# Patient Record
Sex: FEMALE | Race: WHITE | NOT HISPANIC OR LATINO | Employment: PART TIME | URBAN - METROPOLITAN AREA
[De-identification: names, ages, dates, MRNs, and addresses within clinical notes are randomized per-mention and may not be internally consistent; named-entity substitution may affect disease eponyms.]

---

## 2017-01-13 ENCOUNTER — ALLSCRIPTS OFFICE VISIT (OUTPATIENT)
Dept: OTHER | Facility: OTHER | Age: 52
End: 2017-01-13

## 2017-01-13 DIAGNOSIS — Z00.00 ENCOUNTER FOR GENERAL ADULT MEDICAL EXAMINATION WITHOUT ABNORMAL FINDINGS: ICD-10-CM

## 2017-01-13 DIAGNOSIS — G31.09 OTHER FRONTOTEMPORAL DEMENTIA (CODE) (HCC): ICD-10-CM

## 2017-01-13 DIAGNOSIS — G43.109 MIGRAINE WITH AURA AND WITHOUT STATUS MIGRAINOSUS, NOT INTRACTABLE: ICD-10-CM

## 2017-01-13 DIAGNOSIS — E78.5 HYPERLIPIDEMIA: ICD-10-CM

## 2017-01-30 ENCOUNTER — HOSPITAL ENCOUNTER (OUTPATIENT)
Dept: MAMMOGRAPHY | Facility: MEDICAL CENTER | Age: 52
Discharge: HOME | End: 2017-01-30
Payer: COMMERCIAL

## 2017-01-30 DIAGNOSIS — Z12.31 ENCOUNTER FOR SCREENING MAMMOGRAM FOR MALIGNANT NEOPLASM OF BREAST: ICD-10-CM

## 2017-01-30 PROCEDURE — G0202 SCR MAMMO BI INCL CAD: HCPCS

## 2017-02-02 ENCOUNTER — APPOINTMENT (OUTPATIENT)
Dept: LAB | Facility: HOSPITAL | Age: 52
End: 2017-02-02
Payer: COMMERCIAL

## 2017-02-02 ENCOUNTER — TRANSCRIBE ORDERS (OUTPATIENT)
Dept: ADMINISTRATIVE | Facility: HOSPITAL | Age: 52
End: 2017-02-02

## 2017-02-02 DIAGNOSIS — Z00.00 ENCOUNTER FOR GENERAL ADULT MEDICAL EXAMINATION WITHOUT ABNORMAL FINDINGS: ICD-10-CM

## 2017-02-02 DIAGNOSIS — G31.09 OTHER FRONTOTEMPORAL DEMENTIA (CODE) (HCC): ICD-10-CM

## 2017-02-02 DIAGNOSIS — G43.109 MIGRAINE WITH AURA AND WITHOUT STATUS MIGRAINOSUS, NOT INTRACTABLE: ICD-10-CM

## 2017-02-02 DIAGNOSIS — E78.5 HYPERLIPIDEMIA: ICD-10-CM

## 2017-02-02 LAB
ALBUMIN SERPL BCP-MCNC: 3.5 G/DL (ref 3.5–5)
ALP SERPL-CCNC: 74 U/L (ref 46–116)
ALT SERPL W P-5'-P-CCNC: 47 U/L (ref 12–78)
ANION GAP SERPL CALCULATED.3IONS-SCNC: 6 MMOL/L (ref 4–13)
AST SERPL W P-5'-P-CCNC: 40 U/L (ref 5–45)
BACTERIA UR QL AUTO: ABNORMAL /HPF
BASOPHILS # BLD AUTO: 0.03 THOUSANDS/ΜL (ref 0–0.1)
BASOPHILS NFR BLD AUTO: 1 % (ref 0–1)
BILIRUB SERPL-MCNC: 0.4 MG/DL (ref 0.2–1)
BILIRUB UR QL STRIP: NEGATIVE
BUN SERPL-MCNC: 18 MG/DL (ref 5–25)
CALCIUM SERPL-MCNC: 8.8 MG/DL (ref 8.3–10.1)
CHLORIDE SERPL-SCNC: 101 MMOL/L (ref 100–108)
CLARITY UR: CLEAR
CO2 SERPL-SCNC: 34 MMOL/L (ref 21–32)
COLOR UR: YELLOW
CREAT SERPL-MCNC: 0.77 MG/DL (ref 0.6–1.3)
EOSINOPHIL # BLD AUTO: 0.22 THOUSAND/ΜL (ref 0–0.61)
EOSINOPHIL NFR BLD AUTO: 4 % (ref 0–6)
ERYTHROCYTE [DISTWIDTH] IN BLOOD BY AUTOMATED COUNT: 12.5 % (ref 11.6–15.1)
GFR SERPL CREATININE-BSD FRML MDRD: >60 ML/MIN/1.73SQ M
GLUCOSE SERPL-MCNC: 107 MG/DL (ref 65–140)
GLUCOSE UR STRIP-MCNC: NEGATIVE MG/DL
HCT VFR BLD AUTO: 43.4 % (ref 34.8–46.1)
HGB BLD-MCNC: 13.8 G/DL (ref 11.5–15.4)
HGB UR QL STRIP.AUTO: NEGATIVE
KETONES UR STRIP-MCNC: NEGATIVE MG/DL
LEUKOCYTE ESTERASE UR QL STRIP: ABNORMAL
LYMPHOCYTES # BLD AUTO: 2.1 THOUSANDS/ΜL (ref 0.6–4.47)
LYMPHOCYTES NFR BLD AUTO: 35 % (ref 14–44)
MCH RBC QN AUTO: 31.4 PG (ref 26.8–34.3)
MCHC RBC AUTO-ENTMCNC: 31.8 G/DL (ref 31.4–37.4)
MCV RBC AUTO: 99 FL (ref 82–98)
MONOCYTES # BLD AUTO: 0.81 THOUSAND/ΜL (ref 0.17–1.22)
MONOCYTES NFR BLD AUTO: 14 % (ref 4–12)
MUCOUS THREADS UR QL AUTO: ABNORMAL
NEUTROPHILS # BLD AUTO: 2.78 THOUSANDS/ΜL (ref 1.85–7.62)
NEUTS SEG NFR BLD AUTO: 46 % (ref 43–75)
NITRITE UR QL STRIP: NEGATIVE
NON-SQ EPI CELLS URNS QL MICRO: ABNORMAL /HPF
PH UR STRIP.AUTO: 7 [PH] (ref 4.5–8)
PLATELET # BLD AUTO: 300 THOUSANDS/UL (ref 149–390)
PMV BLD AUTO: 10.6 FL (ref 8.9–12.7)
POTASSIUM SERPL-SCNC: 4.1 MMOL/L (ref 3.5–5.3)
PROT SERPL-MCNC: 7.7 G/DL (ref 6.4–8.2)
PROT UR STRIP-MCNC: NEGATIVE MG/DL
RBC # BLD AUTO: 4.39 MILLION/UL (ref 3.81–5.12)
RBC #/AREA URNS AUTO: ABNORMAL /HPF
SODIUM SERPL-SCNC: 141 MMOL/L (ref 136–145)
SP GR UR STRIP.AUTO: 1.02 (ref 1–1.03)
TSH SERPL DL<=0.05 MIU/L-ACNC: 1.57 UIU/ML (ref 0.36–3.74)
UROBILINOGEN UR QL STRIP.AUTO: 0.2 E.U./DL
VIT B12 SERPL-MCNC: 686 PG/ML (ref 100–900)
WBC # BLD AUTO: 5.94 THOUSAND/UL (ref 4.31–10.16)
WBC #/AREA URNS AUTO: ABNORMAL /HPF

## 2017-02-02 PROCEDURE — 82607 VITAMIN B-12: CPT

## 2017-02-02 PROCEDURE — 81001 URINALYSIS AUTO W/SCOPE: CPT

## 2017-02-02 PROCEDURE — 84443 ASSAY THYROID STIM HORMONE: CPT

## 2017-02-02 PROCEDURE — 85025 COMPLETE CBC W/AUTO DIFF WBC: CPT

## 2017-02-02 PROCEDURE — 80053 COMPREHEN METABOLIC PANEL: CPT

## 2017-02-02 PROCEDURE — 36415 COLL VENOUS BLD VENIPUNCTURE: CPT

## 2017-02-08 ENCOUNTER — GENERIC CONVERSION - ENCOUNTER (OUTPATIENT)
Dept: OTHER | Facility: OTHER | Age: 52
End: 2017-02-08

## 2017-03-24 ENCOUNTER — ALLSCRIPTS OFFICE VISIT (OUTPATIENT)
Dept: OTHER | Facility: OTHER | Age: 52
End: 2017-03-24

## 2017-03-24 ENCOUNTER — TRANSCRIBE ORDERS (OUTPATIENT)
Dept: ADMINISTRATIVE | Facility: HOSPITAL | Age: 52
End: 2017-03-24

## 2017-03-24 DIAGNOSIS — R41.3 OTHER AMNESIA: ICD-10-CM

## 2017-03-24 DIAGNOSIS — R41.3 MEMORY LOSS: Primary | ICD-10-CM

## 2017-03-31 ENCOUNTER — ALLSCRIPTS OFFICE VISIT (OUTPATIENT)
Dept: OTHER | Facility: OTHER | Age: 52
End: 2017-03-31

## 2017-04-07 ENCOUNTER — HOSPITAL ENCOUNTER (OUTPATIENT)
Dept: RADIOLOGY | Facility: HOSPITAL | Age: 52
Discharge: HOME | End: 2017-04-07
Payer: COMMERCIAL

## 2017-04-07 DIAGNOSIS — R41.3 OTHER AMNESIA: ICD-10-CM

## 2017-04-07 PROCEDURE — 70551 MRI BRAIN STEM W/O DYE: CPT

## 2017-05-05 ENCOUNTER — ALLSCRIPTS OFFICE VISIT (OUTPATIENT)
Dept: OTHER | Facility: OTHER | Age: 52
End: 2017-05-05

## 2017-06-02 ENCOUNTER — ALLSCRIPTS OFFICE VISIT (OUTPATIENT)
Dept: OTHER | Facility: OTHER | Age: 52
End: 2017-06-02

## 2017-06-05 ENCOUNTER — ALLSCRIPTS OFFICE VISIT (OUTPATIENT)
Dept: OTHER | Facility: OTHER | Age: 52
End: 2017-06-05

## 2017-06-07 ENCOUNTER — APPOINTMENT (OUTPATIENT)
Dept: LAB | Facility: CLINIC | Age: 52
End: 2017-06-07
Payer: COMMERCIAL

## 2017-06-07 ENCOUNTER — TRANSCRIBE ORDERS (OUTPATIENT)
Dept: LAB | Facility: CLINIC | Age: 52
End: 2017-06-07

## 2017-06-07 DIAGNOSIS — Z00.8 HEALTH EXAMINATION IN POPULATION SURVEY: Primary | ICD-10-CM

## 2017-06-07 DIAGNOSIS — Z00.8 HEALTH EXAMINATION IN POPULATION SURVEY: ICD-10-CM

## 2017-06-07 LAB
CHOLEST SERPL-MCNC: 260 MG/DL (ref 50–200)
EST. AVERAGE GLUCOSE BLD GHB EST-MCNC: 114 MG/DL
HBA1C MFR BLD: 5.6 % (ref 4.2–6.3)
HDLC SERPL-MCNC: 112 MG/DL (ref 40–60)
LDLC SERPL CALC-MCNC: 138 MG/DL (ref 0–100)
TRIGL SERPL-MCNC: 50 MG/DL

## 2017-06-07 PROCEDURE — 80061 LIPID PANEL: CPT

## 2017-06-07 PROCEDURE — 36415 COLL VENOUS BLD VENIPUNCTURE: CPT

## 2017-06-07 PROCEDURE — 83036 HEMOGLOBIN GLYCOSYLATED A1C: CPT

## 2017-07-03 ENCOUNTER — TRANSCRIBE ORDERS (OUTPATIENT)
Dept: ADMINISTRATIVE | Facility: HOSPITAL | Age: 52
End: 2017-07-03

## 2017-07-03 ENCOUNTER — APPOINTMENT (OUTPATIENT)
Dept: LAB | Facility: MEDICAL CENTER | Age: 52
End: 2017-07-03
Payer: COMMERCIAL

## 2017-07-03 DIAGNOSIS — Z11.1 SCREENING EXAMINATION FOR PULMONARY TUBERCULOSIS: Primary | ICD-10-CM

## 2017-07-03 DIAGNOSIS — Z11.1 SCREENING EXAMINATION FOR PULMONARY TUBERCULOSIS: ICD-10-CM

## 2017-07-03 PROCEDURE — 86480 TB TEST CELL IMMUN MEASURE: CPT

## 2017-07-03 PROCEDURE — 36415 COLL VENOUS BLD VENIPUNCTURE: CPT

## 2017-07-05 LAB
ANNOTATION COMMENT IMP: NORMAL
GAMMA INTERFERON BACKGROUND BLD IA-ACNC: 0.04 IU/ML
M TB IFN-G BLD-IMP: NEGATIVE
M TB IFN-G CD4+ BCKGRND COR BLD-ACNC: <0.01 IU/ML
M TB IFN-G CD4+ T-CELLS BLD-ACNC: 0.03 IU/ML
MITOGEN IGNF BLD-ACNC: 7.47 IU/ML
QUANTIFERON-TB GOLD IN TUBE: NORMAL
SERVICE CMNT-IMP: NORMAL

## 2017-07-11 ENCOUNTER — ALLSCRIPTS OFFICE VISIT (OUTPATIENT)
Dept: OTHER | Facility: OTHER | Age: 52
End: 2017-07-11

## 2017-07-28 ENCOUNTER — ALLSCRIPTS OFFICE VISIT (OUTPATIENT)
Dept: OTHER | Facility: OTHER | Age: 52
End: 2017-07-28

## 2017-09-01 ENCOUNTER — ALLSCRIPTS OFFICE VISIT (OUTPATIENT)
Dept: OTHER | Facility: OTHER | Age: 52
End: 2017-09-01

## 2017-09-05 ENCOUNTER — TRANSCRIBE ORDERS (OUTPATIENT)
Dept: ADMINISTRATIVE | Facility: HOSPITAL | Age: 52
End: 2017-09-05

## 2017-09-05 ENCOUNTER — APPOINTMENT (OUTPATIENT)
Dept: LAB | Facility: MEDICAL CENTER | Age: 52
End: 2017-09-05
Payer: COMMERCIAL

## 2017-09-05 DIAGNOSIS — Z11.1 SCREENING EXAMINATION FOR PULMONARY TUBERCULOSIS: Primary | ICD-10-CM

## 2017-09-05 DIAGNOSIS — Z11.1 SCREENING EXAMINATION FOR PULMONARY TUBERCULOSIS: ICD-10-CM

## 2017-09-05 PROCEDURE — 36415 COLL VENOUS BLD VENIPUNCTURE: CPT

## 2017-09-05 PROCEDURE — 86480 TB TEST CELL IMMUN MEASURE: CPT

## 2017-09-07 LAB
ANNOTATION COMMENT IMP: NORMAL
GAMMA INTERFERON BACKGROUND BLD IA-ACNC: 0.06 IU/ML
M TB IFN-G BLD-IMP: NEGATIVE
M TB IFN-G CD4+ BCKGRND COR BLD-ACNC: <0.01 IU/ML
M TB IFN-G CD4+ T-CELLS BLD-ACNC: 0.05 IU/ML
MITOGEN IGNF BLD-ACNC: 9.29 IU/ML
QUANTIFERON-TB GOLD IN TUBE: NORMAL
SERVICE CMNT-IMP: NORMAL

## 2017-10-24 ENCOUNTER — ALLSCRIPTS OFFICE VISIT (OUTPATIENT)
Dept: OTHER | Facility: OTHER | Age: 52
End: 2017-10-24

## 2017-11-17 ENCOUNTER — GENERIC CONVERSION - ENCOUNTER (OUTPATIENT)
Dept: OTHER | Facility: OTHER | Age: 52
End: 2017-11-17

## 2017-11-17 DIAGNOSIS — G43.109 MIGRAINE WITH AURA AND WITHOUT STATUS MIGRAINOSUS, NOT INTRACTABLE: ICD-10-CM

## 2017-11-28 ENCOUNTER — GENERIC CONVERSION - ENCOUNTER (OUTPATIENT)
Dept: OTHER | Facility: OTHER | Age: 52
End: 2017-11-28

## 2017-12-06 ENCOUNTER — TRANSCRIBE ORDERS (OUTPATIENT)
Dept: ADMINISTRATIVE | Facility: HOSPITAL | Age: 52
End: 2017-12-06

## 2017-12-06 DIAGNOSIS — G43.109 MIGRAINE WITH AURA AND WITHOUT STATUS MIGRAINOSUS, NOT INTRACTABLE: Primary | ICD-10-CM

## 2017-12-11 ENCOUNTER — GENERIC CONVERSION - ENCOUNTER (OUTPATIENT)
Dept: OTHER | Facility: OTHER | Age: 52
End: 2017-12-11

## 2018-01-11 NOTE — PROGRESS NOTES
Assessment    1  Encounter for preventive health examination (V70 0) (Z00 00)   2  Frontotemporal dementia (331 19) (G31 09,F02 80)   3  Hyperlipidemia (272 4) (E78 5)   4  Depression with anxiety (300 4) (F41 8)    Plan  Classic migraine with aura    · (1) CBC/PLT/DIFF; Status:Active; Requested [id number];   Frontotemporal dementia    · (1) VITAMIN B12; Status:Active; Requested [id number]:[id number]; Health Maintenance    · (1) URINALYSIS (will reflex a microscopy if leukocytes, occult blood, protein or nitrites  are not within normal limits); Status:Active; Requested [id number]:[id number];   Hyperlipidemia    · (1) COMPREHENSIVE METABOLIC PANEL; Status:Active; Requested [id number]:[id number];    · (1) TSH; Status:Active; Requested WQV:[id number];   PMH: Early onset Alzheimer's dementia without behavioral disturbance, PMH: Memory  difficulties    · Memantine HCl - 10 MG Oral Tablet  PMH: Memory difficulties    · Memantine HCl - 5 MG Oral Tablet    Discussion/Summary    Prevention: Akhil Gaxiola is up-to-date on GYN checkups, eye care and dental care  She also had a colonoscopy in 2014 which was normal and she is on a 10 year recall list     She had hemoglobin A1c and lipid panel through her employer odor this year and ingrown A1c was 5 5 and lipid panel was favorable despite elevated total cholesterol 247, as her HDL was 98  No treatment is required  Appropriate lab work was ordered today and she'll be notified of results  She continues active treatment for migraine including Botox injections with good relief of symptoms temporarily  She is in a clinical trial through the office of Dr Frank Gupta, for consideration for treatment for amyloid deposition disease if her recent amyloid PET/CT suggests that she may benefit from treatment  She does have frontal temporal dementia, as confirmed on multiple MRIs, clinical evaluations, and PET scans   She promises to call with a copy of her amyloid PET/CT result, which will be under Dr veras and will continue follow-up with [facility] neurology as well  She spoke to her cousin who is a neurologist in [place], who advised against taking Memantine until all of her metabolic brain testing was finished  Therefore she decided not to take this medicine  I asked her to inform her neurologist of this  She is maintaining a healthy lifestyle and has great support from her long-standing boyfriend, who is a clinical psychologist with Dr Dominic Olsen  Anxiety with depression well-managed with active follow up with her psychiatrist, Dr Valeria Rehman  Next visit was scheduled for one year  Chief Complaint  pt is here for annual physical      History of Present Illness  HPI: Iain Bermeo is here for annual breast exam and I last saw her just over 2 years ago  She is followed actively by neurology as she has frontotemporal dementia  In addition, she is also followed by neurology for her long-standing confusional migraines which may be related, retrospectively with her frontotemporal dementia  She has active workup through clinical trial available to her through her boyfriends psychiatrist, for consideration of treatment with new medication to remove amyloid deposition from the brain  This involves amyloid PET CT and MRI which will receive performed at [facility] as part of the clinical trial  She'll be following up in the near future with the lead physician in this regard  She is feeling well physically, coping well psychologically with all this  Her total children are doing well and she has grandchildren that are doing well and she is still working  Active Problems    1  Classic migraine with aura (346 00) (G43 109)   2  Depression with anxiety (300 4) (F41 8)   3  Frontotemporal dementia (331 19) (G31 09,F02 80)   4  Headache, chronic daily (784 0) (R51)   5  Hyperlipidemia (272 4) (E78 5)   6   Insomnia (780 52) (G47 00)    Past Medical History    · History of basal cell carcinoma (V10 83) (P73 705)    Surgical History    · History of Bladder Surgery   · History of Cervix Cryosurgery   · History of Eye Surgery   · History of Hysteroscopy With Endometrial Ablation   · History of Therapeutic Cystoscopy   · History of Tubal Ligation    Family History  Mother    · Family history of Heart murmur   · Family history of Hyperlipidemia  Father    · Family history of Atrial fibrillation   · Family history of arthritis (V17 7) (Z82 61)  Daughter    · Family history of ADD (attention deficit disorder)  Son    · Family history of ADD (attention deficit disorder)  Sister    · Family history of Psoriasis  Paternal Grandmother    · Family history of Breast Cancer (V16 3)   · Family history of Diabetes Mellitus (V18 0)   · Family history of Hypertension (V17 49)  Maternal Grandfather    · Family history of Heart disease  Paternal Grandfather    · Family history of Skin cancer    Social History    · Being A Social Drinker   · Caffeine use (V49 89) (F15 90)   ·    · Drinks coffee   · Denied: History of Drug use   · Never A Smoker    Current Meds   1  ClonazePAM 0 5 MG Oral Tablet Recorded   2  FLUoxetine HCl - 40 MG Oral Capsule Recorded   3  Memantine HCl - 10 MG Oral Tablet; take 1 tablet twice a day; Therapy: 04XZG1470 to (Lunda Grief)  Requested for: 34NRH8030; Last   Rx:02Nov2016 Ordered   4  Memantine HCl - 5 MG Oral Tablet; TAKE 1 TABLET ONCE DAILY for 7 days, then 1   tablet bid x 7 days, then 1 tab qam and 2 tab qhs x 7 days, then start 10mg script; Therapy: 42CQQ5735 to (Arthor Given)  Requested for: 51KRS3352; Last   Rx:02Nov2016 Ordered    Allergies    1  Femstat 3 CREA   2   Monistat 1 OINT    Vitals   Recorded: 72QQR2902 02:38PM   Heart Rate 982   Systolic 823   Diastolic 90   Height 5 ft 4 5 in   Weight 126 lb 2 oz   BMI Calculated 21 31   BSA Calculated 1 62   O2 Saturation 98     Physical Exam    Constitutional   General appearance: No acute distress, well appearing and well nourished  Head and Face   Head and face: Normal     Eyes   Conjunctiva and lids: No swelling, erythema or discharge  Pupils and irises: Equal, round, reactive to light  Ears, Nose, Mouth, and Throat   External inspection of ears and nose: Normal     Otoscopic examination: Tympanic membranes translucent with normal light reflex  Canals patent without erythema  Hearing: Normal     Nasal mucosa, septum, and turbinates: Normal without edema or erythema  Lips, teeth, and gums: Normal, good dentition  Oropharynx: Normal with no erythema, edema, exudate or lesions  Neck   Neck: Supple, symmetric, trachea midline, no masses  Thyroid: Normal, no thyromegaly  Pulmonary   Respiratory effort: No increased work of breathing or signs of respiratory distress  Auscultation of lungs: Clear to auscultation  Cardiovascular   Auscultation of heart: Normal rate and rhythm, normal S1 and S2, no murmurs  Carotid pulses: 2+ bilaterally  Abdominal aorta: Normal     Pedal pulses: 2+ bilaterally  Peripheral vascular exam: Normal     Examination of extremities for edema and/or varicosities: Normal     Abdomen   Abdomen: Non-tender, no masses  Liver and spleen: No hepatomegaly or splenomegaly  Examination for hernias: No hernia appreciated  Lymphatic   Palpation of lymph nodes in neck: No lymphadenopathy  Palpation of lymph nodes in other areas: No lymphadenopathy  Musculoskeletal   Gait and station: Normal     Digits and nails: Normal without clubbing or cyanosis  Joints, bones, and muscles: Normal     Range of motion: Normal     Stability: Normal     Muscle strength/tone: Normal     Skin   Skin and subcutaneous tissue: Normal without rashes or lesions  Neurologic   Cranial nerves: Cranial nerves II-XII intact  Occasionally loses her train of thought, but language is fluent and quite sophisticated, good insight is noted and good executive functions  Sensation: No sensory loss  Coordination: Normal finger to nose and heel to shin  Psychiatric   Judgment and insight: Normal     Orientation to person, place, and time: Normal     Recent and remote memory: Intact  Mood and affect: Normal        Results/Data  PHQ-9 Adult Depression Screening 79VYD4071 02:41PM User Stefans     Test Name Result Flag Reference   PHQ-9 Adult Depression Score 6     Over the last two weeks, how often have you been bothered by any of the following problems? Little interest or pleasure in doing things: Several days - 1  Feeling down, depressed, or hopeless: Not at all - 0  Trouble falling or staying asleep, or sleeping too much: More than half the days - 2  Feeling tired or having little energy: Several days - 1  Poor appetite or over eating: Not at all - 0  Feeling bad about yourself - or that you are a failure or have let yourself or your family down: Not at all - 0  Trouble concentrating on things, such as reading the newspaper or watching television: Several days - 1  Moving or speaking so slowly that other people could have noticed  Or the opposite -  being so fidgety or restless that you have been moving around a lot more than usual: Several days - 1  Thoughts that you would be better off dead, or of hurting yourself in some way: Not at all - 0   PHQ-9 Adult Depression Screening Negative     PHQ-9 Difficulty Level Not difficult at all     PHQ-9 Severity Mild Depression       (1) LIPID PANEL, FASTING 21Jun2016 07:28AM Reina Bourgeois     Test Name Result Flag Reference   CHOLESTEROL 247 mg/dL H    HDL,DIRECT 98 mg/dL H 40-60   Specimen collection should occur prior to Metamizole administration due to the potential for falsely depressed results     LDL CHOLESTEROL CALCULATED 138 mg/dL H 0-100   Triglyceride:         Normal              <150 mg/dl       Borderline High    150-199 mg/dl       High               200-499 mg/dl       Very High          >499 mg/dl  Cholesterol:         Desirable        <200 mg/dl      Borderline High  200-239 mg/dl      High             >239 mg/dl  HDL Cholesterol:        High    >59 mg/dL      Low     <41 mg/dL  LDL CALCULATED:    This screening LDL is a calculated result  It does not have the accuracy of the Direct Measured LDL in the monitoring of patients with hyperlipidemia and/or statin therapy  Direct Measure LDL (IPG127) must be ordered separately in these patients  TRIGLYCERIDES 56 mg/dL  <=150   Specimen collection should occur prior to N-Acetylcysteine or Metamizole administration due to the potential for falsely depressed results  (1) HEMOGLOBIN A1C 21Jun2016 07:28AM Shahana Alejo     Test Name Result Flag Reference   HEMOGLOBIN A1C 5 5 %  4 2-6 3   EST  AVG  GLUCOSE 111 mg/dl         Future Appointments    Date/Time Provider Specialty Site   03/31/2017 09:45 AM Monica Flood, [address] Neurology [facility] [address]   01/19/2018 11:00 AM SHAVONNE Latham  Internal Medicine [facility]   03/24/2017 09:45 AM Enriqueta Nobles, [address] Neurology [facility] [address]   11/28/2017 05:20 PM SHAVONNE Sterling   Obstetrics/Gynecology [facility]     Signatures   Electronically signed by : SHAVONNE Fernandes ; Jan 13 2017  3:09PM EST                       (Author)

## 2018-01-12 VITALS — HEART RATE: 87 BPM | TEMPERATURE: 98.3 F | SYSTOLIC BLOOD PRESSURE: 140 MMHG | DIASTOLIC BLOOD PRESSURE: 61 MMHG

## 2018-01-13 VITALS
HEIGHT: 65 IN | HEART RATE: 92 BPM | WEIGHT: 129 LBS | RESPIRATION RATE: 14 BRPM | BODY MASS INDEX: 21.49 KG/M2 | SYSTOLIC BLOOD PRESSURE: 118 MMHG | DIASTOLIC BLOOD PRESSURE: 72 MMHG

## 2018-01-13 VITALS
BODY MASS INDEX: 21.16 KG/M2 | HEIGHT: 65 IN | SYSTOLIC BLOOD PRESSURE: 122 MMHG | HEART RATE: 84 BPM | RESPIRATION RATE: 16 BRPM | DIASTOLIC BLOOD PRESSURE: 72 MMHG | WEIGHT: 127 LBS

## 2018-01-13 VITALS
HEART RATE: 93 BPM | DIASTOLIC BLOOD PRESSURE: 70 MMHG | SYSTOLIC BLOOD PRESSURE: 130 MMHG | RESPIRATION RATE: 16 BRPM | OXYGEN SATURATION: 98 % | BODY MASS INDEX: 21.51 KG/M2 | WEIGHT: 129.13 LBS | HEIGHT: 65 IN

## 2018-01-13 VITALS
OXYGEN SATURATION: 98 % | DIASTOLIC BLOOD PRESSURE: 90 MMHG | HEIGHT: 65 IN | HEART RATE: 102 BPM | WEIGHT: 126.13 LBS | BODY MASS INDEX: 21.01 KG/M2 | SYSTOLIC BLOOD PRESSURE: 144 MMHG

## 2018-01-13 VITALS — RESPIRATION RATE: 14 BRPM | SYSTOLIC BLOOD PRESSURE: 142 MMHG | DIASTOLIC BLOOD PRESSURE: 90 MMHG | HEART RATE: 83 BPM

## 2018-01-13 NOTE — PROCEDURES
Chief Complaint   Patient present for Botox Injection for chronic migraines      Current Meds    1  Amphetamine-Dextroamphetamine 5 MG Oral Tablet; TAKE 1 TO 2 TABLETS daily for     attention difficulties; Therapy: 01Sep2017 to (Last Rx:01Sep2017) Ordered   2  BuPROPion HCl ER (XL) 300 MG Oral Tablet Extended Release 24 Hour; take 1 tablet by     mouth every morning; Therapy: 19QBP2855 to (Last Rx:01Sep2017)  Requested for: 01Sep2017 Ordered   3  ClonazePAM 0 5 MG Oral Tablet; Take 1/2 to 1 tab daily as needed; Therapy: 01Sep2017 to (Last Rx:01Sep2017) Ordered   4  FLUoxetine HCl - 40 MG Oral Capsule; 1 CAPSULE DAILY  Requested for: 01Sep2017;     Last Rx:01Sep2017 Ordered    Allergies   1  Femstat 3 CREA   2  Monistat 1 OINT    Vitals    Recorded: 58PFQ0091 07:40AM   Temperature 73 3 F   Systolic 140   Diastolic 84     Procedure   Procedure: Headache botox injection  Indication: Chronic migraine headache  Risks, benefits and alternatives were discussed with the patient  We discussed possible complications, including infection,-- bleeding-- and-- allergic reaction  Written consent was obtained prior to the procedure  The site was prepped with an alcohol swab  Anesthesia: No anesthesia was needed  Procedure Note: The patient was placed in the upright position  200 --Mml of Botox-A and  EMG guidance was not used in identifying the injection site  5 unit(s) was injected into the procerus muscle  5 unit(s) was injected into the  right  muscle  5 unit(s) was injected into the  left  muscle  10 unit(s) was injected into the  right frontalis muscle  10 unit(s) was injected into the  left frontalis muscle  20 unit(s) was injected into the  right temporalis muscle  20 unit(s) was injected into the  left temporalis muscle  15 unit(s) was injected into the  right occipitalis muscle      15 unit(s) was injected into the  left occipitalis muscle  10 unit(s) was injected into the  right cervical paraspinal muscle  10 unit(s) was injected into the  left cervical paraspinal muscle  15 unit(s) was injected into the  right trapezius muscle  15 unit(s) was injected into the  left trapezius muscle  A total of 155units were used  A total of 45units were discarded  Botox Lot:  Lot number: [id number] -- Expiration date: apr 2020  Patient Status:  the patient tolerated the procedure well  Complications:  there were no complications  Follow-up in the office in 3 month(s)  100 units/ 2cc saline x2      Assessment   1  Chronic migraine without aura (346 70) (G43 709)   2  Classic migraine with aura (346 00) (G43 109)    Plan    Chronic migraine without aura    · Botox 100 UNIT Injection Solution Reconstituted   Rx By: Jose G NUGENT;For: Chronic migraine without aura; Dose of 200 UNIT; Injection; BHAVYA = N; Administered by: Rayne Mackey MA: 10/24/2017 8:06:00 AM      Follow-up visit in 3 months Evaluation and Treatment  Follow-up  Status: Hold For - Scheduling  Requested for: 26HJX9762     Ordered; For: Chronic migraine without aura; Ordered By: Clarence Wayne  Performed:   Due: 12PUB0106      Chemodenervation of muscles innervated by facial, trigeminal, c-spine, accessory nerves - POC; Status:Need Information - Financial Authorization; Requested [id number];      Perform: In Office; [phone]; Ordered; For:Chronic migraine without aura; Ordered By:Betsy Rios;      Chronic migraine without aura (346 70) (G43 709)               Discussion/Summary      The patient was encouraged to follow up with her physician Dr Jose Dexter as directed by Shira MURPHY        Future Appointments      Date/Time Provider Specialty Site   02/09/2018 01:30 PM Bee Montelongo DO Psychiatry [facility]   12/20/2018 05:20 PM Brady Godinez OB   02/02/2018 12:45 PM Daniela Smith, [facility] Neurology [place] [id number]     Signatures    Electronically signed by :  Elvin De La Garza, [facility]; Oct 24 2017 10:00AM EST                       (Author)     Electronically signed by : Uche Velazquez MD; Jan 25 2018  5:44PM EST                       (Co-author)

## 2018-01-14 VITALS — SYSTOLIC BLOOD PRESSURE: 149 MMHG | DIASTOLIC BLOOD PRESSURE: 67 MMHG | TEMPERATURE: 98.6 F | HEART RATE: 85 BPM

## 2018-01-14 VITALS — DIASTOLIC BLOOD PRESSURE: 84 MMHG | TEMPERATURE: 98.3 F | SYSTOLIC BLOOD PRESSURE: 124 MMHG

## 2018-01-15 NOTE — PSYCH
Behavioral Health Outpatient Intake    Referred By: PT IS EMPLOYEE  Intake Questions: there are no developmental disabilities  the patient does not have a hearing impairment  the patient does not have an ICM or CTT  patient is not taking injectable psychiatric medications  Employment: The patient is employed full time at [employer]  Emergency Contact Information:   Emergency Contact: Ruthie Nava   Relationship to Patient: MOTHER   Phone Number: [phone]   Previous Psychiatric Treatment: She has previously been seen by a psychiatrist  Katlyn Duron  She has not been previously seen by a therapist     History: no  service  She has not had combat service  Insurance Subscriber: Arnold Biggs   Employer: [employer]   Employer Address: [address], [facility]   Primary Insurance: [employer]   ID number: [id number]   Secondary Insurance: NO         Presenting Problem (in patient's words): REC MDD, ADHD  Substance Abuse: NONE  Accepted as Patient   DR Flower Ramos 4/21/17 @ 10AM     Primary Care Physician: DR Mac Blandon       Signatures   Electronically signed by : Kenny Garrett, ; Feb 8 2017 12:26PM EST                       (Author)

## 2018-01-16 NOTE — PSYCH
Assessment    1  Recurrent major depressive disorder, in remission (296 35) (F33 40)   2  TIM (generalized anxiety disorder) (300 02) (F41 1)   3  Attention and concentration deficit (799 51) (R41 840)    Dm Gonzalez is a pleasant 68-year-old female with history of major depressive episodes which is currently in remission and also has some low-lying depression presently  Also has generalized anxiety disorder  Attention and concentration deficit is also apparently present although not sure if this is related to organic issues, anxiety, or underlying ADD/ADHD  She appears to gotten B's in middle school and high school and a's generally in business school  I do not sense that this is been not long-standing attention or concentration issue and less it's truly related to anxiety  She has been on stimulants for at least a year and has done well with this and not abused  I think continuing at this time would be reasonable  We talked about "putrid and she is interested in trying this medication  We talked about anxiety and insomnia seizures hypertension cardiovascular risks and other side effects  Discussed bipolar and manic induction (do not believe she is bipolar)  We also talked about serotonin syndrome and issues of interactions with medications  She said that she is going to try to reduce her stimulant use and start the Wellbutrin  She only uses Klonopin 2 times a week  I also talked about my concerns related to how Klonopin and stimulants interact with each other  She appears to be a genuine and honest historian and does not appear to have any substance issues or misuse of medications  She is a good support system in a good job  She is generally healthy aside from her neurological concerns and she does have a paternal aunt who had early onset dementia  Plan    1  ClonazePAM 0 5 MG Oral Tablet; 1/2 in AM as needed and 1 nigthly as needed for   anxiety   2   FLUoxetine HCl - 40 MG Oral Capsule; 1 CAPSULE DAILY    3  BuPROPion HCl ER (XL) 150 MG Oral Tablet Extended Release 24 Hour   (Wellbutrin XL); TAKE 1 TABLET DAILY    1) Meds:   - Prozac 40mg daily  PARQ discussed (consider increase but orgasm affected)   - START Wellbutrin XL 150mg daily  PARQ discussed   - Klonopin 0 25mg in day PRN and 0 5mg HS PRN  (Pt uses ~2 days a week  #60 for 3 mo supply)   - Adderal XR 20mg Daily (Pt still has scripts for this from prior psychiatrist  Uses ~5x/wk  Will try to cut back as reasonable  Consider Vyvanse as it is a smoother on/off medication)    2) Labs: PRN   -2/2017: CBC, CMP WNL  TSH 1 57, TG 56, HDL 98,     3) Therapy:   - not in therapy, revisit PRN    4) Medical:  Concentration issues, MRI abnormalities, family h/o early onset dementia ([place])   - pt to f/u with neurologist   - pt to f/u with other providers PRN    5) Other: support prn   - works at [address] [place] [address]  Reception   - good support from family, daughter lives with her   - 3 kids,     10) Follow up:   - 4 weeks, but patient to call if issues or concerns      Chief Complaint  My insurance changed      History of Present Illness  Alex Jordan was seen Dr Jonny Webb for approximately 7 years but due to insurance changes she had to convert  She said the headache good relationship and with that had left that she didn't have to  She's been doing with depression and anxiety "all my life"  Things that make it worse include stressors at work and stressors in her home life  One situational things, she has a hard time coping with change  Patient states that she has difficulties such as setting down poor concentration and other issues when she is feeling these ways  She has not had any significant issues recently and says her depression and anxiety are both relatively controlled at 2-3/10  However she does have to take Adderall in order to make herself feel calm at work because she has worries that, but she is not focused well   We talked about how she uses stimulants and benzodiazepines and while she uses them judiciously and appropriately we talked about alternatives to care  She denies any suicidal or homicidal ideation and states that her sleeps about 8 hours a night and her energy is good during the day and her appetite is been fine  Things of been relatively stable in her life recently aside from the fact that the heydi that she was dating moved to [place] 2 months ago and this is been depressing to her  No other significant stressors or changes in her life other than her ongoing stress related to work but this is relatively well managed  Review of Systems  Psychiatric review of systems:  She denies any symptoms suggestive of bipolar disorder now or in the past although she will states that she has periods where she has a burst of energy and decreased need for sleep but she says that it actually because she has anxiety and has a lot of things to get done  She denies grandiosity elevated mood irritability indiscretions distractibility racing thoughts talkativeness decreased need for sleep and high energy at other times now or in the past    She denies any symptoms that would suggest current major depressive episode although she has had some in the past and currently is in remission  It's been so bad that she has attempted suicide in almost been in a catatonic like state  She does admit to being a worrier and does have worry racing thoughts related to worry fatigue difficulty with sleep and difficulty with concentration related to worry and that his most days of the week that she'll have worry although is generally well-controlled lately  She has a history of social anxiety to some degree but it never caused her to change her behavior significantly and she is very much internalizing her worries  She denies ever having panic attacks  She used to be a little compulsive in her behaviors but generally speaking this is much reduced now that she is on medications   It does not appear this ever met criteria as far as I can ascertain  She denies ever having PTSD symptoms or a traumatic event that would lead to this  Denies eating disorders now or in the past such as restriction binging or purging  She denies a history of substance issues or present issues  She denies ever having psychotic symptoms  anxiety and depression  Constitutional: No fever, no chills, no recent weight gain or recent weight loss  ENT: no ear ache, no loss of hearing, no nosebleeds or nasal discharge, no sore throat or hoarseness  Cardiovascular: no complaints of slow or fast heart rate, no chest pain, no palpitations, no leg claudication or lower extremity edema  Respiratory: no complaints of shortness of breath, no wheezing, no dyspnea on exertion, no orthopnea or PND  Gastrointestinal: no complaints of abdominal pain, no constipation, no nausea or diarrhea, no vomiting, no bloody stools  Genitourinary: Delayed orgasm, but no complaints of dysuria, no incontinence, no pelvic pain, no dysmenorrhea, no vaginal discharge or abnormal vaginal bleeding  Musculoskeletal: no complaints of arthralgia, no myalgia, no joint swelling or stiffness, no limb pain or swelling  Integumentary: no complaints of skin rash or lesion, no itching or dry skin, no skin wounds  Neurological: confusion  Other Symptoms: Denies endocrine issues  ROS reviewed  Past Psychiatric History    Past Psychiatric History: Patient did used to see [facility] in early 2016 when she had a change providers due to insurance  She does not have a therapist     She been hospitalized once in 2000  She says that it was related to having a breakup with her boyfriend at the time and then finding out that she lost her job because she was  at his place of employment   Losing her boyfriend because she did not want to get  and he did and also having job issues led her to crisis and overdose although it was not a dramatic overdose it was a time when she said that she was trying to take pills to go to sleep and that she did have some suicidal thoughts associated with it  She denies any self-harm homicidal ideation in the past or present  She's never been violent  She says that she's not had any suicidal ideation since that time  Past medications include Prozac which helps but does have some decreased orgasm  She took Topamax for migraines but this seemed to lead to confusion and she has not been on it for years  Zoloft she does not recall details of Nordette she recall details of Celexa or Lexapro  Effexor seemed to cause weight gain and she did not like it for that  Wellbutrin was not particularly helpful as she recalls however it was long ago and she does not recall details but she does not recall issues with that either  Ambien    PAST Dr Radha Guevara records suggest she was on Prozac 50mg daily in the past         Substance Abuse Hx    Substance Abuse History: denies  Active Problems    1  Chronic migraine without aura (346 70) (G43 709)   2  Classic migraine with aura (346 00) (G43 109)   3  Depression with anxiety (300 4) (F41 8)   4  Hyperlipidemia (272 4) (E78 5)   5  Insomnia (780 52) (G47 00)   6  Memory difficulties (780 93) (R41 3)    Past Medical History    1  History of basal cell carcinoma (V10 83) (Z85 828)   2  Denied: History of concussion   3  Denied: History of Seizures    The active problems and past medical history were reviewed and updated today  Surgical History    The surgical history was reviewed and updated today  Allergies    1  Femstat 3 CREA   2  Monistat 1 OINT    Current Meds   1  Adderall XR 20 MG Oral Capsule Extended Release 24 Hour; Take 1 capsule daily as   needed; Therapy: [id number] to (Evaluate:04Jun2017) Recorded   2  ClonazePAM 0 5 MG Oral Tablet Recorded   3  FLUoxetine HCl - 40 MG Oral Capsule Recorded    The medication list was reviewed and updated today  Family Psych History  Mother    1  Family history of Heart murmur   2  Family history of Hyperlipidemia  Father    3  Family history of Atrial fibrillation   4  Family history of arthritis (V17 7) (Z82 61)  Daughter    5  Family history of ADD (attention deficit disorder)  Son    10  Family history of ADD (attention deficit disorder)  Sister    9  Family history of Psoriasis  Paternal Grandmother    6  Family history of Breast Cancer (V16 3)   9  Family history of Diabetes Mellitus (V18 0)   10  Family history of Hypertension (V17 49)  Maternal Grandfather    11  Family history of Heart disease  Paternal Grandfather    15  Family history of Skin cancer  Family History    15  Denied: Family history of bipolar disorder   14  Denied: Family history of substance abuse   15  Denied: Family history of suicide attempt    The family history was reviewed and updated today  Social History    · Being A Social Drinker   · Caffeine use (V49 89) (F15 90)   ·    · Drinks coffee   · Denied: History of Drug use   · Never A Smoker  The social history was reviewed and updated today  Aliya Alvarenga was raised in [facility] to a "good" childhood  Her parents were together and still are  She denies ever having physical or sexual abuse or other forms neglect now or in the past as a child  She has one brother and one sister  She developed normally  She finished high school and got associates degree in business  Her daughter currently lives with her  She has 3 children 2 boys and one girl  She is good relationships with her family  She's been  and  twice  Currently not in a serious relationship at intake    Her support system includes her children her parents and her cousins  She has friends that are close but she doesn't typically opened up to them about mental health  She is [place] and attends [employer] sometimes  No  history no legal issues and no weapons at home      She does not use tobacco drinks about 1 cup of coffee a day and is a social drinker and when she does drink it's very rare and not much at that time  She denies ever using substances and has never been to rehabilitation  History Of Phys/Sex Abuse Or Perpetration    History Of Phys/Sex Abuse or Perpetration: Denies  Vitals  Signs   Recorded: [id number] 02:32PM   Heart Rate: 100  Respiration: 14  Systolic: 818  Diastolic: 82  Weight: 953 lb   BMI Calculated: 21 8  BSA Calculated: 1 63    Physical Exam    Appearance: was calm and cooperative and good eye contact  Observed mood: mood appropriate  Observed mood: affect was constricted  Speech: a normal rate and fluent  Thought processes: coherent/organized  Hallucinations: no hallucinations present  Thought Content: no delusions  Abnormal Thoughts: The patient has no suicidal thoughts and no homicidal thoughts  Orientation: The patient is oriented to person, place and time  Recent and Remote Memory: short term memory intact and long term memory intact  Attention Span And Concentration: concentration intact  Insight: Insight intact  Judgment: Her judgment was intact  Muscle Strength And Tone  Muscle strength and tone were normal  Normal gait and station  Language:   Grossly intact  Fund of knowledge: Patient displays adequate knowledge of current events, adequate fund of knowledge regarding past history and adequate fund of knowledge regarding vocabulary  Risks, Benefits And Possible Side Effects Of Medications: Risks, benefits, and possible side effects of medications explained to patient and patient verbalizes understanding  The patient has been filling controlled prescriptions on time as prescribed to [facility]        End of Encounter Meds    1  ClonazePAM 0 5 MG Oral Tablet; 1/2 in AM as needed and 1 nigthly as needed for anxiety; Last QS:[id number] Ordered   2   FLUoxetine HCl - 40 MG Oral Capsule; 1 CAPSULE DAILY  Requested for: [id number];   Last ED:[id number] Ordered    3  Adderall XR 20 MG Oral Capsule Extended Release 24 Hour   (Amphetamine-Dextroamphet ER); Take 1 capsule daily as needed; Therapy: [id number] to (Evaluate:[id number]) Recorded   4  BuPROPion HCl ER (XL) 150 MG Oral Tablet Extended Release 24 Hour (Wellbutrin XL);   TAKE 1 TABLET DAILY; Therapy: [id number] to (Evaluate:02Get4524)  Requested for: 60BPJ2524; Last   [id number] Ordered    Future Appointments    Date/Time Provider Specialty Site   [facility] 09[facility]   2017 11:15 [facility]   2017 05:00 PM Lylia Runner, DO Psychiatry [facility]   2018 11:00 AM Kathrene Lennox, M D  Internal Medicine [facility]   2017 05:20 PM SHAVONNE Hernandez   Obstetrics/Gynecology [facility]     Signatures   Electronically signed by : Lylia Runner, DO; May  5 2017  2:43PM EST                       (Author)    Electronically signed by : Lylia Runner, DO; May  5 2017  2:47PM EST                       (Author)

## 2018-01-18 NOTE — PSYCH
Psych Med Mgmt    Appearance: was calm and cooperative and good eye contact  Observed mood: mood appropriate  Observed mood: affect appropriate  Speech: a normal rate and fluent  Thought processes: coherent/organized  Hallucinations: no hallucinations present  Thought Content: no delusions  Abnormal Thoughts: The patient has no suicidal thoughts and no homicidal thoughts  Orientation: The patient is oriented to person, place and time  Recent and Remote Memory: short term memory intact and long term memory intact  Attention Span And Concentration: concentration intact  Insight: Insight intact  Judgment: Her judgment was intact  Muscle Strength And Tone  Muscle strength and tone were normal  Normal gait and station  Language:  Intact and appropriate  Fund of knowledge: Patient displays adequate knowledge of current events, adequate fund of knowledge regarding past history and adequate fund of knowledge regarding vocabulary  Risks, Benefits And Possible Side Effects Of Medications: Risks, benefits, and possible side effects of medications explained to patient and patient verbalizes understanding  The patient has been filling controlled prescriptions on time as [facility]     She reports normal appetite, normal energy level, no weight change and decrease in number of sleep hours   Cameron Null indicates that she's been doing well since our last visit  Depression and anxiety are both a 2/10  She denies any suicidal or homicidal ideation  Energy overall is good but slightly low  Appetite is good  She sleeps poorly about 3 days out of 7 but overall gets good sleep  Stop taking her Adderall since her last visit and has not taken Klonopin either  She says that she was rarely taking the Klonopin before but want to see how she would do on his current medications without these others   To prefer that we keep Adderall and Klonopin in the books at this present time as we may revisit them and I originally felt that they were okay for her to be on at the doses that she was taking  That being said it sounds appropriate for her not to take his medications at this time and work towards increasing Wellbutrin  She had no side effects or issues with the medications as currently prescribed and feels like her concentration improved some and also overall her well-being improved  She was offered Namenda by her neurologist but she said that she declined as she would prefer to go away from that and doesn't have a strong indication to take that at this present time  We talked about sleep and she said that she does not want a sleeping at this present time  Reviewed medications and no hospitalizations  No significant family or social history changes  Vitals  Signs   Recorded: 46PXB6644 05:26PM   Heart Rate: 81  Systolic: 027  Diastolic: 87    Assessment    1  TIM (generalized anxiety disorder) (300 02) (F41 1)   2  Recurrent major depressive disorder, in remission (296 35) (F33 40)   3  Attention and concentration deficit (799 51) (R41 840)        Iain Bermeo is a pleasant female who is presenting with symptoms that have shown improvement  Depression has shown improvement, anxiety has shown improvement, and attention and concentration have shown improvement  Attention and concentration deficit is also apparently present although not sure if this is related to organic issues, anxiety, or underlying ADD/ADHD  She is a good support system in a good job  She is generally healthy aside from her neurological concerns and she does have a paternal aunt who had early onset dementia  Past medications include Prozac which helps but does have some decreased orgasm  She took Topamax for migraines but this seemed to lead to confusion and she has not been on it for years  She does not recall details of Zoloft, Celexa or Lexapro   Effexor seemed to cause weight gain and she did not like it for that  Wellbutrin in past  Ambien caused oversedation, melatonin did not help  Plan    1  FLUoxetine HCl - 40 MG Oral Capsule; 1 CAPSULE DAILY    2  BuPROPion HCl ER (XL) 300 MG Oral Tablet Extended Release 24 Hour; take 1   tablet by mouth every morning    1) Meds:   - Prozac 40mg daily  PARQ discussed (consider increase but orgasm affected  Was on 50mg in past per charts)   - Increase Wellbutrin XL from 150mg daily to 300mg daily  PARQ revisited (insomnia, anxiety, seizures, drinking risks, cardiovascular effects)   - Klonopin 0 25mg in day PRN and 0 5mg HS PRN  (Pt not using recently)   - Adderal XR 20mg Daily (Pt not using recently)    2) Labs: PRN   -2/2017: CBC, CMP WNL  TSH 1 57, TG 56, HDL 98,     3) Therapy:   - not in therapy, revisit PRN    4) Medical:  Concentration issues, MRI abnormalities, family h/o early onset dementia (Paunt), migraines (gets Botox)   - pt to f/u with neurologist   - pt to f/u with other providers PRN    5) Other: support prn   - works at [address] [place] [address]  Reception   - good support from family, daughter lives with her   - 3 kids,     10) Follow up:   - 3 months, but patient to call if issues or concerns      Review of Systems    Constitutional: No fever, no chills, feels well, no tiredness, no recent weight gain or loss  Cardiovascular: no complaints of slow or fast heart rate, no chest pain, no palpitations  Respiratory: no complaints of shortness of breath, no wheezing, no dyspnea on exertion  Gastrointestinal: no complaints of abdominal pain, no constipation, no nausea, no diarrhea, no vomiting  Musculoskeletal: no complaints of arthralgia, no myalgias, no limb pain, no joint stiffness  Neurological: confusion, but no complaints of headache, no confusion, no numbness, no dizziness        Past Psychiatric History    Past Psychiatric History: ALEX    [facility] when she had a change providers due to insurance  She does not have a therapist     She been hospitalized once in 2000  She says that it was related to having a breakup with her boyfriend at the time and then finding out that she lost her job because she was  at his place of employment  Losing her boyfriend because she did not want to get  and he did and also having job issues led her to crisis and overdose although it was not a dramatic overdose it was a time when she said that she was trying to take pills to go to sleep and that she did have some suicidal thoughts associated with it  She denies any self-harm homicidal ideation in the past or present  She's never been violent  She says that she's not had any suicidal ideation since that time  Active Problems    1  Attention and concentration deficit (799 51) (R41 840)   2  Chronic migraine without aura (346 70) (G43 709)   3  Classic migraine with aura (346 00) (G43 109)   4  Depression with anxiety (300 4) (F41 8)   5  TIM (generalized anxiety disorder) (300 02) (F41 1)   6  Hyperlipidemia (272 4) (E78 5)   7  Insomnia (780 52) (G47 00)   8  Memory difficulties (780 93) (R41 3)   9  Recurrent major depressive disorder, in remission (296 35) (F33 40)    Past Medical History    1  History of basal cell carcinoma (V10 83) (Z85 828)   2  Denied: History of concussion   3  Denied: History of Seizures    The active problems and past medical history were reviewed and updated today  Surgical History    The surgical history was reviewed and updated today  Allergies    1  Femstat 3 CREA   2  Monistat 1 OINT    Current Meds   1  Adderall XR 20 MG Oral Capsule Extended Release 24 Hour; Take 1 capsule daily as   needed; Therapy: 70HNT3611 to (Evaluate:04Jun2017) Recorded   2  BuPROPion HCl ER (XL) 150 MG Oral Tablet Extended Release 24 Hour; TAKE 1   TABLET DAILY;    Therapy: 39NOO5399 to (Evaluate:82Vzv8392)  Requested for: 91CZM3481; Last   WZ:[id number] Ordered   3  ClonazePAM 0 5 MG Oral Tablet; 1/2 in AM as needed and 1 nigthly as needed for anxiety; Last JY:[id number] Ordered   4  FLUoxetine HCl - 40 MG Oral Capsule; 1 CAPSULE DAILY  Requested for: 01SEM8409;   Last KD:02BAA2603 Ordered    The medication list was reviewed and updated today  Family Psych History  Mother    1  Family history of Heart murmur   2  Family history of Hyperlipidemia  Father    3  Family history of Atrial fibrillation   4  Family history of arthritis (V17 7) (Z82 61)  Daughter    5  Family history of ADD (attention deficit disorder)  Son    10  Family history of ADD (attention deficit disorder)  Sister    9  Family history of Psoriasis  Paternal Grandmother    6  Family history of Breast Cancer (V16 3)   9  Family history of Diabetes Mellitus (V18 0)   10  Family history of Hypertension (V17 49)  Maternal Grandfather    11  Family history of Heart disease  Paternal Grandfather    15  Family history of Skin cancer  Family History    15  Denied: Family history of bipolar disorder   14  Denied: Family history of substance abuse   15  Denied: Family history of suicide attempt    The family history was reviewed and updated today  Social History    · Being A Social Drinker   · Caffeine use (V49 89) (F15 90)   ·    · Drinks coffee   · Denied: History of Drug use   · Never A Smoker  The social history was reviewed and updated today  Bea Cazares was raised in [facility] to a "good" childhood  Her parents were together and still are  She denies ever having physical or sexual abuse or other forms neglect now or in the past as a child  She has one brother and one sister  She developed normally  She finished high school and got associates degree in business  Her daughter currently lives with her  She has 3 children 2 boys and one girl  She is good relationships with her family  She's been  and  twice   Currently not in a serious relationship at intake    Her support system includes her children her parents and her cousins  She has friends that are close but she doesn't typically opened up to them about mental health  She is [place] and attends Spiritism sometimes  No  history no legal issues and no weapons at home  She does not use tobacco drinks about 1 cup of coffee a day and is a social drinker and when she does drink it's very rare and not much at that time  She denies ever using substances and has never been to rehabilitation  End of Encounter Meds    1  ClonazePAM 0 5 MG Oral Tablet; 1/2 in AM as needed and 1 nigthly as needed for anxiety; Last OY:07OXW2918 Ordered   2  FLUoxetine HCl - 40 MG Oral Capsule; 1 CAPSULE DAILY  Requested for: 97BZC2513;   Last Rx:05Jun2017; Status: ACTIVE - Transmit to Pharmacy - Awaiting Verification   Ordered    3  Adderall XR 20 MG Oral Capsule Extended Release 24 Hour   (Amphetamine-Dextroamphet ER); Take 1 capsule daily as needed; Therapy: 17PDO7167 to (Evaluate:04Jun2017) Recorded   4  BuPROPion HCl ER (XL) 300 MG Oral Tablet Extended Release 24 Hour; take 1 tablet by   mouth every morning; Therapy: 36NXE2254 to (Last Rx:05Jun2017)  Requested for: 37NQC5939; Status: ACTIVE   [facility] Ordered    Future Appointments    Date/Time Provider Specialty Site   [facility] [address]   01/19/2018 11:00 AM SHAVONNE Oneill  Internal Medicine [facility]   11/28/2017 05:20 PM SHAVONNE Oliva   Obstetrics/Gynecology [facility] OB     Signatures   Electronically signed by : Jay Frank DO; Jun 5 2017  5:32PM EST                       (Author)

## 2018-01-19 ENCOUNTER — ALLSCRIPTS OFFICE VISIT (OUTPATIENT)
Dept: OTHER | Facility: OTHER | Age: 53
End: 2018-01-19

## 2018-01-19 ENCOUNTER — GENERIC CONVERSION - ENCOUNTER (OUTPATIENT)
Dept: OTHER | Facility: OTHER | Age: 53
End: 2018-01-19

## 2018-01-19 DIAGNOSIS — G43.709 CHRONIC MIGRAINE WITHOUT AURA WITHOUT STATUS MIGRAINOSUS, NOT INTRACTABLE: ICD-10-CM

## 2018-01-19 DIAGNOSIS — E78.5 HYPERLIPIDEMIA: ICD-10-CM

## 2018-01-19 DIAGNOSIS — R41.3 OTHER AMNESIA: ICD-10-CM

## 2018-01-22 ENCOUNTER — TRANSCRIBE ORDERS (OUTPATIENT)
Dept: ADMINISTRATIVE | Facility: HOSPITAL | Age: 53
End: 2018-01-22

## 2018-01-22 ENCOUNTER — APPOINTMENT (OUTPATIENT)
Dept: LAB | Facility: MEDICAL CENTER | Age: 53
End: 2018-01-22
Payer: COMMERCIAL

## 2018-01-22 VITALS
OXYGEN SATURATION: 98 % | SYSTOLIC BLOOD PRESSURE: 120 MMHG | DIASTOLIC BLOOD PRESSURE: 60 MMHG | BODY MASS INDEX: 22.33 KG/M2 | HEART RATE: 93 BPM | HEIGHT: 65 IN | WEIGHT: 134 LBS

## 2018-01-22 VITALS
HEART RATE: 100 BPM | WEIGHT: 129 LBS | SYSTOLIC BLOOD PRESSURE: 136 MMHG | BODY MASS INDEX: 22.14 KG/M2 | DIASTOLIC BLOOD PRESSURE: 82 MMHG | RESPIRATION RATE: 14 BRPM

## 2018-01-22 VITALS — DIASTOLIC BLOOD PRESSURE: 87 MMHG | HEART RATE: 81 BPM | SYSTOLIC BLOOD PRESSURE: 133 MMHG

## 2018-01-22 VITALS
DIASTOLIC BLOOD PRESSURE: 80 MMHG | HEIGHT: 65 IN | SYSTOLIC BLOOD PRESSURE: 110 MMHG | WEIGHT: 134.19 LBS | BODY MASS INDEX: 22.36 KG/M2

## 2018-01-22 DIAGNOSIS — R41.3 OTHER AMNESIA: ICD-10-CM

## 2018-01-22 DIAGNOSIS — E78.5 HYPERLIPIDEMIA: ICD-10-CM

## 2018-01-22 DIAGNOSIS — G43.709 CHRONIC MIGRAINE WITHOUT AURA WITHOUT STATUS MIGRAINOSUS, NOT INTRACTABLE: ICD-10-CM

## 2018-01-22 LAB
ALBUMIN SERPL BCP-MCNC: 3.5 G/DL (ref 3.5–5)
ALP SERPL-CCNC: 63 U/L (ref 46–116)
ALT SERPL W P-5'-P-CCNC: 43 U/L (ref 12–78)
ANION GAP SERPL CALCULATED.3IONS-SCNC: 6 MMOL/L (ref 4–13)
AST SERPL W P-5'-P-CCNC: 33 U/L (ref 5–45)
BASOPHILS # BLD AUTO: 0.01 THOUSANDS/ΜL (ref 0–0.1)
BASOPHILS NFR BLD AUTO: 0 % (ref 0–1)
BILIRUB SERPL-MCNC: 0.34 MG/DL (ref 0.2–1)
BILIRUB UR QL STRIP: NEGATIVE
BUN SERPL-MCNC: 14 MG/DL (ref 5–25)
CALCIUM SERPL-MCNC: 8.7 MG/DL (ref 8.3–10.1)
CHLORIDE SERPL-SCNC: 102 MMOL/L (ref 100–108)
CLARITY UR: CLEAR
CO2 SERPL-SCNC: 30 MMOL/L (ref 21–32)
COLOR UR: YELLOW
CREAT SERPL-MCNC: 0.82 MG/DL (ref 0.6–1.3)
EOSINOPHIL # BLD AUTO: 0.16 THOUSAND/ΜL (ref 0–0.61)
EOSINOPHIL NFR BLD AUTO: 3 % (ref 0–6)
ERYTHROCYTE [DISTWIDTH] IN BLOOD BY AUTOMATED COUNT: 12.9 % (ref 11.6–15.1)
GFR SERPL CREATININE-BSD FRML MDRD: 81 ML/MIN/1.73SQ M
GLUCOSE P FAST SERPL-MCNC: 95 MG/DL (ref 65–99)
GLUCOSE UR STRIP-MCNC: NEGATIVE MG/DL
HCT VFR BLD AUTO: 39.6 % (ref 34.8–46.1)
HGB BLD-MCNC: 12.7 G/DL (ref 11.5–15.4)
HGB UR QL STRIP.AUTO: NEGATIVE
KETONES UR STRIP-MCNC: NEGATIVE MG/DL
LEUKOCYTE ESTERASE UR QL STRIP: NEGATIVE
LYMPHOCYTES # BLD AUTO: 1.93 THOUSANDS/ΜL (ref 0.6–4.47)
LYMPHOCYTES NFR BLD AUTO: 36 % (ref 14–44)
MCH RBC QN AUTO: 31.8 PG (ref 26.8–34.3)
MCHC RBC AUTO-ENTMCNC: 32.1 G/DL (ref 31.4–37.4)
MCV RBC AUTO: 99 FL (ref 82–98)
MONOCYTES # BLD AUTO: 0.71 THOUSAND/ΜL (ref 0.17–1.22)
MONOCYTES NFR BLD AUTO: 13 % (ref 4–12)
NEUTROPHILS # BLD AUTO: 2.58 THOUSANDS/ΜL (ref 1.85–7.62)
NEUTS SEG NFR BLD AUTO: 48 % (ref 43–75)
NITRITE UR QL STRIP: NEGATIVE
NRBC BLD AUTO-RTO: 0 /100 WBCS
PH UR STRIP.AUTO: 6.5 [PH] (ref 4.5–8)
PLATELET # BLD AUTO: 296 THOUSANDS/UL (ref 149–390)
PMV BLD AUTO: 11.7 FL (ref 8.9–12.7)
POTASSIUM SERPL-SCNC: 3.7 MMOL/L (ref 3.5–5.3)
PROT SERPL-MCNC: 7.3 G/DL (ref 6.4–8.2)
PROT UR STRIP-MCNC: NEGATIVE MG/DL
RBC # BLD AUTO: 3.99 MILLION/UL (ref 3.81–5.12)
SODIUM SERPL-SCNC: 138 MMOL/L (ref 136–145)
SP GR UR STRIP.AUTO: 1.03 (ref 1–1.03)
TSH SERPL DL<=0.05 MIU/L-ACNC: 1.94 UIU/ML (ref 0.36–3.74)
UROBILINOGEN UR QL STRIP.AUTO: 0.2 E.U./DL
WBC # BLD AUTO: 5.4 THOUSAND/UL (ref 4.31–10.16)

## 2018-01-22 PROCEDURE — 81003 URINALYSIS AUTO W/O SCOPE: CPT

## 2018-01-22 PROCEDURE — 36415 COLL VENOUS BLD VENIPUNCTURE: CPT

## 2018-01-22 PROCEDURE — 84443 ASSAY THYROID STIM HORMONE: CPT

## 2018-01-22 PROCEDURE — 80053 COMPREHEN METABOLIC PANEL: CPT

## 2018-01-22 PROCEDURE — 85025 COMPLETE CBC W/AUTO DIFF WBC: CPT

## 2018-01-23 NOTE — MISCELLANEOUS
Message  Pt indicates adderall 5-10mg daily is ineffective, and would like to return to XR 20mg daily PRN  P: Adderall XR 20mg daily as needed        Plan  Attention and concentration deficit    · Amphetamine-Dextroamphetamine 5 MG Oral Tablet (Adderall)  Attention and concentration deficit, Memory difficulties    · Amphetamine-Dextroamphet ER 20 MG Oral Capsule Extended Release 24 Hour  (Adderall XR); TAKE 1 CAPSULE DAILY as needed for attention difficulty    Signatures   Electronically signed by : Lisandra Hein DO; Dec 11 2017  5:15PM EST                       (Author)

## 2018-01-23 NOTE — PROGRESS NOTES
Assessment    1  Encounter for preventive health examination (V70 0) (Z00 00)    Plan  Chronic migraine without aura    · (1) CBC/PLT/DIFF; Status:Active; Requested RXP:[id number];    · (1) URINALYSIS (will reflex a microscopy if leukocytes, occult blood, protein or nitrites  are not within normal limits); Status:Active; Requested ICX:61BBQ9304;   Hyperlipidemia    · (1) COMPREHENSIVE METABOLIC PANEL; Status:Active; Requested NNX:43CLF6090;   Memory difficulties    · (1) TSH; Status:Active; Requested LCR:39GTU6235; Discussion/Summary    Phan Haas was encouraged to continue her optimal lifestyle  We also reviewed the list of labs, which will include a TSH  She is having hemoglobin A1c and lipid panel with work each summer and lipids are ideal with very high HDL  This is reviewed again today  Phan Haas will be notified of the results of her tests and further advised  Next checkup was recommended in 1 year  Chief Complaint  pt is here for annual physical      History of Present Illness  HPI: hPan Haas is here for annual preventive exam  We also review medical problems  Her migraines are well controlled and Botox injections are helping  Longstanding treatment for depression anxiety is going very well  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  Phan Haas is noticed for 6 months that her skin has been somewhat dry and she notices that she is losing some hair, and that her metabolism may be slowing  There is no personal or family history of thyroid disease  There is no constipation  Dermatology checkups are once year with history of basal cell cancer and up-to-date  I exams are up-to-date with stable vision with glasses  Colonoscopy was 2014 and was negative and recalls in 10 years  GI review of systems is negative  Carolin exercises regularly with good exercise tolerance  She has received a flu shot this season      Past medical history, family history and social history were reviewed  Allergies medicines reviewed  Review of systems: As above, all others negative  Active Problems    1  Attention and concentration deficit (799 51) (R41 840)   2  Chronic migraine without aura (346 70) (G43 709)   3  Classic migraine with aura (346 00) (G43 109)   4  TIM (generalized anxiety disorder) (300 02) (F41 1)   5  Hyperlipidemia (272 4) (E78 5)   6  Memory difficulties (780 93) (R41 3)   7   Recurrent major depressive disorder, in remission (296 35) (F33 40)    Past Medical History    · History of Depression with anxiety (300 4) (F41 8)   · History of basal cell carcinoma (V10 83) (T52 968)   · Denied: History of concussion   · History of insomnia (V13 89) (Z87 898)   · Denied: History of Seizures    Surgical History    · History of Bladder Surgery   · History of Cervix Cryosurgery   · History of Eye Surgery   · History of Hysteroscopy With Endometrial Ablation   · History of Therapeutic Cystoscopy   · History of Tubal Ligation    Family History  Mother    · Family history of Heart murmur   · Family history of Hyperlipidemia  Father    · Family history of Atrial fibrillation   · Family history of arthritis (V17 7) (Z82 61)  Daughter    · Family history of ADD (attention deficit disorder)  Son    · Family history of ADD (attention deficit disorder)  Sister    · Family history of Psoriasis  Paternal Grandmother    · Family history of Breast Cancer (V16 3)   · Family history of Diabetes Mellitus (V18 0)   · Family history of Hypertension (V17 49)  Maternal Grandfather    · Family history of Heart disease  Paternal Grandfather    · Family history of Skin cancer  Family History    · Denied: Family history of bipolar disorder   · Denied: Family history of substance abuse   · Denied: Family history of suicide attempt    Social History    · Being A Social Drinker   · Caffeine use (V49 89) (F15 90)   ·    · Drinks coffee   · Denied: History of Drug use   · Never A Smoker    Current Meds   1  Amphetamine-Dextroamphet ER 20 MG Oral Capsule Extended Release 24 Hour; TAKE   1 CAPSULE DAILY as needed for attention difficulty; Therapy: 97LZC0557 to (Evaluate:10Jan2018); Last Rx:11Dec2017 Ordered   2  BuPROPion HCl ER (XL) 300 MG Oral Tablet Extended Release 24 Hour; take 1 tablet   by mouth every morning; Therapy: 70POO2839 to (Last Rx:04Dec2017)  Requested for: 85SYS4782 Ordered   3  FLUoxetine HCl - 40 MG Oral Capsule; 1 CAPSULE DAILY  Requested for: 28PCG3548;   Last Rx:04Dec2017 Ordered    Allergies    1  Femstat 3 CREA   2  Monistat 1 OINT    Vitals   Recorded: 96ESP7836 03:50PM   Heart Rate 67   Systolic 304   Diastolic 86   Height 5 ft 4 5 in   Weight 131 lb 6 oz   BMI Calculated 22 2   BSA Calculated 1 65   O2 Saturation 98     Physical Exam    Constitutional   General appearance: No acute distress, well appearing and well nourished  Head and Face   Head and face: Normal     Eyes   Conjunctiva and lids: No swelling, erythema or discharge  Pupils and irises: Equal, round, reactive to light  Ears, Nose, Mouth, and Throat   External inspection of ears and nose: Normal     Otoscopic examination: Tympanic membranes translucent with normal light reflex  Canals patent without erythema  Hearing: Normal     Nasal mucosa, septum, and turbinates: Normal without edema or erythema  Lips, teeth, and gums: Normal, good dentition  Oropharynx: Normal with no erythema, edema, exudate or lesions  Neck   Neck: Supple, symmetric, trachea midline, no masses  Thyroid: Normal, no thyromegaly  Pulmonary   Respiratory effort: No increased work of breathing or signs of respiratory distress  Auscultation of lungs: Clear to auscultation  Cardiovascular   Auscultation of heart: Normal rate and rhythm, normal S1 and S2, no murmurs  Carotid pulses: 2+ bilaterally  Pedal pulses: 2+ bilaterally      Peripheral vascular exam: Normal     Examination of extremities for edema and/or varicosities: Normal     Abdomen   Abdomen: Non-tender, no masses  Liver and spleen: No hepatomegaly or splenomegaly  Examination for hernias: No hernia appreciated  Lymphatic   Palpation of lymph nodes in neck: No lymphadenopathy  Palpation of lymph nodes in other areas: No lymphadenopathy  Musculoskeletal   Gait and station: Normal     Digits and nails: Normal without clubbing or cyanosis  Joints, bones, and muscles: Normal     Range of motion: Normal     Stability: Normal     Muscle strength/tone: Normal     Skin   Skin and subcutaneous tissue: Normal without rashes or lesions  Neurologic   Cranial nerves: Cranial nerves II-XII intact  Cortical function: Normal mental status  Sensation: No sensory loss  Coordination: Normal finger to nose and heel to shin  Psychiatric   Judgment and insight: Normal     Orientation to person, place, and time: Normal     Recent and remote memory: Intact      Mood and affect: Normal        Future Appointments    Date/Time Provider Specialty Site   02/09/2018 01:30 PM Regulo Mcgee DO Psychiatry [facility]   12/20/2018 05:20 PM Tammy Sullivan, [address] Obstetrics/Gynecology [facility]   02/02/2018 12:45 PM Galina Rose, [facility] [address]     Signatures   Electronically signed by : SHAVONNE Vance ; Jan 19 2018  4:08PM EST                       (Author)

## 2018-01-24 VITALS
OXYGEN SATURATION: 98 % | WEIGHT: 131.38 LBS | HEART RATE: 67 BPM | BODY MASS INDEX: 21.89 KG/M2 | DIASTOLIC BLOOD PRESSURE: 86 MMHG | HEIGHT: 65 IN | SYSTOLIC BLOOD PRESSURE: 128 MMHG

## 2018-01-29 DIAGNOSIS — Z12.31 ENCOUNTER FOR SCREENING MAMMOGRAM FOR MALIGNANT NEOPLASM OF BREAST: ICD-10-CM

## 2018-01-29 RX ORDER — FLUOXETINE HYDROCHLORIDE 40 MG/1
1 CAPSULE ORAL DAILY
COMMUNITY
End: 2018-02-09 | Stop reason: SDUPTHER

## 2018-01-29 RX ORDER — DEXTROAMPHETAMINE SACCHARATE, AMPHETAMINE ASPARTATE MONOHYDRATE, DEXTROAMPHETAMINE SULFATE AND AMPHETAMINE SULFATE 5; 5; 5; 5 MG/1; MG/1; MG/1; MG/1
CAPSULE, EXTENDED RELEASE ORAL
COMMUNITY
Start: 2017-12-11 | End: 2018-02-09 | Stop reason: SDUPTHER

## 2018-01-29 RX ORDER — BUPROPION HYDROCHLORIDE 300 MG/1
1 TABLET ORAL
COMMUNITY
Start: 2017-05-05 | End: 2018-02-09 | Stop reason: SDUPTHER

## 2018-02-01 NOTE — PROGRESS NOTES
Patient ID: Juanita Darnell is a 45 y o  female  Assessment/Plan:    No problem-specific Assessment & Plan notes found for this encounter  {Assess/PlanSmartLinks:87282}       Subjective:    HPI    {[facility] HPI texts:07221}    {Common ambulatory SmartLinks:65573}         Objective: There were no vitals taken for this visit      Physical Exam    Neurological Exam      ROS:    Review of Systems

## 2018-02-02 ENCOUNTER — OFFICE VISIT (OUTPATIENT)
Dept: NEUROLOGY | Facility: CLINIC | Age: 53
End: 2018-02-02
Payer: COMMERCIAL

## 2018-02-02 VITALS — DIASTOLIC BLOOD PRESSURE: 86 MMHG | TEMPERATURE: 98.1 F | SYSTOLIC BLOOD PRESSURE: 132 MMHG

## 2018-02-02 DIAGNOSIS — G43.709 CHRONIC MIGRAINE WITHOUT AURA WITHOUT STATUS MIGRAINOSUS, NOT INTRACTABLE: Primary | ICD-10-CM

## 2018-02-02 PROCEDURE — 64615 CHEMODENERV MUSC MIGRAINE: CPT | Performed by: PHYSICIAN ASSISTANT

## 2018-02-02 NOTE — PROGRESS NOTES
Chemodenervation  Date/Time: 2/2/2018 7:46 AM  Performed by: Denise Herrera  Authorized by: Charissa Mcnair details:     Prepped With: Alcohol    Anesthesia (see MAR for exact dosages): Anesthesia method:  None  Procedure details:     Position:  Upright  Botox:     Botox Type:  Type A    Brand:  Botox    Final Concentration per CC:  200 units    Needle Gauge:  30 G 2 5 inch    Medication Administration:  100 Units onabotulinumtoxin A 100 units  Procedures:     Botox Procedures: chronic headache      Indications: migraines    Injection Location:     Head / Face:  L superior cervical paraspinal, R superior cervical paraspinal, L , R , L frontalis, R frontalis, L medial occipitalis, R medial occipitalis, procerus, R temporalis, L temporalis, R superior trapezius, L superior trapezius, L orbicularis oculi and R orbicularis oculi    L  injection amount:  5 unit(s)    R  injection amount:  5 unit(s)    L lateral frontalis:  5 unit(s)    R lateral frontalis:  5 unit(s)    L medial frontalis:  5 unit(s)    R medial frontalis:  5 unit(s)    L temporalis injection amount:  20 unit(s)    R temporalis injection amount:  20 unit(s)    Procerus injection amount:  5 unit(s)    L orbicularis oculi injection amount:  5 unit(s)    R orbicularis oculi injection amount:  5 unit(s)    L medial occipitalis injection amount:  15 unit(s)    R medial occipitalis injection amount:  15 unit(s)    L superior cervical paraspinal injection amount:  10 unit(s)    R superior cervical paraspinal injection amount:  10 unit(s)    L superior trapezius injection amount:  15 unit(s)    R superior trapezius injection amount:  15 unit(s)  Total Units:     Total units used:  200, all medically necessary    Total units discarded:  0  Post-procedure details:     Chemodenervation:  Chronic migraine    Facial Nerve Location[de-identified]  Bilateral facial nerve    Patient tolerance of procedure:   Tolerated well, no immediate complications  Comments:      35 units into scalp, all medically necessary

## 2018-02-09 ENCOUNTER — OFFICE VISIT (OUTPATIENT)
Dept: PSYCHIATRY | Facility: CLINIC | Age: 53
End: 2018-02-09
Payer: COMMERCIAL

## 2018-02-09 DIAGNOSIS — F33.40 RECURRENT MAJOR DEPRESSIVE DISORDER, IN REMISSION (HCC): ICD-10-CM

## 2018-02-09 DIAGNOSIS — R41.3 MEMORY DIFFICULTIES: ICD-10-CM

## 2018-02-09 DIAGNOSIS — F41.1 GAD (GENERALIZED ANXIETY DISORDER): Primary | ICD-10-CM

## 2018-02-09 DIAGNOSIS — R41.840 ATTENTION AND CONCENTRATION DEFICIT: ICD-10-CM

## 2018-02-09 PROCEDURE — 90833 PSYTX W PT W E/M 30 MIN: CPT | Performed by: PSYCHIATRY & NEUROLOGY

## 2018-02-09 PROCEDURE — 99213 OFFICE O/P EST LOW 20 MIN: CPT | Performed by: PSYCHIATRY & NEUROLOGY

## 2018-02-09 RX ORDER — FLUOXETINE HYDROCHLORIDE 40 MG/1
40 CAPSULE ORAL DAILY
Qty: 90 CAPSULE | Refills: 1 | Status: SHIPPED | OUTPATIENT
Start: 2018-02-09 | End: 2018-06-01 | Stop reason: SDUPTHER

## 2018-02-09 RX ORDER — DEXTROAMPHETAMINE SACCHARATE, AMPHETAMINE ASPARTATE MONOHYDRATE, DEXTROAMPHETAMINE SULFATE AND AMPHETAMINE SULFATE 5; 5; 5; 5 MG/1; MG/1; MG/1; MG/1
20 CAPSULE, EXTENDED RELEASE ORAL DAILY PRN
Qty: 30 CAPSULE | Refills: 0 | Status: SHIPPED | OUTPATIENT
Start: 2018-02-09 | End: 2018-02-09 | Stop reason: SDUPTHER

## 2018-02-09 RX ORDER — CLONAZEPAM 0.5 MG/1
TABLET ORAL
Qty: 30 TABLET | Refills: 2 | Status: SHIPPED | OUTPATIENT
Start: 2018-02-09 | End: 2018-06-01 | Stop reason: SDUPTHER

## 2018-02-09 RX ORDER — BUPROPION HYDROCHLORIDE 300 MG/1
300 TABLET ORAL EVERY MORNING
Qty: 90 TABLET | Refills: 1 | Status: SHIPPED | OUTPATIENT
Start: 2018-02-09 | End: 2018-06-01 | Stop reason: SDUPTHER

## 2018-02-09 RX ORDER — DEXTROAMPHETAMINE SACCHARATE, AMPHETAMINE ASPARTATE MONOHYDRATE, DEXTROAMPHETAMINE SULFATE AND AMPHETAMINE SULFATE 5; 5; 5; 5 MG/1; MG/1; MG/1; MG/1
20 CAPSULE, EXTENDED RELEASE ORAL DAILY PRN
Qty: 30 CAPSULE | Refills: 0 | Status: SHIPPED | OUTPATIENT
Start: 2018-02-09 | End: 2018-06-01 | Stop reason: SDUPTHER

## 2018-02-20 ENCOUNTER — HOSPITAL ENCOUNTER (OUTPATIENT)
Dept: MAMMOGRAPHY | Facility: MEDICAL CENTER | Age: 53
Discharge: HOME | End: 2018-02-20
Payer: COMMERCIAL

## 2018-02-20 DIAGNOSIS — Z12.31 ENCOUNTER FOR SCREENING MAMMOGRAM FOR MALIGNANT NEOPLASM OF BREAST: ICD-10-CM

## 2018-02-20 PROCEDURE — 77067 SCR MAMMO BI INCL CAD: CPT

## 2018-03-10 ENCOUNTER — APPOINTMENT (EMERGENCY)
Dept: RADIOLOGY | Facility: HOSPITAL | Age: 53
End: 2018-03-10
Payer: COMMERCIAL

## 2018-03-10 ENCOUNTER — HOSPITAL ENCOUNTER (EMERGENCY)
Facility: HOSPITAL | Age: 53
Discharge: HOME | End: 2018-03-10
Attending: EMERGENCY MEDICINE
Payer: COMMERCIAL

## 2018-03-10 VITALS
DIASTOLIC BLOOD PRESSURE: 72 MMHG | BODY MASS INDEX: 21.97 KG/M2 | OXYGEN SATURATION: 99 % | WEIGHT: 130 LBS | TEMPERATURE: 97.6 F | HEART RATE: 72 BPM | RESPIRATION RATE: 18 BRPM | SYSTOLIC BLOOD PRESSURE: 131 MMHG

## 2018-03-10 DIAGNOSIS — S81.011A KNEE LACERATION, RIGHT, INITIAL ENCOUNTER: ICD-10-CM

## 2018-03-10 DIAGNOSIS — S62.317A: Primary | ICD-10-CM

## 2018-03-10 PROCEDURE — 73564 X-RAY EXAM KNEE 4 OR MORE: CPT

## 2018-03-10 PROCEDURE — 90715 TDAP VACCINE 7 YRS/> IM: CPT | Performed by: EMERGENCY MEDICINE

## 2018-03-10 PROCEDURE — 90471 IMMUNIZATION ADMIN: CPT

## 2018-03-10 PROCEDURE — 99283 EMERGENCY DEPT VISIT LOW MDM: CPT

## 2018-03-10 PROCEDURE — 73130 X-RAY EXAM OF HAND: CPT

## 2018-03-10 RX ORDER — ONDANSETRON 4 MG/1
4 TABLET, ORALLY DISINTEGRATING ORAL ONCE
Status: DISCONTINUED | OUTPATIENT
Start: 2018-03-10 | End: 2018-03-10

## 2018-03-10 RX ORDER — ONDANSETRON 4 MG/1
4 TABLET, ORALLY DISINTEGRATING ORAL ONCE
Status: COMPLETED | OUTPATIENT
Start: 2018-03-10 | End: 2018-03-10

## 2018-03-10 RX ORDER — GINSENG 100 MG
1 CAPSULE ORAL ONCE
Status: COMPLETED | OUTPATIENT
Start: 2018-03-10 | End: 2018-03-10

## 2018-03-10 RX ORDER — LIDOCAINE HYDROCHLORIDE AND EPINEPHRINE 10; 10 MG/ML; UG/ML
20 INJECTION, SOLUTION INFILTRATION; PERINEURAL ONCE
Status: COMPLETED | OUTPATIENT
Start: 2018-03-10 | End: 2018-03-10

## 2018-03-10 RX ADMIN — TETANUS TOXOID, REDUCED DIPHTHERIA TOXOID AND ACELLULAR PERTUSSIS VACCINE, ADSORBED 0.5 ML: 5; 2.5; 8; 8; 2.5 SUSPENSION INTRAMUSCULAR at 05:09

## 2018-03-10 RX ADMIN — LIDOCAINE HYDROCHLORIDE,EPINEPHRINE BITARTRATE 20 ML: 10; .01 INJECTION, SOLUTION INFILTRATION; PERINEURAL at 05:09

## 2018-03-10 RX ADMIN — BACITRACIN ZINC 1 SMALL APPLICATION: 500 OINTMENT TOPICAL at 05:09

## 2018-03-10 RX ADMIN — ONDANSETRON 4 MG: 4 TABLET, ORALLY DISINTEGRATING ORAL at 05:19

## 2018-03-10 RX ADMIN — ONDANSETRON 4 MG: 4 TABLET, ORALLY DISINTEGRATING ORAL at 06:24

## 2018-03-10 NOTE — ED NOTES
Dr Amrita Hughes at bedside administering lidocaine-epinephrine to R  Knee laceration     Tate Heredia RN  03/10/18 7231

## 2018-03-10 NOTE — ED PROVIDER NOTES
History  Chief Complaint   Patient presents with    Knee Pain     Right knee pain with laceration; tripped going up steps; no LOC      This 75-year-old female was running 2 hours ago in the darkness  She did not see 2 steps on the curb and she tripped  She landed striking the right knee  against the curb  She lacerated the knee overlying the patella  She has been able to walk since then but hold her leg nearly straight  She also has pain along  5th metacarpal of the left hand  She denies any other injury  She denies loss of consciousness  and neurologic changes  Prior to Admission Medications   Prescriptions Last Dose Informant Patient Reported? Taking? FLUoxetine (PROzac) 40 MG capsule   No No   Sig: Take 1 capsule (40 mg total) by mouth daily   amphetamine-dextroamphetamine (ADDERALL XR) 20 MG 24 hr capsule   No No   Sig: Take 1 capsule (20 mg total) by mouth daily as needed (focus and concentration deficit) Max Daily Amount: 20 mg   buPROPion (WELLBUTRIN XL) 300 mg 24 hr tablet   No No   Sig: Take 1 tablet (300 mg total) by mouth every morning   clonazePAM (KlonoPIN) 0 5 mg tablet   No No   Sig: Take 1/2 tab daily as needed for anxiety and take 1 tab nightly as needed for insomnia and anxiety      Facility-Administered Medications: None       Past Medical History:   Diagnosis Date    Kidney stone        Past Surgical History:   Procedure Laterality Date    BLADDER SURGERY      lift of bladder       History reviewed  No pertinent family history  I have reviewed and agree with the history as documented  Social History   Substance Use Topics    Smoking status: Never Smoker    Smokeless tobacco: Never Used    Alcohol use No        Review of Systems   Constitutional: Negative  HENT: Negative  Eyes: Negative  Respiratory: Negative  Cardiovascular: Negative  Gastrointestinal: Negative  Endocrine: Negative  Genitourinary: Negative  Musculoskeletal: Negative  See HPI   Skin: Negative  Allergic/Immunologic: Negative  Neurological: Negative  Hematological: Negative  Psychiatric/Behavioral: Negative  All other systems reviewed and are negative  Physical Exam  ED Triage Vitals [03/10/18 0446]   Temperature Pulse Respirations Blood Pressure SpO2   97 6 °F (36 4 °C) 72 15 145/74 98 %      Temp Source Heart Rate Source Patient Position - Orthostatic VS BP Location FiO2 (%)   Temporal Monitor Sitting Left arm --      Pain Score       4           Orthostatic Vital Signs  Vitals:    03/10/18 0446   BP: 145/74   Pulse: 72   Patient Position - Orthostatic VS: Sitting       Physical Exam   Constitutional: She is oriented to person, place, and time  She appears well-developed and well-nourished  No distress  HENT:   Head: Normocephalic and atraumatic  Eyes: Conjunctivae and EOM are normal    Neck: Neck supple  Cardiovascular: Intact distal pulses  Pulmonary/Chest: Effort normal  No stridor  No respiratory distress  Abdominal: Soft  There is no tenderness  Musculoskeletal:   Tenderness along the left hand 5th metatarsal   No deformity or crepitance  Full range of motion  Sensation intact  Tendon function intact  Laceration  The left knee  Has a 5 centimeter laceration overlying the patella  Straight leg lifting is normal   There is no patellar crepitance  There is no joint line tenderness  There is no knee joint effusion  Ligaments are stable  Distal sensation, tendon function and range of motion are intact  Neurological: She is alert and oriented to person, place, and time  No sensory deficit  She exhibits normal muscle tone  Coordination normal    Skin: Skin is warm and dry  Capillary refill takes less than 2 seconds  She is not diaphoretic    5 centimeter laceration overlying the right patella   Psychiatric: She has a normal mood and affect  Her behavior is normal    Nursing note and vitals reviewed        ED Medications  Medications - No data to display    Diagnostic Studies  Results Reviewed     None                 No orders to display              Procedures  Lac Repair  Date/Time: 3/10/2018 6:03 AM  Performed by: Zoe Hernandez  Authorized by: Zoe Hernandez   Consent: Verbal consent obtained  Written consent not obtained  Risks and benefits: risks, benefits and alternatives were discussed  Consent given by: patient  Patient understanding: patient states understanding of the procedure being performed  Imaging studies: imaging studies available  Body area: lower extremity  Location details: right knee  Laceration length: 6 cm  Tendon involvement: none  Nerve involvement: none  Vascular damage: no  Anesthesia: local infiltration    Anesthesia:  Local Anesthetic: lidocaine 1% with epinephrine    Sedation:  Patient sedated: no      Procedure Details:  Preparation: Patient was prepped and draped in the usual sterile fashion    Irrigation solution: saline  Irrigation method: jet lavage  Amount of cleaning: extensive  Debridement: minimal  Degree of undermining: none  Skin closure: 4-0 nylon  Number of sutures: 5  Technique: horizontal mattress  Approximation: close  Approximation difficulty: simple  Dressing: antibiotic ointment, gauze roll and non-adhesive packing strip  Patient tolerance: Patient tolerated the procedure well with no immediate complications             Phone Contacts  ED Phone Contact    ED Course  ED Course                                MDM  Number of Diagnoses or Management Options  Closed fracture of base of fifth metacarpal bone of left hand: new and requires workup  Knee laceration, right, initial encounter: new and requires workup     Amount and/or Complexity of Data Reviewed  Tests in the radiology section of CPT®: ordered and reviewed  Independent visualization of images, tracings, or specimens: yes      CritCare Time    Disposition  Final diagnoses:   None     ED Disposition     None      Follow-up Information None       Patient's Medications   Discharge Prescriptions    No medications on file     No discharge procedures on file      ED Provider  Electronically Signed by           Caroline Urbina DO  03/10/18 3606

## 2018-03-10 NOTE — DISCHARGE INSTRUCTIONS
Hand Fracture   WHAT YOU NEED TO KNOW:   A hand fracture is a break in one of the bones in your hand  This includes the bones in the wrist and fingers, and those that connect the wrist to the fingers  A hand fracture may be caused by twisting or bending the hand in the wrong way  It may also be caused by a fall, a crush injury, or a sports injury  DISCHARGE INSTRUCTIONS:   Return to the emergency department if:   · Your have severe pain that does not get better, even with pain medicine  · Your injured hand or forearm is cold, numb, or pale  · Your cast or splint gets wet, damaged, or comes off  · Your arm feels warm, tender, and painful  It may look swollen and red  Contact your healthcare provider if:   · You have new sores around your brace, cast, or splint  · You notice a bad smell coming from under your cast     · You have questions or concerns about your condition or care  Medicines:   · NSAIDs , such as ibuprofen, help decrease swelling, pain, and fever  This medicine is available with or without a doctor's order  NSAIDs can cause stomach bleeding or kidney problems in certain people  If you take blood thinner medicine, always ask your healthcare provider if NSAIDs are safe for you  Always read the medicine label and follow directions  · Acetaminophen  decreases pain and fever  It is available without a doctor's order  Ask how much to take and how often to take it  Follow directions  Acetaminophen can cause liver damage if not taken correctly  · Prescription pain medicine  may be given  Ask how to take this medicine safely  · Take your medicine as directed  Contact your healthcare provider if you think your medicine is not helping or if you have side effects  Tell him or her if you are allergic to any medicine  Keep a list of the medicines, vitamins, and herbs you take  Include the amounts, and when and why you take them  Bring the list or the pill bottles to follow-up visits  Carry your medicine list with you in case of an emergency  Follow up with your healthcare provider or hand specialist as directed: You may need to return to have your cast, splint, or stitches removed  Write down your questions so you remember to ask them during your visits  Manage your symptoms:   · Wear your splint as directed  Do not remove the splint until you follow up with your healthcare provider or hand specialist      · Apply ice  on your hand for 15 to 20 minutes every hour or as directed  Use an ice pack, or put crushed ice in a plastic bag  Cover it with a towel before you apply it to your skin  Ice helps prevent tissue damage and decreases swelling and pain  · Elevate  your hand above the level of his or her heart as often as you can  This will help decrease swelling and pain  Prop your hand on pillows or blankets to keep it elevated comfortably  · Go to physical therapy as directed  A physical therapist teaches you exercises to help improve movement and strength and to decrease pain  Bathing with a cast or splint:  Do not let your cast or splint get wet  Before bathing, cover the cast or splint with a plastic bag  Tape the bag to your skin above the cast or splint to seal out the water  Keep your hand out of the water in case the bag leaks  Follow instructions about when it is okay to take a bath or shower  Cast or splint care:   · Check the skin around the cast or splint for redness or sores every day  · Do not push down or lean on any part of the cast or splint because it may break  · Do not use a sharp or pointed object to scratch your skin under the cast or splint  Activity:  You may not be able to drive for up to 2 weeks  Ask when it is safe for you to drive and return to other activities such as sports  © 2017 [employer] Information is for End User's use only and may not be sold, redistributed or otherwise used for commercial purposes   All illustrations and images included in CareNotes® are the copyrighted property of A [employer]  or Nigel Barth  The above information is an  only  It is not intended as medical advice for individual conditions or treatments  Talk to your doctor, nurse or pharmacist before following any medical regimen to see if it is safe and effective for you  Care For Your Stitches   WHAT YOU NEED TO KNOW:   Stitches, or sutures, are used to close cuts and wounds on the skin  Stitches need to be removed after your wound has healed  DISCHARGE INSTRUCTIONS:   Return to the emergency department if:   · Your stitches come apart  · Blood soaks through your bandages  · You suddenly cannot move your injured joint  · You have sudden numbness around your wound  · You see red streaks coming from your wound  Contact your healthcare provider if:   · You have a fever and chills  · Your wound is red, warm, swollen, or leaking pus  · There is a bad smell coming from your wound  · You have increased pain in the wound area  · You have questions or concerns about your condition or care  Care for your stitches:  Keep your stitches clean and dry  You may need to cover your stitches with a bandage for 24 to 48 hours, or as directed  Do not bump or hit the suture area, as this could open the wound  Do not trim or shorten the ends of your stitches  If they rub on your clothing, put a gauze bandage between the stitches and your clothes  Clean your wound:  Carefully wash your wound with soap and water  For mouth and lip wounds, rinse your mouth after meals and at bedtime  Ask your healthcare provider what to use to rinse your mouth  If you have a scalp wound, you may gently wash your hair every 2 days with mild shampoo  Do not use hair products, such as hair spray  Help your wound heal:   · Elevate  your wound above the level of your heart as often as you can  This will help decrease swelling and pain   Prop your wound on pillows or blankets to keep it elevated comfortably  · Limit activity  Do not stretch the skin around your wound  This will help prevent bleeding and swelling of the wound area  Follow up with your healthcare provider as directed: You may need to return to have your stitches removed  Write down your questions so you remember to ask them during your visits  © 2017 [employer]  Information is for End User's use only and may not be sold, redistributed or otherwise used for commercial purposes  All illustrations and images included in CareNotes® are the copyrighted property of [employer]  or Nigel Barth  The above information is an  only  It is not intended as medical advice for individual conditions or treatments  Talk to your doctor, nurse or pharmacist before following any medical regimen to see if it is safe and effective for you

## 2018-03-12 ENCOUNTER — OFFICE VISIT (OUTPATIENT)
Dept: OBGYN CLINIC | Facility: CLINIC | Age: 53
End: 2018-03-12
Payer: COMMERCIAL

## 2018-03-12 VITALS
HEIGHT: 64 IN | SYSTOLIC BLOOD PRESSURE: 133 MMHG | WEIGHT: 134.4 LBS | BODY MASS INDEX: 22.94 KG/M2 | DIASTOLIC BLOOD PRESSURE: 85 MMHG | HEART RATE: 93 BPM

## 2018-03-12 DIAGNOSIS — S62.307A CLOSED NONDISPLACED FRACTURE OF FIFTH METACARPAL BONE OF LEFT HAND, UNSPECIFIED PORTION OF METACARPAL, INITIAL ENCOUNTER: Primary | ICD-10-CM

## 2018-03-12 PROCEDURE — 99203 OFFICE O/P NEW LOW 30 MIN: CPT | Performed by: ORTHOPAEDIC SURGERY

## 2018-03-12 PROCEDURE — 26600 TREAT METACARPAL FRACTURE: CPT | Performed by: ORTHOPAEDIC SURGERY

## 2018-03-12 NOTE — PROGRESS NOTES
ASSESSMENT/PLAN:    Diagnoses and all orders for this visit:    Closed nondisplaced fracture of fifth metacarpal bone of left hand, unspecified portion of metacarpal, initial encounter        Assessment:   Fracture Left SF MC base nondisplaced     Plan:   Tylenol, activity modification and casting    Follow Up:  6  week(s)    To Do Next Visit:  cast off and x-rays    General Discussions:     Fracture - Nonoperative Care: The physiology of a fractured bone was discussed with the patient today  With nondisplaced or minimally displaced fractures, conservative treatment often results in a functional recovery  Typically, these fractures are immobilized in either a cast or splint depending on the pattern  Radiographs are typically taken at intervals throughout the fracture healing to ensure that muscular forces do not cause loss of reduction or alignment  If the fracture loses its alignment, surgical intervention may be required to stabilize it  Medical conditions such as diabetes, osteoporosis, vitamin D deficiency, and a history of or exposure to smoking may delay or prevent fracture healing  Operative Discussions:         _____________________________________________________  CHIEF COMPLAINT:  Chief Complaint   Patient presents with    Left Little Finger - Fracture         SUBJECTIVE:  Jovon Pa is a 45y o  year old female who presents with left SF pain after mechanical fall 3 days ago  Pt missed a step while walking in dark landed onto her left hand and Right knee  Pt was evaluated at ED where she was found to Nondisplaced SF MC base fx and no fractures of knee  She sustained a laceration to her right knee which required sutures  No numbness or tingling  No other complaints     PAST MEDICAL HISTORY:  Past Medical History:   Diagnosis Date    Kidney stone        PAST SURGICAL HISTORY:  Past Surgical History:   Procedure Laterality Date    BLADDER SURGERY      lift of bladder       FAMILY HISTORY:  No family history on file  SOCIAL HISTORY:  Social History   Substance Use Topics    Smoking status: Never Smoker    Smokeless tobacco: Never Used    Alcohol use No       MEDICATIONS:    Current Outpatient Prescriptions:     amphetamine-dextroamphetamine (ADDERALL XR) 20 MG 24 hr capsule, Take 1 capsule (20 mg total) by mouth daily as needed (focus and concentration deficit) Max Daily Amount: 20 mg, Disp: 30 capsule, Rfl: 0    buPROPion (WELLBUTRIN XL) 300 mg 24 hr tablet, Take 1 tablet (300 mg total) by mouth every morning, Disp: 90 tablet, Rfl: 1    clonazePAM (KlonoPIN) 0 5 mg tablet, Take 1/2 tab daily as needed for anxiety and take 1 tab nightly as needed for insomnia and anxiety, Disp: 30 tablet, Rfl: 2    FLUoxetine (PROzac) 40 MG capsule, Take 1 capsule (40 mg total) by mouth daily, Disp: 90 capsule, Rfl: 1    ALLERGIES:  Allergies   Allergen Reactions    Butoconazole Hives    Tioconazole Hives       REVIEW OF SYSTEMS:  Pertinent items are noted in HPI  A comprehensive review of systems was negative      LABS:  HgA1c:   Lab Results   Component Value Date    HGBA1C 5 6 06/07/2017     BMP:   Lab Results   Component Value Date    GLUCOSE 107 02/02/2017    CALCIUM 8 7 01/22/2018     01/22/2018    K 3 7 01/22/2018    CO2 30 01/22/2018     01/22/2018    BUN 14 01/22/2018    CREATININE 0 82 01/22/2018         _____________________________________________________  PHYSICAL EXAMINATION:  General: well developed and well nourished, alert, oriented times 3 and appears comfortable  Psychiatric: Normal  HEENT: Trachea Midline, No torticollis  Cardiovascular: No discernable arrhythmia  Pulmonary: No wheezing or stridor  Skin: No masses, erthema, lacerations, fluctation, ulcerations  Neurovascular: Sensation Intact to the Median, Ulnar, Radial Nerve, Motor Intact to the Median, Ulnar, Radial Nerve and Pulses Intact    MUSCULOSKELETAL EXAMINATION:  LEFT SIDE:  Finger:  Tenderness at [facility] base with mild swelling , Wrist:  Full Motion, No tenderness, No instability and Shoulder:  Full ROM, No tenderss, No Instability    _____________________________________________________  STUDIES REVIEWED:  I have personally reviewed pertinent films in PACS and my interpretation is XR left hand SF nondisplaced base fx   PROCEDURES PERFORMED:  Fracture / Dislocation Treatment  Date/Time: 3/12/2018 4:50 PM  Performed by: Fab Gutierrez  Authorized by: Fab Gutierrez   Consent: Verbal consent obtained  Risks and benefits: risks, benefits and alternatives were discussed  Consent given by: patient  Imaging studies: imaging studies available  Patient identity confirmed: verbally with patient  Injury location: hand  Location details: right hand  Injury type: fracture  Fracture type: fifth metacarpal  Pre-procedure neurovascular assessment: neurovascularly intact  Manipulation performed: no  Immobilization: cast    Cast type: short armSupplies used: cotton padding and fiberglass  Post-procedure neurovascular assessment: post-procedure neurovascularly intact  Post-procedure distal perfusion: normal  Post-procedure neurological function: normal  Post-procedure range of motion: normal  Patient tolerance: Patient tolerated the procedure well with no immediate complications            I interviewed, took the history and examined the patient  I discuss the case with the resident and reviewed the resident's note  I supervised the resident and I agree with the resident management plan as it was presented to me  I was present in the clinic and examined the patient

## 2018-03-19 ENCOUNTER — OFFICE VISIT (OUTPATIENT)
Dept: INTERNAL MEDICINE CLINIC | Facility: CLINIC | Age: 53
End: 2018-03-19
Payer: COMMERCIAL

## 2018-03-19 VITALS
HEART RATE: 79 BPM | OXYGEN SATURATION: 97 % | WEIGHT: 134 LBS | TEMPERATURE: 97.7 F | HEIGHT: 64 IN | SYSTOLIC BLOOD PRESSURE: 132 MMHG | BODY MASS INDEX: 22.88 KG/M2 | DIASTOLIC BLOOD PRESSURE: 80 MMHG

## 2018-03-19 DIAGNOSIS — Z48.02 VISIT FOR SUTURE REMOVAL: Primary | ICD-10-CM

## 2018-03-19 PROCEDURE — 99213 OFFICE O/P EST LOW 20 MIN: CPT | Performed by: INTERNAL MEDICINE

## 2018-03-19 PROCEDURE — 3008F BODY MASS INDEX DOCD: CPT | Performed by: INTERNAL MEDICINE

## 2018-03-19 NOTE — PROGRESS NOTES
Assessment/Plan:    Suture removal:  Plan is to keep the wound clean and dry, be on the lookout for any signs of dehiscence or infection, with no evidence at this time  Apply Neosporin ointment until healing is completed  Avoid repeat trauma  Follow-up will be as needed  Diagnoses and all orders for this visit:    Visit for suture removal          Subjective:      Patient ID: Cassy Montalvo is a 45 y o  female  Taylor Small is here for suture removal   She sustained her injury on March 10th, while running out of doors in the dark, tripping on steps and falling forward, striking her right knee on concrete steps and falling to her left on her outstretched hand  She had a laceration to the right knee and a left hand fifth metacarpal fracture  These were treated at the [facility] Emergency Department on the same day  A tetanus shot was given at that time  Immobilization with casting has been successful in relieving her pain and she will be cast for 4 weeks and does have follow-up with Orthopedics  She has had no problems with her wound and has kept clean and dry and apply Neosporin as well  She reports that the sutures are in place  She has no new complaints      HPI    The following portions of the patient's history were reviewed and updated as appropriate:   Current Outpatient Prescriptions   Medication Sig Dispense Refill    amphetamine-dextroamphetamine (ADDERALL XR) 20 MG 24 hr capsule Take 1 capsule (20 mg total) by mouth daily as needed (focus and concentration deficit) Max Daily Amount: 20 mg 30 capsule 0    buPROPion (WELLBUTRIN XL) 300 mg 24 hr tablet Take 1 tablet (300 mg total) by mouth every morning 90 tablet 1    clonazePAM (KlonoPIN) 0 5 mg tablet Take 1/2 tab daily as needed for anxiety and take 1 tab nightly as needed for insomnia and anxiety 30 tablet 2    FLUoxetine (PROzac) 40 MG capsule Take 1 capsule (40 mg total) by mouth daily 90 capsule 1     No current facility-administered medications for this visit       Review of Systems  fever to 102 2 nights ago, with resolution since that time and no other symptoms  Objective:      /80 (BP Location: Left arm, Patient Position: Sitting, Cuff Size: Standard)   Pulse 79   Temp 97 7 °F (36 5 °C)   Ht 5' 4" (1 626 m)   Wt 60 8 kg (134 lb)   SpO2 97%   BMI 23 00 kg/m²          Physical Exam  vital signs stable in no distress  Afebrile  There is a transverse laceration just below the right patella, healing well with 6 sutures in place which were easily removed using a sterile suture removal kit  There is minimal blood loss, no dehiscence of the wound, and there are no signs of infection  A large sterile Band-Aid was applied

## 2018-03-30 ENCOUNTER — TRANSCRIBE ORDERS (OUTPATIENT)
Dept: LAB | Facility: CLINIC | Age: 53
End: 2018-03-30

## 2018-03-30 ENCOUNTER — APPOINTMENT (OUTPATIENT)
Dept: LAB | Facility: CLINIC | Age: 53
End: 2018-03-30
Payer: COMMERCIAL

## 2018-03-30 DIAGNOSIS — G43.109 MIGRAINE WITH AURA AND WITHOUT STATUS MIGRAINOSUS, NOT INTRACTABLE: ICD-10-CM

## 2018-03-30 LAB
ANION GAP SERPL CALCULATED.3IONS-SCNC: 5 MMOL/L (ref 4–13)
BUN SERPL-MCNC: 13 MG/DL (ref 5–25)
CALCIUM SERPL-MCNC: 9.3 MG/DL (ref 8.3–10.1)
CHLORIDE SERPL-SCNC: 101 MMOL/L (ref 100–108)
CO2 SERPL-SCNC: 33 MMOL/L (ref 21–32)
CREAT SERPL-MCNC: 0.84 MG/DL (ref 0.6–1.3)
GFR SERPL CREATININE-BSD FRML MDRD: 79 ML/MIN/1.73SQ M
GLUCOSE SERPL-MCNC: 84 MG/DL (ref 65–140)
POTASSIUM SERPL-SCNC: 3.9 MMOL/L (ref 3.5–5.3)
SODIUM SERPL-SCNC: 139 MMOL/L (ref 136–145)

## 2018-03-30 PROCEDURE — 36415 COLL VENOUS BLD VENIPUNCTURE: CPT

## 2018-03-30 PROCEDURE — 80048 BASIC METABOLIC PNL TOTAL CA: CPT

## 2018-04-06 ENCOUNTER — HOSPITAL ENCOUNTER (OUTPATIENT)
Dept: RADIOLOGY | Facility: HOSPITAL | Age: 53
Discharge: HOME | End: 2018-04-06
Payer: COMMERCIAL

## 2018-04-06 DIAGNOSIS — G43.109 MIGRAINE WITH AURA AND WITHOUT STATUS MIGRAINOSUS, NOT INTRACTABLE: ICD-10-CM

## 2018-04-06 PROCEDURE — 70553 MRI BRAIN STEM W/O & W/DYE: CPT

## 2018-04-06 PROCEDURE — A9585 GADOBUTROL INJECTION: HCPCS | Performed by: PHYSICIAN ASSISTANT

## 2018-04-06 RX ADMIN — GADOBUTROL 10 ML: 604.72 INJECTION INTRAVENOUS at 12:09

## 2018-04-10 DIAGNOSIS — R41.3 AMNESIA/MEMORY DISORDER: Primary | ICD-10-CM

## 2018-04-13 ENCOUNTER — OFFICE VISIT (OUTPATIENT)
Dept: OBGYN CLINIC | Facility: HOSPITAL | Age: 53
End: 2018-04-13

## 2018-04-13 ENCOUNTER — HOSPITAL ENCOUNTER (OUTPATIENT)
Dept: RADIOLOGY | Facility: HOSPITAL | Age: 53
Discharge: HOME | End: 2018-04-13
Attending: ORTHOPAEDIC SURGERY
Payer: COMMERCIAL

## 2018-04-13 VITALS
HEART RATE: 101 BPM | DIASTOLIC BLOOD PRESSURE: 87 MMHG | SYSTOLIC BLOOD PRESSURE: 139 MMHG | HEIGHT: 64 IN | WEIGHT: 135.6 LBS | BODY MASS INDEX: 23.15 KG/M2

## 2018-04-13 DIAGNOSIS — Z09 FRACTURE FOLLOW-UP: ICD-10-CM

## 2018-04-13 DIAGNOSIS — Z09 FRACTURE FOLLOW-UP: Primary | ICD-10-CM

## 2018-04-13 PROCEDURE — 99024 POSTOP FOLLOW-UP VISIT: CPT | Performed by: ORTHOPAEDIC SURGERY

## 2018-04-13 PROCEDURE — 73130 X-RAY EXAM OF HAND: CPT

## 2018-04-13 NOTE — PROGRESS NOTES
ASSESSMENT/PLAN:    Diagnoses and all orders for this visit:    Fracture follow-up  -     XR hand 3+ vw left; Future        Assessment:   Fracture left small metacarpal base    Plan:   Avoid heavy activities x 3 weeks then may proceed with activities as tolerated  Follow Up:  PRN          _____________________________________________________  CHIEF COMPLAINT:  Chief Complaint   Patient presents with    Left Little Finger - Follow-up         SUBJECTIVE:  Mona De La Cruz is a 45y o  year old female who presents for follow up regarding left small finger metacarpal fracture  States overall she is doing well  States an occasional ache in the area of her fracture  No n/t  PAST MEDICAL HISTORY:  Past Medical History:   Diagnosis Date    Basal cell carcinoma     Last assessed: 9/26/14    Depression with anxiety     Last assessed: 1/13/17    Insomnia     Last assessed: 9/26/14    Kidney stone        PAST SURGICAL HISTORY:  Past Surgical History:   Procedure Laterality Date    BLADDER SURGERY      lift of bladder    CYSTOSCOPY      Theurapeutic   TVT-O    EYE SURGERY      LASIK    GYNECOLOGIC CRYOSURGERY      Cervix    HYSTEROSCOPY W/ ENDOMETRIAL ABLATION  12/14/2010    Deborah    KIDNEY SURGERY  kidney stone removal    1983    TUBAL LIGATION         FAMILY HISTORY:  Family History   Problem Relation Age of Onset    Heart murmur Mother     Hyperlipidemia Mother     Atrial fibrillation Father     Arthritis Father     Psoriasis Sister     Heart disease Maternal Grandfather     Breast cancer Paternal Grandmother     Diabetes Paternal Grandmother      Mellitus    Hypertension Paternal Grandmother     Skin cancer Paternal Grandfather    [facility] ADD / ADHD Son     ADD / ADHD Daughter        SOCIAL HISTORY:  Social History   Substance Use Topics    Smoking status: Never Smoker    Smokeless tobacco: Never Used    Alcohol use Yes      Comment: socially       MEDICATIONS:    Current Outpatient Prescriptions:    amphetamine-dextroamphetamine (ADDERALL XR) 20 MG 24 hr capsule, Take 1 capsule (20 mg total) by mouth daily as needed (focus and concentration deficit) Max Daily Amount: 20 mg, Disp: 30 capsule, Rfl: 0    buPROPion (WELLBUTRIN XL) 300 mg 24 hr tablet, Take 1 tablet (300 mg total) by mouth every morning, Disp: 90 tablet, Rfl: 1    clonazePAM (KlonoPIN) 0 5 mg tablet, Take 1/2 tab daily as needed for anxiety and take 1 tab nightly as needed for insomnia and anxiety, Disp: 30 tablet, Rfl: 2    FLUoxetine (PROzac) 40 MG capsule, Take 1 capsule (40 mg total) by mouth daily, Disp: 90 capsule, Rfl: 1    ALLERGIES:  Allergies   Allergen Reactions    Butoconazole Hives    Tioconazole Hives       REVIEW OF SYSTEMS:  Pertinent items are noted in HPI  A comprehensive review of systems was negative  LABS:  HgA1c:   Lab Results   Component Value Date    HGBA1C 5 6 06/07/2017     BMP:   Lab Results   Component Value Date    GLUCOSE 84 03/30/2018    CALCIUM 9 3 03/30/2018     03/30/2018    K 3 9 03/30/2018    CO2 33 (H) 03/30/2018     03/30/2018    BUN 13 03/30/2018    CREATININE 0 84 03/30/2018           _____________________________________________________  PHYSICAL EXAMINATION:  General: well developed and well nourished, alert, oriented times 3 and appears comfortable  Psychiatric: Normal  HEENT: Trachea Midline, No torticollis  Cardiovascular: No discernable arrhythmia  Pulmonary: No wheezing or stridor  Skin: No masses, erthema, lacerations, fluctation, ulcerations  Neurovascular: Sensation Intact to the Median, Ulnar, Radial Nerve, Motor Intact to the Median, Ulnar, Radial Nerve and Pulses Intact    MUSCULOSKELETAL EXAMINATION:  LEFT SIDE:  Finger:  No deformity  Patient denies ttp along the small finger metacarpal  She is able to flex and extend the finger without signs of malrotation   She has some expected LROM 2/2 stiffness from her cast  _____________________________________________________  STUDIES REVIEWED:  I have personally reviewed pertinent films in PACS and my interpretation is nondisplaced healing transverse fracture of small metacarpal base  Ange Savage       PROCEDURES PERFORMED:  Procedures  No Procedures performed today   Scribe Attestation    I,:   Navin Cortes PA-C am acting as a scribe while in the presence of the attending physician :        I,:   Bola Rogel MD personally performed the services described in this documentation    as scribed in my presence :

## 2018-05-10 NOTE — PROGRESS NOTES
Chemodenervation  Date/Time: 5/11/2018 11:59 AM  Performed by: Raven Rodriguez  Authorized by: Cira Tipton details:     Prepped With: Alcohol    Anesthesia (see MAR for exact dosages): Anesthesia method:  None  Procedure details:     Position:  Upright  Botox:     Botox Type:  Type A    Brand:  Botox    mL's of Botulinum Toxin:  200    Final Concentration per CC:  200 units    Needle Gauge:  30 G 2 5 inch  Procedures:     Botox Procedures: chronic headache      Indications: migraines    Injection Location:     Head / Face:  L superior cervical paraspinal, R superior cervical paraspinal, L , R , L frontalis, R frontalis, L medial occipitalis, R medial occipitalis, procerus, R temporalis, L temporalis, R superior trapezius, L superior trapezius, R orbicularis oculi and L orbicularis oculi    L  injection amount:  5 unit(s)    R  injection amount:  5 unit(s)    L lateral frontalis:  5 unit(s)    R lateral frontalis:  5 unit(s)    L medial frontalis:  5 unit(s)    R medial frontalis:  5 unit(s)    L temporalis injection amount:  20 unit(s)    R temporalis injection amount:  20 unit(s)    Procerus injection amount:  5 unit(s)    L orbicularis oculi injection amount:  5 unit(s)    R orbicularis oculi injection amount:  5 unit(s)    Comments: All medically necessary    L medial occipitalis injection amount:  15 unit(s)    R medial occipitalis injection amount:  15 unit(s)    L superior cervical paraspinal injection amount:  10 unit(s)    R superior cervical paraspinal injection amount:  10 unit(s)    L superior trapezius injection amount:  15 unit(s)    R superior trapezius injection amount:  15 unit(s)  Total Units:     Total units used:  200    Total units discarded:  0  Post-procedure details:     Chemodenervation:  Chronic migraine    Patient tolerance of procedure:   Tolerated well, no immediate complications  Comments:      35 units frontalis bilaterally, all medically necessary

## 2018-05-11 ENCOUNTER — PROCEDURE VISIT (OUTPATIENT)
Dept: NEUROLOGY | Facility: CLINIC | Age: 53
End: 2018-05-11
Payer: COMMERCIAL

## 2018-05-11 VITALS — DIASTOLIC BLOOD PRESSURE: 63 MMHG | HEART RATE: 76 BPM | SYSTOLIC BLOOD PRESSURE: 135 MMHG | TEMPERATURE: 98.3 F

## 2018-05-11 DIAGNOSIS — G43.709 CHRONIC MIGRAINE WITHOUT AURA WITHOUT STATUS MIGRAINOSUS, NOT INTRACTABLE: Primary | ICD-10-CM

## 2018-05-11 PROCEDURE — 64615 CHEMODENERV MUSC MIGRAINE: CPT | Performed by: PHYSICIAN ASSISTANT

## 2018-06-01 DIAGNOSIS — F41.1 GAD (GENERALIZED ANXIETY DISORDER): ICD-10-CM

## 2018-06-01 DIAGNOSIS — R41.3 MEMORY DIFFICULTIES: ICD-10-CM

## 2018-06-01 DIAGNOSIS — R41.840 ATTENTION AND CONCENTRATION DEFICIT: ICD-10-CM

## 2018-06-01 DIAGNOSIS — F33.40 RECURRENT MAJOR DEPRESSIVE DISORDER, IN REMISSION (HCC): ICD-10-CM

## 2018-06-01 RX ORDER — FLUOXETINE HYDROCHLORIDE 40 MG/1
40 CAPSULE ORAL DAILY
Qty: 90 CAPSULE | Refills: 0 | Status: SHIPPED | OUTPATIENT
Start: 2018-06-01 | End: 2018-09-07 | Stop reason: SDUPTHER

## 2018-06-01 RX ORDER — CLONAZEPAM 0.5 MG/1
TABLET ORAL
Qty: 30 TABLET | Refills: 2 | Status: SHIPPED | OUTPATIENT
Start: 2018-06-01 | End: 2018-09-07 | Stop reason: SDUPTHER

## 2018-06-01 RX ORDER — BUPROPION HYDROCHLORIDE 300 MG/1
300 TABLET ORAL EVERY MORNING
Qty: 90 TABLET | Refills: 0 | Status: SHIPPED | OUTPATIENT
Start: 2018-06-01 | End: 2018-09-07 | Stop reason: SDUPTHER

## 2018-06-01 RX ORDER — DEXTROAMPHETAMINE SACCHARATE, AMPHETAMINE ASPARTATE MONOHYDRATE, DEXTROAMPHETAMINE SULFATE AND AMPHETAMINE SULFATE 5; 5; 5; 5 MG/1; MG/1; MG/1; MG/1
20 CAPSULE, EXTENDED RELEASE ORAL DAILY PRN
Qty: 30 CAPSULE | Refills: 0 | Status: SHIPPED | OUTPATIENT
Start: 2018-06-01 | End: 2018-07-10 | Stop reason: SDUPTHER

## 2018-06-01 NOTE — TELEPHONE ENCOUNTER
Pt came in and thought she had an appt today/pts appt on 5/04 was canceled and she thought it was rescheduled for today  Pt is rescheduled but not until 09/07/18 and is almost out of medication

## 2018-06-06 NOTE — ED NOTES
Pt reports no nausea    Dr Roxanna Isaac at the bedside providing wound care     Anusha Varela RN  03/10/18 5925 Consent: Verbal consent was obtained and risks were reviewed including but not limited to scarring, infection, bleeding, scabbing, incomplete removal, nerve damage and allergy to anesthesia. Detail Level: Detailed Was A Bandage Applied: Yes Additional Anesthesia Volume In Cc (Will Not Render If 0): 0 Electrodesiccation And Curettage Text: The wound bed was treated with electrodesiccation and curettage after the biopsy was performed. Bill [id number] For Specimen Handling/Conveyance To Laboratory?: no Biopsy Type: H and E Anesthesia Type: 1% lidocaine with 1:100,000 epinephrine Anesthesia Volume In Cc (Will Not Render If 0): 0.5 Wound Care: Aquaphor Dressing: bandage Notification Instructions: Patient understands to return to office in 2-4 weeks for biopsy results Electrodesiccation Text: The wound bed was treated with electrodesiccation after the biopsy was performed. Type Of Destruction Used: Curettage Post-Care Instructions: I reviewed with the patient in detail post-care instructions. Patient is to keep the biopsy site dry overnight, and then apply aquaphor twice daily until healed. Patient may apply hydrogen peroxide soaks to remove any crusting. Silver Nitrate Text: The wound bed was treated with silver nitrate after the biopsy was performed. Cryotherapy Text: The wound bed was treated with cryotherapy after the biopsy was performed. Billing Type: Third-Party Bill Curettage Text: The wound bed was treated with curettage after the biopsy was performed. Hemostasis: Aluminum Chloride

## 2018-06-19 ENCOUNTER — LAB (OUTPATIENT)
Dept: LAB | Facility: MEDICAL CENTER | Age: 53
End: 2018-06-19

## 2018-06-19 ENCOUNTER — TRANSCRIBE ORDERS (OUTPATIENT)
Dept: ADMINISTRATIVE | Facility: HOSPITAL | Age: 53
End: 2018-06-19

## 2018-06-19 DIAGNOSIS — Z00.8 HEALTH EXAMINATION IN POPULATION SURVEY: ICD-10-CM

## 2018-06-19 DIAGNOSIS — Z00.8 HEALTH EXAMINATION IN POPULATION SURVEY: Primary | ICD-10-CM

## 2018-06-19 LAB
CHOLEST SERPL-MCNC: 286 MG/DL (ref 50–200)
EST. AVERAGE GLUCOSE BLD GHB EST-MCNC: 120 MG/DL
HBA1C MFR BLD: 5.8 % (ref 4.2–6.3)
HDLC SERPL-MCNC: 107 MG/DL (ref 40–60)
LDLC SERPL CALC-MCNC: 164 MG/DL (ref 0–100)
NONHDLC SERPL-MCNC: 179 MG/DL
TRIGL SERPL-MCNC: 75 MG/DL

## 2018-06-19 PROCEDURE — 83036 HEMOGLOBIN GLYCOSYLATED A1C: CPT

## 2018-06-19 PROCEDURE — 80061 LIPID PANEL: CPT

## 2018-06-19 PROCEDURE — 36415 COLL VENOUS BLD VENIPUNCTURE: CPT

## 2018-07-10 DIAGNOSIS — R41.840 ATTENTION AND CONCENTRATION DEFICIT: ICD-10-CM

## 2018-07-10 DIAGNOSIS — F33.40 RECURRENT MAJOR DEPRESSIVE DISORDER, IN REMISSION (HCC): ICD-10-CM

## 2018-07-10 DIAGNOSIS — R41.3 MEMORY DIFFICULTIES: ICD-10-CM

## 2018-07-10 RX ORDER — DEXTROAMPHETAMINE SACCHARATE, AMPHETAMINE ASPARTATE MONOHYDRATE, DEXTROAMPHETAMINE SULFATE AND AMPHETAMINE SULFATE 5; 5; 5; 5 MG/1; MG/1; MG/1; MG/1
20 CAPSULE, EXTENDED RELEASE ORAL DAILY PRN
Qty: 30 CAPSULE | Refills: 0 | Status: SHIPPED | OUTPATIENT
Start: 2018-07-10 | End: 2018-09-07 | Stop reason: SDUPTHER

## 2018-07-12 ENCOUNTER — APPOINTMENT (OUTPATIENT)
Dept: LAB | Facility: HOSPITAL | Age: 53
End: 2018-07-12
Attending: OBSTETRICS & GYNECOLOGY
Payer: COMMERCIAL

## 2018-07-12 ENCOUNTER — TELEPHONE (OUTPATIENT)
Dept: OBGYN CLINIC | Facility: CLINIC | Age: 53
End: 2018-07-12

## 2018-07-12 DIAGNOSIS — R30.0 DYSURIA: Primary | ICD-10-CM

## 2018-07-12 DIAGNOSIS — B37.3 CANDIDAL VULVOVAGINITIS: ICD-10-CM

## 2018-07-12 DIAGNOSIS — R30.0 DYSURIA: ICD-10-CM

## 2018-07-12 LAB
BACTERIA UR QL AUTO: ABNORMAL /HPF
BILIRUB UR QL STRIP: NEGATIVE
CLARITY UR: ABNORMAL
COLOR UR: YELLOW
GLUCOSE UR STRIP-MCNC: NEGATIVE MG/DL
HGB UR QL STRIP.AUTO: ABNORMAL
HYALINE CASTS #/AREA URNS LPF: ABNORMAL /LPF
KETONES UR STRIP-MCNC: ABNORMAL MG/DL
LEUKOCYTE ESTERASE UR QL STRIP: ABNORMAL
NITRITE UR QL STRIP: POSITIVE
NON-SQ EPI CELLS URNS QL MICRO: ABNORMAL /HPF
PH UR STRIP.AUTO: 6 [PH] (ref 4.5–8)
PROT UR STRIP-MCNC: ABNORMAL MG/DL
RBC #/AREA URNS AUTO: ABNORMAL /HPF
SP GR UR STRIP.AUTO: 1.02 (ref 1–1.03)
UROBILINOGEN UR QL STRIP.AUTO: 0.2 E.U./DL
WBC #/AREA URNS AUTO: ABNORMAL /HPF

## 2018-07-12 PROCEDURE — 87086 URINE CULTURE/COLONY COUNT: CPT

## 2018-07-12 PROCEDURE — 87077 CULTURE AEROBIC IDENTIFY: CPT

## 2018-07-12 PROCEDURE — 87186 SC STD MICRODIL/AGAR DIL: CPT

## 2018-07-12 PROCEDURE — 81001 URINALYSIS AUTO W/SCOPE: CPT

## 2018-07-12 RX ORDER — SULFAMETHOXAZOLE AND TRIMETHOPRIM 800; 160 MG/1; MG/1
1 TABLET ORAL EVERY 12 HOURS SCHEDULED
Qty: 6 TABLET | Refills: 0 | Status: SHIPPED | OUTPATIENT
Start: 2018-07-12 | End: 2018-07-15

## 2018-07-12 NOTE — TELEPHONE ENCOUNTER
Patient started with UTI symptoms this morning and they have gotten worse throughout the day  She has burning with urination, pressure, constant urge to urinate and odor to urine, no fever  She is going on vacation on Saturday and uses [facility] in [place]  Allergic to butoconazole and tioconazole  I placed order for UA and C&S  She is going now  Please advise treatment

## 2018-07-13 RX ORDER — FLUCONAZOLE 150 MG/1
150 TABLET ORAL ONCE
Qty: 2 TABLET | Refills: 0 | Status: SHIPPED | OUTPATIENT
Start: 2018-07-13 | End: 2018-07-13

## 2018-07-13 NOTE — TELEPHONE ENCOUNTER
Patient called me back, she did say she gets an allergic reaction to OTC yeast meds and can't use them  She says every time she takes an antibiotic she gets a [place]  She is asking for diflucan to  today and take with her so if she gets one while on vacation she will have it on hand  She uses [facility]

## 2018-07-13 NOTE — TELEPHONE ENCOUNTER
I left a message for patient   She does not have a yeast infection now, if she develops one while on vacation she can use [facility]

## 2018-07-13 NOTE — TELEPHONE ENCOUNTER
Pt calling back regarding prev notes,   She get a Vietnamese within a couple days with the rx that has been called in, pls call pt to discuss more options as she is leaving for [place] tomorrow,  thanks

## 2018-07-14 LAB — BACTERIA UR CULT: ABNORMAL

## 2018-07-16 ENCOUNTER — TELEPHONE (OUTPATIENT)
Dept: OBGYN CLINIC | Facility: CLINIC | Age: 53
End: 2018-07-16

## 2018-07-16 NOTE — TELEPHONE ENCOUNTER
----- Message from Arnulfo Telles MD sent at 7/16/2018 10:20 AM EDT -----  Please let the patient know that she did have an E  coli UTI  It is susceptible to the antibiotic we put her on  Please ensure her symptoms have resolved on the Bactrim

## 2018-07-17 ENCOUNTER — TELEPHONE (OUTPATIENT)
Dept: NEUROLOGY | Facility: CLINIC | Age: 53
End: 2018-07-17

## 2018-07-18 ENCOUNTER — TELEPHONE (OUTPATIENT)
Dept: NEUROLOGY | Facility: CLINIC | Age: 53
End: 2018-07-18

## 2018-07-19 ENCOUNTER — TELEPHONE (OUTPATIENT)
Dept: NEUROLOGY | Facility: CLINIC | Age: 53
End: 2018-07-19

## 2018-08-17 ENCOUNTER — PROCEDURE VISIT (OUTPATIENT)
Dept: NEUROLOGY | Facility: CLINIC | Age: 53
End: 2018-08-17
Payer: COMMERCIAL

## 2018-08-17 VITALS — TEMPERATURE: 97.6 F | SYSTOLIC BLOOD PRESSURE: 142 MMHG | HEART RATE: 76 BPM | DIASTOLIC BLOOD PRESSURE: 65 MMHG

## 2018-08-17 DIAGNOSIS — G43.709 CHRONIC MIGRAINE WITHOUT AURA WITHOUT STATUS MIGRAINOSUS, NOT INTRACTABLE: Primary | ICD-10-CM

## 2018-08-17 PROCEDURE — 64615 CHEMODENERV MUSC MIGRAINE: CPT | Performed by: PHYSICIAN ASSISTANT

## 2018-08-17 NOTE — PROGRESS NOTES
Chemodenervation  Date/Time: 8/17/2018 10:46 AM  Performed by: Lavern White  Authorized by: Jefferson Diaz details:     Prepped With: Alcohol    Anesthesia (see MAR for exact dosages): Anesthesia method:  None  Procedure details:     Position:  Upright  Botox:     Botox Type:  Type A    Brand:  Botox    Final Concentration per CC:  200 units    Needle Gauge:  30 G 2 5 inch  Procedures:     Botox Procedures: chronic headache      Indications: migraines    Injection Location:     Head / Face:  L superior cervical paraspinal, R superior cervical paraspinal, L , R , L frontalis, R frontalis, L medial occipitalis, R medial occipitalis, procerus, R temporalis, L temporalis, R superior trapezius and L superior trapezius    L  injection amount:  5 unit(s)    R  injection amount:  5 unit(s)    L lateral frontalis:  5 unit(s)    R lateral frontalis:  5 unit(s)    L medial frontalis:  5 unit(s)    R medial frontalis:  5 unit(s)    L temporalis injection amount:  20 unit(s)    R temporalis injection amount:  20 unit(s)    Procerus injection amount:  5 unit(s)    L medial occipitalis injection amount:  15 unit(s)    R medial occipitalis injection amount:  15 unit(s)    L superior cervical paraspinal injection amount:  10 unit(s)    R superior cervical paraspinal injection amount:  10 unit(s)    L superior trapezius injection amount:  15 unit(s)    R superior trapezius injection amount:  15 unit(s)  Post-procedure details:     Chemodenervation:  Chronic migraine    Patient tolerance of procedure:   Tolerated well, no immediate complications  Comments:      5 units orbicularis oculi bilaterally  35 units frontalis   All medically necessary

## 2018-09-06 ENCOUNTER — DOCUMENTATION (OUTPATIENT)
Dept: PSYCHIATRY | Facility: CLINIC | Age: 53
End: 2018-09-06

## 2018-09-06 ENCOUNTER — TELEPHONE (OUTPATIENT)
Dept: PSYCHIATRY | Facility: CLINIC | Age: 53
End: 2018-09-06

## 2018-09-06 NOTE — TELEPHONE ENCOUNTER
Patient called for refills of Adderall XR 20 mg, Bupropion  mg, Clonazepam 0 5 mg and Fluoxetine 40 mg

## 2018-09-06 NOTE — PROGRESS NOTES
Treatment Plan Tracking    # 1Treatment Plan not completed within required time limits due to :  provider being out of the office 9/7/2018  and  will RS

## 2018-09-07 DIAGNOSIS — R41.840 ATTENTION AND CONCENTRATION DEFICIT: ICD-10-CM

## 2018-09-07 DIAGNOSIS — F41.1 GAD (GENERALIZED ANXIETY DISORDER): Primary | ICD-10-CM

## 2018-09-07 DIAGNOSIS — R41.3 MEMORY DIFFICULTIES: ICD-10-CM

## 2018-09-07 DIAGNOSIS — F33.40 RECURRENT MAJOR DEPRESSIVE DISORDER, IN REMISSION (HCC): ICD-10-CM

## 2018-09-07 RX ORDER — DEXTROAMPHETAMINE SACCHARATE, AMPHETAMINE ASPARTATE MONOHYDRATE, DEXTROAMPHETAMINE SULFATE AND AMPHETAMINE SULFATE 5; 5; 5; 5 MG/1; MG/1; MG/1; MG/1
20 CAPSULE, EXTENDED RELEASE ORAL DAILY PRN
Qty: 30 CAPSULE | Refills: 0 | Status: SHIPPED | OUTPATIENT
Start: 2018-09-07 | End: 2018-10-31 | Stop reason: SDUPTHER

## 2018-09-07 RX ORDER — BUPROPION HYDROCHLORIDE 300 MG/1
300 TABLET ORAL EVERY MORNING
Qty: 90 TABLET | Refills: 0 | Status: SHIPPED | OUTPATIENT
Start: 2018-09-07 | End: 2018-10-31 | Stop reason: SDUPTHER

## 2018-09-07 RX ORDER — CLONAZEPAM 0.5 MG/1
TABLET ORAL
Qty: 30 TABLET | Refills: 0 | Status: SHIPPED | OUTPATIENT
Start: 2018-09-07 | End: 2018-10-31 | Stop reason: SDUPTHER

## 2018-09-07 RX ORDER — FLUOXETINE HYDROCHLORIDE 40 MG/1
40 CAPSULE ORAL DAILY
Qty: 90 CAPSULE | Refills: 0 | Status: SHIPPED | OUTPATIENT
Start: 2018-09-07 | End: 2018-10-31 | Stop reason: SDUPTHER

## 2018-09-07 NOTE — TELEPHONE ENCOUNTER
I spoke with Edie Jordan  Please send to [place]   (She travels between two campuses and uses both depending where she works from that week ) Thanks

## 2018-10-22 ENCOUNTER — TELEPHONE (OUTPATIENT)
Dept: NEUROLOGY | Facility: CLINIC | Age: 53
End: 2018-10-22

## 2018-10-22 NOTE — TELEPHONE ENCOUNTER
LM regarding location change for BINJ on 11/23/18 and OVL on 11/30/18  Asked for call back to confirm

## 2018-10-23 ENCOUNTER — TELEPHONE (OUTPATIENT)
Dept: NEUROLOGY | Facility: CLINIC | Age: 53
End: 2018-10-23

## 2018-10-26 NOTE — TELEPHONE ENCOUNTER
2 nd attempt  LM regarding location change for BINJ  Mailing directions and appt card to patient   In message I did advise patient OVL is canceled due to change in schedule and requested she call back to r/s

## 2018-10-31 DIAGNOSIS — F41.1 GAD (GENERALIZED ANXIETY DISORDER): ICD-10-CM

## 2018-10-31 DIAGNOSIS — R41.840 ATTENTION AND CONCENTRATION DEFICIT: ICD-10-CM

## 2018-10-31 DIAGNOSIS — R41.3 MEMORY DIFFICULTIES: ICD-10-CM

## 2018-10-31 DIAGNOSIS — F33.40 RECURRENT MAJOR DEPRESSIVE DISORDER, IN REMISSION (HCC): ICD-10-CM

## 2018-10-31 RX ORDER — DEXTROAMPHETAMINE SACCHARATE, AMPHETAMINE ASPARTATE MONOHYDRATE, DEXTROAMPHETAMINE SULFATE AND AMPHETAMINE SULFATE 5; 5; 5; 5 MG/1; MG/1; MG/1; MG/1
20 CAPSULE, EXTENDED RELEASE ORAL DAILY PRN
Qty: 30 CAPSULE | Refills: 0 | Status: SHIPPED | OUTPATIENT
Start: 2018-10-31 | End: 2018-12-07 | Stop reason: SDUPTHER

## 2018-10-31 RX ORDER — FLUOXETINE HYDROCHLORIDE 40 MG/1
40 CAPSULE ORAL DAILY
Qty: 90 CAPSULE | Refills: 0 | Status: SHIPPED | OUTPATIENT
Start: 2018-10-31 | End: 2018-12-07 | Stop reason: SDUPTHER

## 2018-10-31 RX ORDER — CLONAZEPAM 0.5 MG/1
TABLET ORAL
Qty: 30 TABLET | Refills: 0 | Status: SHIPPED | OUTPATIENT
Start: 2018-10-31 | End: 2018-12-07 | Stop reason: SDUPTHER

## 2018-10-31 RX ORDER — BUPROPION HYDROCHLORIDE 300 MG/1
300 TABLET ORAL EVERY MORNING
Qty: 90 TABLET | Refills: 0 | Status: SHIPPED | OUTPATIENT
Start: 2018-10-31 | End: 2018-12-07 | Stop reason: SDUPTHER

## 2018-11-28 ENCOUNTER — TELEPHONE (OUTPATIENT)
Dept: NEUROLOGY | Facility: CLINIC | Age: 53
End: 2018-11-28

## 2018-11-28 NOTE — TELEPHONE ENCOUNTER
Patient called back and spoke with [place] Caller from call center and stated she received my message and will keep 12/7/18 SHONNA

## 2018-11-28 NOTE — TELEPHONE ENCOUNTER
Spoke with patient to offer available BINJ spot for tomorrow 11/29/18 but patient declined due [place]  We will keep 12/7/18 appt, confirmed with patient

## 2018-12-05 ENCOUNTER — DOCUMENTATION (OUTPATIENT)
Dept: PSYCHIATRY | Facility: CLINIC | Age: 53
End: 2018-12-05

## 2018-12-05 NOTE — PROGRESS NOTES
# 1Treatment Plan not completed within required time limits due to : Provider will be out of the office 5/4/18  and will reschedule at a later time

## 2018-12-07 ENCOUNTER — PROCEDURE VISIT (OUTPATIENT)
Dept: NEUROLOGY | Facility: CLINIC | Age: 53
End: 2018-12-07
Payer: COMMERCIAL

## 2018-12-07 ENCOUNTER — OFFICE VISIT (OUTPATIENT)
Dept: PSYCHIATRY | Facility: CLINIC | Age: 53
End: 2018-12-07
Payer: COMMERCIAL

## 2018-12-07 VITALS — DIASTOLIC BLOOD PRESSURE: 72 MMHG | SYSTOLIC BLOOD PRESSURE: 130 MMHG | HEART RATE: 76 BPM | TEMPERATURE: 97.9 F

## 2018-12-07 DIAGNOSIS — F33.40 RECURRENT MAJOR DEPRESSIVE DISORDER, IN REMISSION (HCC): ICD-10-CM

## 2018-12-07 DIAGNOSIS — R41.3 MEMORY DIFFICULTIES: ICD-10-CM

## 2018-12-07 DIAGNOSIS — G43.709 CHRONIC MIGRAINE WITHOUT AURA WITHOUT STATUS MIGRAINOSUS, NOT INTRACTABLE: Primary | ICD-10-CM

## 2018-12-07 DIAGNOSIS — R41.840 ATTENTION AND CONCENTRATION DEFICIT: ICD-10-CM

## 2018-12-07 DIAGNOSIS — F41.1 GAD (GENERALIZED ANXIETY DISORDER): ICD-10-CM

## 2018-12-07 PROCEDURE — 99213 OFFICE O/P EST LOW 20 MIN: CPT | Performed by: PSYCHIATRY & NEUROLOGY

## 2018-12-07 PROCEDURE — 64615 CHEMODENERV MUSC MIGRAINE: CPT | Performed by: PHYSICIAN ASSISTANT

## 2018-12-07 RX ORDER — DEXTROAMPHETAMINE SACCHARATE, AMPHETAMINE ASPARTATE MONOHYDRATE, DEXTROAMPHETAMINE SULFATE AND AMPHETAMINE SULFATE 5; 5; 5; 5 MG/1; MG/1; MG/1; MG/1
20 CAPSULE, EXTENDED RELEASE ORAL DAILY PRN
Qty: 30 CAPSULE | Refills: 0 | Status: SHIPPED | OUTPATIENT
Start: 2018-12-07 | End: 2018-12-07 | Stop reason: SDUPTHER

## 2018-12-07 RX ORDER — CLONAZEPAM 0.5 MG/1
TABLET ORAL
Qty: 30 TABLET | Refills: 3 | Status: SHIPPED | OUTPATIENT
Start: 2018-12-07 | End: 2019-04-29 | Stop reason: SDUPTHER

## 2018-12-07 RX ORDER — FLUOXETINE HYDROCHLORIDE 40 MG/1
40 CAPSULE ORAL DAILY
Qty: 90 CAPSULE | Refills: 1 | Status: SHIPPED | OUTPATIENT
Start: 2018-12-07 | End: 2019-07-26 | Stop reason: SDUPTHER

## 2018-12-07 RX ORDER — BUPROPION HYDROCHLORIDE 300 MG/1
300 TABLET ORAL EVERY MORNING
Qty: 90 TABLET | Refills: 1 | Status: SHIPPED | OUTPATIENT
Start: 2018-12-07 | End: 2019-07-26 | Stop reason: SDUPTHER

## 2018-12-07 RX ORDER — DEXTROAMPHETAMINE SACCHARATE, AMPHETAMINE ASPARTATE MONOHYDRATE, DEXTROAMPHETAMINE SULFATE AND AMPHETAMINE SULFATE 5; 5; 5; 5 MG/1; MG/1; MG/1; MG/1
20 CAPSULE, EXTENDED RELEASE ORAL DAILY PRN
Qty: 30 CAPSULE | Refills: 0 | Status: SHIPPED | OUTPATIENT
Start: 2018-12-07 | End: 2019-06-14 | Stop reason: SDUPTHER

## 2018-12-07 NOTE — PROGRESS NOTES
Chemodenervation  Date/Time: 12/7/2018 7:59 AM  Performed by: Brannon Johns by: Sarah Armando details:     Preparation: Patient was prepped and draped in usual sterile fashion      Prepped With: Alcohol    Anesthesia (see MAR for exact dosages): Anesthesia method:  None  Procedure details:     Position:  Upright  Botox:     Botox Type:  Type A    Brand:  Botox    mL's of Botulinum Toxin:  200    Final Concentration per CC:  200 units    Needle Gauge:  30 G 2 5 inch  Procedures:     Botox Procedures: chronic headache      Indications: migraines    Injection Location:     Head / Face:  L superior cervical paraspinal, R superior cervical paraspinal, L , R , L frontalis, R frontalis, procerus, R medial occipitalis, L medial occipitalis, L temporalis, R temporalis, L superior trapezius and R superior trapezius    L  injection amount:  5 unit(s)    R  injection amount:  5 unit(s)    L medial frontalis:  10 unit(s)    R medial frontalis:  10 unit(s)    L temporalis injection amount:  20 unit(s)    R temporalis injection amount:  20 unit(s)    Procerus injection amount:  5 unit(s)    L medial occipitalis injection amount:  15 unit(s)    R medial occipitalis injection amount:  15 unit(s)    L superior cervical paraspinal injection amount:  10 unit(s)    R superior cervical paraspinal injection amount:  10 unit(s)    L superior trapezius injection amount:  15 unit(s)    R superior trapezius injection amount:  15 unit(s)  Total Units:     Total units used:  200    Total units discarded:  0  Post-procedure details:     Chemodenervation:  Chronic migraine    Facial Nerve Location[de-identified]  Bilateral facial nerve    Patient tolerance of procedure:   Tolerated well, no immediate complications  Comments:      5 units orbicularis oculi bilaterally  35 units frontalis   All medically necessary

## 2018-12-07 NOTE — PSYCH
MEDICATION MANAGEMENT NOTE        [facility]      Name and Date of Birth:  Clemencia Blood 52 y o  1965    Date of Visit: December 7, 2018    SUBJECTIVE:  CC: Melissa Archuleta presents today for follow up on "doing good  Things are good"; anxiety and depression     Melissa Archuleta feels meds are doing what she needs them to do  She tried reducing adderall, clonazepambut "I do better on those"  She feels more engaged, more balanced  Difficult with boyfriend, 72, retired, in [facility] and her whole family here, including her aging parents  Since our last visit, overall symptoms have been stable  HPI ROS:             ('was' notes: recent => remote)  Medication Side Effects:  no     Depression (10 worst):  1 (Was 2)   Anxiety (10 worst):  3 (Was 3-6)   Safety (SI, HI, Other):  no (Was no)   Sleep:  good (Was 8hr)   Energy:  good (Was so so )   Appetite:  good (Was fine)   Weight Change:  no change      Melissa Archuleta denies any side effects from medications unless noted above    Review Of Systems as noted above  In addition:     Constitutional negative   ENT negative   Cardiovascular negative   Respiratory negative   Gastrointestinal negative   Genitourinary negative   Musculoskeletal negative   Integumentary negative   Neurological negative   Endocrine negative   Other Symptoms none     Pain none   Pain Scale 0     History Review:  The following portions of the patient's history were reviewed and updated as appropriate: allergies, current medications, past family history, past medical history, past social history and problem list      Lab Review: No new labs or no relevant labs needing review with patient today      OBJECTIVE:     MENTAL STATUS EXAM  Appearance:  age appropriate   Behavior:  pleasant, cooperative, with good eye contact   Speech:  Normal volume, regular rate and rhythm   Mood:  euthymic   Affect:  mood congruent   Language: intact and appropriate for age   Thought Process:  Linear and goal directed   Associations: intact associations   Thought Content:  normal and appropriate   Perceptual Disturbances: no auditory or visual hallcunations   Risk Potential / Abnormal Thoughts: Suicidal ideation - None  Homicidal ideation - None  Potential for aggression - No       Consciousness:  Alert & Awake   Sensorium:  Grossly oriented   Attention: attention span and concentration are age appropriate   Intellect: within normal limits   Fund of Knowledge:  Memory: awareness of current events: yes  recent and remote memory grossly intact   Insight:  good   Judgment: good   Muscle Strength Muscle Tone: normal  normal   Gait/Station: normal gait/station with good balance   Motor Activity: no abnormal movements       Risks, Benefits And Possible Side Effects Of Medications:    AGREE: Risks, benefits, and possible side effects of medications explained to Alex Jordan and she (or legal representative) verbalizes understanding and agreement for treatment  Controlled Medication Discussion:     Alex Jordan has been filling controlled prescriptions on time as prescribed according to [facility] program    ______________________________________________________________      Recent labs:  No visits with results within 1 Month(s) from this visit     Latest known visit with results is:   Appointment on 07/12/2018   Component Date Value    Clarity, UA 07/12/2018 Cloudy     Color, UA 07/12/2018 Yellow     Specific Gravity, UA 07/12/2018 1 021     pH, UA 07/12/2018 6 0     Glucose, UA 07/12/2018 Negative     Ketones, UA 07/12/2018 15 (1+)*    Blood, UA 07/12/2018 Large*    Protein, UA 07/12/2018 Trace*    Nitrite, UA 07/12/2018 Positive*    Bilirubin, UA 07/12/2018 Negative     Urobilinogen, UA 07/12/2018 0 2     Leukocytes, UA 07/12/2018 Moderate*    WBC, UA 07/12/2018 Innumerable*    RBC, UA 07/12/2018 Innumerable*    Hyaline Casts, UA 07/12/2018 None Seen     Bacteria, UA 07/12/2018 Innumerable*    Epithelial Cells 07/12/2018 None Seen     Urine Culture 07/12/2018 >100,000 cfu/ml Escherichia coli*       Psychiatric History   ALEX    Patient did used to see Sabra Bernheim in early 2016 when she had a change providers due to insurance  She does not have a therapist     She been hospitalized once in 2000  She says that it was related to having a breakup with her boyfriend at the time and then finding out that she lost her job because she was  at his place of employment  Losing her boyfriend because she did not want to get  and he did and also having job issues led her to crisis and overdose although it was not a dramatic overdose it was a time when she said that she was trying to take pills to go to sleep and that she did have some suicidal thoughts associated with it  She denies any self-harm homicidal ideation in the past or present  She's never been violent  She says that she's not had any suicidal ideation since that time  Social History:  Jaylen Parham was raised in [place] [facility] to a "good" childhood  Her parents were together and still are  She denies ever having physical or sexual abuse or other forms neglect now or in the past as a child  She has one brother and one sister  She developed normally  She finished high school and got associates degree in business  Her daughter currently lives with her  She has 3 children 2 boys and one girl  She is good relationships with her family  She's been  and  twice  Currently not in a serious relationship at intake    Her support system includes her children her parents and her cousins  She has friends that are close but she doesn't typically opened up to them about mental health  She is [place] and attends Religious sometimes  No  history no legal issues and no weapons at home      She does not use tobacco drinks about 1 cup of coffee a day and is a social drinker and when she does drink it's very rare and not much at that time  She denies ever using substances and has never been to rehabilitation  Medical / Surgical History:    Past Medical History:   Diagnosis Date    Basal cell carcinoma     Last assessed: 9/26/14    Depression with anxiety     Last assessed: 1/13/17    Insomnia     Last assessed: 9/26/14    Kidney stone      Past Surgical History:   Procedure Laterality Date    BLADDER SURGERY      lift of bladder    CYSTOSCOPY      Theurapeutic  TVT-O    EYE SURGERY      LASIK    GYNECOLOGIC CRYOSURGERY      Cervix    HYSTEROSCOPY W/ ENDOMETRIAL ABLATION  12/14/2010    Deborah    KIDNEY SURGERY  kidney stone removal    1983    TUBAL LIGATION         Family Psychiatric History:     Family History   Problem Relation Age of Onset    Heart murmur Mother     Hyperlipidemia Mother     Atrial fibrillation Father     Arthritis Father     Psoriasis Sister     Heart disease Maternal Grandfather     Breast cancer Paternal Grandmother     Diabetes Paternal Grandmother         Mellitus    Hypertension Paternal Grandmother     Skin cancer Paternal Grandfather    Chester Nagy ADD / ADHD Son     ADD / ADHD Daughter        Daughter -  Family history of ADD (attention deficit disorder)  Son - Family history of ADD (attention deficit disorder)  Family History    15  Denied: Family history of bipolar disorder   14  Denied: Family history of substance abuse   15  Denied: Family history of suicide attempt      Confidential Assessment:  Scales:    Assessment/Plan:        There are no diagnoses linked to this encounter     ______________________________________________________________________    Generalized anxiety disorder  MDD, recurrent, in remission    Attention and concentration deficit  Memory difficulties    Kat Rasmussen is doing very well, some anxiety due to situations but not bad  Stable on meds  Attention and concentration deficit may be related to organic issues, anxiety, or underlying ADD/ADHD    However, tried reductions of klonopin, adderall in past but did not do as well  She is doing well on current treatment, will maintain  Past medications include Prozac which helps but does have some decreased orgasm  She took Topamax for migraines but this seemed to lead to confusion and she has not been on it for years  She does not recall details of Zoloft, Celexa or Lexapro  Effexor seemed to cause weight gain and she did not like it for that  Wellbutrin in past  Ambien caused oversedation, melatonin did not help  Treatment Plan:        Patient has been educated about their diagnosis and treatment modalities  They voiced understanding and agreement with the following plan:    1) Meds:   - Prozac 40mg daily  (consider increase but orgasm affected  Was on 50mg in past per charts)   - Wellbutrin XL 300mg daily   - Klonopin 0 25mg in day PRN and 0 5mg HS PRN  (12/7/2019 #30 with 3 refills)    - Adderal XR 20mg Daily (Last refill on 2/1/2019)    2) Labs: PRN   - 2/2017: CBC, CMP WNL  TSH 1 57, TG 56, HDL 98,     3) Therapy:   - not in therapy, revisit PRN   - Provided progressive muscle relaxation handout, asked her to look into belly breathing and guided imagery  (2/9/2018)    4) Medical:  Concentration issues, MRI abnormalities, family h/o early onset dementia (PAunt), migraines (gets Botox)   - pt to f/u with neurologist   - pt to f/u with other providers PRN    5) Other: support prn   - works at [address] [place] [address]  Reception   - good support from family, daughter lives with her   - 3 kids,   Daughter graduates from [employer] with [address] [employer]  Likes psychology, healthcare   - Boyfriend in [place]    6) Follow up:   - 6 months, but patient to call if issues or concerns    7) Treatment Plan: Enacted 2/9/2018, 12/7/2018      Discussed self monitoring of symptoms, and symptom monitoring tools      Patient has been informed of 24 hours and weekend coverage for urgent situations accessed by calling the main clinic phone number             Psychotherapy in session:  Time spent performing psychotherapy: 9 Minutes on supportive therapy

## 2018-12-11 ENCOUNTER — DOCUMENTATION (OUTPATIENT)
Dept: PSYCHIATRY | Facility: CLINIC | Age: 53
End: 2018-12-11

## 2018-12-11 NOTE — PROGRESS NOTES
Treatment Plan not completed within required time limits due to :   Follow up appointment scheduled over the 120 days from [address] [place] [address] Rd 12/7/2018     Next schedule appointment 6/7/2019

## 2019-01-09 NOTE — PROGRESS NOTES
[facility]  PATIENT:  Edgard Singleton  MRN:  [id number]  :  1965  DATE OF SERVICE:  2019      Assessment/Plan:     Migraine with aura and without status migrainosus, not intractable  Continue Botox every 3 months  Continue fluoxetine, managed by psychiatry  Continue Wellbutrin, managed by psychiatry  Continue vitamin b supplements    At onset of migraine may take Advil 2-3 tablets every 6 hr as needed  Limit to less than 3 doses a week  Memory difficulties  This is currently stable    Patient has had multiple MRI  Brain NQ  We will repeat this in or around 2020, as long as remains stable  Patient will call with any changes regarding her memory difficulties    Recurrent major depressive disorder, in remission ([facility] )  Continue to see psychiatry    TIM (generalized anxiety disorder)  Continue to see psychiatry         Problem List Items Addressed This Visit        Cardiovascular and Mediastinum    Migraine with aura and without status migrainosus, not intractable - Primary     Continue Botox every 3 months  Continue fluoxetine, managed by psychiatry  Continue Wellbutrin, managed by psychiatry  Continue vitamin b supplements    At onset of migraine may take Advil 2-3 tablets every 6 hr as needed  Limit to less than 3 doses a week  Other    TIM (generalized anxiety disorder)     Continue to see psychiatry         Recurrent major depressive disorder, in remission ([facility] )     Continue to see psychiatry         Memory difficulties     This is currently stable    Patient has had multiple MRI  Brain NQ  We will repeat this in or around 2020, as long as remains stable  Patient will call with any changes regarding her memory difficulties                   We will perform MRI NQ in or around 2020      History of Present Illness: We had the pleasure of evaluating Edgard Singleton in neurological follow up  today for headaches  As you know,  she is a 52 y  o  right handedfemale  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 PREVENTATIVE:  Prozac, Wellbutrin, Klonopin, Depakote, Topamax, Effexor, Lexapro, Zoloft, Celexa, zonisamide, verapamil, gabapentin, Botox  ABORTIVE:  Aleve, Tylenol, ibuprofen    Alternative therapies used in the past for headaches? none  Headache are worse if the patient: cough, sneeze, bending over  Headache triggers:  Lack of sleep, fatigue, stress/tension, hot flashes    Aura and how long does it last -  No    What is your current pain level - 0/10    Any family history of migraines? No  Any family history of aneurysms? Yes maternal cousin    Headaches started at what age? 27years old  How often do the headaches occur? 0-1 month  What time of the day do the headaches start? varies  How long do the headaches last?   2-3 hours  Are you ever headache free? Yes  Describe your usual headache - Throbbing, Pressure, Dull  Where is your headache located? Bilateral frontal, bilateral temporalis and occipitalis  What is the intensity of pain? 6/10  Associated symptoms:   - Decrease of appetite, nausea  - Photophobia, phonophobia, sensitivity to smell   - Problem with concentration  - light-headed or dizzy, stiff or sore neck,   - Hands or feet tingle or feel numb, prefer to be alone and in a dark room, unable to work    Number of days missed per month because of headaches:  Work (or school) days: 0  Social or Family activities: 0      What time of the year do headaches occur more frequently?   no  Have you seen someone else for headaches or pain? No  Have you had trigger point injection performed and how often? No  Have you had Botox injection performed and how often?  Yes   Have you had epidural injections or transforaminal injections performed? No    Have you used CBD or THC for your headaches and how often? No  Are you current pregnant or planning on getting pregnant? No, done with family planning  Have you ever had any Brain imaging? yes     11/2014 MRI brain: Several nonspecific subcortical white matter lesions bilaterally in   the frontal lobes, nonspecific findings which may be related to early   changes of microangiopathy, in the appropriate clinical setting  Other   postinfectious, postinflammatory or demyelinating processes not   excluded  Patients with migraine headaches can have white matter   lesions as well  Consider followup repeat contrast enhanced MRI of the   brain in 6-12 months to establish stability and to exclude abnormal   enhancement  No acute infarction, intracranial hemorrhage or mass effect  11/2014 MRA head: No intracranial aneurysm or major intracranial arterial stenosis  6/2/2015 MRI brain: No acute disease  Stable white matter changes, nonspecific  Selective hippocampal volume loss with normal sized lateral and temporal horn volume  This may be a congenital in etiology  Repeat marrow quantitative imaging in one year to document trajectory of volume loss  10/2016 MRI NQ Small white matter lesions are noted within the frontal lobes which are nonspecific and 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  Small white matter lesions have been described within the frontal lobes in patients with chronic   migraine headaches  Overall no significant change noted  10/2016 PET brain Symmetric decreased FDG activity in the medial temporal lobes bilaterally, corresponding with prior MRI findings  FDG distribution is otherwise unremarkable  If early Alzheimer's disease is a clinical consideration, beta amyloid PET CT   brain imaging may be useful      NeuroQuant analysis was performed: Measurements suggest significantly decreased hippocampal volume and mild local ex-vacuo dilatation of the adjacent inferior lateral ventricles : 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  Given patient's age and anatomic imaging, consider 6-12 month follow-up examination  4/2017 MRI NQ No acute intracranial abnormality  NeuroQuant analysis was performed: Low hippocampal volume without ex-vacuo dilatation: 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  F/U to establish presence and nature of volume change over time  No substantial interval change from the prior examination of 10/10/2016 when given measurement error  Nonspecific white matter changes suggestive of mild chronic microvascular ischemia  4/28/2018 MRI NQ Brain   Several tiny supratentorial white matter T2 and FLAIR hyperintense foci suspicious for mild chronic microangiopathic changes such as may accompany migraine headaches      NeuroQuant analysis was performed: Low hippocampal volume without ex-vacuo dilatation: 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   F/U to establish presence and nature of volume change over time        Past Medical History:   Diagnosis Date    Basal cell carcinoma     Last assessed: 9/26/14    Depression with anxiety     Last assessed: 1/13/17    Insomnia     Last assessed: 9/26/14    Kidney stone        Patient Active Problem List   Diagnosis    Attention and concentration deficit    TIM (generalized anxiety disorder)    Recurrent major depressive disorder, in remission ([facility] )    Migraine with aura and without status migrainosus, not intractable    Hyperlipidemia    Memory difficulties    Closed nondisplaced fracture of fifth metacarpal bone of left hand       Medications:      Current Outpatient Prescriptions   Medication Sig Dispense Refill    amphetamine-dextroamphetamine (ADDERALL XR) 20 MG 24 hr capsule Take 1 capsule (20 mg total) by mouth daily as needed (focus and concentration deficit) Max Daily Amount: 20 mg 30 capsule 0    b complex vitamins tablet Take 1 tablet by mouth every other day      buPROPion (WELLBUTRIN XL) 300 mg 24 hr tablet Take 1 tablet (300 mg total) by mouth every morning 90 tablet 1    Calcium Carb-Cholecalciferol (CALCIUM 500+D3 PO) Take by mouth every other day      clonazePAM (KlonoPIN) 0 5 mg tablet Take 1/2 tab daily as needed for anxiety and take 1 tab nightly as needed for insomnia and anxiety 30 tablet 3    FLUoxetine (PROzac) 40 MG capsule Take 1 capsule (40 mg total) by mouth daily 90 capsule 1     No current facility-administered medications for this visit  Allergies: Allergies   Allergen Reactions    Butoconazole Hives    Tioconazole Hives       Family History:     Family History   Problem Relation Age of Onset    Heart murmur Mother     Hyperlipidemia Mother     Atrial fibrillation Father     Arthritis Father     Psoriasis Sister     Heart disease Maternal Grandfather     Breast cancer Paternal Grandmother     Diabetes Paternal Grandmother         Mellitus    Hypertension Paternal Grandmother     Skin cancer Paternal Grandfather    [facility] ADD / ADHD Son    [facility] ADD / ADHD Daughter        Social History:     Social History     Social History    Marital status:      Spouse name: N/A    Number of children: N/A    Years of education: N/A     Occupational History    Not on file       Social History Main Topics    Smoking status: Never Smoker    Smokeless tobacco: Never Used    Alcohol use Yes      Comment: socially    Drug use: No    Sexual activity: Yes     Partners: Male     Birth control/ protection: Post-menopausal     Other Topics Concern    Not on file     Social History Narrative    Caffeine use    Drinks coffee         Objective:   Physical Exam:                                                                   Vitals:            Constitutional:    /76 (BP Location: Left arm, Patient Position: Sitting, Cuff Size: Adult)   Pulse 88   Ht 5' 4" (1 626 m)   Wt 59 2 kg (130 lb 8 oz)   BMI 22 40 kg/m²   BP Readings from Last 3 Encounters:   01/11/19 114/76   12/07/18 130/72   08/17/18 142/65     Pulse Readings from Last 3 Encounters:   01/11/19 88   12/07/18 76   08/17/18 76         Well developed, well nourished, well groomed  No dysmorphic features  HEENT:  Normocephalic atraumatic  No meningismus  Oropharynx is clear and moist  No oral mucosal lesions  Chest:  Respirations regular and unlabored  Cardiovascular:  Regular rate, intact distal pulses  Distal extremities warm without palpable edema or tenderness, no observed significant swelling  Musculoskeletal:  Full range of motion  (see below under neurologic exam for evaluation of motor function and gait)   Skin:  warm and dry, not diaphoretic  No apparent birthmarks or stigmata of neurocutaneous disease  Psychiatric:  Normal behavior and appropriate affect        Neurological Examination:     Mental status/cognitive function:   Orientated to time, place and person  Recent and remote memory intact  Attention span and concentration as well as fund of knowledge are appropriate for age  Normal language and spontaneous speech  Cranial Nerves:  II-visual fields full  Fundi appear normal bilaterally  III, IV, VI-Pupils were equal, round, and reactive to light and accomodation  Extraocular movements were full and conjugate without nystagmus  V-facial sensation symmetric  VII-facial expression symmetric, intact forehead wrinkle, strong eye closure, symmetric smile    VIII-hearing grossly intact bilaterally   IX, X-palate elevation symmetric, no dysarthria  XI-shoulder shrug strength intact    XII-tongue protrusion midline  Motor Exam: symmetric bulk and tone throughout, no pronator drift  Power/strength 5/5 bilateral upper and lower extremities, no atrophy, fasciculations or abnormal movements noted  Sensory: grossly intact light touch in all extremities     Reflexes: brachioradialis 2+, biceps 2+, knee 2+, ankle 2+ bilaterally  No ankle clonus  Coordination: Finger nose finger intact bilaterally, no apparent dysmetria, ataxia or tremor noted  Gait: steady casual and tandem gait  Romberg Negative  Review of Systems:   Review of Systems   Constitutional: Negative  HENT: Negative  Eyes: Negative  Respiratory: Negative  Cardiovascular: Negative  Gastrointestinal: Negative  Endocrine: Negative  Genitourinary: Negative  Musculoskeletal: Negative  Skin: Negative  Allergic/Immunologic: Negative  Neurological: Negative  Hematological: Negative  Psychiatric/Behavioral: Negative          I personally reviewed and updated the ROS that was entered by the medical assistant       Author:  Mae Vasquez PA-C 1/11/2019 9:31 AM

## 2019-01-11 ENCOUNTER — OFFICE VISIT (OUTPATIENT)
Dept: NEUROLOGY | Facility: CLINIC | Age: 54
End: 2019-01-11
Payer: COMMERCIAL

## 2019-01-11 ENCOUNTER — ANNUAL EXAM (OUTPATIENT)
Dept: OBGYN CLINIC | Facility: CLINIC | Age: 54
End: 2019-01-11
Payer: COMMERCIAL

## 2019-01-11 VITALS
DIASTOLIC BLOOD PRESSURE: 62 MMHG | SYSTOLIC BLOOD PRESSURE: 104 MMHG | WEIGHT: 129 LBS | HEIGHT: 64 IN | BODY MASS INDEX: 22.02 KG/M2

## 2019-01-11 VITALS
DIASTOLIC BLOOD PRESSURE: 76 MMHG | BODY MASS INDEX: 22.28 KG/M2 | HEIGHT: 64 IN | HEART RATE: 88 BPM | SYSTOLIC BLOOD PRESSURE: 114 MMHG | WEIGHT: 130.5 LBS

## 2019-01-11 DIAGNOSIS — Z12.31 ENCOUNTER FOR SCREENING MAMMOGRAM FOR MALIGNANT NEOPLASM OF BREAST: ICD-10-CM

## 2019-01-11 DIAGNOSIS — Z12.4 SCREENING FOR CERVICAL CANCER: ICD-10-CM

## 2019-01-11 DIAGNOSIS — Z01.419 ENCOUNTER FOR GYNECOLOGICAL EXAMINATION (GENERAL) (ROUTINE) WITHOUT ABNORMAL FINDINGS: Primary | ICD-10-CM

## 2019-01-11 DIAGNOSIS — R41.3 MEMORY DIFFICULTIES: ICD-10-CM

## 2019-01-11 DIAGNOSIS — N94.10 DYSPAREUNIA, FEMALE: ICD-10-CM

## 2019-01-11 DIAGNOSIS — Z11.51 SCREENING FOR HUMAN PAPILLOMAVIRUS (HPV): ICD-10-CM

## 2019-01-11 DIAGNOSIS — F33.40 RECURRENT MAJOR DEPRESSIVE DISORDER, IN REMISSION (HCC): ICD-10-CM

## 2019-01-11 DIAGNOSIS — F41.1 GAD (GENERALIZED ANXIETY DISORDER): ICD-10-CM

## 2019-01-11 DIAGNOSIS — G43.109 MIGRAINE WITH AURA AND WITHOUT STATUS MIGRAINOSUS, NOT INTRACTABLE: Primary | ICD-10-CM

## 2019-01-11 PROCEDURE — 87624 HPV HI-RISK TYP POOLED RSLT: CPT | Performed by: OBSTETRICS & GYNECOLOGY

## 2019-01-11 PROCEDURE — 99214 OFFICE O/P EST MOD 30 MIN: CPT | Performed by: PHYSICIAN ASSISTANT

## 2019-01-11 PROCEDURE — 99396 PREV VISIT EST AGE 40-64: CPT | Performed by: OBSTETRICS & GYNECOLOGY

## 2019-01-11 PROCEDURE — G0145 SCR C/V CYTO,THINLAYER,RESCR: HCPCS | Performed by: OBSTETRICS & GYNECOLOGY

## 2019-01-11 NOTE — PATIENT INSTRUCTIONS
Continue BOTOX every 3 months  At onset of migraine, take 2-3 advil  May repeat in 6 hours if needed  Limit of less than 3 doses a week    We will perform MRI NQ in or around 4/2020      Medication overuse headaches:   - We discussed medication overuse headache [facility]) and how to avoid it in the future  It was explained that all analgesics have the potential to cause medication overuse headache [facility]) and analgesic overuse can negate the effectiveness of headache preventive measures  After successful [address] treatment, preventive medications for an underlying primary headache disorder have a greater chance for success  Avoid medications with narcotics, barbiturates, or caffeine in them as these can cause rebound headaches after very few doses and can interfere with other headache medicine efficacy  Taking any analgesics for more than 2-3 days a week can cause medication overuse headache  Reproductive age women: Should take folic acid daily when taking anti-seizure drugs especially Depakote  Over-the-counter supplements: to decrease intensity and frequency of migraines  - Magnesium Oxide 400-500 mg a day  If any diarrhea or upset stomach, decrease dose  as tolerated  -  B2 200 mg twice a day  This supplement will change the color of the urine to fluorescent yellow no matter how hydrated, which is normal       Headache management instructions  - When patient has a moderate to severe headache, they should seek rest, initiate relaxation and apply cold compresses to the head  - Maintain regular sleep schedule  Adults need at least 7-8 hours of uninterrupted a night  - Limit over the counter medications such as Tylenol, Ibuprofen, Aleve, Excedrin  (No more than 3 times a week)  - Maintain headache diary  We discussed an ROBBY for a smart phone is "Migraine mel"  - Limit caffeine to 1-2 cups 8 to 16 oz a day or less  - Avoid dietary trigger  (aged cheese, peanuts, MSG, aspartame and nitrates)    - Patient is to have regular frequent meals to prevent headache onset  - Please drink at least 64 ounces of water a day to help remain hydrated  Please call with any questions or concerns   Office number is [phone]

## 2019-01-11 NOTE — ASSESSMENT & PLAN NOTE
Continue Botox every 3 months  Continue fluoxetine, managed by psychiatry  Continue Wellbutrin, managed by psychiatry  Continue vitamin b supplements    At onset of migraine may take Advil 2-3 tablets every 6 hr as needed  Limit to less than 3 doses a week

## 2019-01-11 NOTE — PROGRESS NOTES
Annual Exam     Carlos Marquez is a 52 y o  female who presents for annual exam  The patient c/o vaginal dryness and painful IC despite lubricant use  The patient is sexually active  GYN screening history: last pap: was normal  The patient is not taking hormone replacement therapy  Patient denies post-menopausal vaginal bleeding    The patient wears seatbelts: yes  The patient participates in regular exercise: yes  Has the patient ever been transfused or tattooed?: no  The patient reports that there is not domestic violence in her life  OB History      Para Term  AB Living    3 3            SAB TAB Ectopic Multiple Live Births                      GYN History:  No LMP recorded  Patient is postmenopausal    + h/o cervical dysplasia treated by cryosurgery      The following portions of the patient's history were reviewed and updated as appropriate: allergies, current medications, past family history, past medical history, past social history, past surgical history and problem list     Review of Systems  Pertinent items are noted in HPI  Objective      /62   Ht 5' 4" (1 626 m)   Wt 58 5 kg (129 lb)   BMI 22 14 kg/m²     General:   alert and oriented, in no acute distress   Heart:    Breasts: regular rate and rhythm, S1, S2 normal, no murmur, click, rub or gallop  appear normal, no suspicious masses, no skin or nipple changes or axillary nodes  Lungs: clear to auscultation bilaterally   Abdomen: soft, non-tender, without masses or organomegaly   Vulva: Atrophic with minor erythema   Vagina: Thin atrophic mucosa   Cervix: no lesions   Uterus: normal size, mobile, non-tender   Adnexa: normal adnexa and no mass, fullness, tenderness       Assessment/Plan:  Encounter for annual exam with abnormal finding  Postmenopausal vaginal atrophy with dyspareunia - discussed vaginal estrogen preparations and Intrarosa  Use, side effects, contraindications all reviewed    Patient would like to try estrogen cream   H/o cervical dysplasia - Pap and HPV done  Continue q 3 years until age 70    Screening for breast cancer - mammogram order given

## 2019-01-11 NOTE — ASSESSMENT & PLAN NOTE
This is currently stable    Patient has had multiple MRI  Brain NQ  We will repeat this in or around April of 2020, as long as remains stable      Patient will call with any changes regarding her memory difficulties

## 2019-01-14 LAB
HPV HR 12 DNA CVX QL NAA+PROBE: NEGATIVE
HPV16 DNA CVX QL NAA+PROBE: NEGATIVE
HPV18 DNA CVX QL NAA+PROBE: NEGATIVE

## 2019-01-16 LAB
LAB AP GYN PRIMARY INTERPRETATION: NORMAL
Lab: NORMAL

## 2019-01-25 ENCOUNTER — OFFICE VISIT (OUTPATIENT)
Dept: INTERNAL MEDICINE CLINIC | Facility: CLINIC | Age: 54
End: 2019-01-25
Payer: COMMERCIAL

## 2019-01-25 VITALS
SYSTOLIC BLOOD PRESSURE: 141 MMHG | OXYGEN SATURATION: 97 % | WEIGHT: 131.2 LBS | HEIGHT: 64 IN | BODY MASS INDEX: 22.4 KG/M2 | DIASTOLIC BLOOD PRESSURE: 76 MMHG | HEART RATE: 90 BPM | TEMPERATURE: 97.4 F

## 2019-01-25 DIAGNOSIS — Z00.00 WELL ADULT EXAM: Primary | ICD-10-CM

## 2019-01-25 DIAGNOSIS — H02.9 EYELID ABNORMALITY: Primary | ICD-10-CM

## 2019-01-25 PROBLEM — S62.307A CLOSED NONDISPLACED FRACTURE OF FIFTH METACARPAL BONE OF LEFT HAND: Status: RESOLVED | Noted: 2018-03-12 | Resolved: 2019-01-25

## 2019-01-25 PROCEDURE — 99396 PREV VISIT EST AGE 40-64: CPT | Performed by: INTERNAL MEDICINE

## 2019-01-25 NOTE — PROGRESS NOTES
Assessment/Plan:     Diagnoses and all orders for this visit:    Well adult exam        Plan:  Continue optimal lifestyle and next visit can be in 1 year barring problems sooner  Referral to Ophthalmology was made as well  Subjective:      Patient ID: Harleen Toscano is a 52 y o  female who is here for an annual preventive exam and feels well  She is still working full-time as a  at a [employer] and enjoys her work  Her longstanding boyfriend has retired to [place] and she splits her time in [place] and here, enjoying her 1 young grandchildren  She is very active with excellent exercise tolerance  HPI   We reviewed preventive care which is up-to-date including normal colonoscopy in 2014 on 10 year recall list with negative GI review of systems  We reviewed her gyn visit of January 11th and 3 time a week oral estrogen therapy has resolved her substantial menopausal symptoms  Botox injections for migraine have been very effective and she is continuing this process  She is up-to-date on visits with Psychiatry in stable  She had lab work on March 30th of last year including an unremarkable basic metabolic profile, and Employee lab work on June 19 showing hemoglobin A1c of 5 8, and optimal lipids despite total cholesterol elevation of 286 with   Her only complaint is a chronic stye that intermittently becomes inflamed involving the upper eyelid of the right eye  This seems to be slowly enlarging over time  We discussed follow-up with an ophthalmologist subspecialist and this will be arranged  Family history is unchanged with possible history of Alzheimer's disease on her father side of the family      The following portions of the patient's history were reviewed and updated as appropriate: allergies, current medications, past family history, past medical history, past social history, past surgical history and problem list     Review of Systems  as above, all others negative  Objective:      /76 (BP Location: Left arm, Patient Position: Sitting, Cuff Size: Standard)   Pulse 90   Temp (!) 97 4 °F (36 3 °C) (Tympanic)   Ht 5' 4" (1 626 m)   Wt 59 5 kg (131 lb 3 2 oz)   SpO2 97%   BMI 22 52 kg/m²      Wt Readings from Last 3 Encounters:   01/25/19 59 5 kg (131 lb 3 2 oz)   01/11/19 58 5 kg (129 lb)   01/11/19 59 2 kg (130 lb 8 oz)     Temp Readings from Last 3 Encounters:   01/25/19 (!) 97 4 °F (36 3 °C) (Tympanic)   12/07/18 97 9 °F (36 6 °C)   08/17/18 97 6 °F (36 4 °C) (Oral)     BP Readings from Last 3 Encounters:   01/25/19 141/76   01/11/19 104/62   01/11/19 114/76     Pulse Readings from Last 3 Encounters:   01/25/19 90   01/11/19 88   12/07/18 76        Physical Exam    Vital signs stable, pulse regular in no distress peer HEENT exam notable for what appears to be a chronic inflammatory growth verses stye of the right upper eyelid  Head neck without mass or adenopathy  Thyroid exam normal on inspection and palpation  There is no JVD  There are no carotid bruits in carotid pulses are normal   Lungs are clear and cardiac exam is normal on auscultation  Abdomen:  Nondistended nontender with normal bowel sounds, nontender and soft without masses bruits organomegaly or hernia and there is no periumbilical adenopathy  Peripheral circulation is intact  There is no edema  There is no significant joint dysfunction or joint deformity  Neurologic exam unremarkable  Cognitive status intact    Patient Active Problem List   Diagnosis    Attention and concentration deficit    TIM (generalized anxiety disorder)    Recurrent major depressive disorder, in remission ([facility] [address] )    Migraine with aura and without status migrainosus, not intractable    Hyperlipidemia    Memory difficulties       Current Outpatient Prescriptions on File Prior to Visit   Medication Sig Dispense Refill    amphetamine-dextroamphetamine (ADDERALL XR) 20 MG 24 hr capsule Take 1 capsule (20 mg total) by mouth daily as needed (focus and concentration deficit) Max Daily Amount: 20 mg 30 capsule 0    b complex vitamins tablet Take 1 tablet by mouth every other day      buPROPion (WELLBUTRIN XL) 300 mg 24 hr tablet Take 1 tablet (300 mg total) by mouth every morning 90 tablet 1    Calcium Carb-Cholecalciferol (CALCIUM 500+D3 PO) Take by mouth every other day      clonazePAM (KlonoPIN) 0 5 mg tablet Take 1/2 tab daily as needed for anxiety and take 1 tab nightly as needed for insomnia and anxiety 30 tablet 3    conjugated estrogens (PREMARIN) vaginal cream Insert 0 5g vaginally three times per week 30 g 0    FLUoxetine (PROzac) 40 MG capsule Take 1 capsule (40 mg total) by mouth daily 90 capsule 1     No current facility-administered medications on file prior to visit

## 2019-01-28 ENCOUNTER — OFFICE VISIT (OUTPATIENT)
Dept: PLASTIC SURGERY | Facility: CLINIC | Age: 54
End: 2019-01-28
Payer: COMMERCIAL

## 2019-01-28 VITALS — HEIGHT: 64 IN | WEIGHT: 129 LBS | BODY MASS INDEX: 22.02 KG/M2

## 2019-01-28 DIAGNOSIS — H02.823 CYST OF RIGHT EYELID: Primary | ICD-10-CM

## 2019-01-28 PROCEDURE — 99242 OFF/OP CONSLTJ NEW/EST SF 20: CPT | Performed by: PLASTIC SURGERY

## 2019-02-22 ENCOUNTER — TELEPHONE (OUTPATIENT)
Dept: INTERNAL MEDICINE CLINIC | Facility: CLINIC | Age: 54
End: 2019-02-22

## 2019-02-26 ENCOUNTER — HOSPITAL ENCOUNTER (OUTPATIENT)
Dept: MAMMOGRAPHY | Facility: MEDICAL CENTER | Age: 54
Discharge: HOME | End: 2019-02-26
Payer: COMMERCIAL

## 2019-02-26 ENCOUNTER — ANESTHESIA EVENT (OUTPATIENT)
Dept: PERIOP | Facility: AMBULARY SURGERY CENTER | Age: 54
End: 2019-02-26
Payer: COMMERCIAL

## 2019-02-26 ENCOUNTER — APPOINTMENT (OUTPATIENT)
Dept: RADIOLOGY | Facility: MEDICAL CENTER | Age: 54
End: 2019-02-26
Payer: COMMERCIAL

## 2019-02-26 DIAGNOSIS — Z12.31 ENCOUNTER FOR SCREENING MAMMOGRAM FOR MALIGNANT NEOPLASM OF BREAST: ICD-10-CM

## 2019-02-26 DIAGNOSIS — M79.671 RIGHT FOOT PAIN: Primary | ICD-10-CM

## 2019-02-26 DIAGNOSIS — M79.671 RIGHT FOOT PAIN: ICD-10-CM

## 2019-02-26 PROCEDURE — 73630 X-RAY EXAM OF FOOT: CPT

## 2019-02-26 PROCEDURE — 77067 SCR MAMMO BI INCL CAD: CPT

## 2019-02-27 ENCOUNTER — TELEPHONE (OUTPATIENT)
Dept: OBGYN CLINIC | Facility: CLINIC | Age: 54
End: 2019-02-27

## 2019-03-04 ENCOUNTER — APPOINTMENT (OUTPATIENT)
Dept: LAB | Facility: MEDICAL CENTER | Age: 54
End: 2019-03-04
Attending: PLASTIC SURGERY
Payer: COMMERCIAL

## 2019-03-04 DIAGNOSIS — M79.671 FOOT PAIN, RIGHT: Primary | ICD-10-CM

## 2019-03-04 DIAGNOSIS — H02.823 CYST OF RIGHT EYELID: ICD-10-CM

## 2019-03-04 LAB
ANION GAP SERPL CALCULATED.3IONS-SCNC: 3 MMOL/L (ref 4–13)
BASOPHILS # BLD AUTO: 0.02 THOUSANDS/ΜL (ref 0–0.1)
BASOPHILS NFR BLD AUTO: 0 % (ref 0–1)
BUN SERPL-MCNC: 10 MG/DL (ref 5–25)
CALCIUM SERPL-MCNC: 9 MG/DL (ref 8.3–10.1)
CHLORIDE SERPL-SCNC: 101 MMOL/L (ref 100–108)
CO2 SERPL-SCNC: 31 MMOL/L (ref 21–32)
CREAT SERPL-MCNC: 0.84 MG/DL (ref 0.6–1.3)
EOSINOPHIL # BLD AUTO: 0.13 THOUSAND/ΜL (ref 0–0.61)
EOSINOPHIL NFR BLD AUTO: 2 % (ref 0–6)
ERYTHROCYTE [DISTWIDTH] IN BLOOD BY AUTOMATED COUNT: 12.4 % (ref 11.6–15.1)
GFR SERPL CREATININE-BSD FRML MDRD: 78 ML/MIN/1.73SQ M
GLUCOSE SERPL-MCNC: 94 MG/DL (ref 65–140)
HCT VFR BLD AUTO: 42.6 % (ref 34.8–46.1)
HGB BLD-MCNC: 13.5 G/DL (ref 11.5–15.4)
IMM GRANULOCYTES # BLD AUTO: 0.02 THOUSAND/UL (ref 0–0.2)
IMM GRANULOCYTES NFR BLD AUTO: 0 % (ref 0–2)
LYMPHOCYTES # BLD AUTO: 2.25 THOUSANDS/ΜL (ref 0.6–4.47)
LYMPHOCYTES NFR BLD AUTO: 33 % (ref 14–44)
MCH RBC QN AUTO: 31.9 PG (ref 26.8–34.3)
MCHC RBC AUTO-ENTMCNC: 31.7 G/DL (ref 31.4–37.4)
MCV RBC AUTO: 101 FL (ref 82–98)
MONOCYTES # BLD AUTO: 0.74 THOUSAND/ΜL (ref 0.17–1.22)
MONOCYTES NFR BLD AUTO: 11 % (ref 4–12)
NEUTROPHILS # BLD AUTO: 3.67 THOUSANDS/ΜL (ref 1.85–7.62)
NEUTS SEG NFR BLD AUTO: 54 % (ref 43–75)
NRBC BLD AUTO-RTO: 0 /100 WBCS
PLATELET # BLD AUTO: 304 THOUSANDS/UL (ref 149–390)
PMV BLD AUTO: 11.2 FL (ref 8.9–12.7)
POTASSIUM SERPL-SCNC: 3.8 MMOL/L (ref 3.5–5.3)
RBC # BLD AUTO: 4.23 MILLION/UL (ref 3.81–5.12)
SODIUM SERPL-SCNC: 135 MMOL/L (ref 136–145)
WBC # BLD AUTO: 6.83 THOUSAND/UL (ref 4.31–10.16)

## 2019-03-04 PROCEDURE — 85025 COMPLETE CBC W/AUTO DIFF WBC: CPT

## 2019-03-04 PROCEDURE — 80048 BASIC METABOLIC PNL TOTAL CA: CPT

## 2019-03-04 PROCEDURE — 36415 COLL VENOUS BLD VENIPUNCTURE: CPT

## 2019-03-04 NOTE — PRE-PROCEDURE INSTRUCTIONS
Pre-Surgery Instructions:   Medication Instructions    amphetamine-dextroamphetamine (ADDERALL XR) 20 MG 24 hr capsule Instructed patient per Anesthesia Guidelines   b complex vitamins tablet Patient was instructed by Physician and understands   buPROPion (WELLBUTRIN XL) 300 mg 24 hr tablet Instructed patient per Anesthesia Guidelines   Calcium Carb-Cholecalciferol (CALCIUM 500+D3 PO) Patient was instructed by Physician and understands   clonazePAM (KlonoPIN) 0 5 mg tablet Instructed patient per Anesthesia Guidelines   conjugated estrogens (PREMARIN) vaginal cream Instructed patient per Anesthesia Guidelines   FLUoxetine (PROzac) 40 MG capsule Instructed patient per Anesthesia Guidelines  Pre op and bathing instructions reviewed

## 2019-03-04 NOTE — PROGRESS NOTES
Assessment/Plan   1) Right upper eyelid cyst    I discussed that I felt that this lesion is: benign  I discussed excision of the lesion(s) with the patient  I discussed that this could be performed as outpatient surgery    I discussed risks including but not limited to: bleeding, infection, hematoma, seroma, wound breakdown, scar, need for further surgery, deformity, pain, numbness, weakness, asymmetry, injury to structures, and medical complications  I discussed that the scar involved would be larger than the maximal dimension of the lesion itself  The patient would like to proceed and therefore we will initiate the insurance approval process  A total of 30 minutes of face-to-face time was spent in this encounter, of which >50% was spent in counseling  Rick Mcmillan 5   [facility] [address]   [place], [place] [address] [place]   Office: [phone]            Subjective   Patient ID: Cassy Montalvo is a 52 y o  female  Vitals:     Taylor Jung is a 52years old female who presents for evaluation of a skin lesion  Location:  Right upper eyelid  Duration:  Many months and progressively enlarging causing irritation along her or eyelid lashes  No prior treatments  No visual disturbance  Patient looking for any options to limit her symptoms  Patient had prior lasik surgery     Past Medical History:   Diagnosis Date    Basal cell carcinoma     Last assessed: 9/26/14    Depression with anxiety     Last assessed: 1/13/17    Insomnia     Last assessed: 9/26/14    Kidney stone      Past Surgical History:   Procedure Laterality Date    BLADDER SURGERY      lift of bladder    CYSTOSCOPY      Theurapeutic   TVT-O    EYE SURGERY      LASIK    GYNECOLOGIC CRYOSURGERY      Cervix    HYSTEROSCOPY W/ ENDOMETRIAL ABLATION  12/14/2010    Novasure    KIDNEY SURGERY  kidney stone removal    1983    TUBAL LIGATION         Review of Systems Constitutional: Negative  HENT: Negative  Eyes: Positive for itching  Respiratory: Negative  Cardiovascular: Negative  Gastrointestinal: Negative  Endocrine: Negative  Genitourinary: Negative  Musculoskeletal: Negative  Skin: Negative  Allergic/Immunologic: Negative  Neurological: Negative  Hematological: Negative  Psychiatric/Behavioral: Negative  Objective   Physical Exam   Constitutional  She appears well-developed and well-nourished  Eyes  Pupils are equal, round, and reactive to light  General -             Right: Right eye extraocular movements are normal              Left: Left eye extraocular movements are normal          Cardiovascular: Normal rate  Pulmonary/Chest  Effort normal      Skin  Skin is warm  Psychiatric  She has a normal mood and affect   Her behavior is normal

## 2019-03-07 ENCOUNTER — OFFICE VISIT (OUTPATIENT)
Dept: OBGYN CLINIC | Facility: CLINIC | Age: 54
End: 2019-03-07
Payer: COMMERCIAL

## 2019-03-07 VITALS
DIASTOLIC BLOOD PRESSURE: 70 MMHG | BODY MASS INDEX: 50.02 KG/M2 | HEIGHT: 64 IN | WEIGHT: 293 LBS | SYSTOLIC BLOOD PRESSURE: 125 MMHG

## 2019-03-07 DIAGNOSIS — S92.534D CLOSED NONDISPLACED FRACTURE OF DISTAL PHALANX OF LESSER TOE OF RIGHT FOOT WITH ROUTINE HEALING: Primary | ICD-10-CM

## 2019-03-07 PROCEDURE — 99213 OFFICE O/P EST LOW 20 MIN: CPT | Performed by: ORTHOPAEDIC SURGERY

## 2019-03-07 NOTE — ASSESSMENT & PLAN NOTE
24-year-old female with a 5th small toe fracture and contusion to her dorsal foot  We discussed progressive activities as tolerated, deep tissue massage, gentle range of motion and footwear  Follow-up as needed

## 2019-03-07 NOTE — PROGRESS NOTES
Assessment:     1  Closed nondisplaced fracture of distal phalanx of lesser toe of right foot with routine healing          Plan:     Problem List Items Addressed This Visit        Musculoskeletal and Integument    Closed nondisplaced fracture of distal phalanx of lesser toe of right foot with routine healing - Primary     57-year-old female with a 5th small toe fracture and contusion to her dorsal foot  We discussed progressive activities as tolerated, deep tissue massage, gentle range of motion and footwear  Follow-up as needed  Patient ID: Kaleigh Mendez is a 52 y o  female  Chief Complaint:  Right foot injury    HPI:  57-year-old female who dropped a heavy flashlight onto her foot around February 13, 2019  She thinks the primarily hit her small toe  She has had a lot of pain in the small toe since then it has to be careful with wearing shoes that are too tight  She also complains of discomfort when he is putting her sock on  Her foot is plantar flexed and inverted a little bit she has pain on the dorsal aspect of her foot  She was wanting to make sure that nothing was going on with this  She has not had significant swelling  She has not needed any braces  She has been able to continue working without difficulty  She has not had problems with this foot in the past that she is aware of  Her medical intake was reviewed      Allergy:  Allergies   Allergen Reactions    Butoconazole Hives    Tioconazole Hives       Medications:  all current active meds have been reviewed    Past Medical History:  Past Medical History:   Diagnosis Date    Basal cell carcinoma     Last assessed: 9/26/14    Depression with anxiety     Last assessed: 1/13/17    Insomnia     Last assessed: 9/26/14    Kidney stone        Past Surgical History:  Past Surgical History:   Procedure Laterality Date    BLADDER SURGERY      lift of bladder    CYSTOSCOPY      Theurapeutic   TVT-O    EYE SURGERY Bilateral     LASIK  GYNECOLOGIC CRYOSURGERY      Cervix    HYSTEROSCOPY W/ ENDOMETRIAL ABLATION  12/14/2010    Deborah    KIDNEY SURGERY  kidney stone removal    1983    TUBAL LIGATION         Family History:  Family History   Problem Relation Age of Onset    Heart murmur Mother     Hyperlipidemia Mother     Atrial fibrillation Father     Arthritis Father     Psoriasis Sister     Heart disease Maternal Grandfather     Breast cancer Paternal Grandmother 77    Diabetes Paternal Grandmother         Mellitus    Hypertension Paternal Grandmother     Skin cancer Paternal Grandfather    Marla Aguirre ADD / ADHD Son     ADD / ADHD Daughter        Social History:  Social History     Substance and Sexual Activity   Alcohol Use Yes    Frequency: 2-3 times a week    Drinks per session: 1 or 2    Comment: socially     Social History     Substance and Sexual Activity   Drug Use No     Social History     Tobacco Use   Smoking Status Never Smoker   Smokeless Tobacco Never Used           ROS:  Review of Systems   Constitutional: Negative  HENT: Negative  Eyes: Negative  Respiratory: Negative  Cardiovascular: Negative  Gastrointestinal: Negative  Endocrine: Negative  Genitourinary: Negative  Musculoskeletal: Positive for arthralgias  Skin: Negative  Allergic/Immunologic: Negative  Neurological: Negative  Hematological: Negative  Psychiatric/Behavioral: Negative  Objective:  BP Readings from Last 1 Encounters:   03/07/19 125/70      Wt Readings from Last 1 Encounters:   03/07/19 (!) 142 kg (313 lb)        BMI:   Estimated body mass index is 53 73 kg/m² as calculated from the following:    Height as of this encounter: 5' 4" (1 626 m)  Weight as of this encounter: 142 kg (313 lb)  EXAM:   Physical Exam   Constitutional: She appears well-developed and well-nourished  No distress  HENT:   Head: Normocephalic and atraumatic  Eyes: Right eye exhibits no discharge  Left eye exhibits no discharge  Neck: Normal range of motion  Neck supple  No tracheal deviation present  Cardiovascular: Normal rate and regular rhythm  Pulmonary/Chest: Effort normal and breath sounds normal  No respiratory distress  She exhibits no tenderness  Abdominal: Soft  She exhibits no distension  There is no tenderness  Neurological: She is alert  Skin: Skin is warm and dry  She is not diaphoretic  No erythema  Psychiatric: She has a normal mood and affect  Her behavior is normal    Vitals reviewed  Right Ankle Exam     Comments:  No swelling, full range of motion of the ankle, tender to palpation over the 5th toe, tenderness to palpation over the dorsal aspect of the midfoot diffusely  Minimal pain with range of motion of the ankle and LEs fully plantar flexed 50 were did slightly  No instability of the ankle including a negative drawer and negative varus tilt  Sensation light touch intact throughout  Brisk cap refill of the toes  Left Ankle Exam   Left ankle exam is normal     Tenderness   The patient is experiencing no tenderness  Swelling: none    Range of Motion   The patient has normal left ankle ROM  Muscle Strength   The patient has normal left ankle strength  Tests   Anterior drawer: negative  Varus tilt: negative    Other   Erythema: absent  Sensation: normal  Pulse: present              Radiographs:  I have personally reviewed pertinent films in PACS and my interpretation is X-rays of the right foot are reviewed  Midfoot shows no fractures or dislocations  There is a fracture of the small toe the area where the middle and distal phalanx fused previously

## 2019-03-11 PROBLEM — G43.709 CHRONIC MIGRAINE WITHOUT AURA WITHOUT STATUS MIGRAINOSUS, NOT INTRACTABLE: Status: ACTIVE | Noted: 2019-03-11

## 2019-03-12 ENCOUNTER — ANESTHESIA (OUTPATIENT)
Dept: PERIOP | Facility: AMBULARY SURGERY CENTER | Age: 54
End: 2019-03-12
Payer: COMMERCIAL

## 2019-03-12 ENCOUNTER — HOSPITAL ENCOUNTER (OUTPATIENT)
Facility: AMBULARY SURGERY CENTER | Age: 54
Setting detail: OUTPATIENT SURGERY
Discharge: HOME | End: 2019-03-12
Attending: PLASTIC SURGERY | Admitting: PLASTIC SURGERY
Payer: COMMERCIAL

## 2019-03-12 VITALS
HEART RATE: 82 BPM | OXYGEN SATURATION: 99 % | DIASTOLIC BLOOD PRESSURE: 67 MMHG | WEIGHT: 131.5 LBS | BODY MASS INDEX: 22.45 KG/M2 | SYSTOLIC BLOOD PRESSURE: 127 MMHG | RESPIRATION RATE: 18 BRPM | HEIGHT: 64 IN | TEMPERATURE: 97 F

## 2019-03-12 DIAGNOSIS — H02.823 CYST OF RIGHT EYELID: ICD-10-CM

## 2019-03-12 PROCEDURE — 11440 EXC FACE-MM B9+MARG 0.5 CM/<: CPT | Performed by: PLASTIC SURGERY

## 2019-03-12 PROCEDURE — 88342 IMHCHEM/IMCYTCHM 1ST ANTB: CPT | Performed by: PATHOLOGY

## 2019-03-12 PROCEDURE — 88341 IMHCHEM/IMCYTCHM EA ADD ANTB: CPT | Performed by: PATHOLOGY

## 2019-03-12 PROCEDURE — 88304 TISSUE EXAM BY PATHOLOGIST: CPT | Performed by: PATHOLOGY

## 2019-03-12 RX ORDER — ONDANSETRON 2 MG/ML
INJECTION INTRAMUSCULAR; INTRAVENOUS AS NEEDED
Status: DISCONTINUED | OUTPATIENT
Start: 2019-03-12 | End: 2019-03-12 | Stop reason: SURG

## 2019-03-12 RX ORDER — PROPOFOL 10 MG/ML
INJECTION, EMULSION INTRAVENOUS CONTINUOUS PRN
Status: DISCONTINUED | OUTPATIENT
Start: 2019-03-12 | End: 2019-03-12 | Stop reason: SURG

## 2019-03-12 RX ORDER — ONDANSETRON 2 MG/ML
4 INJECTION INTRAMUSCULAR; INTRAVENOUS ONCE AS NEEDED
Status: DISCONTINUED | OUTPATIENT
Start: 2019-03-12 | End: 2019-03-12 | Stop reason: HOSPADM

## 2019-03-12 RX ORDER — SODIUM CHLORIDE, SODIUM LACTATE, POTASSIUM CHLORIDE, CALCIUM CHLORIDE 600; 310; 30; 20 MG/100ML; MG/100ML; MG/100ML; MG/100ML
50 INJECTION, SOLUTION INTRAVENOUS CONTINUOUS
Status: DISCONTINUED | OUTPATIENT
Start: 2019-03-12 | End: 2019-03-12 | Stop reason: HOSPADM

## 2019-03-12 RX ORDER — FENTANYL CITRATE/PF 50 MCG/ML
25 SYRINGE (ML) INJECTION
Status: DISCONTINUED | OUTPATIENT
Start: 2019-03-12 | End: 2019-03-12 | Stop reason: HOSPADM

## 2019-03-12 RX ORDER — PROPOFOL 10 MG/ML
INJECTION, EMULSION INTRAVENOUS AS NEEDED
Status: DISCONTINUED | OUTPATIENT
Start: 2019-03-12 | End: 2019-03-12 | Stop reason: SURG

## 2019-03-12 RX ORDER — BALANCED SALT SOLUTION ENRICHED WITH BICARBONATE, DEXTROSE, AND GLUTATHIONE
KIT INTRAOCULAR AS NEEDED
Status: DISCONTINUED | OUTPATIENT
Start: 2019-03-12 | End: 2019-03-12 | Stop reason: HOSPADM

## 2019-03-12 RX ORDER — SODIUM CHLORIDE 9 MG/ML
INJECTION, SOLUTION INTRAVENOUS CONTINUOUS PRN
Status: DISCONTINUED | OUTPATIENT
Start: 2019-03-12 | End: 2019-03-12 | Stop reason: SURG

## 2019-03-12 RX ORDER — BUPIVACAINE HYDROCHLORIDE AND EPINEPHRINE 5; 5 MG/ML; UG/ML
INJECTION, SOLUTION PERINEURAL AS NEEDED
Status: DISCONTINUED | OUTPATIENT
Start: 2019-03-12 | End: 2019-03-12 | Stop reason: HOSPADM

## 2019-03-12 RX ORDER — MIDAZOLAM HYDROCHLORIDE 1 MG/ML
INJECTION INTRAMUSCULAR; INTRAVENOUS AS NEEDED
Status: DISCONTINUED | OUTPATIENT
Start: 2019-03-12 | End: 2019-03-12 | Stop reason: SURG

## 2019-03-12 RX ADMIN — MIDAZOLAM HYDROCHLORIDE 2 MG: 1 INJECTION, SOLUTION INTRAMUSCULAR; INTRAVENOUS at 07:36

## 2019-03-12 RX ADMIN — ONDANSETRON 4 MG: 2 INJECTION INTRAMUSCULAR; INTRAVENOUS at 07:50

## 2019-03-12 RX ADMIN — PROPOFOL 50 MG: 10 INJECTION, EMULSION INTRAVENOUS at 07:39

## 2019-03-12 RX ADMIN — SODIUM CHLORIDE: 0.9 INJECTION, SOLUTION INTRAVENOUS at 07:30

## 2019-03-12 RX ADMIN — PROPOFOL 50 MCG/KG/MIN: 10 INJECTION, EMULSION INTRAVENOUS at 07:39

## 2019-03-12 RX ADMIN — PROPOFOL 50 MG: 10 INJECTION, EMULSION INTRAVENOUS at 07:38

## 2019-03-12 NOTE — OP NOTE
OPERATIVE REPORT  PATIENT NAME: Michelle Coles    :  1965  MRN: [id number]  Pt Location: [facility] OR ROOM 05    SURGERY DATE: 3/12/2019    Surgeon(s) and Role:     * Hiwot Stone MD - Primary    Preop Diagnosis:  Cyst of right eyelid [H02 823]    Post-Op Diagnosis Codes:     * Cyst of right eyelid [H02 823]    Procedure(s) (LRB):  UPPER EYELID CYST EXCISION (Right)    Specimen(s):  ID Type Source Tests Collected by Time Destination   1 : right upper eyelid cyst Tissue Cyst TISSUE EXAM Hiwot Stone MD 3/12/2019 0778        Estimated Blood Loss:   Minimal    Drains:  * No LDAs found *    Anesthesia Type:   IV Sedation with Anesthesia    Operative Indications:  Cyst of right eyelid [H02 823]      Operative Findings:  3 mm cystic lesion upper eyelid    Complications:   None    Procedure and Technique:  The patient was met in the preoperative holding area any last minute questions were properly answered risks and benefits were explained in detail and informed consent was then verified  She was then taken to the operative theater placed in supine position  All the perioperative measurements were taken and a surgical time-out was performed  IV sedation was seen was induced and no bright was infiltrated with 0 4 mL of 0 5% Marcaine with epinephrine  After allowing several minutes for the epinephrine effect ear was prepped and draped sterilely usual fashion an elliptical excision of the cyst lesion was marked and excision was performed sharply and specimen passed off the table for permanent pathology  Minimal undermining of the upper eyelid was performed and primary closure was performed with interrupted simple 6 0 Prolene sutures  The family bacitracin ointment was applied at the conclusion of the case  The length of the closure was 8 mm       I was present for the entire procedure and A qualified resident physician was not available    Patient Disposition:  PACU  and hemodynamically stable    SIGNATURE: Lukas Perkins MD  DATE: March 12, 2019  TIME: 7:56 AM

## 2019-03-12 NOTE — H&P
Assessment/Plan   1) Right upper eyelid cyst     I discussed that I felt that this lesion is: benign      I discussed excision of the lesion(s) with the patient  I discussed that this could be performed as outpatient surgery     I discussed risks including but not limited to: bleeding, infection, hematoma, seroma, wound breakdown, scar, need for further surgery, deformity, pain, numbness, weakness, asymmetry, injury to structures, and medical complications      I discussed that the scar involved would be larger than the maximal dimension of the lesion itself      The patient would like to proceed and therefore we will initiate the insurance approval process      A total of 30 minutes of face-to-face time was spent in this encounter, of which >50% was spent in counseling   Lavern Landin MD   [place] [id number]   [address] [facility]   Office: [phone]                 Subjective   Patient ID: Clemencia Centeno is a 52 y o  female      Vitals:      Melissa Archuleta is a 52years old female who presents for evaluation of a skin lesion      Location:  Right upper eyelid  Duration:  Many months and progressively enlarging causing irritation along her or eyelid lashes  No prior treatments  No visual disturbance  Patient looking for any options to limit her symptoms  Patient had prior lasik surgery      Medical History        Past Medical History:   Diagnosis Date    Basal cell carcinoma       Last assessed: 9/26/14    Depression with anxiety       Last assessed: 1/13/17    Insomnia       Last assessed: 9/26/14    Kidney stone           Surgical History         Past Surgical History:   Procedure Laterality Date    BLADDER SURGERY         lift of bladder    CYSTOSCOPY         Theurapeutic   TVT-O    EYE SURGERY         LASIK    GYNECOLOGIC CRYOSURGERY         Cervix    HYSTEROSCOPY W/ ENDOMETRIAL ABLATION   12/14/2010     Novasure    KIDNEY SURGERY   kidney stone removal     1983    TUBAL LIGATION                Review of Systems   Constitutional: Negative  HENT: Negative  Eyes: Positive for itching  Respiratory: Negative  Cardiovascular: Negative  Gastrointestinal: Negative  Endocrine: Negative  Genitourinary: Negative  Musculoskeletal: Negative  Skin: Negative  Allergic/Immunologic: Negative  Neurological: Negative  Hematological: Negative  Psychiatric/Behavioral: Negative           Objective   Physical Exam   Constitutional  She appears well-developed and well-nourished       Eyes  Pupils are equal, round, and reactive to light       General -             Right: Right eye extraocular movements are normal              Left: Left eye extraocular movements are normal          Cardiovascular: Normal rate       Pulmonary/Chest  Effort normal       Skin  Skin is warm       Psychiatric  She has a normal mood and affect   Her behavior is normal

## 2019-03-12 NOTE — ANESTHESIA POSTPROCEDURE EVALUATION
Post-Op Assessment Note    CV Status:  Stable  Pain Score: 0    Pain management: adequate     Mental Status:  Sleepy   Hydration Status:  Stable   PONV Controlled:  None   Airway Patency:  Patent    Staff: CRNA           BP   118/65   Temp  97   Pulse  68   Resp   16   SpO2   100

## 2019-03-12 NOTE — ANESTHESIA PREPROCEDURE EVALUATION
Review of Systems/Medical History    Chart reviewed  No history of anesthetic complications     Cardiovascular  Negative cardio ROS Exercise tolerance (METS): >4,  Hyperlipidemia,    Pulmonary  Negative pulmonary ROS        GI/Hepatic  Negative GI/hepatic ROS          Kidney stones,        Endo/Other     GYN       Hematology   Musculoskeletal       Neurology    Headaches,    Psychology   Anxiety, Depression ,              Physical Exam    Airway    Mallampati score: II  TM Distance: >3 FB  Neck ROM: full     Dental   No notable dental hx     Cardiovascular  Comment: Negative ROS,     Pulmonary      Other Findings        Anesthesia Plan  ASA Score- 2     Anesthesia Type- IV sedation with anesthesia with ASA Monitors  Additional Monitors:   Airway Plan:     Comment: GA backup  Plan Factors-    Induction- intravenous  Postoperative Plan-     Informed Consent- Anesthetic plan and risks discussed with patient  I personally reviewed this patient with the CRNA  Discussed and agreed on the Anesthesia Plan with the CRNA  Karime Fontana

## 2019-03-13 ENCOUNTER — TELEPHONE (OUTPATIENT)
Dept: PLASTIC SURGERY | Facility: CLINIC | Age: 54
End: 2019-03-13

## 2019-03-13 NOTE — TELEPHONE ENCOUNTER
Called karmen to see how she is doing after her procedure   Left message to call the office to schedule a appointment for 1 week after her surgery for a suture removal around 3/19/19

## 2019-03-14 ENCOUNTER — TELEPHONE (OUTPATIENT)
Dept: PLASTIC SURGERY | Facility: CLINIC | Age: 54
End: 2019-03-14

## 2019-03-14 NOTE — TELEPHONE ENCOUNTER
Patient has concerns about her eye lid being itchy and red  I informed the patient it will be red and a little itchy just because she did just have surgery   She has a appointment to follow up on the 22nd and said she is fine waiting till then she will call next week if her eye does not feel any better

## 2019-03-22 ENCOUNTER — OFFICE VISIT (OUTPATIENT)
Dept: PLASTIC SURGERY | Facility: CLINIC | Age: 54
End: 2019-03-22
Payer: COMMERCIAL

## 2019-03-22 ENCOUNTER — PROCEDURE VISIT (OUTPATIENT)
Dept: NEUROLOGY | Facility: CLINIC | Age: 54
End: 2019-03-22
Payer: COMMERCIAL

## 2019-03-22 VITALS — HEIGHT: 64 IN | WEIGHT: 130 LBS | BODY MASS INDEX: 22.2 KG/M2

## 2019-03-22 VITALS — SYSTOLIC BLOOD PRESSURE: 132 MMHG | TEMPERATURE: 98.2 F | HEART RATE: 89 BPM | DIASTOLIC BLOOD PRESSURE: 68 MMHG

## 2019-03-22 DIAGNOSIS — G43.709 CHRONIC MIGRAINE WITHOUT AURA WITHOUT STATUS MIGRAINOSUS, NOT INTRACTABLE: Primary | ICD-10-CM

## 2019-03-22 DIAGNOSIS — H02.823 CYST OF RIGHT EYELID: Primary | ICD-10-CM

## 2019-03-22 PROCEDURE — 64615 CHEMODENERV MUSC MIGRAINE: CPT | Performed by: PHYSICIAN ASSISTANT

## 2019-03-22 PROCEDURE — 99024 POSTOP FOLLOW-UP VISIT: CPT | Performed by: PLASTIC SURGERY

## 2019-03-22 NOTE — PROGRESS NOTES
Chemodenervation  Date/Time: 3/22/2019 12:44 PM  Performed by: Katlin Elmore PA-C  Authorized by: Katlin Elmore PA-C     Pre-procedure details:     Prepped With: Alcohol    Anesthesia (see MAR for exact dosages): Anesthesia method:  None  Procedure details:     Position:  Upright  Botox:     Botox Type:  Type A    Brand:  Botox    mL's of Botulinum Toxin:  200    Final Concentration per CC:  200 units    Needle Gauge:  30 G 2 5 inch  Procedures:     Botox Procedures: chronic headache      Indications: migraines    Injection Location:     Head / Face:  L superior cervical paraspinal, R superior cervical paraspinal, L , R , L frontalis, R frontalis, L medial occipitalis, R medial occipitalis, procerus, R temporalis, L temporalis, R superior trapezius and L superior trapezius    L  injection amount:  5 unit(s)    R  injection amount:  5 unit(s)    L lateral frontalis:  5 unit(s)    R lateral frontalis:  5 unit(s)    L medial frontalis:  5 unit(s)    R medial frontalis:  5 unit(s)    L temporalis injection amount:  20 unit(s)    R temporalis injection amount:  20 unit(s)    Procerus injection amount:  5 unit(s)    L medial occipitalis injection amount:  15 unit(s)    R medial occipitalis injection amount:  15 unit(s)    L superior cervical paraspinal injection amount:  10 unit(s)    R superior cervical paraspinal injection amount:  10 unit(s)    L superior trapezius injection amount:  15 unit(s)    R superior trapezius injection amount:  15 unit(s)  Total Units:     Total units used:  200    Total units discarded:  0  Post-procedure details:     Chemodenervation:  Chronic migraine    Facial Nerve Location[de-identified]  Bilateral facial nerve    Patient tolerance of procedure:   Tolerated well, no immediate complications  Comments:      5 units orbicularis oculi bilaterally  35 units frontalis   All medically necessary

## 2019-03-22 NOTE — PROGRESS NOTES
Dm Gonzalez is 1 week post-op: right upper eyelid cyst excision  Presenting for routine follow-up  S:  Doing well  Patient has noted no excessive swelling, pain and discharge  O:  Right upper eyelid suture line intact  Pathology     A  Skin Cyst, right upper eyelid cyst, shave excision:  - Venous lake/varix, collapsed  -- Confirmed by positive CD31 and negative D2-40 immunostains   - Negative for malignancy on multiple examined levels  A:  Satisfactory course  I discussed the pathology results during this encounter    P: Sutures removed  Patient to return in as needed prn  Patient is to return as needed for redness, swelling, discomfort, or any concern about her surgery

## 2019-04-29 ENCOUNTER — TELEPHONE (OUTPATIENT)
Dept: PSYCHIATRY | Facility: CLINIC | Age: 54
End: 2019-04-29

## 2019-04-29 DIAGNOSIS — F41.1 GAD (GENERALIZED ANXIETY DISORDER): ICD-10-CM

## 2019-04-29 RX ORDER — CLONAZEPAM 0.5 MG/1
TABLET ORAL
Qty: 30 TABLET | Refills: 3 | Status: SHIPPED | OUTPATIENT
Start: 2019-04-29 | End: 2019-07-26 | Stop reason: SDUPTHER

## 2019-05-04 NOTE — PSYCH
MEDICATION MANAGEMENT NOTE        [place] [address] [facility]      Name and Date of Birth:  Jovon Pa 45 y o  1965    Date of Visit: February 9, 2018    SUBJECTIVE:  CC: Dm Gonzalez presents today for follow up on anxiety and depression     Dm Gonzalez feels that she is overall doing well  Thinks her medications are helping  Does not think that she is having significant side effects or issues  She would like to continue with the current medications  We talked a lot about her daughter, her anxieties about work, and also her anxieties about her children  It sounds like everybody is moving in a good direction and that she is satisfied with her life presently  She has not had any worsening of her concentration or attention issues  Adderall at the reduced dose was not sufficient  She continues to follow up with other providers to monitor her concentration and memory due to family history of dementia and her own concentration and memory issues  Again, overall she is stable  Some hair falling out  She denies any side effects from medications      HPI ROS:   Depression: 2 /10 (10 worst)  Anxiety: 3-6/7 /10 (10 worst)  Safety concerns (SI, HI, etc): No SI or HI  Sleep: 8  Energy: so so   Appetite: fine  Weight Change: slight weight gain (a couple of pounds)    Recent labs:  Appointment on 01/22/2018   Component Date Value    Sodium 01/22/2018 138     Potassium 01/22/2018 3 7     Chloride 01/22/2018 102     CO2 01/22/2018 30     Anion Gap 01/22/2018 6     BUN 01/22/2018 14     Creatinine 01/22/2018 0 82     Glucose, Fasting 01/22/2018 95     Calcium 01/22/2018 8 7     AST 01/22/2018 33     ALT 01/22/2018 43     Alkaline Phosphatase 01/22/2018 63     Total Protein 01/22/2018 7 3     Albumin 01/22/2018 3 5     Total Bilirubin 01/22/2018 0 34     eGFR 01/22/2018 81     WBC 01/22/2018 5 40     RBC 01/22/2018 3 99     Hemoglobin 01/22/2018 12 7     Hematocrit 01/22/2018 39 6     MCV 01/22/2018 99*    MCH 01/22/2018 31 8     MCHC 01/22/2018 32 1     RDW 01/22/2018 12 9     MPV 01/22/2018 11 7     Platelets 80/98/9522 296     nRBC 01/22/2018 0     Neutrophils Relative 01/22/2018 48     Lymphocytes Relative 01/22/2018 36     Monocytes Relative 01/22/2018 13*    Eosinophils Relative 01/22/2018 3     Basophils Relative 01/22/2018 0     Neutrophils Absolute 01/22/2018 2 58     Lymphocytes Absolute 01/22/2018 1 93     Monocytes Absolute 01/22/2018 0 71     Eosinophils Absolute 01/22/2018 0 16     Basophils Absolute 01/22/2018 0 01     Color, UA 01/22/2018 Yellow     Clarity, UA 01/22/2018 Clear     Specific Gravity, UA 01/22/2018 1 027     pH, UA 01/22/2018 6 5     Leukocytes, UA 01/22/2018 Negative     Nitrite, UA 01/22/2018 Negative     Protein, UA 01/22/2018 Negative     Glucose, UA 01/22/2018 Negative     Ketones, UA 01/22/2018 Negative     Urobilinogen, UA 01/22/2018 0 2     Bilirubin, UA 01/22/2018 Negative     Blood, UA 01/22/2018 Negative     TSH 3RD GENERATON 01/22/2018 1 940        No new labs or no relevant labs needing review with patient today    Review Of Systems as noted above  In addition:     Constitutional negative   ENT negative   Cardiovascular negative   Respiratory negative   Gastrointestinal negative   Genitourinary negative   Musculoskeletal negative   Integumentary negative   Neurological negative   Endocrine negative   Other Symptoms some hair/eye brow loss  She notes menopuase  She has dermatology f/u       Psychiatric History   ALEX    Patient did used to see Maria C Whitten in early 2016 when she had a change providers due to insurance  She does not have a therapist     She been hospitalized once in 2000  She says that it was related to having a breakup with her boyfriend at the time and then finding out that she lost her job because she was  at his place of employment   Losing her boyfriend because she did not want to get  and he did and also having job issues led her to crisis and overdose although it was not a dramatic overdose it was a time when she said that she was trying to take pills to go to sleep and that she did have some suicidal thoughts associated with it  She denies any self-harm homicidal ideation in the past or present  She's never been violent  She says that she's not had any suicidal ideation since that time  Social History:  Kat Rasmussen was raised in [facility] to a "good" childhood  Her parents were together and still are  She denies ever having physical or sexual abuse or other forms neglect now or in the past as a child  She has one brother and one sister  She developed normally  She finished high school and got associates degree in business  Her daughter currently lives with her  She has 3 children 2 boys and one girl  She is good relationships with her family  She's been  and  twice  Currently not in a serious relationship at intake    Her support system includes her children her parents and her cousins  She has friends that are close but she doesn't typically opened up to them about mental health  She is [employer] and attends [employer] sometimes  No  history no legal issues and no weapons at home  She does not use tobacco drinks about 1 cup of coffee a day and is a social drinker and when she does drink it's very rare and not much at that time  She denies ever using substances and has never been to rehabilitation  Social History     Social History    Marital status:      Spouse name: N/A    Number of children: N/A    Years of education: N/A     Occupational History    Not on file       Social History Main Topics    Smoking status: Not on file    Smokeless tobacco: Not on file    Alcohol use Not on file    Drug use: Unknown    Sexual activity: Not on file     Other Topics Concern    Not on file     Social History Narrative    No narrative on file Substance Abuse History:    History   Alcohol use Not on file     History   Drug use: Unknown         Medical / Surgical History:    No past medical history on file  No past surgical history on file  Family Psychiatric History:     No family history on file  Daughter -  Family history of ADD (attention deficit disorder)  Son - Family history of ADD (attention deficit disorder)  Family History    15  Denied: Family history of bipolar disorder   14  Denied: Family history of substance abuse   15  Denied: Family history of suicide attempt      History Review:  The following portions of the patient's history were reviewed and updated as appropriate: allergies, current medications, past family history, past medical history, past social history, past surgical history and problem list        OBJECTIVE:     MENTAL STATUS EXAM  Appearance:  age appropriate   Behavior:  pleasant, cooperative, with good eye contact   Speech:  Normal volume, regular rate and rhythm   Mood:  euthymic   Affect:  mood congruent   Language: intact and appropriate for age   Thought Process:  Linear and goal directed   Associations: intact associations   Thought Content:  normal and appropriate   Perceptual Disturbances: no auditory or visual hallcunations   Risk Potential / Abnormal Thoughts: Suicidal ideation - None  Homicidal ideation - None  Potential for aggression - No       Consciousness:  Alert & Awake   Sensorium:  Fully oriented to person, place, time/date   Attention: attention span and concentration are age appropriate   Intellect: within normal limits   Fund of Knowledge:  Memory: awareness of current events: yes  recent and remote memory grossly intact   Insight:  good   Judgment: good   Muscle Strength Muscle Tone: normal  normal   Gait/Station: normal gait/station with good balance   Motor Activity: no abnormal movements     Pain none   Pain Scale 0       Confidential Assessment:  Scales:    Assessment/Plan: Diagnoses and all orders for this visit:    TIM (generalized anxiety disorder)  -     clonazePAM (KlonoPIN) 0 5 mg tablet; Take 1/2 tab daily as needed for anxiety and take 1 tab nightly as needed for insomnia and anxiety  -     FLUoxetine (PROzac) 40 MG capsule; Take 1 capsule (40 mg total) by mouth daily    Recurrent major depressive disorder, in remission (HCC)  -     FLUoxetine (PROzac) 40 MG capsule; Take 1 capsule (40 mg total) by mouth daily  -     buPROPion (WELLBUTRIN XL) 300 mg 24 hr tablet; Take 1 tablet (300 mg total) by mouth every morning  -     Discontinue: amphetamine-dextroamphetamine (ADDERALL XR) 20 MG 24 hr capsule; Take 1 capsule (20 mg total) by mouth daily as needed (focus and concentration deficit) Max Daily Amount: 20 mg  -     Discontinue: amphetamine-dextroamphetamine (ADDERALL XR) 20 MG 24 hr capsule; Take 1 capsule (20 mg total) by mouth daily as needed (focus and concentration deficit) Max Daily Amount: 20 mg  -     amphetamine-dextroamphetamine (ADDERALL XR) 20 MG 24 hr capsule; Take 1 capsule (20 mg total) by mouth daily as needed (focus and concentration deficit) Max Daily Amount: 20 mg    Attention and concentration deficit  -     buPROPion (WELLBUTRIN XL) 300 mg 24 hr tablet; Take 1 tablet (300 mg total) by mouth every morning  -     Discontinue: amphetamine-dextroamphetamine (ADDERALL XR) 20 MG 24 hr capsule; Take 1 capsule (20 mg total) by mouth daily as needed (focus and concentration deficit) Max Daily Amount: 20 mg  -     Discontinue: amphetamine-dextroamphetamine (ADDERALL XR) 20 MG 24 hr capsule; Take 1 capsule (20 mg total) by mouth daily as needed (focus and concentration deficit) Max Daily Amount: 20 mg  -     amphetamine-dextroamphetamine (ADDERALL XR) 20 MG 24 hr capsule; Take 1 capsule (20 mg total) by mouth daily as needed (focus and concentration deficit) Max Daily Amount: 20 mg    Memory difficulties  -     buPROPion (WELLBUTRIN XL) 300 mg 24 hr tablet;  Take 1 tablet (300 mg total) by mouth every morning  -     Discontinue: amphetamine-dextroamphetamine (ADDERALL XR) 20 MG 24 hr capsule; Take 1 capsule (20 mg total) by mouth daily as needed (focus and concentration deficit) Max Daily Amount: 20 mg  -     Discontinue: amphetamine-dextroamphetamine (ADDERALL XR) 20 MG 24 hr capsule; Take 1 capsule (20 mg total) by mouth daily as needed (focus and concentration deficit) Max Daily Amount: 20 mg  -     amphetamine-dextroamphetamine (ADDERALL XR) 20 MG 24 hr capsule; Take 1 capsule (20 mg total) by mouth daily as needed (focus and concentration deficit) Max Daily Amount: 20 mg        ______________________________________________________________________    Generalized anxiety disorder  MDD, recurrent, in remission    Attention and concentration deficit  Memory difficulties    MDD improved/stable, TIM improved/stable, memory/attention issues are stable  She is doing well on medications  Using appropriately  Attention and concentration deficit may be related to organic issues, anxiety, or underlying ADD/ADHD  She is doing well on current treatment  No CP, SOB, or issues with medications  Past medications include Prozac which helps but does have some decreased orgasm  She took Topamax for migraines but this seemed to lead to confusion and she has not been on it for years  She does not recall details of Zoloft, Celexa or Lexapro  Effexor seemed to cause weight gain and she did not like it for that  Wellbutrin in past  Ambien caused oversedation, melatonin did not help  Treatment Plan:        Patient has been educated about their diagnosis and treatment modalities  They voiced understanding and agreement with the following plan:    1) Meds:   - Prozac 40mg daily  (consider increase but orgasm affected  Was on 50mg in past per charts)   - Wellbutrin XL 300mg daily   - Klonopin 0 25mg in day PRN and 0 5mg HS PRN   (Pt takes ~2x/wk) #30   - Adderal XR 20mg Daily (3 mo supply given)    2) Labs: PRN   - 2/2017: CBC, CMP WNL  TSH 1 57, TG 56, HDL 98,     3) Therapy:   - not in therapy, revisit PRN   - Provided progressive muscle relaxation handout, asked her to look into belly breathing and guided imagery  (2/9/2018)    4) Medical:  Concentration issues, MRI abnormalities, family h/o early onset dementia (PAunt), migraines (gets Botox)   - pt to f/u with neurologist   - pt to f/u with other providers PRN    5) Other: support prn   - works at [address] [place] [address]  Reception   - good support from family, daughter lives with her   - 3 kids,   Daughter graduates from [employer] with [address] [employer]  Likes psychology, healthcare    6) Follow up:   - 3 months, but patient to call if issues or concerns    7) Treatment Plan: Enacted 2/9/2018      Discussed self monitoring of symptoms, and symptom monitoring tools  Patient has been informed of 24 hours and weekend coverage for urgent situations accessed by calling the main clinic phone number  Risks/Benefits      Risks, Benefits And Possible Side Effects Of Medications:    Risks, benefits, and possible side effects of medications explained to Tammie Araujo and she verbalizes understanding and agreement for treatment      Controlled Medication Discussion:     using appropriately            Psychotherapy in session:  Time spent performing psychotherapy: 17 Minutes on supportive therapy negative...

## 2019-05-08 ENCOUNTER — TELEPHONE (OUTPATIENT)
Dept: NEUROLOGY | Facility: CLINIC | Age: 54
End: 2019-05-08

## 2019-05-17 ENCOUNTER — TELEPHONE (OUTPATIENT)
Dept: PSYCHIATRY | Facility: CLINIC | Age: 54
End: 2019-05-17

## 2019-05-28 ENCOUNTER — APPOINTMENT (OUTPATIENT)
Dept: LAB | Facility: MEDICAL CENTER | Age: 54
End: 2019-05-28
Payer: COMMERCIAL

## 2019-05-28 DIAGNOSIS — D64.9 ANEMIA, UNSPECIFIED TYPE: Primary | ICD-10-CM

## 2019-05-28 LAB
BASOPHILS # BLD AUTO: 0.02 THOUSANDS/ΜL (ref 0–0.1)
BASOPHILS NFR BLD AUTO: 0 % (ref 0–1)
EOSINOPHIL # BLD AUTO: 0.15 THOUSAND/ΜL (ref 0–0.61)
EOSINOPHIL NFR BLD AUTO: 2 % (ref 0–6)
ERYTHROCYTE [DISTWIDTH] IN BLOOD BY AUTOMATED COUNT: 13.1 % (ref 11.6–15.1)
HCT VFR BLD AUTO: 44.5 % (ref 34.8–46.1)
HGB BLD-MCNC: 13.9 G/DL (ref 11.5–15.4)
IMM GRANULOCYTES # BLD AUTO: 0.02 THOUSAND/UL (ref 0–0.2)
IMM GRANULOCYTES NFR BLD AUTO: 0 % (ref 0–2)
LYMPHOCYTES # BLD AUTO: 1.66 THOUSANDS/ΜL (ref 0.6–4.47)
LYMPHOCYTES NFR BLD AUTO: 25 % (ref 14–44)
MCH RBC QN AUTO: 32.3 PG (ref 26.8–34.3)
MCHC RBC AUTO-ENTMCNC: 31.2 G/DL (ref 31.4–37.4)
MCV RBC AUTO: 104 FL (ref 82–98)
MONOCYTES # BLD AUTO: 0.8 THOUSAND/ΜL (ref 0.17–1.22)
MONOCYTES NFR BLD AUTO: 12 % (ref 4–12)
NEUTROPHILS # BLD AUTO: 3.95 THOUSANDS/ΜL (ref 1.85–7.62)
NEUTS SEG NFR BLD AUTO: 61 % (ref 43–75)
NRBC BLD AUTO-RTO: 0 /100 WBCS
PLATELET # BLD AUTO: 283 THOUSANDS/UL (ref 149–390)
PMV BLD AUTO: 12.8 FL (ref 8.9–12.7)
RBC # BLD AUTO: 4.3 MILLION/UL (ref 3.81–5.12)
WBC # BLD AUTO: 6.6 THOUSAND/UL (ref 4.31–10.16)

## 2019-05-28 PROCEDURE — 36415 COLL VENOUS BLD VENIPUNCTURE: CPT

## 2019-05-28 PROCEDURE — 85025 COMPLETE CBC W/AUTO DIFF WBC: CPT

## 2019-05-29 ENCOUNTER — TELEPHONE (OUTPATIENT)
Dept: INTERNAL MEDICINE CLINIC | Facility: CLINIC | Age: 54
End: 2019-05-29

## 2019-06-07 ENCOUNTER — TELEPHONE (OUTPATIENT)
Dept: NEUROLOGY | Facility: CLINIC | Age: 54
End: 2019-06-07

## 2019-06-10 ENCOUNTER — DOCUMENTATION (OUTPATIENT)
Dept: NEUROLOGY | Facility: CLINIC | Age: 54
End: 2019-06-10

## 2019-06-14 DIAGNOSIS — F33.40 RECURRENT MAJOR DEPRESSIVE DISORDER, IN REMISSION (HCC): ICD-10-CM

## 2019-06-14 DIAGNOSIS — R41.3 MEMORY DIFFICULTIES: ICD-10-CM

## 2019-06-14 DIAGNOSIS — R41.840 ATTENTION AND CONCENTRATION DEFICIT: ICD-10-CM

## 2019-06-14 RX ORDER — DEXTROAMPHETAMINE SACCHARATE, AMPHETAMINE ASPARTATE MONOHYDRATE, DEXTROAMPHETAMINE SULFATE AND AMPHETAMINE SULFATE 5; 5; 5; 5 MG/1; MG/1; MG/1; MG/1
20 CAPSULE, EXTENDED RELEASE ORAL DAILY PRN
Qty: 30 CAPSULE | Refills: 0 | Status: SHIPPED | OUTPATIENT
Start: 2019-06-14 | End: 2019-07-26 | Stop reason: SDUPTHER

## 2019-06-26 NOTE — PROGRESS NOTES
Chemodenervation  Date/Time: 6/28/2019 10:57 AM  Performed by: Connie Hedrick MD  Authorized by: Connie Hedrick MD     Pre-procedure details:     Prepped With: Alcohol    Anesthesia (see MAR for exact dosages): Anesthesia method:  None  Procedure details:     Position:  Upright  Botox:     Botox Type:  Type A    Brand:  Botox    mL's of Botulinum Toxin:  200    Final Concentration per CC:  200 units    Needle Gauge:  30 G 2 5 inch  Procedures:     Botox Procedures: chronic headache      Indications: migraines    Injection Location:     Head / Face:  L superior cervical paraspinal, R superior cervical paraspinal, L , R , L frontalis, R frontalis, L medial occipitalis, R medial occipitalis, procerus, R temporalis, L temporalis, R superior trapezius and L superior trapezius    L  injection amount:  5 unit(s)    R  injection amount:  5 unit(s)    L lateral frontalis:  5 unit(s)    R lateral frontalis:  5 unit(s)    L medial frontalis:  5 unit(s)    R medial frontalis:  5 unit(s)    L temporalis injection amount:  20 unit(s)    R temporalis injection amount:  20 unit(s)    Procerus injection amount:  5 unit(s)    L medial occipitalis injection amount:  15 unit(s)    R medial occipitalis injection amount:  15 unit(s)    L superior cervical paraspinal injection amount:  10 unit(s)    R superior cervical paraspinal injection amount:  10 unit(s)    L superior trapezius injection amount:  15 unit(s)    R superior trapezius injection amount:  15 unit(s)  Total Units:     Total units used:  200    Total units discarded:  0  Post-procedure details:     Chemodenervation:  Chronic migraine    Facial Nerve Location[de-identified]  Bilateral facial nerve    Patient tolerance of procedure:   Tolerated well, no immediate complications    Comments:      5 units orbicularis oculi bilaterally  35 units frontalis   All medically necessary

## 2019-06-28 ENCOUNTER — PROCEDURE VISIT (OUTPATIENT)
Dept: NEUROLOGY | Facility: CLINIC | Age: 54
End: 2019-06-28
Payer: COMMERCIAL

## 2019-06-28 VITALS — TEMPERATURE: 98.7 F | SYSTOLIC BLOOD PRESSURE: 125 MMHG | DIASTOLIC BLOOD PRESSURE: 68 MMHG | HEART RATE: 94 BPM

## 2019-06-28 DIAGNOSIS — G43.709 CHRONIC MIGRAINE WITHOUT AURA WITHOUT STATUS MIGRAINOSUS, NOT INTRACTABLE: Primary | ICD-10-CM

## 2019-06-28 PROCEDURE — 64615 CHEMODENERV MUSC MIGRAINE: CPT | Performed by: PSYCHIATRY & NEUROLOGY

## 2019-07-10 NOTE — TELEPHONE ENCOUNTER
Regarding: Non-Urgent Medical Question  Contact: [phone]  ----- Message from [address] [place] [address] [address], Generic sent at 2/21/2019  3:50 PM EST -----    [facility]  I was wondering if I could get an order for an xray of my right foot  I dropped a large flashlight on my foot a week or two ago, and when I bend my toes upward with my foot on the ground, I get a lot of pain in the cuneiform and navicular bone area  I thought I felt some bones moving around and crunching, so I didn't want to do nothing and make it worse  My cell phone is [phone] if you would like to speak to me about this or I can just go and get an xray    Thank you Cayla Cuevasro Muckandi  1965 yes

## 2019-07-15 ENCOUNTER — TELEPHONE (OUTPATIENT)
Dept: NEUROLOGY | Facility: CLINIC | Age: 54
End: 2019-07-15

## 2019-07-23 ENCOUNTER — DOCUMENTATION (OUTPATIENT)
Dept: PSYCHIATRY | Facility: CLINIC | Age: 54
End: 2019-07-23

## 2019-07-23 NOTE — PROGRESS NOTES
Treatment Plan not completed within required time limits due to : Provider will be out of the office 6/7/19 and will reschedule at a later time

## 2019-07-26 ENCOUNTER — OFFICE VISIT (OUTPATIENT)
Dept: PSYCHIATRY | Facility: CLINIC | Age: 54
End: 2019-07-26
Payer: COMMERCIAL

## 2019-07-26 VITALS — SYSTOLIC BLOOD PRESSURE: 140 MMHG | HEART RATE: 81 BPM | DIASTOLIC BLOOD PRESSURE: 86 MMHG

## 2019-07-26 DIAGNOSIS — R41.840 ATTENTION AND CONCENTRATION DEFICIT: ICD-10-CM

## 2019-07-26 DIAGNOSIS — F41.1 GAD (GENERALIZED ANXIETY DISORDER): ICD-10-CM

## 2019-07-26 DIAGNOSIS — R41.3 MEMORY DIFFICULTIES: ICD-10-CM

## 2019-07-26 DIAGNOSIS — F33.40 RECURRENT MAJOR DEPRESSIVE DISORDER, IN REMISSION (HCC): ICD-10-CM

## 2019-07-26 PROCEDURE — 99213 OFFICE O/P EST LOW 20 MIN: CPT | Performed by: PSYCHIATRY & NEUROLOGY

## 2019-07-26 RX ORDER — FLUOXETINE HYDROCHLORIDE 40 MG/1
40 CAPSULE ORAL DAILY
Qty: 90 CAPSULE | Refills: 1 | Status: SHIPPED | OUTPATIENT
Start: 2019-07-26 | End: 2019-12-26 | Stop reason: SDUPTHER

## 2019-07-26 RX ORDER — BUPROPION HYDROCHLORIDE 300 MG/1
300 TABLET ORAL EVERY MORNING
Qty: 90 TABLET | Refills: 1 | Status: SHIPPED | OUTPATIENT
Start: 2019-07-26 | End: 2019-12-26 | Stop reason: SDUPTHER

## 2019-07-26 RX ORDER — DEXTROAMPHETAMINE SACCHARATE, AMPHETAMINE ASPARTATE MONOHYDRATE, DEXTROAMPHETAMINE SULFATE AND AMPHETAMINE SULFATE 5; 5; 5; 5 MG/1; MG/1; MG/1; MG/1
20 CAPSULE, EXTENDED RELEASE ORAL DAILY PRN
Qty: 30 CAPSULE | Refills: 0 | Status: SHIPPED | OUTPATIENT
Start: 2019-07-26 | End: 2019-07-26 | Stop reason: SDUPTHER

## 2019-07-26 RX ORDER — DEXTROAMPHETAMINE SACCHARATE, AMPHETAMINE ASPARTATE MONOHYDRATE, DEXTROAMPHETAMINE SULFATE AND AMPHETAMINE SULFATE 5; 5; 5; 5 MG/1; MG/1; MG/1; MG/1
20 CAPSULE, EXTENDED RELEASE ORAL DAILY PRN
Qty: 30 CAPSULE | Refills: 0 | Status: SHIPPED | OUTPATIENT
Start: 2019-07-26 | End: 2019-11-19 | Stop reason: SDUPTHER

## 2019-07-26 RX ORDER — CLONAZEPAM 0.5 MG/1
TABLET ORAL
Qty: 45 TABLET | Refills: 5 | Status: SHIPPED | OUTPATIENT
Start: 2019-07-26 | End: 2019-12-26 | Stop reason: SDUPTHER

## 2019-07-26 NOTE — PSYCH
MEDICATION MANAGEMENT NOTE        [facility]      Name and Date of Birth:  Nelia Simon 52 y o  1965    Date of Visit: July 26, 2019    SUBJECTIVE:  CC: Monik Andrews presents today for follow up on "I am good"; anxiety and depression     Monik Andrews has been doing well  Neurologist is monitoring her memory  She has a difficult time with short term memory  feels meds are doing what she needs them to do  She has tried reducing adderall, clonazepam in the past, but not successfully  Difficult with boyfriend, 72, retired, in [place] and her whole family here, including her aging parents  Anxiety producing because her grandkids/son live up here    Since our last visit, overall symptoms have been unchanged  Med Compliance: yes    HPI ROS:             ('was' notes: recent => remote)  Medication Side Effects:  no     Depression (10 worst):  1 (Was 1)   Anxiety (10 worst):  Fine except for topic of BF in [place], grandkids here (Was 3)   Safety concerns (SI, HI, etc):  no (Was no)   Sleep: (NM = Nightmares)  good (Was good)   Energy:  good (Was good)   Appetite:  good (Was good)   Weight Change:  no       Monik Andrews denies any side effects from medications unless noted above    Review Of Systems as noted above  In addition:     Constitutional negative   ENT negative   Cardiovascular negative   Respiratory negative   Gastrointestinal negative   Genitourinary negative   Musculoskeletal negative   Integumentary negative   Neurological negative   Endocrine negative   Other Symptoms none     Pain none   Pain Scale 0     History Review:  The following portions of the patient's history were reviewed and documented: allergies, current medications, past family history, past medical history, past social history and problem list      Lab Review: No new labs or no relevant labs needing review with patient today      OBJECTIVE:     MENTAL STATUS EXAM  Appearance:  age appropriate   Behavior: pleasant, cooperative, with good eye contact   Speech:  Normal volume, regular rate and rhythm   Mood:  euthymic   Affect:  mood congruent   Language: intact and appropriate for age   Thought Process:  Linear and goal directed   Associations: intact associations   Thought Content:  normal and appropriate   Perceptual Disturbances: no auditory or visual hallcunations   Risk Potential / Abnormal Thoughts: Suicidal ideation - None  Homicidal ideation - None  Potential for aggression - No       Consciousness:  Alert & Awake   Sensorium:  Grossly oriented   Attention: attention span and concentration are age appropriate       Fund of Knowledge:  Memory: awareness of current events: yes  recent and remote memory grossly intact   Insight:  good   Judgment: good   Muscle Strength Muscle Tone: normal  normal   Gait/Station: normal gait/station with good balance   Motor Activity: no abnormal movements       Risks, Benefits And Possible Side Effects Of Medications:    AGREE: Risks, benefits, and possible side effects of medications explained to Nathalie Anderson and she (or legal representative) verbalizes understanding and agreement for treatment  Controlled Medication Discussion:     Nathalie Monica has been filling controlled prescriptions on time as prescribed according to [facility] program    ______________________________________________________________      Recent labs:  No visits with results within 1 Month(s) from this visit     Latest known visit with results is:   Appointment on 05/28/2019   Component Date Value    WBC 05/28/2019 6 60     RBC 05/28/2019 4 30     Hemoglobin 05/28/2019 13 9     Hematocrit 05/28/2019 44 5     MCV 05/28/2019 104*    MCH 05/28/2019 32 3     MCHC 05/28/2019 31 2*    RDW 05/28/2019 13 1     MPV 05/28/2019 12 8*    Platelets 50/11/1054 283     nRBC 05/28/2019 0     Neutrophils Relative 05/28/2019 61     Immat GRANS % 05/28/2019 0     Lymphocytes Relative 05/28/2019 25     Monocytes Relative 05/28/2019 12     Eosinophils Relative 05/28/2019 2     Basophils Relative 05/28/2019 0     Neutrophils Absolute 05/28/2019 3 95     Immature Grans Absolute 05/28/2019 0 02     Lymphocytes Absolute 05/28/2019 1 66     Monocytes Absolute 05/28/2019 0 80     Eosinophils Absolute 05/28/2019 0 15     Basophils Absolute 05/28/2019 0 02        Psychiatric History   ALEX    Patient did used to see Bijan Zuniga in early 2016 when she had a change providers due to insurance  She does not have a therapist     She been hospitalized once in 2000  She says that it was related to having a breakup with her boyfriend at the time and then finding out that she lost her job because she was  at his place of employment  Losing her boyfriend because she did not want to get  and he did and also having job issues led her to crisis and overdose although it was not a dramatic overdose it was a time when she said that she was trying to take pills to go to sleep and that she did have some suicidal thoughts associated with it  She denies any self-harm homicidal ideation in the past or present  She's never been violent  She says that she's not had any suicidal ideation since that time  Social History:  Shonda Valdez was raised in [facility] to a "good" childhood  Her parents were together and still are  She denies ever having physical or sexual abuse or other forms neglect now or in the past as a child  She has one brother and one sister  She developed normally  She finished high school and got associates degree in business  Her daughter currently lives with her  She has 3 children 2 boys and one girl  She is good relationships with her family  She's been  and  twice  Currently not in a serious relationship at intake    Her support system includes her children her parents and her cousins   She has friends that are close but she doesn't typically opened up to them about mental health  She is [place] and attends [employer] sometimes  No  history no legal issues and no weapons at home  She does not use tobacco drinks about 1 cup of coffee a day and is a social drinker and when she does drink it's very rare and not much at that time  She denies ever using substances and has never been to rehabilitation  Medical / Surgical History:    Past Medical History:   Diagnosis Date    Basal cell carcinoma     Last assessed: 9/26/14    Depression with anxiety     Last assessed: 1/13/17    Insomnia     Last assessed: 9/26/14    Kidney stone      Past Surgical History:   Procedure Laterality Date    BLADDER SURGERY      lift of bladder    CYSTOSCOPY      Theurapeutic  TVT-O    EYE SURGERY Bilateral     LASIK    FACIAL/NECK BIOPSY Right 3/12/2019    Procedure: UPPER EYELID CYST EXCISION;  Surgeon: Laila Capps MD;  Location: [facility];  Service: Plastics    GYNECOLOGIC CRYOSURGERY      Cervix    HYSTEROSCOPY W/ ENDOMETRIAL ABLATION  12/14/2010    Novasure    KIDNEY SURGERY  kidney stone removal    1983    TUBAL LIGATION         Family Psychiatric History:     Family History   Problem Relation Age of Onset    Heart murmur Mother     Hyperlipidemia Mother     Atrial fibrillation Father     Arthritis Father     Psoriasis Sister     Heart disease Maternal Grandfather     Breast cancer Paternal Grandmother 77    Diabetes Paternal Grandmother         Mellitus    Hypertension Paternal Grandmother     Skin cancer Paternal Grandfather     ADD / ADHD Son     ADD / ADHD Daughter        Daughter -  Family history of ADD (attention deficit disorder)  Son - Family history of ADD (attention deficit disorder)  Family History    15  Denied: Family history of bipolar disorder   14  Denied: Family history of substance abuse   15   Denied: Family history of suicide attempt      Confidential Assessment:  Scales:    Assessment/Plan:        Diagnoses and all orders for this visit:    TIM (generalized anxiety disorder)    Recurrent major depressive disorder, in remission ([address] )    Attention and concentration deficit    Memory difficulties        ______________________________________________________________________    Generalized anxiety disorder  MDD, recurrent, in remission    Attention and concentration deficit  Memory difficulties    Attention and concentration deficit may be related to organic issues, anxiety, or underlying ADD/ADHD  Ongoing f/u with Neurology  BP a bit high, periodic, asymptomatic  She understands risks with meds, wants to continue    Safety Risk Assessment: see above   In considering risk and protective factors, suicide risk and safety risk are low  Past medications include   Prozac which helps but does have some decreased orgasm  Topamax for migraines but this seemed to lead to confusion ( years ago)  ON but no detail recall - Zoloft, Celexa or Lexapro  Effexor seemed to cause weight gain  Wellbutrin  Ambien caused oversedation  melatonin did not help  Treatment Plan:        Patient has been educated about their diagnosis and treatment modalities  They voiced understanding and agreement with the following plan:    1) Meds:   - Prozac 40mg daily  (consider increase but orgasm affected  Was on 50mg in past per charts)   - Wellbutrin XL 300mg daily   - Klonopin 0 25mg in day PRN and 0 5mg HS PRN  - Adderal XR 20mg Daily     2) Labs: PRN   - 2/2017: CBC, CMP WNL  TSH 1 57, TG 56, HDL 98,     3) Therapy:   - not in therapy, revisit PRN   - Provided progressive muscle relaxation handout, asked her to look into belly breathing and guided imagery  (2/9/2018)    4) Medical:  Concentration issues, MRI abnormalities, family h/o early onset dementia (PAunt), migraines (gets Botox)   - pt to f/u with neurologist   - pt to f/u with other providers PRN    5) Other: support prn   - works at [address] [place] [address]   Reception   - good support from family, daughter lives with her   - 3 kids,   Daughter graduates from [employer] with [address] [employer]  Likes psychology, healthcare   - Boyfriend in [place]    6) Follow up:   - 6 months, but patient to call if issues or concerns    7) Treatment Plan: Enacted 2/9/2018, 12/7/2018  , 7/26/2019 (electronic)    Discussed self monitoring of symptoms, and symptom monitoring tools  Patient has been informed of 24 hours and weekend coverage for urgent situations accessed by calling the main clinic phone number             Psychotherapy in session:  Time spent performing psychotherapy: 12 Minutes on supportive therapy

## 2019-07-26 NOTE — BH TREATMENT PLAN
TREATMENT PLAN (Medication Management Only)        [facility]    Name/Date of Birth/MRN:  Nat Hernandez 52 y o  1965 MRN: [id number]  Date of Treatment Plan: July 26, 2019  Diagnosis/Diagnoses:   1  TIM (generalized anxiety disorder)    2  Recurrent major depressive disorder, in remission ([facility] [address] )    3  Attention and concentration deficit    4  Memory difficulties      Strengths/Personal Resources for Self-Care: very good work ethics, honest, kind  Area/Areas of need (in own words): speaking my mind, making my decisions  1  Long Term Goal: "keep doing well"   Target Date:  days from treatment plan  Person/Persons responsible for completion of goal: Dr Vaishnavi Weiss and Self  2  Short Term Objective (s) - How will we reach this goal?:   A  Provider new recommended medication/dosage changes and/or continue medication(s): no   B   Continue to stay active and involved with family  C  Take meds as prescribed and follow up as recommended  Target Date: 3 months from treatment plan unless noted otherwise  Person/Persons Responsible for Completion of Goal: Dr Vaishnavi Weiss and Self   Progress Towards Goals: continuing treatment   Treatment Modality: Medication management and therapy PRN  Review due 90 to 120 days from date of this plan: Approximately 3 months from today ( 10/26/2019 )    Expected length of service: ongoing treatment  My Physician/PA/NP and I have developed this plan together and I agree to work on the goals and objectives  I understand the treatment goals that were developed for my treatment    Signature:       Date and time:  Signature of parent/guardian if under age of 15 years: Date and time:  Signature of provider:      Date and time:  Signature of Supervising Physician:    Date and time: 7/26/2019      Sharon Dixon III, DO

## 2019-08-16 ENCOUNTER — VBI (OUTPATIENT)
Dept: ADMINISTRATIVE | Facility: OTHER | Age: 54
End: 2019-08-16

## 2019-08-19 ENCOUNTER — DOCUMENTATION (OUTPATIENT)
Dept: NEUROLOGY | Facility: CLINIC | Age: 54
End: 2019-08-19

## 2019-08-19 NOTE — PROGRESS NOTES
Type Date User Summary Attachment   General 08/16/2019 11:32 AM Sveta Rdz Care Coordination  -   Note    Botox- authorization #: [id number]- 2nd visit- valid from 6/7/2019 until 6/7/2019 until 6/7/2020   Please use our stock      Thank you,     Izabel Aaron

## 2019-08-19 NOTE — PROGRESS NOTES
Patient is scheduled with Samantha Corrales on 9/27/2019 in the [facility] location

## 2019-09-27 ENCOUNTER — PROCEDURE VISIT (OUTPATIENT)
Dept: NEUROLOGY | Facility: CLINIC | Age: 54
End: 2019-09-27
Payer: COMMERCIAL

## 2019-09-27 VITALS — HEART RATE: 91 BPM | TEMPERATURE: 97.6 F | DIASTOLIC BLOOD PRESSURE: 69 MMHG | SYSTOLIC BLOOD PRESSURE: 134 MMHG

## 2019-09-27 DIAGNOSIS — G43.709 CHRONIC MIGRAINE WITHOUT AURA WITHOUT STATUS MIGRAINOSUS, NOT INTRACTABLE: Primary | ICD-10-CM

## 2019-09-27 PROCEDURE — 64615 CHEMODENERV MUSC MIGRAINE: CPT | Performed by: PHYSICIAN ASSISTANT

## 2019-09-27 NOTE — PROGRESS NOTES
Chemodenervation  Date/Time: 9/27/2019 8:56 AM  Performed by: Rosie Chen PA-C  Authorized by: Rosie Chen PA-C     Pre-procedure details:     Prepped With: Alcohol    Anesthesia (see MAR for exact dosages): Anesthesia method:  None  Procedure details:     Position:  Upright  Botox:     Botox Type:  Type A    Brand:  Botox    mL's of Botulinum Toxin:  200    Final Concentration per CC:  200 units    Needle Gauge:  30 G 2 5 inch  Procedures:     Botox Procedures: chronic headache      Indications: migraines    Injection Location:     Head / Face:  L superior cervical paraspinal, R superior cervical paraspinal, L , R , L frontalis, R frontalis, L medial occipitalis, R medial occipitalis, procerus, R temporalis, L temporalis, R superior trapezius and L superior trapezius    L  injection amount:  5 unit(s)    R  injection amount:  5 unit(s)    L lateral frontalis:  5 unit(s)    R lateral frontalis:  5 unit(s)    L medial frontalis:  5 unit(s)    R medial frontalis:  5 unit(s)    L temporalis injection amount:  20 unit(s)    R temporalis injection amount:  20 unit(s)    Procerus injection amount:  5 unit(s)    L medial occipitalis injection amount:  15 unit(s)    R medial occipitalis injection amount:  15 unit(s)    L superior cervical paraspinal injection amount:  10 unit(s)    R superior cervical paraspinal injection amount:  10 unit(s)    L superior trapezius injection amount:  15 unit(s)    R superior trapezius injection amount:  15 unit(s)  Total Units:     Total units used:  200    Total units discarded:  0  Post-procedure details:     Chemodenervation:  Chronic migraine    Facial Nerve Location[de-identified]  Bilateral facial nerve    Patient tolerance of procedure:   Tolerated well, no immediate complications  Comments:      5 units orbicularis oculi bilaterally  35 units frontalis  All medically necessary

## 2019-10-30 ENCOUNTER — TELEPHONE (OUTPATIENT)
Dept: NEUROLOGY | Facility: CLINIC | Age: 54
End: 2019-10-30

## 2019-10-30 NOTE — TELEPHONE ENCOUNTER
Voicemail left for patient regarding changing her [address]  appt on 1/3/20  Patient is [employer] employee and insurance is changing for new year and we will not be able to get new authorization until 2020  Need to move appt to following week

## 2019-10-31 NOTE — TELEPHONE ENCOUNTER
Patient called back r/s SHONNA to 1/10/20 at 2 pm  Patient aware with change of insurance need to submit for new authorization

## 2019-11-19 DIAGNOSIS — F33.40 RECURRENT MAJOR DEPRESSIVE DISORDER, IN REMISSION (HCC): ICD-10-CM

## 2019-11-19 DIAGNOSIS — R41.3 MEMORY DIFFICULTIES: ICD-10-CM

## 2019-11-19 DIAGNOSIS — R41.840 ATTENTION AND CONCENTRATION DEFICIT: ICD-10-CM

## 2019-11-20 RX ORDER — DEXTROAMPHETAMINE SACCHARATE, AMPHETAMINE ASPARTATE MONOHYDRATE, DEXTROAMPHETAMINE SULFATE AND AMPHETAMINE SULFATE 5; 5; 5; 5 MG/1; MG/1; MG/1; MG/1
20 CAPSULE, EXTENDED RELEASE ORAL DAILY PRN
Qty: 30 CAPSULE | Refills: 0 | Status: SHIPPED | OUTPATIENT
Start: 2019-11-20 | End: 2019-12-11 | Stop reason: SDUPTHER

## 2019-11-21 ENCOUNTER — DOCUMENTATION (OUTPATIENT)
Dept: PSYCHIATRY | Facility: CLINIC | Age: 54
End: 2019-11-21

## 2019-11-21 NOTE — PROGRESS NOTES
Treatment Plan not completed within required time limits due to:  Magalis Keys  cancelled appointment  on 1/17/2020  (left message to cancel appt for 1/17/20 @ 10am)

## 2019-11-26 ENCOUNTER — TELEPHONE (OUTPATIENT)
Dept: NEUROLOGY | Facility: CLINIC | Age: 54
End: 2019-11-26

## 2019-11-26 NOTE — TELEPHONE ENCOUNTER
Voicemail left for patient to change appt time of [facility] appt on 1/10/2020 from 2 pm to 0900 am  This appt is held for this patient

## 2019-11-29 NOTE — TELEPHONE ENCOUNTER
Patient was transferred to [facility] phone line and left voicemail stating she will take 0900 am appt on 1/10/2020  Appt time changed

## 2019-12-11 DIAGNOSIS — R41.840 ATTENTION AND CONCENTRATION DEFICIT: ICD-10-CM

## 2019-12-11 DIAGNOSIS — F33.40 RECURRENT MAJOR DEPRESSIVE DISORDER, IN REMISSION (HCC): ICD-10-CM

## 2019-12-11 DIAGNOSIS — R41.3 MEMORY DIFFICULTIES: ICD-10-CM

## 2019-12-11 RX ORDER — DEXTROAMPHETAMINE SACCHARATE, AMPHETAMINE ASPARTATE MONOHYDRATE, DEXTROAMPHETAMINE SULFATE AND AMPHETAMINE SULFATE 5; 5; 5; 5 MG/1; MG/1; MG/1; MG/1
20 CAPSULE, EXTENDED RELEASE ORAL DAILY PRN
Qty: 30 CAPSULE | Refills: 0 | Status: SHIPPED | OUTPATIENT
Start: 2019-12-11 | End: 2020-01-23 | Stop reason: SDUPTHER

## 2019-12-13 ENCOUNTER — TELEPHONE (OUTPATIENT)
Dept: OBGYN CLINIC | Facility: CLINIC | Age: 54
End: 2019-12-13

## 2019-12-13 NOTE — TELEPHONE ENCOUNTER
Pt called in regards to speaking with you directly  Pt states she is having a personal problem   Pt would not go into detail with me please advise pt @ [phone]

## 2019-12-26 DIAGNOSIS — F41.1 GAD (GENERALIZED ANXIETY DISORDER): ICD-10-CM

## 2019-12-26 DIAGNOSIS — F33.40 RECURRENT MAJOR DEPRESSIVE DISORDER, IN REMISSION (HCC): ICD-10-CM

## 2019-12-26 DIAGNOSIS — R41.840 ATTENTION AND CONCENTRATION DEFICIT: ICD-10-CM

## 2019-12-26 DIAGNOSIS — R41.3 MEMORY DIFFICULTIES: ICD-10-CM

## 2019-12-26 NOTE — TELEPHONE ENCOUNTER
Nursing called [facility] and verified that the only refill needed is for the Clonazepam at this time  She has refills for the Prozac and Wellbutrin  Will send to a covering physician in Dr Bowen Son absence  Message left on patients voicemail  For Dr Radha Medrano to review

## 2019-12-26 NOTE — TELEPHONE ENCOUNTER
Ashu Bedoya called on 12/26 to get refills on her Wellutrin,Klonopin, & prozac  She requested that they be called into [facility]

## 2019-12-27 RX ORDER — BUPROPION HYDROCHLORIDE 300 MG/1
300 TABLET ORAL EVERY MORNING
Qty: 90 TABLET | Refills: 1 | Status: SHIPPED | OUTPATIENT
Start: 2019-12-27 | End: 2020-08-14 | Stop reason: SDUPTHER

## 2019-12-27 RX ORDER — CLONAZEPAM 0.5 MG/1
TABLET ORAL
Qty: 45 TABLET | Refills: 5 | Status: SHIPPED | OUTPATIENT
Start: 2019-12-27 | End: 2020-01-23 | Stop reason: SDUPTHER

## 2019-12-27 RX ORDER — FLUOXETINE HYDROCHLORIDE 40 MG/1
40 CAPSULE ORAL DAILY
Qty: 90 CAPSULE | Refills: 1 | Status: SHIPPED | OUTPATIENT
Start: 2019-12-27 | End: 2020-08-14 | Stop reason: SDUPTHER

## 2020-01-06 ENCOUNTER — TELEPHONE (OUTPATIENT)
Dept: NEUROLOGY | Facility: CLINIC | Age: 55
End: 2020-01-06

## 2020-01-06 NOTE — TELEPHONE ENCOUNTER
Late entry: New insurance cards received from the patient  PA forms filled out and signed  Faxed to [address] [place] [address] on 1/3/2020  Will await approval/denial letter  Called [facility] Admin- spoke with Marlaine Siemens- I advised him I was calling to see if they received the patient's prior authorization that was faxed on 1/3/2020  He advised me he does not see it on file- he was able to get help from his supervisor who stated PA was not received  I advised him that I have a confirmation receipt that the fax was successful  I also confirmed the fax number located on the PA request sheet which is correct  He advised me to re-fax the authorization request with our cover sheet stating urgent request and the date of the patient's appointment  He also advised me to call back tomorrow around lunch time to confirm that these were received  Forms re-faxed on 1/6/2020       Reference #: [id number]

## 2020-01-08 NOTE — TELEPHONE ENCOUNTER
Called [facility] Admin- spoke with Suzy Rivera- I advised her that I was calling to check on the status of the patient's Botox authorization  She was able to locate the authorization but it is still pending  She states it has a decision date of 1/9/2020  She advised me that I can send a correspondence via fax to the authorization team letting them know the patient's appointment is Friday 1/10/2020 and that I would like this expedited so a decision can be made today  She states I should include the pending authorization number on the cover sheet       Pending authorization #: [id number]          Call reference #: [id number]

## 2020-01-09 ENCOUNTER — DOCUMENTATION (OUTPATIENT)
Dept: NEUROLOGY | Facility: CLINIC | Age: 55
End: 2020-01-09

## 2020-01-09 NOTE — PROGRESS NOTES
Type Date User Summary Attachment   General 01/09/2020  8:18 AM Tenna Phoenix care coordination  -   Note    Botox- authorization #: [id number]- 1 visit- valid from 1/10/2020 until 4/9/2020   Please use our stock      Thank you,     [facility]

## 2020-01-09 NOTE — TELEPHONE ENCOUNTER
Called [facility] Admin- spoke with Parthenia Holter- she advised me the patient's Botox authorization has been approved  She was able to provide me with the following Botox authorization information  Approval letter will be received via fax      Authorization information:    Authorization #: [id number]  Valid dates: 1/10/2020 until 4/9/2020- 1 visit

## 2020-01-23 DIAGNOSIS — F33.40 RECURRENT MAJOR DEPRESSIVE DISORDER, IN REMISSION (HCC): ICD-10-CM

## 2020-01-23 DIAGNOSIS — F41.1 GAD (GENERALIZED ANXIETY DISORDER): ICD-10-CM

## 2020-01-23 DIAGNOSIS — R41.3 MEMORY DIFFICULTIES: ICD-10-CM

## 2020-01-23 DIAGNOSIS — R41.840 ATTENTION AND CONCENTRATION DEFICIT: ICD-10-CM

## 2020-01-23 RX ORDER — DEXTROAMPHETAMINE SACCHARATE, AMPHETAMINE ASPARTATE MONOHYDRATE, DEXTROAMPHETAMINE SULFATE AND AMPHETAMINE SULFATE 5; 5; 5; 5 MG/1; MG/1; MG/1; MG/1
20 CAPSULE, EXTENDED RELEASE ORAL DAILY PRN
Qty: 30 CAPSULE | Refills: 0 | Status: SHIPPED | OUTPATIENT
Start: 2020-01-23 | End: 2020-02-19 | Stop reason: SDUPTHER

## 2020-01-23 RX ORDER — CLONAZEPAM 0.5 MG/1
TABLET ORAL
Qty: 45 TABLET | Refills: 3 | Status: SHIPPED | OUTPATIENT
Start: 2020-01-23 | End: 2020-05-06 | Stop reason: SDUPTHER

## 2020-01-28 ENCOUNTER — OFFICE VISIT (OUTPATIENT)
Dept: FAMILY MEDICINE CLINIC | Facility: CLINIC | Age: 55
End: 2020-01-28
Payer: COMMERCIAL

## 2020-01-28 VITALS
WEIGHT: 124.7 LBS | SYSTOLIC BLOOD PRESSURE: 124 MMHG | BODY MASS INDEX: 21.29 KG/M2 | RESPIRATION RATE: 18 BRPM | HEIGHT: 64 IN | DIASTOLIC BLOOD PRESSURE: 76 MMHG | HEART RATE: 90 BPM

## 2020-01-28 DIAGNOSIS — Z00.00 ANNUAL PHYSICAL EXAM: Primary | ICD-10-CM

## 2020-01-28 PROCEDURE — 99386 PREV VISIT NEW AGE 40-64: CPT | Performed by: PHYSICIAN ASSISTANT

## 2020-01-28 NOTE — PATIENT INSTRUCTIONS

## 2020-01-28 NOTE — PROGRESS NOTES
ADULT ANNUAL PHYSICAL  [facility]    NAME: Halie Farley  AGE: 62 y o  SEX: female  : 1965     DATE: 2020     Assessment and Plan:     Healthy 62year old female    Immunizations and preventive care screenings were discussed with patient today  Appropriate education was printed on patient's after visit summary  Counseling:  Alcohol/drug use: discussed moderation in alcohol intake, the recommendations for healthy alcohol use, and avoidance of illicit drug use  Dental Health: discussed importance of regular tooth brushing, flossing, and dental visits  Injury prevention: discussed safety/seat belts, safety helmets, smoke detectors, carbon dioxide detectors, and smoking near bedding or upholstery  Sexual health: discussed sexually transmitted diseases, partner selection, use of condoms, avoidance of unintended pregnancy, and contraceptive alternatives  · Exercise: the importance of regular exercise/physical activity was discussed  Recommend exercise 3-5 times per week for at least 30 minutes  · PAP, mammo 2020  · Colon due          Return in 1 year (on 2021)  Chief Complaint:     Chief Complaint   Patient presents with    Establish Care      History of Present Illness:     Adult Annual Physical   Patient here for a comprehensive physical exam  The patient reports no problems  Diet and Physical Activity  · Diet/Nutrition: well balanced diet  · Exercise: no formal exercise  Depression Screening  PHQ-9 Depression Screening    PHQ-9:    Frequency of the following problems over the past two weeks:            General Health  · Sleep: sleeps well and gets 7-8 hours of sleep on average  · Hearing: normal - bilateral   · Vision: goes for regular eye exams, most recent eye exam <1 year ago and wears glasses  · Dental: regular dental visits and brushes teeth twice daily         /GYN Health  · Patient is: postmenopausal  · PAP - Dr Pretty Amin  · Mammo due 2/2020  · Colon      Review of Systems:     Review of Systems   Constitutional: Negative  HENT: Negative  Eyes: Negative  Respiratory: Negative  Cardiovascular: Negative  Gastrointestinal: Negative  Endocrine: Negative  Genitourinary: Negative  Musculoskeletal: Negative  Skin: Negative  Allergic/Immunologic: Negative  Neurological: Negative  Hematological: Negative  Psychiatric/Behavioral: Negative  Past Medical History:     Past Medical History:   Diagnosis Date    Basal cell carcinoma     Last assessed: 9/26/14    Depression with anxiety     Last assessed: 1/13/17    Insomnia     Last assessed: 9/26/14    Kidney stone       Past Surgical History:     Past Surgical History:   Procedure Laterality Date    BLADDER SURGERY      lift of bladder    CYSTOSCOPY      Theurapeutic   TVT-O    EYE SURGERY Bilateral     LASIK    FACIAL/NECK BIOPSY Right 3/12/2019    Procedure: UPPER EYELID CYST EXCISION;  Surgeon: Ernestina Schmid MD;  [facility];  Service: Plastics    GYNECOLOGIC CRYOSURGERY      Cervix    HYSTEROSCOPY W/ ENDOMETRIAL ABLATION  12/14/2010    Deborah    KIDNEY SURGERY  kidney stone removal    1983    TUBAL LIGATION        Social History:     Social History     Socioeconomic History    Marital status:      Spouse name: None    Number of children: None    Years of education: None    Highest education level: None   Occupational History    None   Social Needs    Financial resource strain: None    Food insecurity:     Worry: None     Inability: None    Transportation needs:     Medical: None     Non-medical: None   Tobacco Use    Smoking status: Never Smoker    Smokeless tobacco: Never Used   Substance and Sexual Activity    Alcohol use: Yes     Frequency: 2-3 times a week     Drinks per session: 1 or 2     Comment: socially    Drug use: No    Sexual activity: Yes     Partners: Male     Birth control/protection: Post-menopausal   Lifestyle    Physical activity:     Days per week: None     Minutes per session: None    Stress: None   Relationships    Social connections:     Talks on phone: None     Gets together: None     Attends Mormonism service: None     Active member of club or organization: None     Attends meetings of clubs or organizations: None     Relationship status: None    Intimate partner violence:     Fear of current or ex partner: None     Emotionally abused: None     Physically abused: None     Forced sexual activity: None   Other Topics Concern    None   Social History Narrative    Caffeine use    Drinks coffee      Family History:     Family History   Problem Relation Age of Onset    Heart murmur Mother     Hyperlipidemia Mother     Atrial fibrillation Father     Arthritis Father     Psoriasis Sister     Heart disease Maternal Grandfather     Breast cancer Paternal Grandmother 77    Diabetes Paternal Grandmother         Mellitus    Hypertension Paternal Grandmother     Skin cancer Paternal Grandfather     ADD / ADHD Son     ADD / ADHD Daughter     Alcohol abuse Neg Hx     Substance Abuse Neg Hx     Mental illness Neg Hx       Current Medications:     Current Outpatient Medications   Medication Sig Dispense Refill    amphetamine-dextroamphetamine (ADDERALL XR) 20 MG 24 hr capsule Take 1 capsule (20 mg total) by mouth daily as needed (focus and concentration deficit)Max Daily Amount: 20 mg 30 capsule 0    b complex vitamins tablet Take 1 tablet by mouth every other day      buPROPion (WELLBUTRIN XL) 300 mg 24 hr tablet Take 1 tablet (300 mg total) by mouth every morning 90 tablet 1    Calcium Carb-Cholecalciferol (CALCIUM 500+D3 PO) Take by mouth every other day      clonazePAM (KlonoPIN) 0 5 mg tablet Take 1/2 tab daily as needed for anxiety and take 1 tab nightly as needed for insomnia and anxiety 45 tablet 3    conjugated estrogens (PREMARIN) vaginal cream Insert 0 5g vaginally three times per week 30 g 0    FLUoxetine (PROzac) 40 MG capsule Take 1 capsule (40 mg total) by mouth daily 90 capsule 1     No current facility-administered medications for this visit  Allergies: Allergies   Allergen Reactions    Butoconazole Hives    Tioconazole Hives      Physical Exam:     /76 (BP Location: Left arm, Patient Position: Sitting, Cuff Size: Standard)   Pulse 90   Resp 18   Ht 5' 4 25" (1 632 m)   Wt 56 6 kg (124 lb 11 2 oz)   BMI 21 24 kg/m²     Physical Exam   Constitutional: She is oriented to person, place, and time  She appears well-developed and well-nourished  HENT:   Head: Normocephalic and atraumatic  Right Ear: External ear normal    Left Ear: External ear normal    Nose: Nose normal    Mouth/Throat: Oropharynx is clear and moist    Eyes: Pupils are equal, round, and reactive to light  Conjunctivae and EOM are normal    Neck: Normal range of motion  Neck supple  No thyromegaly present  Cardiovascular: Normal rate, regular rhythm, normal heart sounds and intact distal pulses  No murmur heard  Pulmonary/Chest: Effort normal and breath sounds normal  No respiratory distress  She has no wheezes  She has no rales  Abdominal: Soft  Bowel sounds are normal  She exhibits no distension and no mass  There is no tenderness  Musculoskeletal: Normal range of motion  She exhibits no edema  Lymphadenopathy:     She has no cervical adenopathy  Neurological: She is alert and oriented to person, place, and time  She has normal reflexes  No cranial nerve deficit  Skin: Skin is warm and dry  Psychiatric: She has a normal mood and affect   Judgment normal        Neli Weinberg PA-C  [address]

## 2020-01-29 NOTE — TELEPHONE ENCOUNTER
Provider was sick on 1/10/2020 and appt was rescheduled to 1/31/2020 at 145 pm  I called patient to offer availability in morning as she prefers morning appts  Appt is same date 1/31/2020 at 0830 am with Amrita  Asked for call back to confirm

## 2020-01-31 ENCOUNTER — PROCEDURE VISIT (OUTPATIENT)
Dept: NEUROLOGY | Facility: CLINIC | Age: 55
End: 2020-01-31
Payer: COMMERCIAL

## 2020-01-31 VITALS — DIASTOLIC BLOOD PRESSURE: 87 MMHG | HEART RATE: 97 BPM | TEMPERATURE: 97.5 F | SYSTOLIC BLOOD PRESSURE: 132 MMHG

## 2020-01-31 DIAGNOSIS — G43.709 CHRONIC MIGRAINE WITHOUT AURA WITHOUT STATUS MIGRAINOSUS, NOT INTRACTABLE: Primary | ICD-10-CM

## 2020-01-31 PROCEDURE — 64615 CHEMODENERV MUSC MIGRAINE: CPT | Performed by: PHYSICIAN ASSISTANT

## 2020-01-31 NOTE — PROGRESS NOTES
Chemodenervation  Date/Time: 1/31/2020 1:46 PM  Performed by: Evans Osorio PA-C  Authorized by: Evans Osorio PA-C     Pre-procedure details:     Prepped With: Alcohol    Anesthesia (see MAR for exact dosages): Anesthesia method:  None  Procedure details:     Position:  Upright  Botox:     Botox Type:  Type A    Brand:  Botox    mL's of Botulinum Toxin:  200    Final Concentration per CC:  200 units    Needle Gauge:  30 G 2 5 inch  Procedures:     Botox Procedures: chronic headache      Indications: migraines    Injection Location:     Head / Face:  L superior cervical paraspinal, R superior cervical paraspinal, L , R , L frontalis, R frontalis, L medial occipitalis, R medial occipitalis, procerus, R temporalis, L temporalis, R superior trapezius and L superior trapezius    L  injection amount:  5 unit(s)    R  injection amount:  5 unit(s)    L lateral frontalis:  5 unit(s)    R lateral frontalis:  5 unit(s)    L medial frontalis:  5 unit(s)    R medial frontalis:  5 unit(s)    L temporalis injection amount:  20 unit(s)    R temporalis injection amount:  20 unit(s)    Procerus injection amount:  5 unit(s)    L medial occipitalis injection amount:  15 unit(s)    R medial occipitalis injection amount:  15 unit(s)    L superior cervical paraspinal injection amount:  10 unit(s)    R superior cervical paraspinal injection amount:  10 unit(s)    L superior trapezius injection amount:  15 unit(s)    R superior trapezius injection amount:  15 unit(s)  Total Units:     Total units used:  200    Total units discarded:  0  Post-procedure details:     Chemodenervation:  Chronic migraine    Facial Nerve Location[de-identified]  Bilateral facial nerve    Patient tolerance of procedure:   Tolerated well, no immediate complications  Comments:      5 units orbicularis oculi bilaterally  35 units frontalis  All medically necessary

## 2020-02-14 ENCOUNTER — OFFICE VISIT (OUTPATIENT)
Dept: PSYCHIATRY | Facility: CLINIC | Age: 55
End: 2020-02-14
Payer: COMMERCIAL

## 2020-02-14 DIAGNOSIS — F33.40 RECURRENT MAJOR DEPRESSIVE DISORDER, IN REMISSION (HCC): ICD-10-CM

## 2020-02-14 DIAGNOSIS — F41.1 GAD (GENERALIZED ANXIETY DISORDER): Primary | ICD-10-CM

## 2020-02-14 DIAGNOSIS — R41.840 ATTENTION AND CONCENTRATION DEFICIT: ICD-10-CM

## 2020-02-14 PROCEDURE — 1036F TOBACCO NON-USER: CPT | Performed by: NURSE PRACTITIONER

## 2020-02-14 PROCEDURE — 99213 OFFICE O/P EST LOW 20 MIN: CPT | Performed by: NURSE PRACTITIONER

## 2020-02-14 NOTE — PSYCH
MEDICATION MANAGEMENT NOTE        [facility]      Name and Date of Birth:  Lay Alcaraz 62 y o  1965 MRN: [id number]    Date of Visit: February 14, 2020    SUBJECTIVE:    Ashu Bedoya is seen today for a follow up for depression, anxiety and ADHD  She continues to do well since the last visit  Patient reports she her mood and anxiety level remains stable current medication combination bupropion 300 mg 24 hour tablet Prozac 40 mg and clonazepam half tablet in the morning and 1 tablet in the evening as needed  In addition patient's attention and focus remain well controlled with Adderall XR 20 mg daily  Patient reports slight increase in memory problems since last visit  Patient has strong family history for early onset dementia  Patient is followed by neurology every 3 months  Neurologist aware of increased memory difficulties  Patient educated on the possibility clonazepam interfering with memory formation when taken throughout the day  Recommended patient decreased dose and eliminate a m  Dose if possible    She denies any side effects from medications  PLAN:    Continue all current medications  Return in 6 months  Patient to call office with schedule sooner appointment if difficulties arise  Monitor memory symptoms      HPI ROS Appetite Changes and Sleep:     She reports normal sleep, normal appetite, normal energy level    Review Of Systems:      General normal    Personality no change in personality   Constitutional negative   ENT negative   Cardiovascular negative   Respiratory negative   Gastrointestinal negative   Genitourinary negative   Musculoskeletal negative   Integumentary negative   Neurological decreased memory   Endocrine negative   Other Symptoms none, all other systems are negative     Mental Status Evaluation:    Appearance Adequate hygiene and grooming   Behavior calm and cooperative   Mood euthymic  Depression Scale - 1 of 10 (0 = No depression)  Anxiety Scale - 3 of 10 (0 = No anxiety)   Speech Normal rate and volume   Affect appropriate   Thought Processes Goal directed and coherent   Thought Content Does not verbalize delusional material   Associations Tightly connected   Perceptual Disturbances Denies hallucinations and does not appear to be responding to internal stimuli   Risk Potential Suicidal/Homicidal Ideation - No suicidal or homicidal ideation  Risk of Violence - No  Risk of Self Mutilation - No   Orientation oriented to person, place, time/date and situation   Memory short term memory Mildly impaired, possibly due to benzodiazepine use     Consciousness alert and awake   Attention/Concentration attention span and concentration are age appropriate   Insight intact   Judgement intact   Muscle Strength and Gait normal muscle strength and normal muscle tone, normal gait/station and normal balance   Motor Activity no abnormal movements   Language no difficulty naming common objects, no difficulty repeating a phrase, no difficulty writing a sentence   Fund of Knowledge adequate knowledge of current events  adequate fund of knowledge regarding past history  adequate fund of knowledge regarding vocabulary        Past Psychiatric History Update:     Inpatient Psychiatric Admission Since Last Encounter:   no  Changes to Outpatient Psychiatric Treatment:    no  Suicide Attempt Or Self Mutilation Since Last Encounter:   no  Incidence of Violent Behavior Since Last Encounter:   no      Traumatic History Update:     New Onset of Abuse Since Last Encounter:   no  Traumatic Events Since Last Encounter:   no    Past Medical History:    Past Medical History:   Diagnosis Date    Basal cell carcinoma     Last assessed: 9/26/14    Depression with anxiety     Last assessed: 1/13/17    Insomnia     Last assessed: 9/26/14    Kidney stone      Past Medical History Pertinent Negatives:   Diagnosis Date Noted    History of transfusion 03/04/2019     Past Surgical History:   Procedure Laterality Date    BLADDER SURGERY      lift of bladder    CYSTOSCOPY      Theurapeutic   TVT-O    EYE SURGERY Bilateral     LASIK    FACIAL/NECK BIOPSY Right 3/12/2019    Procedure: UPPER EYELID CYST EXCISION;  Surgeon: Pancho Salmon MD;  [facility];  Service: Plastics    GYNECOLOGIC CRYOSURGERY      Cervix    HYSTEROSCOPY W/ ENDOMETRIAL ABLATION  12/14/2010    Novasure    KIDNEY SURGERY  kidney stone removal    1983    TUBAL LIGATION       Allergies   Allergen Reactions    Butoconazole Hives    Tioconazole Hives       Substance Abuse History:    Social History     Substance and Sexual Activity   Alcohol Use Yes    Frequency: 2-3 times a week    Drinks per session: 1 or 2    Comment: socially     Social History     Substance and Sexual Activity   Drug Use No       Social History:    Social History     Socioeconomic History    Marital status:      Spouse name: Not on file    Number of children: Not on file    Years of education: Not on file    Highest education level: Not on file   Occupational History    Not on file   Social Needs    Financial resource strain: Not on file    Food insecurity:     Worry: Not on file     Inability: Not on file    Transportation needs:     Medical: Not on file     Non-medical: Not on file   Tobacco Use    Smoking status: Never Smoker    Smokeless tobacco: Never Used   Substance and Sexual Activity    Alcohol use: Yes     Frequency: 2-3 times a week     Drinks per session: 1 or 2     Comment: socially    Drug use: No    Sexual activity: Yes     Partners: Male     Birth control/protection: Post-menopausal   Lifestyle    Physical activity:     Days per week: Not on file     Minutes per session: Not on file    Stress: Not on file   Relationships    Social connections:     Talks on phone: Not on file     Gets together: Not on file     Attends Mandaen service: Not on file     Active member of club or organization: Not on file     Attends meetings of clubs or organizations: Not on file     Relationship status: Not on file    Intimate partner violence:     Fear of current or ex partner: Not on file     Emotionally abused: Not on file     Physically abused: Not on file     Forced sexual activity: Not on file   Other Topics Concern    Not on file   Social History Narrative    Caffeine use    Drinks coffee       Family Psychiatric History:     Family History   Problem Relation Age of Onset    Heart murmur Mother     Hyperlipidemia Mother     Atrial fibrillation Father     Arthritis Father     Psoriasis Sister     Heart disease Maternal Grandfather     Breast cancer Paternal Grandmother 77    Diabetes Paternal Grandmother         Mellitus    Hypertension Paternal Grandmother     Skin cancer Paternal Grandfather     ADD / ADHD Son     ADD / ADHD Daughter     Alcohol abuse Neg Hx     Substance Abuse Neg Hx     Mental illness Neg Hx        History Review: The following portions of the patient's history were reviewed and updated as appropriate: allergies, current medications, past family history, past medical history, past social history, past surgical history and problem list          OBJECTIVE:     Vital signs in last 24 hours: There were no vitals filed for this visit  Laboratory Results:   Recent Labs (last 2 months):   No visits with results within 2 Month(s) from this visit     Latest known visit with results is:   Appointment on 05/28/2019   Component Date Value    WBC 05/28/2019 6 60     RBC 05/28/2019 4 30     Hemoglobin 05/28/2019 13 9     Hematocrit 05/28/2019 44 5     MCV 05/28/2019 104*    MCH 05/28/2019 32 3     MCHC 05/28/2019 31 2*    RDW 05/28/2019 13 1     MPV 05/28/2019 12 8*    Platelets 55/44/4236 283     nRBC 05/28/2019 0     Neutrophils Relative 05/28/2019 61     Immat GRANS % 05/28/2019 0     Lymphocytes Relative 05/28/2019 25     Monocytes Relative 05/28/2019 12  Eosinophils Relative 05/28/2019 2     Basophils Relative 05/28/2019 0     Neutrophils Absolute 05/28/2019 3 95     Immature Grans Absolute 05/28/2019 0 02     Lymphocytes Absolute 05/28/2019 1 66     Monocytes Absolute 05/28/2019 0 80     Eosinophils Absolute 05/28/2019 0 15     Basophils Absolute 05/28/2019 0 02      I have personally reviewed all pertinent laboratory/tests results  Suicide/Homicide Risk Assessment:    Risk of Harm to Self:   Recent Specific Risk Factors include: none    Risk of Harm to Others:   Recent Specific Risk Factors include: none  The following interventions are recommended: no intervention changes needed    Medications Risks/Benefits:      Risks, Benefits And Possible Side Effects Of Medications:    Discussed risks and benefits of treatment with patient including risk of suicidality and serotonin syndrome related to treatment with antidepressants; Risk of induction of manic symptoms in certain patient populations, risks of misuse, abuse or dependence, sedation and respiratory depression related to treatment with benzodiazepine medications, risks of cardiovascular side effects including elevated blood pressure, risk of misuse, abuse or dependence and risk of increased anxiety related to treatment with stimulant medications and Reduction in seizure threshold from Wellbutrin     Controlled Medication Discussion:     Melany Bryan has been filling controlled prescriptions on time as prescribed according to [place] Prescription Drug Monitoring Program    Assessment/Plan:      There are no diagnoses linked to this encounter  Treatment Recommendations/Precautions:    Aware of 24 hour and weekend coverage for urgent situations accessed by calling [facility] main practice number    Psychotherapy Provided:     Individual psychotherapy provided: Yes  Counseling was provided during the session today for 16 minutes  Supportive therapy provided   Discussed Changes in memory and possible stroke medication adjustment strategies for improve memory  Right supportive counseling and reviewed patient strategies for managing impaired short-term recall       Treatment Plan:    Due for update/Updated:   ILEANA Anderson 02/14/20

## 2020-02-19 DIAGNOSIS — R41.3 MEMORY DIFFICULTIES: ICD-10-CM

## 2020-02-19 DIAGNOSIS — F33.40 RECURRENT MAJOR DEPRESSIVE DISORDER, IN REMISSION (HCC): ICD-10-CM

## 2020-02-19 DIAGNOSIS — R41.840 ATTENTION AND CONCENTRATION DEFICIT: ICD-10-CM

## 2020-02-19 RX ORDER — DEXTROAMPHETAMINE SACCHARATE, AMPHETAMINE ASPARTATE MONOHYDRATE, DEXTROAMPHETAMINE SULFATE AND AMPHETAMINE SULFATE 5; 5; 5; 5 MG/1; MG/1; MG/1; MG/1
20 CAPSULE, EXTENDED RELEASE ORAL DAILY PRN
Qty: 30 CAPSULE | Refills: 0 | Status: SHIPPED | OUTPATIENT
Start: 2020-02-20 | End: 2020-03-25 | Stop reason: SDUPTHER

## 2020-02-19 NOTE — TELEPHONE ENCOUNTER
Eleazar Virgen called and requested a refill on Adderall       Carolin's next appointment is on 8/14/2020

## 2020-02-21 ENCOUNTER — APPOINTMENT (OUTPATIENT)
Dept: LAB | Facility: CLINIC | Age: 55
End: 2020-02-21
Payer: COMMERCIAL

## 2020-02-21 DIAGNOSIS — Z00.00 ANNUAL PHYSICAL EXAM: ICD-10-CM

## 2020-02-21 LAB
ALBUMIN SERPL BCP-MCNC: 3.9 G/DL (ref 3.5–5)
ALP SERPL-CCNC: 71 U/L (ref 46–116)
ALT SERPL W P-5'-P-CCNC: 29 U/L (ref 12–78)
ANION GAP SERPL CALCULATED.3IONS-SCNC: 5 MMOL/L (ref 4–13)
AST SERPL W P-5'-P-CCNC: 26 U/L (ref 5–45)
BACTERIA UR QL AUTO: ABNORMAL /HPF
BASOPHILS # BLD AUTO: 0.01 THOUSANDS/ΜL (ref 0–0.1)
BASOPHILS NFR BLD AUTO: 0 % (ref 0–1)
BILIRUB SERPL-MCNC: 0.31 MG/DL (ref 0.2–1)
BILIRUB UR QL STRIP: NEGATIVE
BUN SERPL-MCNC: 14 MG/DL (ref 5–25)
CALCIUM SERPL-MCNC: 9.1 MG/DL (ref 8.3–10.1)
CHLORIDE SERPL-SCNC: 107 MMOL/L (ref 100–108)
CHOLEST SERPL-MCNC: 263 MG/DL (ref 50–200)
CLARITY UR: ABNORMAL
CO2 SERPL-SCNC: 30 MMOL/L (ref 21–32)
COLOR UR: YELLOW
CREAT SERPL-MCNC: 0.84 MG/DL (ref 0.6–1.3)
EOSINOPHIL # BLD AUTO: 0.15 THOUSAND/ΜL (ref 0–0.61)
EOSINOPHIL NFR BLD AUTO: 3 % (ref 0–6)
ERYTHROCYTE [DISTWIDTH] IN BLOOD BY AUTOMATED COUNT: 12.4 % (ref 11.6–15.1)
GFR SERPL CREATININE-BSD FRML MDRD: 78 ML/MIN/1.73SQ M
GLUCOSE P FAST SERPL-MCNC: 99 MG/DL (ref 65–99)
GLUCOSE UR STRIP-MCNC: NEGATIVE MG/DL
HCT VFR BLD AUTO: 43.4 % (ref 34.8–46.1)
HDLC SERPL-MCNC: 90 MG/DL
HGB BLD-MCNC: 13.7 G/DL (ref 11.5–15.4)
HGB UR QL STRIP.AUTO: NEGATIVE
IMM GRANULOCYTES # BLD AUTO: 0.02 THOUSAND/UL (ref 0–0.2)
IMM GRANULOCYTES NFR BLD AUTO: 0 % (ref 0–2)
KETONES UR STRIP-MCNC: NEGATIVE MG/DL
LDLC SERPL CALC-MCNC: 160 MG/DL (ref 0–100)
LEUKOCYTE ESTERASE UR QL STRIP: ABNORMAL
LYMPHOCYTES # BLD AUTO: 1.85 THOUSANDS/ΜL (ref 0.6–4.47)
LYMPHOCYTES NFR BLD AUTO: 36 % (ref 14–44)
MCH RBC QN AUTO: 32.4 PG (ref 26.8–34.3)
MCHC RBC AUTO-ENTMCNC: 31.6 G/DL (ref 31.4–37.4)
MCV RBC AUTO: 103 FL (ref 82–98)
MONOCYTES # BLD AUTO: 0.68 THOUSAND/ΜL (ref 0.17–1.22)
MONOCYTES NFR BLD AUTO: 13 % (ref 4–12)
NEUTROPHILS # BLD AUTO: 2.47 THOUSANDS/ΜL (ref 1.85–7.62)
NEUTS SEG NFR BLD AUTO: 48 % (ref 43–75)
NITRITE UR QL STRIP: NEGATIVE
NON-SQ EPI CELLS URNS QL MICRO: ABNORMAL /HPF
NRBC BLD AUTO-RTO: 0 /100 WBCS
PH UR STRIP.AUTO: 7.5 [PH]
PLATELET # BLD AUTO: 267 THOUSANDS/UL (ref 149–390)
PMV BLD AUTO: 12.6 FL (ref 8.9–12.7)
POTASSIUM SERPL-SCNC: 4.3 MMOL/L (ref 3.5–5.3)
PROT SERPL-MCNC: 7.8 G/DL (ref 6.4–8.2)
PROT UR STRIP-MCNC: ABNORMAL MG/DL
RBC # BLD AUTO: 4.23 MILLION/UL (ref 3.81–5.12)
RBC #/AREA URNS AUTO: ABNORMAL /HPF
SODIUM SERPL-SCNC: 142 MMOL/L (ref 136–145)
SP GR UR STRIP.AUTO: 1.03 (ref 1–1.03)
TRI-PHOS CRY URNS QL MICRO: ABNORMAL /HPF
TRIGL SERPL-MCNC: 67 MG/DL
TSH SERPL DL<=0.05 MIU/L-ACNC: 0.83 UIU/ML (ref 0.36–3.74)
UROBILINOGEN UR QL STRIP.AUTO: 1 E.U./DL
WBC # BLD AUTO: 5.18 THOUSAND/UL (ref 4.31–10.16)
WBC #/AREA URNS AUTO: ABNORMAL /HPF

## 2020-02-21 PROCEDURE — 36415 COLL VENOUS BLD VENIPUNCTURE: CPT

## 2020-02-21 PROCEDURE — 81001 URINALYSIS AUTO W/SCOPE: CPT | Performed by: PHYSICIAN ASSISTANT

## 2020-02-21 PROCEDURE — 85025 COMPLETE CBC W/AUTO DIFF WBC: CPT

## 2020-02-21 PROCEDURE — 80053 COMPREHEN METABOLIC PANEL: CPT

## 2020-02-21 PROCEDURE — 84443 ASSAY THYROID STIM HORMONE: CPT

## 2020-02-21 PROCEDURE — 80061 LIPID PANEL: CPT

## 2020-02-22 DIAGNOSIS — R80.9 PROTEINURIA, UNSPECIFIED TYPE: Primary | ICD-10-CM

## 2020-02-28 ENCOUNTER — ANNUAL EXAM (OUTPATIENT)
Dept: OBGYN CLINIC | Facility: CLINIC | Age: 55
End: 2020-02-28
Payer: COMMERCIAL

## 2020-02-28 VITALS — BODY MASS INDEX: 21.09 KG/M2 | DIASTOLIC BLOOD PRESSURE: 70 MMHG | SYSTOLIC BLOOD PRESSURE: 110 MMHG | WEIGHT: 123.8 LBS

## 2020-02-28 DIAGNOSIS — N94.10 DYSPAREUNIA, FEMALE: ICD-10-CM

## 2020-02-28 DIAGNOSIS — Z01.419 ENCOUNTER FOR GYNECOLOGICAL EXAMINATION (GENERAL) (ROUTINE) WITHOUT ABNORMAL FINDINGS: Primary | ICD-10-CM

## 2020-02-28 DIAGNOSIS — Z12.31 SCREENING MAMMOGRAM, ENCOUNTER FOR: ICD-10-CM

## 2020-02-28 PROCEDURE — 99396 PREV VISIT EST AGE 40-64: CPT | Performed by: OBSTETRICS & GYNECOLOGY

## 2020-02-28 RX ORDER — RIBOFLAVIN (VITAMIN B2) 100 MG
100 TABLET ORAL DAILY
COMMUNITY
End: 2021-10-21

## 2020-02-28 NOTE — PROGRESS NOTES
Assessment/Plan     Diagnoses and all orders for this visit:    Screening mammogram, encounter for  -     Mammo screening bilateral w 3d & cad; Future    Other orders  -     Ascorbic Acid (VITAMIN C) 100 MG tablet; Take 100 mg by mouth daily  -     Multiple Vitamins-Minerals (MULTIVITAL-M PO); Take by mouth             Martha Stahl is a 62 y o  female who presents for annual exam  The patient has no complaints today  The patient is not currently sexually active  GYN screening history: last pap: was normal  The patient has never been taking hormone replacement therapy  Patient denies post-menopausal vaginal bleeding    The patient participates in regular exercise: yes  The patient denies any urinary complaints  Menstrual History:  OB History        6    Para   6    Term   3            AB        Living           SAB        TAB        Ectopic        Multiple        Live Births                        No LMP recorded  Patient is postmenopausal        The following portions of the patient's history were reviewed and updated as appropriate: allergies, current medications, past family history, past medical history, past social history, past surgical history and problem list     Review of Systems  Pertinent items are noted in HPI  Objective      /70   Wt 56 2 kg (123 lb 12 8 oz)   BMI 21 09 kg/m²     General:   alert and oriented, in no acute distress   Heart:    Breasts: regular rate and rhythm, S1, S2 normal, no murmur, click, rub or gallop   appear normal, no suspicious masses, no skin or nipple changes or axillary nodes     Lungs: clear to auscultation bilaterally   Abdomen: soft, non-tender, without masses or organomegaly   Vulva: normal   Vagina: normal mucosa   Cervix: no lesions   Uterus: normal size, non-tender, normal shape and consistency   Adnexa: normal adnexa and no mass, fullness, tenderness

## 2020-03-12 ENCOUNTER — TRANSCRIBE ORDERS (OUTPATIENT)
Dept: ADMINISTRATIVE | Facility: HOSPITAL | Age: 55
End: 2020-03-12

## 2020-03-12 ENCOUNTER — APPOINTMENT (OUTPATIENT)
Dept: LAB | Facility: MEDICAL CENTER | Age: 55
End: 2020-03-12
Payer: COMMERCIAL

## 2020-03-12 DIAGNOSIS — Z00.8 HEALTH EXAMINATION IN POPULATION SURVEYS: Primary | ICD-10-CM

## 2020-03-12 DIAGNOSIS — Z00.8 HEALTH EXAMINATION IN POPULATION SURVEYS: ICD-10-CM

## 2020-03-12 LAB
CHOLEST SERPL-MCNC: 266 MG/DL (ref 50–200)
EST. AVERAGE GLUCOSE BLD GHB EST-MCNC: 111 MG/DL
HBA1C MFR BLD: 5.5 %
HDLC SERPL-MCNC: 101 MG/DL
LDLC SERPL CALC-MCNC: 151 MG/DL (ref 0–100)
NONHDLC SERPL-MCNC: 165 MG/DL
TRIGL SERPL-MCNC: 68 MG/DL

## 2020-03-12 PROCEDURE — 83036 HEMOGLOBIN GLYCOSYLATED A1C: CPT

## 2020-03-12 PROCEDURE — 80061 LIPID PANEL: CPT

## 2020-03-12 PROCEDURE — 36415 COLL VENOUS BLD VENIPUNCTURE: CPT

## 2020-03-17 ENCOUNTER — TELEPHONE (OUTPATIENT)
Dept: OBGYN CLINIC | Facility: CLINIC | Age: 55
End: 2020-03-17

## 2020-03-17 DIAGNOSIS — N94.10 DYSPAREUNIA, FEMALE: ICD-10-CM

## 2020-03-17 NOTE — TELEPHONE ENCOUNTER
Pt needs refill of premarin   Pt states that the medication was not sent to pharmacy please advise [facility]

## 2020-03-25 ENCOUNTER — TELEPHONE (OUTPATIENT)
Dept: PSYCHIATRY | Facility: CLINIC | Age: 55
End: 2020-03-25

## 2020-03-25 DIAGNOSIS — R41.840 ATTENTION AND CONCENTRATION DEFICIT: ICD-10-CM

## 2020-03-25 DIAGNOSIS — R41.3 MEMORY DIFFICULTIES: ICD-10-CM

## 2020-03-25 DIAGNOSIS — F33.40 RECURRENT MAJOR DEPRESSIVE DISORDER, IN REMISSION (HCC): ICD-10-CM

## 2020-03-25 RX ORDER — DEXTROAMPHETAMINE SACCHARATE, AMPHETAMINE ASPARTATE MONOHYDRATE, DEXTROAMPHETAMINE SULFATE AND AMPHETAMINE SULFATE 5; 5; 5; 5 MG/1; MG/1; MG/1; MG/1
20 CAPSULE, EXTENDED RELEASE ORAL DAILY PRN
Qty: 90 CAPSULE | Refills: 0 | Status: SHIPPED | OUTPATIENT
Start: 2020-03-25 | End: 2020-03-26 | Stop reason: SDUPTHER

## 2020-03-26 ENCOUNTER — TELEPHONE (OUTPATIENT)
Dept: PSYCHIATRY | Facility: CLINIC | Age: 55
End: 2020-03-26

## 2020-03-26 DIAGNOSIS — R41.3 MEMORY DIFFICULTIES: ICD-10-CM

## 2020-03-26 DIAGNOSIS — R41.840 ATTENTION AND CONCENTRATION DEFICIT: ICD-10-CM

## 2020-03-26 DIAGNOSIS — F33.40 RECURRENT MAJOR DEPRESSIVE DISORDER, IN REMISSION (HCC): ICD-10-CM

## 2020-03-26 RX ORDER — DEXTROAMPHETAMINE SACCHARATE, AMPHETAMINE ASPARTATE MONOHYDRATE, DEXTROAMPHETAMINE SULFATE AND AMPHETAMINE SULFATE 5; 5; 5; 5 MG/1; MG/1; MG/1; MG/1
20 CAPSULE, EXTENDED RELEASE ORAL DAILY PRN
Qty: 90 CAPSULE | Refills: 0 | Status: SHIPPED | OUTPATIENT
Start: 2020-03-26 | End: 2020-08-14 | Stop reason: SDUPTHER

## 2020-03-26 NOTE — TELEPHONE ENCOUNTER
OBED on Carolin's VM that script was re-submitted to [facility]

## 2020-03-26 NOTE — TELEPHONE ENCOUNTER
Narcisa Barboza called in regards to her prescription that was sent over yesterday, adderall 3/25  Narcisa Barboza tried to  her prescription from the [address] [place], [place] [address]S e in [facility] but they scrip was sent to [facility] so she could not get it  She called our office to have a new script sent to the [facility]  I called the pharmacy, since on our end we did sent the prescription to the correct pharmacy  I spoke with a Pharmacists who did notice that they did receive it at their pharmacy and it was somehow transferred to the [place] office  She was going to work on getting it sent back to their pharmacy since they are not allowed to transfer scripts  I called Narcisa Barboza back and left a message informing here that the [facility] was working on receiving her script and to give us a call back if she has any concerns or questions

## 2020-03-30 ENCOUNTER — TELEMEDICINE (OUTPATIENT)
Dept: FAMILY MEDICINE CLINIC | Facility: CLINIC | Age: 55
End: 2020-03-30
Payer: COMMERCIAL

## 2020-03-30 ENCOUNTER — TELEPHONE (OUTPATIENT)
Dept: NEUROLOGY | Facility: CLINIC | Age: 55
End: 2020-03-30

## 2020-03-30 VITALS — TEMPERATURE: 98.3 F

## 2020-03-30 DIAGNOSIS — J06.9 VIRAL URI WITH COUGH: Primary | ICD-10-CM

## 2020-03-30 PROCEDURE — 99213 OFFICE O/P EST LOW 20 MIN: CPT | Performed by: PHYSICIAN ASSISTANT

## 2020-03-30 NOTE — PROGRESS NOTES
Virtual Regular Visit     Reason for visit is cough    Encounter provider Arturo Blas PA-C    Provider located at [address] [place] [address] [address]  [phone]      Recent Visits  No visits were found meeting these conditions  Showing recent visits within past 7 days and meeting all other requirements     Today's Visits  Date Type Provider Dept   03/30/20 Telemedicine Arturo Blas PA-C [address]   Showing today's visits and meeting all other requirements     Future Appointments  Date Type Provider Dept   03/30/20 Telemedicine Arturo Blas PA-C [address] [facility]   Showing future appointments within next 150 days and meeting all other requirements        The patient was identified by name and date of birth  Purnima Patton was informed that this is a telemedicine visit and that the visit is being conducted through [employer] and patient was informed that this is not a secure, HIPAA-complaint platform  she agrees to proceed     My office door was closed  No one else was in the room  She acknowledged consent and understanding of privacy and security of the video platform  The patient has agreed to participate and understands they can discontinue the visit at any time  Patient is aware this is a billable service  Subjective  Purnima Patton is a 62 y o  female  Who developed a cough last Thursday, progressively and rapidly got worse, productive and dry  Felt sick at the time, generalize malaise  Denies fever, chills, nasal congestion, ear pain, sore throat  Took some OTC with some relief  Woke up this morning with significant improvement  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 given the current situation  Has been out of work since Thursday  Feels she is continuing to improve as the day goes on        Past Medical History:   Diagnosis Date    Basal cell carcinoma     Last assessed: 9/26/14    Depression with anxiety     Last assessed: 1/13/17    Insomnia     Last assessed: 9/26/14    Kidney stone        Past Surgical History:   Procedure Laterality Date    BLADDER SURGERY      lift of bladder    CYSTOSCOPY      Theurapeutic  TVT-O    EYE SURGERY Bilateral     LASIK    FACIAL/NECK BIOPSY Right 3/12/2019    Procedure: UPPER EYELID CYST EXCISION;  Surgeon: Maude Vicente MD;  [facility];  Service: Plastics    GYNECOLOGIC CRYOSURGERY      Cervix    HYSTEROSCOPY W/ ENDOMETRIAL ABLATION  12/14/2010    Deborah    KIDNEY SURGERY  kidney stone removal    1983    TUBAL LIGATION         Current Outpatient Medications   Medication Sig Dispense Refill    amphetamine-dextroamphetamine (ADDERALL XR) 20 MG 24 hr capsule Take 1 capsule (20 mg total) by mouth daily as needed (focus and concentration deficit)Max Daily Amount: 20 mg 90 capsule 0    Ascorbic Acid (VITAMIN C) 100 MG tablet Take 100 mg by mouth daily      b complex vitamins tablet Take 1 tablet by mouth every other day      buPROPion (WELLBUTRIN XL) 300 mg 24 hr tablet Take 1 tablet (300 mg total) by mouth every morning 90 tablet 1    clonazePAM (KlonoPIN) 0 5 mg tablet Take 1/2 tab daily as needed for anxiety and take 1 tab nightly as needed for insomnia and anxiety 45 tablet 3    conjugated estrogens (PREMARIN) vaginal cream Insert 0 5g vaginally three times per week 30 g 2    FLUoxetine (PROzac) 40 MG capsule Take 1 capsule (40 mg total) by mouth daily 90 capsule 1    Multiple Vitamins-Minerals (MULTIVITAL-M PO) Take by mouth       No current facility-administered medications for this visit  Allergies   Allergen Reactions    Butoconazole Hives    Tioconazole Hives       Review of Systems   Constitutional: Positive for fatigue  Negative for chills and fever     HENT: Negative for congestion, ear pain, postnasal drip, rhinorrhea, sinus pressure, sinus pain, sneezing and sore throat  Eyes: Negative  Respiratory: Positive for choking  Negative for chest tightness, shortness of breath and wheezing  Cardiovascular: Negative  Gastrointestinal: Negative  Genitourinary: Negative  Neurological: Negative  Psychiatric/Behavioral: Negative  Vitals:    03/30/20 0812   Temp: 98 3 °F (36 8 °C)         Physical Exam   Constitutional: She is oriented to person, place, and time  She appears well-developed and well-nourished  Pulmonary/Chest: Effort normal  No respiratory distress  Neurological: She is alert and oriented to person, place, and time  Psychiatric: She has a normal mood and affect  Judgment normal       Assessment/Plan:    1  Viral URI - fluids, rest, reassurance, can return to work on Thursday assuming she stays afebrile and coughing improves  This will have been 7 days since the beginning of her symptoms  Patient will call if her temp >100 4 F or if her symptoms get worse  She is encouraged to continue to practice universal hygiene and self isolation until she is better  I spent 15 minutes with the patient during this visit

## 2020-03-30 NOTE — TELEPHONE ENCOUNTER
Amparo Art,    Please schedule the patient for a Botox follow-up  Patient is due for Botox on 5/1/20  Will need updated notes for new authorization- last seen 1/2019  Please let me know once the patient is scheduled/ seen so I can initiate new authorization      Thank you    Goyo Francis

## 2020-03-30 NOTE — LETTER
March 30, 2020     Patient: Chaparrita Sanchez   YOB: 1965   Date of Visit: 3/30/2020       To Whom it May Concern:    Anaid Hobbs is under my professional care  She was seen via televisit 3/30/2020  She may return to work on 4/2/2020 assuming she is improving and fever free over the next 72 hours  If she is not we will update you on her return date as soon as possible       If you have any questions or concerns, please don't hesitate to call           Sincerely,          Chante Cheney PA-C        CC: No Recipients

## 2020-03-31 NOTE — TELEPHONE ENCOUNTER
Spoke with patient and scheduled virtual video visit with Rogers Sauceda on 4/17/2020 at 145 pm  Registration complete and patient added into Teams

## 2020-04-17 ENCOUNTER — TELEMEDICINE (OUTPATIENT)
Dept: NEUROLOGY | Facility: CLINIC | Age: 55
End: 2020-04-17
Payer: COMMERCIAL

## 2020-04-17 VITALS — HEIGHT: 64 IN | BODY MASS INDEX: 21.17 KG/M2 | WEIGHT: 124 LBS

## 2020-04-17 DIAGNOSIS — G43.709 CHRONIC MIGRAINE WITHOUT AURA WITHOUT STATUS MIGRAINOSUS, NOT INTRACTABLE: Primary | ICD-10-CM

## 2020-04-17 PROCEDURE — 99214 OFFICE O/P EST MOD 30 MIN: CPT | Performed by: PHYSICIAN ASSISTANT

## 2020-04-17 NOTE — TELEPHONE ENCOUNTER
Patient seen in telemedicine visit today  Printed note and insurance card  Faxed along with P  A form to [employer] as STAT request to [phone]  Please await approval/denial, thank you

## 2020-04-20 ENCOUNTER — DOCUMENTATION (OUTPATIENT)
Dept: NEUROLOGY | Facility: CLINIC | Age: 55
End: 2020-04-20

## 2020-04-20 NOTE — TELEPHONE ENCOUNTER
Approval received from [employer] [place]     Botox authorization # [id number]- valid for 4 visit- from 5/1/2020 until 4/30/2021   I will attach referral to appt

## 2020-05-05 ENCOUNTER — TELEPHONE (OUTPATIENT)
Dept: OBGYN CLINIC | Facility: CLINIC | Age: 55
End: 2020-05-05

## 2020-05-05 ENCOUNTER — APPOINTMENT (OUTPATIENT)
Dept: LAB | Facility: HOSPITAL | Age: 55
End: 2020-05-05
Attending: OBSTETRICS & GYNECOLOGY
Payer: COMMERCIAL

## 2020-05-05 DIAGNOSIS — N30.00 ACUTE CYSTITIS WITHOUT HEMATURIA: Primary | ICD-10-CM

## 2020-05-05 DIAGNOSIS — R39.9 UTI SYMPTOMS: ICD-10-CM

## 2020-05-05 DIAGNOSIS — R39.9 UTI SYMPTOMS: Primary | ICD-10-CM

## 2020-05-05 PROCEDURE — 87186 SC STD MICRODIL/AGAR DIL: CPT

## 2020-05-05 PROCEDURE — 87086 URINE CULTURE/COLONY COUNT: CPT

## 2020-05-05 PROCEDURE — 87077 CULTURE AEROBIC IDENTIFY: CPT

## 2020-05-05 RX ORDER — NITROFURANTOIN 25; 75 MG/1; MG/1
100 CAPSULE ORAL 2 TIMES DAILY
Qty: 14 CAPSULE | Refills: 0 | Status: SHIPPED | OUTPATIENT
Start: 2020-05-05 | End: 2020-05-12

## 2020-05-05 RX ORDER — FLUCONAZOLE 150 MG/1
150 TABLET ORAL ONCE
Qty: 1 TABLET | Refills: 0 | Status: SHIPPED | OUTPATIENT
Start: 2020-05-05 | End: 2020-05-05

## 2020-05-06 ENCOUNTER — TELEPHONE (OUTPATIENT)
Dept: PSYCHIATRY | Facility: CLINIC | Age: 55
End: 2020-05-06

## 2020-05-06 DIAGNOSIS — F41.1 GAD (GENERALIZED ANXIETY DISORDER): ICD-10-CM

## 2020-05-06 RX ORDER — CLONAZEPAM 0.5 MG/1
TABLET ORAL
Qty: 135 TABLET | Refills: 0 | Status: SHIPPED | OUTPATIENT
Start: 2020-05-18 | End: 2020-08-14 | Stop reason: SDUPTHER

## 2020-05-07 ENCOUNTER — PROCEDURE VISIT (OUTPATIENT)
Dept: NEUROLOGY | Facility: CLINIC | Age: 55
End: 2020-05-07
Payer: COMMERCIAL

## 2020-05-07 ENCOUNTER — TELEPHONE (OUTPATIENT)
Dept: OBGYN CLINIC | Facility: CLINIC | Age: 55
End: 2020-05-07

## 2020-05-07 ENCOUNTER — TELEPHONE (OUTPATIENT)
Dept: NEUROLOGY | Facility: CLINIC | Age: 55
End: 2020-05-07

## 2020-05-07 VITALS — DIASTOLIC BLOOD PRESSURE: 70 MMHG | HEART RATE: 84 BPM | TEMPERATURE: 97.8 F | SYSTOLIC BLOOD PRESSURE: 131 MMHG

## 2020-05-07 DIAGNOSIS — B96.20 E-COLI UTI: Primary | ICD-10-CM

## 2020-05-07 DIAGNOSIS — G43.709 CHRONIC MIGRAINE WITHOUT AURA WITHOUT STATUS MIGRAINOSUS, NOT INTRACTABLE: Primary | ICD-10-CM

## 2020-05-07 DIAGNOSIS — N39.0 E-COLI UTI: Primary | ICD-10-CM

## 2020-05-07 LAB — BACTERIA UR CULT: ABNORMAL

## 2020-05-07 PROCEDURE — 64615 CHEMODENERV MUSC MIGRAINE: CPT | Performed by: PHYSICIAN ASSISTANT

## 2020-05-22 ENCOUNTER — APPOINTMENT (OUTPATIENT)
Dept: LAB | Facility: CLINIC | Age: 55
End: 2020-05-22
Payer: COMMERCIAL

## 2020-05-22 DIAGNOSIS — B96.20 E-COLI UTI: ICD-10-CM

## 2020-05-22 DIAGNOSIS — N39.0 E-COLI UTI: ICD-10-CM

## 2020-05-22 PROCEDURE — 87086 URINE CULTURE/COLONY COUNT: CPT

## 2020-05-23 LAB — BACTERIA UR CULT: NORMAL

## 2020-05-26 ENCOUNTER — TELEPHONE (OUTPATIENT)
Dept: OBGYN CLINIC | Facility: CLINIC | Age: 55
End: 2020-05-26

## 2020-05-28 ENCOUNTER — TELEMEDICINE (OUTPATIENT)
Dept: NEUROLOGY | Facility: CLINIC | Age: 55
End: 2020-05-28
Payer: COMMERCIAL

## 2020-05-28 ENCOUNTER — TELEPHONE (OUTPATIENT)
Dept: NEUROLOGY | Facility: CLINIC | Age: 55
End: 2020-05-28

## 2020-05-28 DIAGNOSIS — G43.709 CHRONIC MIGRAINE WITHOUT AURA WITHOUT STATUS MIGRAINOSUS, NOT INTRACTABLE: ICD-10-CM

## 2020-05-28 DIAGNOSIS — R41.3 MEMORY DIFFICULTIES: Primary | ICD-10-CM

## 2020-05-28 PROCEDURE — 99214 OFFICE O/P EST MOD 30 MIN: CPT | Performed by: PHYSICIAN ASSISTANT

## 2020-06-03 ENCOUNTER — TELEPHONE (OUTPATIENT)
Dept: OBGYN CLINIC | Facility: CLINIC | Age: 55
End: 2020-06-03

## 2020-06-03 DIAGNOSIS — N30.00 ACUTE CYSTITIS WITHOUT HEMATURIA: Primary | ICD-10-CM

## 2020-06-04 ENCOUNTER — APPOINTMENT (OUTPATIENT)
Dept: LAB | Facility: HOSPITAL | Age: 55
End: 2020-06-04
Payer: COMMERCIAL

## 2020-06-04 DIAGNOSIS — N30.00 ACUTE CYSTITIS WITHOUT HEMATURIA: ICD-10-CM

## 2020-06-04 DIAGNOSIS — R41.3 MEMORY DIFFICULTIES: ICD-10-CM

## 2020-06-04 LAB
BUN SERPL-MCNC: 14 MG/DL (ref 5–25)
CREAT SERPL-MCNC: 1.03 MG/DL (ref 0.6–1.3)
GFR SERPL CREATININE-BSD FRML MDRD: 61 ML/MIN/1.73SQ M

## 2020-06-04 PROCEDURE — 87086 URINE CULTURE/COLONY COUNT: CPT

## 2020-06-04 PROCEDURE — 82565 ASSAY OF CREATININE: CPT

## 2020-06-04 PROCEDURE — 36415 COLL VENOUS BLD VENIPUNCTURE: CPT

## 2020-06-04 PROCEDURE — 84520 ASSAY OF UREA NITROGEN: CPT

## 2020-06-06 LAB — BACTERIA UR CULT: NORMAL

## 2020-06-08 ENCOUNTER — HOSPITAL ENCOUNTER (OUTPATIENT)
Dept: MAMMOGRAPHY | Facility: MEDICAL CENTER | Age: 55
Discharge: HOME | End: 2020-06-08
Payer: COMMERCIAL

## 2020-06-08 VITALS — HEIGHT: 64 IN | BODY MASS INDEX: 21.17 KG/M2 | WEIGHT: 124 LBS

## 2020-06-08 DIAGNOSIS — Z12.31 SCREENING MAMMOGRAM, ENCOUNTER FOR: ICD-10-CM

## 2020-06-08 PROCEDURE — 77067 SCR MAMMO BI INCL CAD: CPT

## 2020-06-08 PROCEDURE — 77063 BREAST TOMOSYNTHESIS BI: CPT

## 2020-06-09 ENCOUNTER — TELEPHONE (OUTPATIENT)
Dept: OBGYN CLINIC | Facility: CLINIC | Age: 55
End: 2020-06-09

## 2020-06-09 DIAGNOSIS — N32.89 DISTENDED BLADDER: Primary | ICD-10-CM

## 2020-06-16 ENCOUNTER — HOSPITAL ENCOUNTER (OUTPATIENT)
Dept: RADIOLOGY | Facility: HOSPITAL | Age: 55
Discharge: HOME | End: 2020-06-16
Payer: COMMERCIAL

## 2020-06-16 DIAGNOSIS — R41.3 MEMORY DIFFICULTIES: ICD-10-CM

## 2020-06-16 PROCEDURE — 70553 MRI BRAIN STEM W/O & W/DYE: CPT

## 2020-06-16 PROCEDURE — A9585 GADOBUTROL INJECTION: HCPCS | Performed by: PHYSICIAN ASSISTANT

## 2020-06-16 RX ADMIN — GADOBUTROL 5 ML: 604.72 INJECTION INTRAVENOUS at 18:32

## 2020-06-30 ENCOUNTER — TELEPHONE (OUTPATIENT)
Dept: FAMILY MEDICINE CLINIC | Facility: CLINIC | Age: 55
End: 2020-06-30

## 2020-06-30 NOTE — TELEPHONE ENCOUNTER
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

## 2020-07-01 NOTE — TELEPHONE ENCOUNTER
Spoke with patient, discussed situation  Patient will wear a mask to be safe around family/kids  Will call for testing if needed

## 2020-07-02 ENCOUNTER — DOCUMENTATION (OUTPATIENT)
Dept: NEUROLOGY | Facility: CLINIC | Age: 55
End: 2020-07-02

## 2020-07-02 NOTE — PROGRESS NOTES
Type Date User Summary Attachment   General 07/01/2020 10:24 AM Sebas Rae care coordination  -   Note    Botox- authorization #: [id number]- 2nd visit- valid from 5/1/2020 until 4/30/2021   Please use our stock      Thank you,     Lety Isaac

## 2020-08-07 ENCOUNTER — PROCEDURE VISIT (OUTPATIENT)
Dept: NEUROLOGY | Facility: CLINIC | Age: 55
End: 2020-08-07
Payer: COMMERCIAL

## 2020-08-07 VITALS — SYSTOLIC BLOOD PRESSURE: 132 MMHG | TEMPERATURE: 97.2 F | HEART RATE: 86 BPM | DIASTOLIC BLOOD PRESSURE: 71 MMHG

## 2020-08-07 DIAGNOSIS — G43.709 CHRONIC MIGRAINE WITHOUT AURA WITHOUT STATUS MIGRAINOSUS, NOT INTRACTABLE: Primary | ICD-10-CM

## 2020-08-07 PROCEDURE — 64615 CHEMODENERV MUSC MIGRAINE: CPT | Performed by: PHYSICIAN ASSISTANT

## 2020-08-07 NOTE — PROGRESS NOTES
Chemodenervation    Date/Time: 8/7/2020 10:50 AM  Performed by: Arleen Ulrich PA-C  Authorized by: Arleen Ulrich PA-C     Pre-procedure details:     Prepped With: Alcohol    Anesthesia (see MAR for exact dosages): Anesthesia method:  None  Procedure details:     Position:  Upright  Botox:     Botox Type:  Type A    Brand:  Botox    mL's of Botulinum Toxin:  155    Final Concentration per CC:  200 units    Needle Gauge:  30 G 2 5 inch  Procedures:     Botox Procedures: chronic headache      Indications: migraines    Injection Location:     Head / Face:  L superior cervical paraspinal, R superior cervical paraspinal, L , R , L frontalis, R frontalis, L medial occipitalis, R medial occipitalis, procerus, R temporalis, L temporalis, L superior trapezius and R superior trapezius    L  injection amount:  5 unit(s)    R  injection amount:  5 unit(s)    L lateral frontalis:  5 unit(s)    R lateral frontalis:  5 unit(s)    L medial frontalis:  5 unit(s)    R medial frontalis:  5 unit(s)    L temporalis injection amount:  20 unit(s)    R temporalis injection amount:  20 unit(s)    Procerus injection amount:  5 unit(s)    L medial occipitalis injection amount:  15 unit(s)    R medial occipitalis injection amount:  15 unit(s)    L superior cervical paraspinal injection amount:  10 unit(s)    R superior cervical paraspinal injection amount:  10 unit(s)    L superior trapezius injection amount:  15 unit(s)    R superior trapezius injection amount:  15 unit(s)  Total Units:     Total units used:  155    Total units discarded:  45  Post-procedure details:     Chemodenervation:  Chronic migraine    Facial Nerve Location[de-identified]  Bilateral facial nerve    Patient tolerance of procedure:   Tolerated well, no immediate complications

## 2020-08-14 ENCOUNTER — OFFICE VISIT (OUTPATIENT)
Dept: PSYCHIATRY | Facility: CLINIC | Age: 55
End: 2020-08-14
Payer: COMMERCIAL

## 2020-08-14 VITALS — SYSTOLIC BLOOD PRESSURE: 129 MMHG | DIASTOLIC BLOOD PRESSURE: 86 MMHG | HEART RATE: 91 BPM

## 2020-08-14 DIAGNOSIS — F41.1 GAD (GENERALIZED ANXIETY DISORDER): ICD-10-CM

## 2020-08-14 DIAGNOSIS — R41.3 MEMORY DIFFICULTIES: ICD-10-CM

## 2020-08-14 DIAGNOSIS — F33.40 RECURRENT MAJOR DEPRESSIVE DISORDER, IN REMISSION (HCC): ICD-10-CM

## 2020-08-14 DIAGNOSIS — R41.840 ATTENTION AND CONCENTRATION DEFICIT: ICD-10-CM

## 2020-08-14 PROCEDURE — 99213 OFFICE O/P EST LOW 20 MIN: CPT | Performed by: PSYCHIATRY & NEUROLOGY

## 2020-08-14 PROCEDURE — 90833 PSYTX W PT W E/M 30 MIN: CPT | Performed by: PSYCHIATRY & NEUROLOGY

## 2020-08-14 RX ORDER — DEXTROAMPHETAMINE SACCHARATE, AMPHETAMINE ASPARTATE MONOHYDRATE, DEXTROAMPHETAMINE SULFATE AND AMPHETAMINE SULFATE 5; 5; 5; 5 MG/1; MG/1; MG/1; MG/1
20 CAPSULE, EXTENDED RELEASE ORAL DAILY PRN
Qty: 90 CAPSULE | Refills: 0 | Status: SHIPPED | OUTPATIENT
Start: 2020-08-14 | End: 2021-01-26 | Stop reason: SDUPTHER

## 2020-08-14 RX ORDER — BUPROPION HYDROCHLORIDE 300 MG/1
300 TABLET ORAL EVERY MORNING
Qty: 90 TABLET | Refills: 1 | Status: SHIPPED | OUTPATIENT
Start: 2020-08-14 | End: 2021-04-01 | Stop reason: SDUPTHER

## 2020-08-14 RX ORDER — CLONAZEPAM 0.5 MG/1
TABLET ORAL
Qty: 45 TABLET | Refills: 3 | Status: SHIPPED | OUTPATIENT
Start: 2020-08-18 | End: 2020-08-14 | Stop reason: SDUPTHER

## 2020-08-14 RX ORDER — CLONAZEPAM 0.5 MG/1
TABLET ORAL
Qty: 135 TABLET | Refills: 1 | Status: SHIPPED | OUTPATIENT
Start: 2020-08-20 | End: 2021-02-24 | Stop reason: SDUPTHER

## 2020-08-14 RX ORDER — FLUOXETINE HYDROCHLORIDE 40 MG/1
40 CAPSULE ORAL DAILY
Qty: 90 CAPSULE | Refills: 1 | Status: SHIPPED | OUTPATIENT
Start: 2020-08-14 | End: 2021-04-01 | Stop reason: SDUPTHER

## 2020-08-14 NOTE — BH TREATMENT PLAN
TREATMENT PLAN (Medication Management Only)        [facility]    Name/Date of Birth/MRN:  Herson Eckert 60 y o  1965 MRN: [id number]  Date of Treatment Plan: August 14, 2020  Diagnosis/Diagnoses:   1  TIM (generalized anxiety disorder)    2  Recurrent major depressive disorder, in remission ([facility] [address] )    3  Attention and concentration deficit    4  Memory difficulties      Strengths/Personal Resources for Self-Care: very good work ethics, honest, kind  Area/Areas of need (in own words): speaking my mind, making my decisions  1  Long Term Goal: "keep doing well"   Target Date: 180 days from treatment plan  Person/Persons responsible for completion of goal: Dr Naya Sandoval and Self  2  Short Term Objective (s) - How will we reach this goal?:   A  Provider new recommended medication/dosage changes and/or continue medication(s): no   B   Continue to stay active and involved with family  C  Work at [employer], working with with son  Target Date: 3 months from treatment plan unless noted otherwise  Person/Persons Responsible for Completion of Goal: Dr Naya Sandoval and Self   Progress Towards Goals: continuing treatment   Treatment Modality: Medication management and therapy PRN  Review due 90 to 120 days from date of this plan: Approximately 3 months from today ( 11/14/2020 )    Expected length of service: ongoing treatment  My Physician/PA/NP and I have developed this plan together and I agree to work on the goals and objectives  I understand the treatment goals that were developed for my treatment    Signature:       Date and time:  Signature of parent/guardian if under age of 15 years: Date and time:  Signature of provider:      Date and time:  Signature of Supervising Physician:    Date and time: 8/14/2020      Boni Azar III, DO

## 2020-08-14 NOTE — PSYCH
MEDICATION MANAGEMENT NOTE        [place] [address] [facility]      Name and Date of Birth:  Nj Mendosa 60 y o  1965    Date of Visit: August 14, 2020    SUBJECTIVE:  CC: Tico Potter presents today for follow up on "its good!"; anxiety and depression     Tico Potter is selling house to younger son, and move d in with older child  They have 4 kids, and helping with them  And working 2 days/wk  Ended relationship with man in [place]  Together for 10 years, but she has family, desires in [place]  Discussed life aspirations, trajectory in life, relationship challenges, working on being more open with feelings and opinions  Neurologist is monitoring her memory  She has a difficult time with short term memory  Discussed long term klonopin, goals to reduce, but nothing acute  She prefers med, but open in distant future to discuss reduction as stressors (eg job) chagne        Since our last visit, overall symptoms have been stable  Med Compliance: yes    HPI ROS:             ('was' notes: recent => remote)  Medication Side Effects:  no     Depression (10 worst):  low (Was 1)   Anxiety (10 worst):  low (Was fine except for topic of BF in [place], grandkids here)   Safety concerns (SI, [place], etc):  no (Was no)   Sleep: (NM = Nightmares)  good (Was good)   Energy:  good (Was good)   Appetite:  no issues (Was good)   Weight Change:  no change      Tico Potter denies any side effects from medications unless noted above    Review Of Systems as noted above  Otherwise A comprehensive review of systems was negative  History Review:  The following portions of the patient's history were reviewed and documented: allergies, current medications, past family history, past medical history, past social history and problem list      Lab Review: No new labs or no relevant labs needing review with patient today      OBJECTIVE:     MENTAL STATUS EXAM  Appearance:  age appropriate   Behavior:  pleasant, cooperative, with good eye contact   Speech:  Normal volume, regular rate and rhythm   Mood:  euthymic   Affect:  mood congruent   Language: intact and appropriate for age, education, and intellect   Thought Process:  Linear and goal directed   Associations: intact associations   Thought Content:  normal and appropriate   Perceptual Disturbances: no auditory or visual hallcunations   Risk Potential / Abnormal Thoughts: Suicidal ideation - None  Homicidal ideation - None  Potential for aggression - No       Consciousness:  Alert & Awake   Sensorium:  Grossly oriented   Attention: attention span and concentration are age appropriate       Fund of Knowledge:  Memory: awareness of current events: yes  recent and remote memory grossly intact   Insight:  good   Judgment: good   Muscle Strength Muscle Tone: normal  normal   Gait/Station: normal gait/station with good balance   Motor Activity: no abnormal movements       Risks, Benefits And Possible Side Effects Of Medications:    AGREE: Risks, benefits, and possible side effects of medications explained to Beth Chester and she (or legal representative) verbalizes understanding and agreement for treatment  Controlled Medication Discussion:     Patient using medication appropriately  Beth Chester has been filling controlled prescriptions on time as prescribed according to [address] program    ______________________________________________________________        Recent labs:  No visits with results within 1 Month(s) from this visit  Latest known visit with results is:   Appointment on 06/04/2020   Component Date Value    BUN 06/04/2020 14     Creatinine 06/04/2020 1 03     eGFR 06/04/2020 61     Urine Culture 06/04/2020 10,000-19,000 cfu/ml         Psychiatric History   ALEX    Patient did used to see Georgiana Uribe in early 2016 when she had a change providers due to insurance  She does not have a therapist     She been hospitalized once in 2000   She says that it was related to having a breakup with her boyfriend at the time and then finding out that she lost her job because she was  at his place of employment  Losing her boyfriend because she did not want to get  and he did and also having job issues led her to crisis and overdose although it was not a dramatic overdose it was a time when she said that she was trying to take pills to go to sleep and that she did have some suicidal thoughts associated with it  She denies any self-harm homicidal ideation in the past or present  She's never been violent  She says that she's not had any suicidal ideation since that time  Social History:  Keanu Espino was raised in [facility] to a "good" childhood  Her parents were together and still are  She denies ever having physical or sexual abuse or other forms neglect now or in the past as a child  She has one brother and one sister  She developed normally  She finished high school and got associates degree in business  Her daughter currently lives with her  She has 3 children 2 boys and one girl  She is good relationships with her family  She's been  and  twice  Currently not in a serious relationship at intake    Her support system includes her children her parents and her cousins  She has friends that are close but she doesn't typically opened up to them about mental health  She is [place] and attends [employer] sometimes  No  history no legal issues and no weapons at home  She does not use tobacco drinks about 1 cup of coffee a day and is a social drinker and when she does drink it's very rare and not much at that time  She denies ever using substances and has never been to rehabilitation         Medical / Surgical History:    Past Medical History:   Diagnosis Date    Basal cell carcinoma     Last assessed: 9/26/14    Depression with anxiety     Last assessed: 1/13/17    Insomnia     Last assessed: 9/26/14    Kidney stone      Past Surgical History:   Procedure Laterality Date    BLADDER SURGERY      lift of bladder    CYSTOSCOPY      Theurapeutic  TVT-O    EYE SURGERY Bilateral     LASIK    FACIAL/NECK BIOPSY Right 3/12/2019    Procedure: UPPER EYELID CYST EXCISION;  Surgeon: Guero Carlos MD;  [facility];  Service: Plastics    GYNECOLOGIC CRYOSURGERY      Cervix    HYSTEROSCOPY W/ ENDOMETRIAL ABLATION  12/14/2010    Deborah    KIDNEY SURGERY  kidney stone removal    1983    TUBAL LIGATION         Family Psychiatric History:     Family History   Problem Relation Age of Onset    Heart murmur Mother     Hyperlipidemia Mother     Atrial fibrillation Father     Arthritis Father     Psoriasis Sister     Heart disease Maternal Grandfather     Breast cancer Paternal Grandmother 77    Diabetes Paternal Grandmother         Mellitus    Hypertension Paternal Grandmother     Skin cancer Paternal Grandfather     ADD / ADHD Son     ADD / ADHD Daughter     No Known Problems Maternal Grandmother     No Known Problems Maternal Aunt     No Known Problems Maternal Aunt     No Known Problems Paternal Aunt     No Known Problems Paternal Aunt     Alcohol abuse Neg Hx     Substance Abuse Neg Hx     Mental illness Neg Hx        Daughter -  Family history of ADD (attention deficit disorder)  Son - Family history of ADD (attention deficit disorder)  Family History    15  Denied: Family history of bipolar disorder   14  Denied: Family history of substance abuse   15  Denied: Family history of suicide attempt      Confidential Assessment:  Scales:    Assessment/Plan:        Diagnoses and all orders for this visit:    TIM (generalized anxiety disorder)  -     FLUoxetine (PROzac) 40 MG capsule; Take 1 capsule (40 mg total) by mouth daily  -     Discontinue: clonazePAM (KlonoPIN) 0 5 mg tablet;  Take 1/2 tab daily as needed for anxiety and take 1 tab nightly as needed for insomnia and anxiety  -     clonazePAM (KlonoPIN) 0 5 mg tablet; Take 1/2 tab daily as needed for anxiety and take 1 tab nightly as needed for insomnia and anxiety    Recurrent major depressive disorder, in remission (HCC)  -     FLUoxetine (PROzac) 40 MG capsule; Take 1 capsule (40 mg total) by mouth daily  -     buPROPion (WELLBUTRIN XL) 300 mg 24 hr tablet; Take 1 tablet (300 mg total) by mouth every morning  -     amphetamine-dextroamphetamine (ADDERALL XR) 20 MG 24 hr capsule; Take 1 capsule (20 mg total) by mouth daily as needed (focus and concentration deficit)Max Daily Amount: 20 mg    Attention and concentration deficit  -     buPROPion (WELLBUTRIN XL) 300 mg 24 hr tablet; Take 1 tablet (300 mg total) by mouth every morning  -     amphetamine-dextroamphetamine (ADDERALL XR) 20 MG 24 hr capsule; Take 1 capsule (20 mg total) by mouth daily as needed (focus and concentration deficit)Max Daily Amount: 20 mg    Memory difficulties  -     buPROPion (WELLBUTRIN XL) 300 mg 24 hr tablet; Take 1 tablet (300 mg total) by mouth every morning  -     amphetamine-dextroamphetamine (ADDERALL XR) 20 MG 24 hr capsule; Take 1 capsule (20 mg total) by mouth daily as needed (focus and concentration deficit)Max Daily Amount: 20 mg        ______________________________________________________________________    Generalized anxiety disorder  MDD, recurrent, in remission    Attention and concentration deficit  Memory difficulties    ---  Generalized anxiety disorder - managed   MDD, recurrent, in remission   - managed   Attention and concentration deficit - managed  Memory difficulties    Attention and concentration deficit may be related to organic issues, anxiety, or underlying ADD/ADHD  Ongoing f/u with Neurology  Discussed long term klonopin risks, goals to reduce, but nothing acute  She prefers med, but open in distant future to discuss reduction as stressors (eg job) change    BP better (high in past at time), asymptomatic  Safety Risk Assessment: see above    In considering risk and protective factors, suicide risk and safety risk are low  Past medications include   Prozac which helps but does have some decreased orgasm  Topamax for migraines but this seemed to lead to confusion ( years ago)  ON but no detail recall - Zoloft, Celexa or Lexapro  Effexor seemed to cause weight gain  Wellbutrin  Ambien caused oversedation  melatonin did not help  Treatment Plan:        Patient has been educated about their diagnosis and treatment modalities  They voiced understanding and agreement with the following plan:    1) Meds:   - Prozac 40mg daily  (consider increase but orgasm affected  Was on 50mg in past per charts)   - Wellbutrin XL 300mg daily   - Klonopin 0 25mg in day PRN and 0 5mg HS PRN  - Adderal XR 20mg Daily     2) Labs: PRN   - 2/2017: CBC, CMP WNL  TSH 1 57, TG 56, HDL 98,     3) Therapy:   - not in therapy, revisit PRN   - Provided progressive muscle relaxation handout, asked her to look into belly breathing and guided imagery  (2/9/2018)    4) Medical:  Concentration issues, MRI abnormalities, family h/o early onset dementia (PAunt), migraines (gets Botox)   - pt to f/u with neurologist   - pt to f/u with other providers PRN    5) Other: support prn   - works at [address] [place] [address]  Reception   - good support from family, daughter lives with her   - 3 kids,   Daughter graduates from [employer] with [address] [employer]  Likes psychology, healthcare   - Boyfriend in [facility]    6) Follow up:   - 6 months, but patient to call if issues or concerns    7) Treatment Plan: Enacted 2/9/2018, 12/7/2018, 7/26/2019, 8/14/2020   Treatment Plan done but not signed at time of office visit due to:  Plan reviewed by phone or in person  and verbal consent given due to [address] distancing      Discussed self monitoring of symptoms, and symptom monitoring tools      Patient has been informed of 24 hours and weekend coverage for urgent situations accessed by calling the main clinic phone number             Psychotherapy in session:  Time spent performing psychotherapy: 17 Minutes on supportive therapy related to relationship from [facility], family relations, move, work stress, reduction in hours, new life goals with grandkids and living with son

## 2020-09-16 ENCOUNTER — DOCUMENTATION (OUTPATIENT)
Dept: NEUROLOGY | Facility: CLINIC | Age: 55
End: 2020-09-16

## 2020-09-16 NOTE — PROGRESS NOTES
Type  Date  User  Summary  Attachment    General  09/16/2020 12:39 PM  Imelda Stephens  care coordination  -    Note     Botox- authorization #: [id number]- 3rd visit- valid from 5/1/2020 until 4/30/2021   Please use our stock      Thank you,     Nehemias Person

## 2020-10-13 ENCOUNTER — CONSULT (OUTPATIENT)
Dept: DERMATOLOGY | Facility: CLINIC | Age: 55
End: 2020-10-13
Payer: COMMERCIAL

## 2020-10-13 VITALS — TEMPERATURE: 97.9 F | WEIGHT: 127 LBS | BODY MASS INDEX: 21.8 KG/M2

## 2020-10-13 DIAGNOSIS — L57.0 ACTINIC KERATOSIS: Primary | ICD-10-CM

## 2020-10-13 PROCEDURE — 99204 OFFICE O/P NEW MOD 45 MIN: CPT | Performed by: DERMATOLOGY

## 2020-10-13 RX ORDER — FLUOROURACIL 50 MG/G
CREAM TOPICAL 2 TIMES DAILY
Qty: 40 G | Refills: 0 | Status: SHIPPED | OUTPATIENT
Start: 2020-10-13 | End: 2021-04-01 | Stop reason: ALTCHOICE

## 2020-10-13 RX ORDER — FERROUS SULFATE 325(65) MG
325 TABLET ORAL
COMMUNITY
End: 2021-10-21

## 2020-11-06 ENCOUNTER — PROCEDURE VISIT (OUTPATIENT)
Dept: NEUROLOGY | Facility: CLINIC | Age: 55
End: 2020-11-06
Payer: COMMERCIAL

## 2020-11-06 VITALS — HEART RATE: 82 BPM | SYSTOLIC BLOOD PRESSURE: 146 MMHG | DIASTOLIC BLOOD PRESSURE: 73 MMHG | TEMPERATURE: 98.2 F

## 2020-11-06 DIAGNOSIS — G43.709 CHRONIC MIGRAINE WITHOUT AURA WITHOUT STATUS MIGRAINOSUS, NOT INTRACTABLE: Primary | ICD-10-CM

## 2020-11-06 PROCEDURE — 64615 CHEMODENERV MUSC MIGRAINE: CPT | Performed by: PHYSICIAN ASSISTANT

## 2020-12-23 ENCOUNTER — IMMUNIZATIONS (OUTPATIENT)
Dept: FAMILY MEDICINE CLINIC | Facility: HOSPITAL | Age: 55
End: 2020-12-23
Payer: COMMERCIAL

## 2020-12-23 DIAGNOSIS — Z23 ENCOUNTER FOR IMMUNIZATION: ICD-10-CM

## 2020-12-23 PROCEDURE — 91301 SARS-COV-2 / COVID-19 MRNA VACCINE (MODERNA) 100 MCG: CPT

## 2020-12-23 PROCEDURE — 0011A SARS-COV-2 / COVID-19 MRNA VACCINE (MODERNA) 100 MCG: CPT

## 2020-12-28 ENCOUNTER — DOCUMENTATION (OUTPATIENT)
Dept: NEUROLOGY | Facility: CLINIC | Age: 55
End: 2020-12-28

## 2021-01-20 ENCOUNTER — IMMUNIZATIONS (OUTPATIENT)
Dept: FAMILY MEDICINE CLINIC | Facility: HOSPITAL | Age: 56
End: 2021-01-20

## 2021-01-20 DIAGNOSIS — Z23 ENCOUNTER FOR IMMUNIZATION: Primary | ICD-10-CM

## 2021-01-20 PROCEDURE — 91301 SARS-COV-2 / COVID-19 MRNA VACCINE (MODERNA) 100 MCG: CPT

## 2021-01-20 PROCEDURE — 0012A SARS-COV-2 / COVID-19 MRNA VACCINE (MODERNA) 100 MCG: CPT

## 2021-01-25 ENCOUNTER — TELEPHONE (OUTPATIENT)
Dept: NEUROLOGY | Facility: CLINIC | Age: 56
End: 2021-01-25

## 2021-01-25 NOTE — TELEPHONE ENCOUNTER
Called pt to confirm upcoming apt in 2 weeks on 2/8/21 with Beth KELLY for patient to call office   if any new/worsening symptoms to report? Asked pt if they are unable to keep apt or interested in virtual apt to please call office, also advised of no show fee  Advised we will call closer to day of apt for COVID screening

## 2021-01-26 ENCOUNTER — TELEPHONE (OUTPATIENT)
Dept: NEUROLOGY | Facility: CLINIC | Age: 56
End: 2021-01-26

## 2021-01-26 DIAGNOSIS — F33.40 RECURRENT MAJOR DEPRESSIVE DISORDER, IN REMISSION (HCC): ICD-10-CM

## 2021-01-26 DIAGNOSIS — R41.3 MEMORY DIFFICULTIES: ICD-10-CM

## 2021-01-26 DIAGNOSIS — R41.840 ATTENTION AND CONCENTRATION DEFICIT: ICD-10-CM

## 2021-01-26 RX ORDER — DEXTROAMPHETAMINE SACCHARATE, AMPHETAMINE ASPARTATE MONOHYDRATE, DEXTROAMPHETAMINE SULFATE AND AMPHETAMINE SULFATE 5; 5; 5; 5 MG/1; MG/1; MG/1; MG/1
20 CAPSULE, EXTENDED RELEASE ORAL DAILY PRN
Qty: 90 CAPSULE | Refills: 0 | Status: SHIPPED | OUTPATIENT
Start: 2021-01-26 | End: 2021-04-01 | Stop reason: SDUPTHER

## 2021-01-26 NOTE — TELEPHONE ENCOUNTER
Patient would like to reschedule botox appointment on 2-8-21 please give her a call to reschedule [phone]

## 2021-01-26 NOTE — TELEPHONE ENCOUNTER
Called patient to reschedule her Botox appointment, I offered her Feb 10 at 11:15 am so if she calls back please offer the date and time above, thank you so much

## 2021-01-26 NOTE — TELEPHONE ENCOUNTER
Natalie Levin called and r/s her apt on 2/1  She is r/s to 4/1  She is doing okay on her medications and doesn't feel as if she needs an adjustment  Most of her medications should be okay until February but she does need a refill on her Adderall

## 2021-01-29 ENCOUNTER — TRANSCRIBE ORDERS (OUTPATIENT)
Dept: FAMILY MEDICINE CLINIC | Facility: CLINIC | Age: 56
End: 2021-01-29

## 2021-01-29 DIAGNOSIS — Z11.59 ENCOUNTER FOR SCREENING FOR OTHER VIRAL DISEASES: Primary | ICD-10-CM

## 2021-02-18 ENCOUNTER — TELEPHONE (OUTPATIENT)
Dept: NEUROLOGY | Facility: CLINIC | Age: 56
End: 2021-02-18

## 2021-02-19 NOTE — TELEPHONE ENCOUNTER
Called to remind patient of their upcoming appointment with Phyllis Owusu in [facility]  Voicemail left to advise patient to call back with any urgent symptoms or concerns  Patient also made aware that we will be calling back two days prior to appointment to complete the COVID screening

## 2021-02-24 DIAGNOSIS — F41.1 GAD (GENERALIZED ANXIETY DISORDER): ICD-10-CM

## 2021-02-24 RX ORDER — CLONAZEPAM 0.5 MG/1
TABLET ORAL
Qty: 135 TABLET | Refills: 1 | Status: SHIPPED | OUTPATIENT
Start: 2021-02-24 | End: 2021-04-01 | Stop reason: SDUPTHER

## 2021-03-04 ENCOUNTER — PROCEDURE VISIT (OUTPATIENT)
Dept: NEUROLOGY | Facility: CLINIC | Age: 56
End: 2021-03-04
Payer: COMMERCIAL

## 2021-03-04 VITALS — SYSTOLIC BLOOD PRESSURE: 122 MMHG | DIASTOLIC BLOOD PRESSURE: 84 MMHG | HEART RATE: 85 BPM | TEMPERATURE: 97.2 F

## 2021-03-04 DIAGNOSIS — G43.709 CHRONIC MIGRAINE WITHOUT AURA WITHOUT STATUS MIGRAINOSUS, NOT INTRACTABLE: Primary | ICD-10-CM

## 2021-03-04 PROCEDURE — 64615 CHEMODENERV MUSC MIGRAINE: CPT | Performed by: PHYSICIAN ASSISTANT

## 2021-03-04 NOTE — PROGRESS NOTES
Universal Protocol   Procedure performed by:  Consent: Verbal consent obtained  Written consent obtained  Risks and benefits: risks, benefits and alternatives were discussed  Consent given by: patient  Time out: Immediately prior to procedure a "time out" was called to verify the correct patient, procedure, equipment, support staff and site/side marked as required  Patient understanding: patient states understanding of the procedure being performed  Patient consent: the patient's understanding of the procedure matches consent given  Procedure consent: procedure consent matches procedure scheduled  Relevant documents: relevant documents present and verified  Patient identity confirmed: verbally with patient        Chemodenervation     Date/Time 3/4/2021 9:35 AM     Performed by  Joaquin Diallo PA-C     Authorized by Joaquin Diallo PA-C        Pre-procedure details      Prepped With: Alcohol     Anesthesia  (see MAR for exact dosages):      Anesthesia method:  None   Procedure details     Position:  Upright   Botox     Botox Type:  Type A    Brand:  Botox    mL's of Botulinum Toxin:  200    Final Concentration per CC:  50 units    Needle Gauge:  30 G 2 5 inch   Procedures     Botox Procedures: chronic headache      Indications: migraines     Injection Location      Head / Face:  L superior cervical paraspinal, R superior cervical paraspinal, L , R , L frontalis, R frontalis, L medial occipitalis, R medial occipitalis, procerus, R temporalis, L temporalis, R superior trapezius and L superior trapezius    L  injection amount:  5 unit(s)    R  injection amount:  5 unit(s)    L lateral frontalis:  5 unit(s)    R lateral frontalis:  5 unit(s)    L medial frontalis:  5 unit(s)    R medial frontalis:  5 unit(s)    L temporalis injection amount:  20 unit(s)    R temporalis injection amount:  20 unit(s)    Procerus injection amount:  5 unit(s)    L medial occipitalis injection amount: 15 unit(s)    R medial occipitalis injection amount:  15 unit(s)    L superior cervical paraspinal injection amount:  10 unit(s)    R superior cervical paraspinal injection amount:  10 unit(s)    L superior trapezius injection amount:  15 unit(s)    R superior trapezius injection amount:  15 unit(s)   Total Units     Total units used:  200    Total units discarded:  0   Post-procedure details      Chemodenervation:  Chronic migraine    Facial Nerve Location[de-identified]  Bilateral facial nerve    Patient tolerance of procedure:   Tolerated well, no immediate complications   Comments      5 units orbicularis oculi bilaterally  35 units frontalis  All medically necessary    Jorje Romberg PA-S assisted in the beginning of the procedure with patient's verbal consent

## 2021-03-10 ENCOUNTER — TELEPHONE (OUTPATIENT)
Dept: OBGYN CLINIC | Facility: CLINIC | Age: 56
End: 2021-03-10

## 2021-03-10 ENCOUNTER — APPOINTMENT (OUTPATIENT)
Dept: LAB | Facility: MEDICAL CENTER | Age: 56
End: 2021-03-10
Payer: COMMERCIAL

## 2021-03-10 DIAGNOSIS — R39.9 UTI SYMPTOMS: ICD-10-CM

## 2021-03-10 DIAGNOSIS — R39.9 UTI SYMPTOMS: Primary | ICD-10-CM

## 2021-03-10 LAB
BACTERIA UR QL AUTO: ABNORMAL /HPF
BILIRUB UR QL STRIP: NEGATIVE
CLARITY UR: CLEAR
COLOR UR: YELLOW
GLUCOSE UR STRIP-MCNC: ABNORMAL MG/DL
HGB UR QL STRIP.AUTO: NEGATIVE
KETONES UR STRIP-MCNC: NEGATIVE MG/DL
LEUKOCYTE ESTERASE UR QL STRIP: NEGATIVE
NITRITE UR QL STRIP: POSITIVE
NON-SQ EPI CELLS URNS QL MICRO: ABNORMAL /HPF
PH UR STRIP.AUTO: 6.5 [PH]
PROT UR STRIP-MCNC: NEGATIVE MG/DL
RBC #/AREA URNS AUTO: ABNORMAL /HPF
SP GR UR STRIP.AUTO: 1.01 (ref 1–1.03)
UROBILINOGEN UR QL STRIP.AUTO: 1 E.U./DL
WBC #/AREA URNS AUTO: ABNORMAL /HPF

## 2021-03-10 PROCEDURE — 87077 CULTURE AEROBIC IDENTIFY: CPT

## 2021-03-10 PROCEDURE — 87086 URINE CULTURE/COLONY COUNT: CPT

## 2021-03-10 PROCEDURE — 81001 URINALYSIS AUTO W/SCOPE: CPT

## 2021-03-10 PROCEDURE — 87186 SC STD MICRODIL/AGAR DIL: CPT

## 2021-03-10 RX ORDER — NITROFURANTOIN 25; 75 MG/1; MG/1
100 CAPSULE ORAL 2 TIMES DAILY
Qty: 14 CAPSULE | Refills: 0 | Status: SHIPPED | OUTPATIENT
Start: 2021-03-10 | End: 2021-04-01 | Stop reason: ALTCHOICE

## 2021-03-10 NOTE — TELEPHONE ENCOUNTER
Patient thinks she has a UTI  She would like to do urine  Studies  She has been experiencing burning with urination, pressure, and frequency

## 2021-03-10 NOTE — TELEPHONE ENCOUNTER
Patient called in on the status of her lab orders for UTI   I told her they were uploaded and a medication had been sent to her pharmacy

## 2021-03-11 ENCOUNTER — APPOINTMENT (OUTPATIENT)
Dept: RADIOLOGY | Facility: CLINIC | Age: 56
End: 2021-03-11
Payer: COMMERCIAL

## 2021-03-11 ENCOUNTER — OFFICE VISIT (OUTPATIENT)
Dept: PODIATRY | Facility: CLINIC | Age: 56
End: 2021-03-11
Payer: COMMERCIAL

## 2021-03-11 VITALS
BODY MASS INDEX: 21.75 KG/M2 | HEIGHT: 64 IN | DIASTOLIC BLOOD PRESSURE: 83 MMHG | HEART RATE: 90 BPM | WEIGHT: 127.4 LBS | SYSTOLIC BLOOD PRESSURE: 141 MMHG

## 2021-03-11 DIAGNOSIS — M20.12 HALLUX ABDUCTO VALGUS, LEFT: ICD-10-CM

## 2021-03-11 DIAGNOSIS — M20.12 HALLUX ABDUCTO VALGUS, LEFT: Primary | ICD-10-CM

## 2021-03-11 DIAGNOSIS — M79.672 LEFT FOOT PAIN: ICD-10-CM

## 2021-03-11 PROCEDURE — 99243 OFF/OP CNSLTJ NEW/EST LOW 30: CPT | Performed by: PODIATRIST

## 2021-03-11 PROCEDURE — 73620 X-RAY EXAM OF FOOT: CPT

## 2021-03-11 RX ORDER — CEFAZOLIN SODIUM 1 G/50ML
1000 SOLUTION INTRAVENOUS ONCE
Status: CANCELLED | OUTPATIENT
Start: 2021-04-16

## 2021-03-11 NOTE — PROGRESS NOTES
Assessment/Plan:         Diagnoses and all orders for this visit:    Hallux abducto valgus, left  -     XR foot 2 vw left; Future  -     Case request operating room: [facility] left foot; Standing    Left foot pain  -     XR foot 2 vw left; Future  -     Case request operating room: [facility] left foot; Standing    Other orders  -     Incentive spirometry; Standing  -     Insert and maintain IV line; Standing  -     Void On-Call to O R ; Standing  -     ceFAZolin (ANCEF) 1,000 mg in dextrose 5 % 100 mL IVPB      diagnosis and options discussed  Reviewed patient's chart and x-ray images with her  She has mild to moderate hallux abductovalgus which is symptomatic  Her surgical procedure recommended would be a long-arm chevron osteotomy  The surgical procedure and postop recovery was discussed in detail  Given she is having day-to-day handicap with his condition I would recommend surgery as her conservative options of changing shoe gear and padding have failed  Answered the patient's questions and she feels comfortable proceeding with surgery  It will be planned for next month at the [facility]  She will be sent for preoperative testing  Subjective:      Patient ID: Kerry Rouse is a 60 y o  female  PAtient presents with foot pain  She has had a bunion on her left foot for years  There is a large bump  She has been exercising more recently and her bunion is rubbing in her shoes all the time  She is trying different pads and shoes but it isn't helping  The entire medial foot "feels like it will crack in half " No significant PMH  She does not smoke  The following portions of the patient's history were reviewed and updated as appropriate: allergies, current medications, past family history, past medical history, past social history, past surgical history and problem list     Review of Systems   Constitutional: Negative      HENT: Negative for sinus pressure and sinus pain     Respiratory: Negative for cough and shortness of breath  Cardiovascular: Negative for chest pain and leg swelling  Gastrointestinal: Negative for diarrhea, nausea and vomiting  Musculoskeletal: Positive for arthralgias and joint swelling  Negative for gait problem and myalgias  Skin: Negative for color change and wound  Neurological: Negative for weakness and numbness  Psychiatric/Behavioral: The patient is not nervous/anxious  Objective:      /83   Pulse 90   Ht 5' 4" (1 626 m) Comment: VERBAL  Wt 57 8 kg (127 lb 6 4 oz)   BMI 21 87 kg/m²          Physical Exam  Vitals signs reviewed  Constitutional:       Appearance: She is normal weight  She is not ill-appearing or diaphoretic  HENT:      Nose: No congestion or rhinorrhea  Cardiovascular:      Rate and Rhythm: Normal rate  Pulses: Normal pulses  Pulmonary:      Effort: Pulmonary effort is normal  No respiratory distress  Musculoskeletal:      Left ankle: Normal  Achilles tendon normal         Feet:    Skin:     General: Skin is warm  Capillary Refill: Capillary refill takes less than 2 seconds  Findings: No erythema or rash  Neurological:      Mental Status: She is alert and oriented to person, place, and time  Sensory: No sensory deficit  Motor: No weakness  Gait: Gait normal    Psychiatric:         Mood and Affect: Mood normal              XRay 2 views of the left foot personally read by Dr Anoml Macedo in office today and discussed with patient:  1  IM angle 14 degrees  2  No elevatus on lateral  3  Sesamoid position 3/4

## 2021-03-11 NOTE — LETTER
March 15, 2021     Megan Johnson PA-C  [facility], [address] [place] [address]    Patient: Yadira Hodge   YOB: 1965   Date of Visit: 3/11/2021       Dear Dr Luna Good: Thank you for referring Yadira Hodge to me for evaluation  Below are my notes for this consultation  If you have questions, please do not hesitate to call me  I look forward to following your patient along with you  Sincerely,        Sathya Linares DPM        CC: No Recipients  JEANA Riojas [facility]  3/11/2021  1:18 PM  Signed  Assessment/Plan:         Diagnoses and all orders for this visit:    Hallux abducto valgus, left  -     XR foot 2 vw left; Future  -     Case request operating room: [place] left foot; Standing    Left foot pain  -     XR foot 2 vw left; Future  -     Case request operating room: [place] left foot; Standing    Other orders  -     Incentive spirometry; Standing  -     Insert and maintain IV line; Standing  -     Void On-Call to O R ; Standing  -     ceFAZolin (ANCEF) 1,000 mg in dextrose 5 % 100 mL IVPB      diagnosis and options discussed  Reviewed patient's chart and x-ray images with her  She has mild to moderate hallux abductovalgus which is symptomatic  Her surgical procedure recommended would be a long-arm chevron osteotomy  The surgical procedure and postop recovery was discussed in detail  Given she is having day-to-day handicap with his condition I would recommend surgery as her conservative options of changing shoe gear and padding have failed  Answered the patient's questions and she feels comfortable proceeding with surgery  It will be planned for next month at the [facility]  She will be sent for preoperative testing  Subjective:      Patient ID: Yadira Hodge is a 60 y o  female  PAtient presents with foot pain  She has had a bunion on her left foot for years  There is a large bump   She has been exercising more recently and her bunion is rubbing in her shoes all the time  She is trying different pads and shoes but it isn't helping  The entire medial foot "feels like it will crack in half " No significant PMH  She does not smoke  The following portions of the patient's history were reviewed and updated as appropriate: allergies, current medications, past family history, past medical history, past social history, past surgical history and problem list     Review of Systems   Constitutional: Negative  HENT: Negative for sinus pressure and sinus pain  Respiratory: Negative for cough and shortness of breath  Cardiovascular: Negative for chest pain and leg swelling  Gastrointestinal: Negative for diarrhea, nausea and vomiting  Musculoskeletal: Positive for arthralgias and joint swelling  Negative for gait problem and myalgias  Skin: Negative for color change and wound  Neurological: Negative for weakness and numbness  Psychiatric/Behavioral: The patient is not nervous/anxious  Objective:      /83   Pulse 90   Ht 5' 4" (1 626 m) Comment: VERBAL  Wt 57 8 kg (127 lb 6 4 oz)   BMI 21 87 kg/m²          Physical Exam  Vitals signs reviewed  Constitutional:       Appearance: She is normal weight  She is not ill-appearing or diaphoretic  HENT:      Nose: No congestion or rhinorrhea  Cardiovascular:      Rate and Rhythm: Normal rate  Pulses: Normal pulses  Pulmonary:      Effort: Pulmonary effort is normal  No respiratory distress  Musculoskeletal:      Left ankle: Normal  Achilles tendon normal         Feet:    Skin:     General: Skin is warm  Capillary Refill: Capillary refill takes less than 2 seconds  Findings: No erythema or rash  Neurological:      Mental Status: She is alert and oriented to person, place, and time  Sensory: No sensory deficit  Motor: No weakness        Gait: Gait normal    Psychiatric:         Mood and Affect: Mood normal  XRay 2 views of the left foot personally read by Dr Manolo Parnell in office today and discussed with patient:  1  IM angle 14 degrees  2  No elevatus on lateral  3  Sesamoid position 3/4

## 2021-03-12 ENCOUNTER — TELEPHONE (OUTPATIENT)
Dept: NEUROLOGY | Facility: CLINIC | Age: 56
End: 2021-03-12

## 2021-03-12 LAB — BACTERIA UR CULT: ABNORMAL

## 2021-03-12 NOTE — TELEPHONE ENCOUNTER
Called patient to offer her a sooner botox appt with Misti Morrissey due to her flex week opening  Pt can be scheduled the week of 6/7/21 for her botox inj  Unable to leave a vm, phone kept ringing then went to loud static

## 2021-03-18 ENCOUNTER — ANNUAL EXAM (OUTPATIENT)
Dept: OBGYN CLINIC | Facility: CLINIC | Age: 56
End: 2021-03-18
Payer: COMMERCIAL

## 2021-03-18 VITALS
SYSTOLIC BLOOD PRESSURE: 112 MMHG | WEIGHT: 127.2 LBS | DIASTOLIC BLOOD PRESSURE: 62 MMHG | BODY MASS INDEX: 21.72 KG/M2 | HEIGHT: 64 IN

## 2021-03-18 DIAGNOSIS — Z12.31 ENCOUNTER FOR SCREENING MAMMOGRAM FOR MALIGNANT NEOPLASM OF BREAST: ICD-10-CM

## 2021-03-18 DIAGNOSIS — Z01.419 ENCOUNTER FOR GYNECOLOGICAL EXAMINATION (GENERAL) (ROUTINE) WITHOUT ABNORMAL FINDINGS: Primary | ICD-10-CM

## 2021-03-18 DIAGNOSIS — N95.2 VAGINAL ATROPHY: ICD-10-CM

## 2021-03-18 PROCEDURE — 99396 PREV VISIT EST AGE 40-64: CPT | Performed by: OBSTETRICS & GYNECOLOGY

## 2021-03-18 NOTE — PROGRESS NOTES
Assessment/Plan:      Encounter for gynecological examination (general) (routine) without abnormal findings    Encounter for screening mammogram for malignant neoplasm of breast  -     Mammo screening bilateral w 3d & cad; Future    Vaginal atrophy    - continue Premarin vaginal cream             Martha Berrios is a 60 y o  female who presents for annual exam  The patient has no complaints today  The patient is sexually active (BF lives in [place])  GYN screening history: last pap: was normal and last mammogram: was normal  The patient is not taking hormone replacement therapy  Patient denies post-menopausal vaginal bleeding  The patient participates in regular exercise: yes  The patient denies any issues with urination or IC  Due for colonoscopy this year  Menstrual History:  OB History        3    Para   3    Term   0            AB        Living           SAB        TAB        Ectopic        Multiple        Live Births                    No LMP recorded   Patient is postmenopausal      Past Medical History:   Diagnosis Date    Basal cell carcinoma     Last assessed: 14    Depression with anxiety     Last assessed: 17    Insomnia     Last assessed: 14    Kidney stone        Family History   Problem Relation Age of Onset    Heart murmur Mother     Hyperlipidemia Mother     Atrial fibrillation Father     Arthritis Father     Psoriasis Sister     Heart disease Maternal Grandfather     Breast cancer Paternal Grandmother 77    Diabetes Paternal Grandmother         Mellitus    Hypertension Paternal Grandmother     Skin cancer Paternal Grandfather     ADD / ADHD Son     ADD / ADHD Daughter     No Known Problems Maternal Grandmother     No Known Problems Maternal Aunt     No Known Problems Maternal Aunt     No Known Problems Paternal Aunt     No Known Problems Paternal Aunt     Alcohol abuse Neg Hx     Substance Abuse Neg Hx     Mental illness Neg Hx The following portions of the patient's history were reviewed and updated as appropriate: allergies, current medications, past family history, past medical history, past social history, past surgical history and problem list     Review of Systems  Pertinent items are noted in HPI  Objective      /62 (BP Location: Left arm, Patient Position: Sitting, Cuff Size: Standard)   Ht 5' 4" (1 626 m)   Wt 57 7 kg (127 lb 3 2 oz)   BMI 21 83 kg/m²     General:   alert and oriented, in no acute distress   Heart:    Breasts: regular rate and rhythm, S1, S2 normal, no murmur, click, rub or gallop   appear normal, no suspicious masses, no skin or nipple changes or axillary nodes     Lungs: clear to auscultation bilaterally   Abdomen: soft, non-tender, without masses or organomegaly   Vulva: normal   Vagina: normal mucosa   Cervix: multiparous appearance and no lesions   Uterus: normal size, mobile, non-tender   Adnexa: normal adnexa and no mass, fullness, tenderness

## 2021-03-22 NOTE — TELEPHONE ENCOUNTER
Rambo Mancera - please reattempt contact with patient re: r/s to sooner Botox appointment  If you are unable to contact her, please document and route to [facility] for additional follow up  Thank you!

## 2021-03-22 NOTE — TELEPHONE ENCOUNTER
Called patient, BENJAMIN for a c/b  Gee Pass, please reach out to the patient again to schedule  Thank you!

## 2021-04-01 ENCOUNTER — OFFICE VISIT (OUTPATIENT)
Dept: PSYCHIATRY | Facility: CLINIC | Age: 56
End: 2021-04-01
Payer: COMMERCIAL

## 2021-04-01 ENCOUNTER — OFFICE VISIT (OUTPATIENT)
Dept: FAMILY MEDICINE CLINIC | Facility: CLINIC | Age: 56
End: 2021-04-01
Payer: COMMERCIAL

## 2021-04-01 VITALS
RESPIRATION RATE: 16 BRPM | HEIGHT: 65 IN | HEART RATE: 92 BPM | WEIGHT: 125.1 LBS | DIASTOLIC BLOOD PRESSURE: 98 MMHG | BODY MASS INDEX: 20.84 KG/M2 | SYSTOLIC BLOOD PRESSURE: 140 MMHG

## 2021-04-01 DIAGNOSIS — R41.3 MEMORY DIFFICULTIES: ICD-10-CM

## 2021-04-01 DIAGNOSIS — M21.619 BUNION: ICD-10-CM

## 2021-04-01 DIAGNOSIS — R41.840 ATTENTION AND CONCENTRATION DEFICIT: ICD-10-CM

## 2021-04-01 DIAGNOSIS — E78.5 HYPERLIPIDEMIA, UNSPECIFIED HYPERLIPIDEMIA TYPE: ICD-10-CM

## 2021-04-01 DIAGNOSIS — Z01.818 PREOP EXAMINATION: Primary | ICD-10-CM

## 2021-04-01 DIAGNOSIS — Z12.11 SCREENING FOR MALIGNANT NEOPLASM OF COLON: ICD-10-CM

## 2021-04-01 DIAGNOSIS — F33.40 RECURRENT MAJOR DEPRESSIVE DISORDER, IN REMISSION (HCC): ICD-10-CM

## 2021-04-01 DIAGNOSIS — G43.109 MIGRAINE WITH AURA AND WITHOUT STATUS MIGRAINOSUS, NOT INTRACTABLE: ICD-10-CM

## 2021-04-01 DIAGNOSIS — Z12.11 SCREENING FOR COLON CANCER: ICD-10-CM

## 2021-04-01 DIAGNOSIS — F41.1 GAD (GENERALIZED ANXIETY DISORDER): ICD-10-CM

## 2021-04-01 DIAGNOSIS — Z01.818 PRE-OP TESTING: ICD-10-CM

## 2021-04-01 PROBLEM — S92.534D CLOSED NONDISPLACED FRACTURE OF DISTAL PHALANX OF LESSER TOE OF RIGHT FOOT WITH ROUTINE HEALING: Status: RESOLVED | Noted: 2019-03-07 | Resolved: 2021-04-01

## 2021-04-01 PROCEDURE — 99213 OFFICE O/P EST LOW 20 MIN: CPT | Performed by: PSYCHIATRY & NEUROLOGY

## 2021-04-01 PROCEDURE — 99214 OFFICE O/P EST MOD 30 MIN: CPT | Performed by: PHYSICIAN ASSISTANT

## 2021-04-01 PROCEDURE — 90833 PSYTX W PT W E/M 30 MIN: CPT | Performed by: PSYCHIATRY & NEUROLOGY

## 2021-04-01 RX ORDER — CLONAZEPAM 0.5 MG/1
TABLET ORAL
Qty: 45 TABLET | Refills: 3 | Status: SHIPPED | OUTPATIENT
Start: 2021-04-01 | End: 2021-08-12 | Stop reason: SDUPTHER

## 2021-04-01 RX ORDER — BUPROPION HYDROCHLORIDE 300 MG/1
300 TABLET ORAL EVERY MORNING
Qty: 90 TABLET | Refills: 1 | Status: SHIPPED | OUTPATIENT
Start: 2021-04-01 | End: 2021-10-21 | Stop reason: SDUPTHER

## 2021-04-01 RX ORDER — DEXTROAMPHETAMINE SACCHARATE, AMPHETAMINE ASPARTATE MONOHYDRATE, DEXTROAMPHETAMINE SULFATE AND AMPHETAMINE SULFATE 5; 5; 5; 5 MG/1; MG/1; MG/1; MG/1
20 CAPSULE, EXTENDED RELEASE ORAL DAILY PRN
Qty: 90 CAPSULE | Refills: 0 | Status: SHIPPED | OUTPATIENT
Start: 2021-04-24 | End: 2021-07-13 | Stop reason: SDUPTHER

## 2021-04-01 RX ORDER — FLUOXETINE HYDROCHLORIDE 40 MG/1
40 CAPSULE ORAL DAILY
Qty: 90 CAPSULE | Refills: 1 | Status: SHIPPED | OUTPATIENT
Start: 2021-04-01 | End: 2021-10-21 | Stop reason: SDUPTHER

## 2021-04-01 NOTE — H&P (VIEW-ONLY)
FAMILY [facility] PRE-OPERATIVE EVALUATION    [facility]    NAME: Kaleigh Mendez  AGE: 60 y o  SEX: female  : 1965     DATE: 2021    Family [facility] Pre-Operative Evaluation      Chief Complaint: Pre-operative Evaluation     Surgery: left bunionectomy  Anticipated Date of Surgery: 2021  Referring Provider: Dr Anabel Miles     History of Present Illness:     Kaleigh Mendez is a 60 y o  female who presents to the office today for a preoperative consultation at the request of surgeon, Dr Anabel Miles, who plans on performing bunionectomy on 2021  Planned anesthesia is general  Patient has a bleeding risk of: no recent abnormal bleeding  Patient does not have objections to receiving blood products if needed  Current anti-platelet/anti-coagulation medications that the patient is prescribed includes: none  Assessment of Chronic Conditions:   1  Migraines - well controlled with Botox  2  Depression/Anxiety - well controlled with prozac, wellbutrin, klonopin  3  ADD - on Adderall as needed     Assessment of Cardiac Risk:  · Denies unstable or severe angina or MI in the last 6 weeks or history of stent placement in the last year   · Denies decompensated heart failure (e g  New onset heart failure, NYHA functional class IV heart failure, or worsening existing heart failure)  · Denies significant arrhythmias such as high grade AV block, symptomatic ventricular arrhythmia, newly recognized ventricular tachycardia, supraventricular tachycardia with resting heart rate >100, or symptomatic bradycardia  · Denies severe heart valve disease including aortic stenosis or symptomatic mitral stenosis     Exercise Capacity:  · Able to walk 4 blocks without symptoms?: No  · Able to walk 2 flights without symptoms?: No    Prior Anesthesia Reactions: No     Personal history of venous thromboembolic disease?  No    History of steroid use for >2 weeks within last year? No         Review of Systems:     Review of Systems   Constitutional: Negative  HENT: Negative  Eyes: Negative  Respiratory: Negative  Cardiovascular: Negative  Gastrointestinal: Negative  Endocrine: Negative  Genitourinary: Negative  Musculoskeletal: Negative  Skin: Negative  Allergic/Immunologic: Negative  Neurological: Negative  Hematological: Negative  Psychiatric/Behavioral: Negative  Current Problem List:     Patient Active Problem List   Diagnosis    Attention and concentration deficit    TIM (generalized anxiety disorder)    Recurrent major depressive disorder, in remission ([facility] [address] )    Migraine with aura and without status migrainosus, not intractable    Hyperlipidemia    Memory difficulties    Cyst of right eyelid    Closed nondisplaced fracture of distal phalanx of lesser toe of right foot with routine healing    Chronic migraine without aura without status migrainosus, not intractable       Allergies: Allergies   Allergen Reactions    Butoconazole Hives    Tioconazole Hives       Current Medications:       Current Outpatient Medications:     [START ON 4/24/2021] amphetamine-dextroamphetamine (ADDERALL XR) 20 MG 24 hr capsule, Take 1 capsule (20 mg total) by mouth daily as needed (focus and concentration deficit)   Brand Name Necessary  Max Daily Amount: 20 mg, Disp: 90 capsule, Rfl: 0    Ascorbic Acid (VITAMIN C) 100 MG tablet, Take 100 mg by mouth daily, Disp: , Rfl:     b complex vitamins tablet, Take 1 tablet by mouth every other day, Disp: , Rfl:     buPROPion (WELLBUTRIN XL) 300 mg 24 hr tablet, Take 1 tablet (300 mg total) by mouth every morning, Disp: 90 tablet, Rfl: 1    clonazePAM (KlonoPIN) 0 5 mg tablet, Take 1/2 tab daily as needed for anxiety and take 1 tab nightly as needed for insomnia and anxiety, Disp: 45 tablet, Rfl: 3    conjugated estrogens (PREMARIN) vaginal cream, Insert 0 5g vaginally three times per week (Patient taking differently: as needed Insert 0 5g vaginally three times per week), Disp: 30 g, Rfl: 2    ferrous sulfate 325 (65 Fe) mg tablet, Take 325 mg by mouth daily with breakfast Iron - over the counter, Disp: , Rfl:     FLUoxetine (PROzac) 40 MG capsule, Take 1 capsule (40 mg total) by mouth daily, Disp: 90 capsule, Rfl: 1    Multiple Vitamins-Minerals (MULTIVITAL-M PO), Take by mouth, Disp: , Rfl:     Probiotic Product (PROBIOTIC DAILY PO), Take by mouth, Disp: , Rfl:     Past Medical History:       Past Medical History:   Diagnosis Date    Basal cell carcinoma     Last assessed: 9/26/14    Depression with anxiety     Last assessed: 1/13/17    Insomnia     Last assessed: 9/26/14    Kidney stone         Past Surgical History:   Procedure Laterality Date    BLADDER SURGERY      lift of bladder    CYSTOSCOPY      Theurapeutic   TVT-O    EYE SURGERY Bilateral     LASIK    FACIAL/NECK BIOPSY Right 3/12/2019    Procedure: UPPER EYELID CYST EXCISION;  Surgeon: Edgard Castanon MD;  [facility];  Service: Plastics    GYNECOLOGIC CRYOSURGERY      Cervix    HYSTEROSCOPY W/ ENDOMETRIAL ABLATION  12/14/2010    Novasure    KIDNEY SURGERY  kidney stone removal    1983    TUBAL LIGATION          Family History   Problem Relation Age of Onset    Heart murmur Mother     Hyperlipidemia Mother     Atrial fibrillation Father     Arthritis Father     Psoriasis Sister     Heart disease Maternal Grandfather     Breast cancer Paternal Grandmother 77    Diabetes Paternal Grandmother         Mellitus    Hypertension Paternal Grandmother     Skin cancer Paternal Grandfather     ADD / ADHD Son     ADD / ADHD Daughter     No Known Problems Maternal Grandmother     No Known Problems Maternal Aunt     No Known Problems Maternal Aunt     No Known Problems Paternal Aunt     No Known Problems Paternal Aunt     Alcohol abuse Neg Hx     Substance Abuse Neg Hx     Mental illness Neg Hx         Social History     Socioeconomic History    Marital status:      Spouse name: Not on file    Number of children: Not on file    Years of education: Not on file    Highest education level: Not on file   Occupational History    Not on file   Social Needs    Financial resource strain: Not on file    Food insecurity     Worry: Not on file     Inability: Not on file    Transportation needs     Medical: Not on file     Non-medical: Not on file   Tobacco Use    Smoking status: Never Smoker    Smokeless tobacco: Never Used   Substance and Sexual Activity    Alcohol use: Yes     Frequency: 2-3 times a week     Drinks per session: 1 or 2     Comment: socially    Drug use: No     Comment: ocassionally    Sexual activity: Not Currently     Partners: Male     Birth control/protection: Post-menopausal   Lifestyle    Physical activity     Days per week: Not on file     Minutes per session: Not on file    Stress: Not on file   Relationships    Social connections     Talks on phone: Not on file     Gets together: Not on file     Attends Zoroastrian service: Not on file     Active member of club or organization: Not on file     Attends meetings of clubs or organizations: Not on file     Relationship status: Not on file    Intimate partner violence     Fear of current or ex partner: Not on file     Emotionally abused: Not on file     Physically abused: Not on file     Forced sexual activity: Not on file   Other Topics Concern    Not on file   Social History Narrative    Caffeine use    Drinks coffee        Physical Exam:     /98   Pulse 92   Resp 16   Ht 5' 4 5" (1 638 m)   Wt 56 7 kg (125 lb 1 6 oz)   BMI 21 14 kg/m²     Physical Exam  Constitutional:       Appearance: Normal appearance  She is well-developed and normal weight  HENT:      Head: Normocephalic and atraumatic        Right Ear: External ear normal       Left Ear: External ear normal       Nose: Nose normal    Eyes: Extraocular Movements: Extraocular movements intact  Conjunctiva/sclera: Conjunctivae normal       Pupils: Pupils are equal, round, and reactive to light  Neck:      Musculoskeletal: Normal range of motion and neck supple  Thyroid: No thyromegaly  Cardiovascular:      Rate and Rhythm: Normal rate and regular rhythm  Pulses: Normal pulses  Heart sounds: Normal heart sounds  No murmur  Pulmonary:      Effort: Pulmonary effort is normal  No respiratory distress  Breath sounds: Normal breath sounds  No wheezing or rales  Abdominal:      General: Abdomen is flat  Bowel sounds are normal  There is no distension  Palpations: Abdomen is soft  There is no mass  Tenderness: There is no abdominal tenderness  Musculoskeletal: Normal range of motion  Lymphadenopathy:      Cervical: No cervical adenopathy  Skin:     General: Skin is warm and dry  Neurological:      General: No focal deficit present  Mental Status: She is alert and oriented to person, place, and time  Cranial Nerves: No cranial nerve deficit  Deep Tendon Reflexes: Reflexes are normal and symmetric  Psychiatric:         Mood and Affect: Mood normal          Behavior: Behavior normal          Thought Content: Thought content normal          Judgment: Judgment normal           Data:     Pre-operative work-up    Laboratory Results: ordered     EKG: not indicated    Chest x-ray: not indicated      Previous cardiopulmonary studies within the past year:  · Echocardiogram: none  · Cardiac Catheterization: none  · Stress Test: none  · Pulmonary Function Testing: none      Assessment & Recommendations:         Pre-Op Evaluation Assessment  60 y o  female with planned surgery: bunionectomy, left  Known risk factors for perioperative complications: None  Current medications which may produce withdrawal symptoms if withheld perioperatively: none  Pre-Op Evaluation Plan  1   Further preoperative workup as follows:   - None; no further preoperative work-up is required    2  Medication Management/Recommendations:   - None, continue medication regimen including morning of surgery, with sip of water    3  Prophylaxis for cardiac events with perioperative beta-blockers: not indicated  4  Patient requires further consultation with: None    Clearance  Patient is CLEARED for surgery without any additional cardiac testing       Sam Aquino PA-C  [place] [address] [place] [address] [address] [facility] [address]  Phone#  [phone]  Fax#  [phone]

## 2021-04-01 NOTE — PSYCH
MEDICATION MANAGEMENT NOTE        [place] [address] [facility]      Name and Date of Birth:  Elroy Vieyra 60 y o  1965    Date of Visit: April 1, 2021    SUBJECTIVE:  CC: Shyam presents today for follow up on "its been going well"; anxiety and depression     Shyam has been livign with her son (sold house to younger son, moved in with older son), it keeps her busy, taking care of 4 grandkids, 5,3,2,6mo  Bunion surgery coming up  Some guilt about having to live with her parents, not help with kids  But concern about how she will heal     Son telling her to make plans for her life after kids grow some, she has not thought aoout much but recognizes the day will come, and she is thinking about what her life might look like    Daughter and she get a long well, but overwhelmed by 4 kids  They cook together, daughter is a great cook  Went to [place] February, great family trip  All kids as well  Refreshing, made friends  Work ok part time, manageable  Cognitive situation is unchanged, no decline  Neurologist is monitoring her memory  She has a difficult time with short term memory  Got brand adderall now, and likes it better than generic  Since our last visit, overall symptoms have been stable  Med Compliance: yes    HPI ROS:             ('was' notes: recent => remote)  Medication Side Effects:  no     Depression (10 worst):  low (Was low)   Anxiety (10 worst):  low low   Safety concerns ([place], [place], etc):  no (Was no)   Sleep: (NM = Nightmares)  good (Was good)   Energy:  good (Was good)   Appetite:  healthier (Was no issues)   Weight Change:  stable      PHQ-9 Follow-up           PHQ-9 Score (since 3/1/2021)     None              Shyam denies any side effects from medications unless noted above    Review Of Systems as noted above  Otherwise A comprehensive review of systems was negative  History Review:  The following portions of the patient's history were reviewed and documented: allergies, current medications, past family history, past medical history, past social history and problem list      Lab Review: Labs were reviewed      OBJECTIVE:     MENTAL STATUS EXAM  Appearance:  age appropriate   Behavior:  pleasant, cooperative, with good eye contact   Speech:  Normal volume, regular rate and rhythm   Mood:  euthymic   Affect:  mood congruent   Language: intact and appropriate for age, education, and intellect   Thought Process:  Linear and goal directed   Associations: intact associations   Thought Content:  normal and appropriate   Perceptual Disturbances: no auditory or visual hallcunations   Risk Potential / Abnormal Thoughts: Suicidal ideation - None  Homicidal ideation - None  Potential for aggression - No       Consciousness:  Alert & Awake   Sensorium:  Grossly oriented   Attention: attention span and concentration are age appropriate       Fund of Knowledge:  Memory: awareness of current events: yes  recent and remote memory grossly intact   Insight:  good   Judgment: good   Muscle Strength Muscle Tone: normal  normal   Gait/Station: normal gait/station with good balance   Motor Activity: no abnormal movements       Risks, Benefits And Possible Side Effects Of Medications:    AGREE: Risks, benefits, and possible side effects of medications explained to Shyam and she (or legal representative) verbalizes understanding and agreement for treatment      Controlled Medication Discussion:     Patient using medication appropriately  Shyam has been filling controlled prescriptions on time as prescribed according to [facility] program    _____________________________________________________________      Recent labs:  Appointment on 03/10/2021   Component Date Value    Urine Culture 03/10/2021 >100,000 cfu/ml Escherichia coli*    Clarity, UA 03/10/2021 Clear     Color, UA 03/10/2021 Yellow     Specific Gravity, UA 03/10/2021 1 015     pH, UA 03/10/2021 6 5     Glucose, UA 03/10/2021 100 (1/10%)*    Ketones, UA 03/10/2021 Negative     Blood, UA 03/10/2021 Negative     Protein, UA 03/10/2021 Negative     Nitrite, UA 03/10/2021 Positive*    Bilirubin, UA 03/10/2021 Negative     Urobilinogen, UA 03/10/2021 1 0     Leukocytes, UA 03/10/2021 Negative     WBC, UA 03/10/2021 4-10*    RBC, UA 03/10/2021 None Seen     Bacteria, UA 03/10/2021 Occasional     Epithelial Cells 03/10/2021 Occasional        Psychiatric History   ALEX    Patient did used to see Madelaine Addison in early 2016 when she had a change providers due to insurance  She does not have a therapist     She been hospitalized once in 2000  She says that it was related to having a breakup with her boyfriend at the time and then finding out that she lost her job because she was  at his place of employment  Losing her boyfriend because she did not want to get  and he did and also having job issues led her to crisis and overdose although it was not a dramatic overdose it was a time when she said that she was trying to take pills to go to sleep and that she did have some suicidal thoughts associated with it  She denies any self-harm homicidal ideation in the past or present  She's never been violent  She says that she's not had any suicidal ideation since that time  Social History:  Phan Haas was raised in [facility] to a "good" childhood  Her parents were together and still are  She denies ever having physical or sexual abuse or other forms neglect now or in the past as a child  She has one brother and one sister  She developed normally  She finished high school and got associates degree in business  Her daughter currently lives with her  She has 3 children 2 boys and one girl  She is good relationships with her family  She's been  and  twice   Currently not in a serious relationship at intake    Her support system includes her children her parents and her cousins  She has friends that are close but she doesn't typically opened up to them about mental health  She is [place] and attends Zoroastrianism sometimes  No  history no legal issues and no weapons at home  She does not use tobacco drinks about 1 cup of coffee a day and is a social drinker and when she does drink it's very rare and not much at that time  She denies ever using substances and has never been to rehabilitation  Medical / Surgical History:    Past Medical History:   Diagnosis Date    Basal cell carcinoma     Last assessed: 9/26/14    Depression with anxiety     Last assessed: 1/13/17    Insomnia     Last assessed: 9/26/14    Kidney stone      Past Surgical History:   Procedure Laterality Date    BLADDER SURGERY      lift of bladder    CYSTOSCOPY      Theurapeutic   TVT-O    EYE SURGERY Bilateral     LASIK    FACIAL/NECK BIOPSY Right 3/12/2019    Procedure: UPPER EYELID CYST EXCISION;  Surgeon: Talia Lazo MD;  [facility]  MAIN [facility];  Service: Plastics    GYNECOLOGIC CRYOSURGERY      Cervix    HYSTEROSCOPY W/ ENDOMETRIAL ABLATION  12/14/2010    Novasure    KIDNEY SURGERY  kidney stone removal    1983    TUBAL LIGATION         Family Psychiatric History:     Family History   Problem Relation Age of Onset    Heart murmur Mother     Hyperlipidemia Mother     Atrial fibrillation Father     Arthritis Father     Psoriasis Sister     Heart disease Maternal Grandfather     Breast cancer Paternal Grandmother 77    Diabetes Paternal Grandmother         Mellitus    Hypertension Paternal Grandmother     Skin cancer Paternal Grandfather     ADD / ADHD Son     ADD / ADHD Daughter     No Known Problems Maternal Grandmother     No Known Problems Maternal Aunt     No Known Problems Maternal Aunt     No Known Problems Paternal Aunt     No Known Problems Paternal Aunt     Alcohol abuse Neg Hx     Substance Abuse Neg Hx     Mental illness Neg Hx        Daughter - Family history of ADD (attention deficit disorder)  Son - Family history of ADD (attention deficit disorder)  Family History    15  Denied: Family history of bipolar disorder   14  Denied: Family history of substance abuse   15  Denied: Family history of suicide attempt      Confidential Assessment:  Scales:    Assessment/Plan:        Diagnoses and all orders for this visit:    Recurrent major depressive disorder, in remission ([facility] [address] )  -     amphetamine-dextroamphetamine (ADDERALL XR) 20 MG 24 hr capsule; Take 1 capsule (20 mg total) by mouth daily as needed (focus and concentration deficit)   Brand Name Necessary  Max Daily Amount: 20 mg  -     buPROPion (WELLBUTRIN XL) 300 mg 24 hr tablet; Take 1 tablet (300 mg total) by mouth every morning  -     FLUoxetine (PROzac) 40 MG capsule; Take 1 capsule (40 mg total) by mouth daily    Attention and concentration deficit  -     amphetamine-dextroamphetamine (ADDERALL XR) 20 MG 24 hr capsule; Take 1 capsule (20 mg total) by mouth daily as needed (focus and concentration deficit)   Brand Name Necessary  Max Daily Amount: 20 mg  -     buPROPion (WELLBUTRIN XL) 300 mg 24 hr tablet; Take 1 tablet (300 mg total) by mouth every morning    Memory difficulties  -     amphetamine-dextroamphetamine (ADDERALL XR) 20 MG 24 hr capsule; Take 1 capsule (20 mg total) by mouth daily as needed (focus and concentration deficit)   Brand Name Necessary  Max Daily Amount: 20 mg  -     buPROPion (WELLBUTRIN XL) 300 mg 24 hr tablet; Take 1 tablet (300 mg total) by mouth every morning    TIM (generalized anxiety disorder)  -     clonazePAM (KlonoPIN) 0 5 mg tablet; Take 1/2 tab daily as needed for anxiety and take 1 tab nightly as needed for insomnia and anxiety  -     FLUoxetine (PROzac) 40 MG capsule;  Take 1 capsule (40 mg total) by mouth daily        ______________________________________________________________________    Generalized anxiety disorder  MDD, recurrent, in remission    Attention and concentration deficit  Memory difficulties    ---  Generalized anxiety disorder - managed   MDD, recurrent, in remission   - managed   Attention and concentration deficit - managed  Memory difficulties    Alex Jordan is doing well, likes meds where they are  Attention and concentration deficit may be related to organic issues, anxiety, or underlying ADD/ADHD  Ongoing f/u with Neurology  Discussed long term klonopin risks, goals to reduce, but nothing acute  She prefers med, but open in distant future to discuss reduction as stressors (eg job) change    BP better (high in past at time), asymptomatic  Safety Risk Assessment: see above   In considering risk and protective factors, suicide risk and safety risk are low  Past medications include   Prozac which helps but does have some decreased orgasm  Topamax for migraines but this seemed to lead to confusion ( years ago)  ON but no detail recall - Zoloft, Celexa or Lexapro  Effexor seemed to cause weight gain  Wellbutrin  Ambien caused oversedation  melatonin did not help  Treatment Plan:        Patient has been educated about their diagnosis and treatment modalities  They voiced understanding and agreement with the following plan:    1) Meds:   - Prozac 40mg daily  (consider increase but orgasm affected  Was on 50mg in past per charts)   - Wellbutrin XL 300mg daily   - Klonopin 0 25mg in day PRN and 0 5mg HS PRN  - Adderal XR 20mg Daily     2) Labs: PRN   - 2/2017: CBC, CMP WNL  TSH 1 57, TG 56, HDL 98,     3) Therapy:   - not in therapy, revisit PRN   - Provided progressive muscle relaxation handout, asked her to look into belly breathing and guided imagery  (2/9/2018)    4) Medical:  Concentration issues, MRI abnormalities, family h/o early onset dementia (PAunt), migraines (gets Botox)   - pt to f/u with neurologist   - pt to f/u with other providers PRN    5) Other: support prn   - works at [address] [place] [address]   Reception   - good support from family, daughter lives with her   - 3 kids,   Daughter graduates from [employer] with [address] [employer]  Likes psychology, healthcare   - [facility]friend [facility]    6) Follow up:   - 6 months, but patient to call if issues or concerns    7) Treatment Plan: Enacted 2/9/2018, 12/7/2018, 7/26/2019, 8/14/2020, 4/1/21      Discussed self monitoring of symptoms, and symptom monitoring tools  Patient has been informed of 24 hours and weekend coverage for urgent situations accessed by calling the main clinic phone number             Psychotherapy in session:  Time spent performing psychotherapy: 18 Minutes on supportive therapy related to family, healthy relationships, planning, surgery, stressor management as grandmother and employee, setting life goals

## 2021-04-01 NOTE — PROGRESS NOTES
FAMILY [facility] PRE-OPERATIVE EVALUATION  [facility] [place]    NAME: Patricia Parker  AGE: 60 y o  SEX: female  : 1965     DATE: 2021    Family Practice Pre-Operative Evaluation      Chief Complaint: Pre-operative Evaluation     Surgery: left bunionectomy  Anticipated Date of Surgery: 2021  Referring Provider: Dr Susy Bowens     History of Present Illness:     Patricia Parker is a 60 y o  female who presents to the office today for a preoperative consultation at the request of surgeon, Dr Susy Bowens, who plans on performing bunionectomy on 2021  Planned anesthesia is general  Patient has a bleeding risk of: no recent abnormal bleeding  Patient does not have objections to receiving blood products if needed  Current anti-platelet/anti-coagulation medications that the patient is prescribed includes: none  Assessment of Chronic Conditions:   1  Migraines - well controlled with Botox  2  Depression/Anxiety - well controlled with prozac, wellbutrin, klonopin  3  ADD - on Adderall as needed     Assessment of Cardiac Risk:  · Denies unstable or severe angina or MI in the last 6 weeks or history of stent placement in the last year   · Denies decompensated heart failure (e g  New onset heart failure, NYHA functional class IV heart failure, or worsening existing heart failure)  · Denies significant arrhythmias such as high grade AV block, symptomatic ventricular arrhythmia, newly recognized ventricular tachycardia, supraventricular tachycardia with resting heart rate >100, or symptomatic bradycardia  · Denies severe heart valve disease including aortic stenosis or symptomatic mitral stenosis     Exercise Capacity:  · Able to walk 4 blocks without symptoms?: No  · Able to walk 2 flights without symptoms?: No    Prior Anesthesia Reactions: No     Personal history of venous thromboembolic disease?  No    History of steroid use for >2 weeks within last year? No         Review of Systems:     Review of Systems   Constitutional: Negative  HENT: Negative  Eyes: Negative  Respiratory: Negative  Cardiovascular: Negative  Gastrointestinal: Negative  Endocrine: Negative  Genitourinary: Negative  Musculoskeletal: Negative  Skin: Negative  Allergic/Immunologic: Negative  Neurological: Negative  Hematological: Negative  Psychiatric/Behavioral: Negative  Current Problem List:     Patient Active Problem List   Diagnosis    Attention and concentration deficit    TIM (generalized anxiety disorder)    Recurrent major depressive disorder, in remission ([facility] [address] )    Migraine with aura and without status migrainosus, not intractable    Hyperlipidemia    Memory difficulties    Cyst of right eyelid    Closed nondisplaced fracture of distal phalanx of lesser toe of right foot with routine healing    Chronic migraine without aura without status migrainosus, not intractable       Allergies: Allergies   Allergen Reactions    Butoconazole Hives    Tioconazole Hives       Current Medications:       Current Outpatient Medications:     [START ON 4/24/2021] amphetamine-dextroamphetamine (ADDERALL XR) 20 MG 24 hr capsule, Take 1 capsule (20 mg total) by mouth daily as needed (focus and concentration deficit)   Brand Name Necessary  Max Daily Amount: 20 mg, Disp: 90 capsule, Rfl: 0    Ascorbic Acid (VITAMIN C) 100 MG tablet, Take 100 mg by mouth daily, Disp: , Rfl:     b complex vitamins tablet, Take 1 tablet by mouth every other day, Disp: , Rfl:     buPROPion (WELLBUTRIN XL) 300 mg 24 hr tablet, Take 1 tablet (300 mg total) by mouth every morning, Disp: 90 tablet, Rfl: 1    clonazePAM (KlonoPIN) 0 5 mg tablet, Take 1/2 tab daily as needed for anxiety and take 1 tab nightly as needed for insomnia and anxiety, Disp: 45 tablet, Rfl: 3    conjugated estrogens (PREMARIN) vaginal cream, Insert 0 5g vaginally three times per week (Patient taking differently: as needed Insert 0 5g vaginally three times per week), Disp: 30 g, Rfl: 2    ferrous sulfate 325 (65 Fe) mg tablet, Take 325 mg by mouth daily with breakfast Iron - over the counter, Disp: , Rfl:     FLUoxetine (PROzac) 40 MG capsule, Take 1 capsule (40 mg total) by mouth daily, Disp: 90 capsule, Rfl: 1    Multiple Vitamins-Minerals (MULTIVITAL-M PO), Take by mouth, Disp: , Rfl:     Probiotic Product (PROBIOTIC DAILY PO), Take by mouth, Disp: , Rfl:     Past Medical History:       Past Medical History:   Diagnosis Date    Basal cell carcinoma     Last assessed: 9/26/14    Depression with anxiety     Last assessed: 1/13/17    Insomnia     Last assessed: 9/26/14    Kidney stone         Past Surgical History:   Procedure Laterality Date    BLADDER SURGERY      lift of bladder    CYSTOSCOPY      [place]ur[place]ut[place]   TVT-O    EYE SURGERY Bilateral     LASIK    FACIAL/NECK BIOPSY Right 3/12/2019    Procedure: UPPER EYELID CYST EXCISION;  Surgeon: Irma Hollis MD;  [facility]: [facility];  Service: Plastics    GYNECOLOGIC CRYOSURGERY      Cervix    HYSTEROSCOPY W/ ENDOMETRIAL ABLATION  12/14/2010    [facility]    KIDNEY SURGERY  kidney stone removal    1983    TUBAL LIGATION          Family History   Problem Relation Age of Onset    Heart murmur Mother     Hyperlipidemia Mother     Atrial fibrillation Father     Arthritis Father     Psoriasis Sister     Heart disease Maternal Grandfather     Breast cancer Paternal Grandmother 77    Diabetes Paternal Grandmother         Mellitus    Hypertension Paternal Grandmother     Skin cancer Paternal Grandfather     ADD / ADHD Son     ADD / ADHD Daughter     No Known Problems Maternal Grandmother     No Known Problems Maternal Aunt     No Known Problems Maternal Aunt     No Known Problems Paternal Aunt     No Known Problems Paternal Aunt     Alcohol abuse Neg Hx     Substance Abuse Neg Hx     Mental illness Neg Hx         Social History     Socioeconomic History    Marital status:      Spouse name: Not on file    Number of children: Not on file    Years of education: Not on file    Highest education level: Not on file   Occupational History    Not on file   Social Needs    Financial resource strain: Not on file    Food insecurity     Worry: Not on file     Inability: Not on file    Transportation needs     Medical: Not on file     Non-medical: Not on file   Tobacco Use    Smoking status: Never Smoker    Smokeless tobacco: Never Used   Substance and Sexual Activity    Alcohol use: Yes     Frequency: 2-3 times a week     Drinks per session: 1 or 2     Comment: socially    Drug use: No     Comment: ocassionally    Sexual activity: Not Currently     Partners: Male     Birth control/protection: Post-menopausal   Lifestyle    Physical activity     Days per week: Not on file     Minutes per session: Not on file    Stress: Not on file   Relationships    Social connections     Talks on phone: Not on file     Gets together: Not on file     Attends [employer] service: Not on file     Active member of club or organization: Not on file     Attends meetings of clubs or organizations: Not on file     Relationship status: Not on file    Intimate partner violence     Fear of current or ex partner: Not on file     Emotionally abused: Not on file     Physically abused: Not on file     Forced sexual activity: Not on file   Other Topics Concern    Not on file   Social History Narrative    Caffeine use    Drinks coffee        Physical Exam:     /98   Pulse 92   Resp 16   Ht 5' 4 5" (1 638 m)   Wt 56 7 kg (125 lb 1 6 oz)   BMI 21 14 kg/m²     Physical Exam  Constitutional:       Appearance: Normal appearance  She is well-developed and normal weight  HENT:      Head: Normocephalic and atraumatic        Right Ear: External ear normal       Left Ear: External ear normal       Nose: Nose normal    Eyes: Extraocular Movements: Extraocular movements intact  Conjunctiva/sclera: Conjunctivae normal       Pupils: Pupils are equal, round, and reactive to light  Neck:      Musculoskeletal: Normal range of motion and neck supple  Thyroid: No thyromegaly  Cardiovascular:      Rate and Rhythm: Normal rate and regular rhythm  Pulses: Normal pulses  Heart sounds: Normal heart sounds  No murmur  Pulmonary:      Effort: Pulmonary effort is normal  No respiratory distress  Breath sounds: Normal breath sounds  No wheezing or rales  Abdominal:      General: Abdomen is flat  Bowel sounds are normal  There is no distension  Palpations: Abdomen is soft  There is no mass  Tenderness: There is no abdominal tenderness  Musculoskeletal: Normal range of motion  Lymphadenopathy:      Cervical: No cervical adenopathy  Skin:     General: Skin is warm and dry  Neurological:      General: No focal deficit present  Mental Status: She is alert and oriented to person, place, and time  Cranial Nerves: No cranial nerve deficit  Deep Tendon Reflexes: Reflexes are normal and symmetric  Psychiatric:         Mood and Affect: Mood normal          Behavior: Behavior normal          Thought Content: Thought content normal          Judgment: Judgment normal           Data:     Pre-operative work-up    Laboratory Results: ordered     EKG: not indicated    Chest x-ray: not indicated      Previous cardiopulmonary studies within the past year:  · Echocardiogram: none  · Cardiac Catheterization: none  · Stress Test: none  · Pulmonary Function Testing: none      Assessment & Recommendations:         Pre-Op Evaluation Assessment  60 y o  female with planned surgery: bunionectomy, left  Known risk factors for perioperative complications: None  Current medications which may produce withdrawal symptoms if withheld perioperatively: none  Pre-Op Evaluation Plan  1   Further preoperative workup as follows:   - None; no further preoperative work-up is required    2  Medication Management/Recommendations:   - None, continue medication regimen including morning of surgery, with sip of water    3  Prophylaxis for cardiac events with perioperative beta-blockers: not indicated  4  Patient requires further consultation with: None    Clearance  Patient is CLEARED for surgery without any additional cardiac testing       Jamee Gonzalez PA-C  [address] [facility] [address]  Phone#  [phone]  Fax#  [phone]

## 2021-04-01 NOTE — BH TREATMENT PLAN
TREATMENT PLAN (Medication Management Only)        [facility]    Name/Date of Birth/MRN:  Madyson Pang 60 y o  1965 MRN: [id number]  Date of Treatment Plan: April 1, 2021  Diagnosis/Diagnoses:   1  Recurrent major depressive disorder, in remission ([facility] [address] )    2  Attention and concentration deficit    3  Memory difficulties    4  TIM (generalized anxiety disorder)      Strengths/Personal Resources for Self-Care: very good work ethics, honest, kind  Area/Areas of need (in own words): speaking my mind, making my decisions  1  Long Term Goal: "keep doing well"   Target Date: 180 days from treatment plan  Person/Persons responsible for completion of goal: Dr Uvaldo Woody and Self  2  Short Term Objective (s) - How will we reach this goal?:   A  Provider new recommended medication/dosage changes and/or continue medication(s): no   B   Continue to stay active and involved with family  C  Target Date: 6 months from treatment plan unless noted otherwise  Person/Persons Responsible for Completion of Goal: Dr Uvaldo Woody and Self   Progress Towards Goals: continuing treatment   Treatment Modality: Medication management and therapy PRN  Review due 180 days from date of this plan: Approximately 6 months from today ( 7/1/2021 )    Expected length of service: ongoing treatment  My Physician/PA/NP and I have developed this plan together and I agree to work on the goals and objectives  I understand the treatment goals that were developed for my treatment    Signature:       Date and time:  Signature of parent/guardian if under age of 15 years: Date and time:  Signature of provider:      Date and time:  Signature of Supervising Physician:    Date and time: 4/1/2021      Lorena Masterson III,

## 2021-04-05 ENCOUNTER — TRANSCRIBE ORDERS (OUTPATIENT)
Dept: URGENT CARE | Facility: CLINIC | Age: 56
End: 2021-04-05

## 2021-04-05 ENCOUNTER — APPOINTMENT (OUTPATIENT)
Dept: LAB | Facility: CLINIC | Age: 56
End: 2021-04-05
Payer: COMMERCIAL

## 2021-04-05 ENCOUNTER — APPOINTMENT (OUTPATIENT)
Dept: LAB | Facility: CLINIC | Age: 56
End: 2021-04-05

## 2021-04-05 DIAGNOSIS — Z00.8 ENCOUNTER FOR OTHER GENERAL EXAMINATION: Primary | ICD-10-CM

## 2021-04-05 DIAGNOSIS — Z01.818 PRE-OP TESTING: ICD-10-CM

## 2021-04-05 DIAGNOSIS — Z00.8 ENCOUNTER FOR OTHER GENERAL EXAMINATION: ICD-10-CM

## 2021-04-05 LAB
ANION GAP SERPL CALCULATED.3IONS-SCNC: 5 MMOL/L (ref 4–13)
BASOPHILS # BLD AUTO: 0.02 THOUSANDS/ΜL (ref 0–0.1)
BASOPHILS NFR BLD AUTO: 0 % (ref 0–1)
BUN SERPL-MCNC: 15 MG/DL (ref 5–25)
CALCIUM SERPL-MCNC: 8.3 MG/DL (ref 8.3–10.1)
CHLORIDE SERPL-SCNC: 105 MMOL/L (ref 100–108)
CHOLEST SERPL-MCNC: 252 MG/DL (ref 50–200)
CO2 SERPL-SCNC: 31 MMOL/L (ref 21–32)
CREAT SERPL-MCNC: 0.9 MG/DL (ref 0.6–1.3)
EOSINOPHIL # BLD AUTO: 0.2 THOUSAND/ΜL (ref 0–0.61)
EOSINOPHIL NFR BLD AUTO: 3 % (ref 0–6)
ERYTHROCYTE [DISTWIDTH] IN BLOOD BY AUTOMATED COUNT: 12 % (ref 11.6–15.1)
EST. AVERAGE GLUCOSE BLD GHB EST-MCNC: 108 MG/DL
GFR SERPL CREATININE-BSD FRML MDRD: 71 ML/MIN/1.73SQ M
GLUCOSE SERPL-MCNC: 84 MG/DL (ref 65–140)
HBA1C MFR BLD: 5.4 %
HCT VFR BLD AUTO: 41.5 % (ref 34.8–46.1)
HDLC SERPL-MCNC: 85 MG/DL
HGB BLD-MCNC: 13.1 G/DL (ref 11.5–15.4)
IMM GRANULOCYTES # BLD AUTO: 0.01 THOUSAND/UL (ref 0–0.2)
IMM GRANULOCYTES NFR BLD AUTO: 0 % (ref 0–2)
LDLC SERPL CALC-MCNC: 150 MG/DL (ref 0–100)
LYMPHOCYTES # BLD AUTO: 2.43 THOUSANDS/ΜL (ref 0.6–4.47)
LYMPHOCYTES NFR BLD AUTO: 40 % (ref 14–44)
MCH RBC QN AUTO: 32 PG (ref 26.8–34.3)
MCHC RBC AUTO-ENTMCNC: 31.6 G/DL (ref 31.4–37.4)
MCV RBC AUTO: 102 FL (ref 82–98)
MONOCYTES # BLD AUTO: 0.67 THOUSAND/ΜL (ref 0.17–1.22)
MONOCYTES NFR BLD AUTO: 11 % (ref 4–12)
NEUTROPHILS # BLD AUTO: 2.73 THOUSANDS/ΜL (ref 1.85–7.62)
NEUTS SEG NFR BLD AUTO: 46 % (ref 43–75)
NONHDLC SERPL-MCNC: 167 MG/DL
NRBC BLD AUTO-RTO: 0 /100 WBCS
PLATELET # BLD AUTO: 261 THOUSANDS/UL (ref 149–390)
PMV BLD AUTO: 12.2 FL (ref 8.9–12.7)
POTASSIUM SERPL-SCNC: 3.6 MMOL/L (ref 3.5–5.3)
RBC # BLD AUTO: 4.09 MILLION/UL (ref 3.81–5.12)
SODIUM SERPL-SCNC: 141 MMOL/L (ref 136–145)
TRIGL SERPL-MCNC: 83 MG/DL
WBC # BLD AUTO: 6.06 THOUSAND/UL (ref 4.31–10.16)

## 2021-04-05 PROCEDURE — 80061 LIPID PANEL: CPT

## 2021-04-05 PROCEDURE — 83036 HEMOGLOBIN GLYCOSYLATED A1C: CPT

## 2021-04-05 PROCEDURE — 80048 BASIC METABOLIC PNL TOTAL CA: CPT

## 2021-04-05 PROCEDURE — 85025 COMPLETE CBC W/AUTO DIFF WBC: CPT

## 2021-04-05 PROCEDURE — 36415 COLL VENOUS BLD VENIPUNCTURE: CPT

## 2021-04-08 ENCOUNTER — OFFICE VISIT (OUTPATIENT)
Dept: PODIATRY | Facility: CLINIC | Age: 56
End: 2021-04-08
Payer: COMMERCIAL

## 2021-04-08 VITALS
WEIGHT: 124 LBS | HEIGHT: 65 IN | DIASTOLIC BLOOD PRESSURE: 80 MMHG | HEART RATE: 86 BPM | BODY MASS INDEX: 20.66 KG/M2 | SYSTOLIC BLOOD PRESSURE: 128 MMHG

## 2021-04-08 DIAGNOSIS — M79.672 LEFT FOOT PAIN: ICD-10-CM

## 2021-04-08 DIAGNOSIS — M20.12 HALLUX ABDUCTO VALGUS, LEFT: Primary | ICD-10-CM

## 2021-04-08 PROCEDURE — 99214 OFFICE O/P EST MOD 30 MIN: CPT | Performed by: PODIATRIST

## 2021-04-08 NOTE — PROGRESS NOTES
Assessment/Plan:         Diagnoses and all orders for this visit:    Hallux abducto valgus, left  -     Crutches    Left foot pain  -     Crutches      patient signed consent today for Gustavo bunionectomy left foot  Procedure was discussed in detail as were the alternatives, risks, complications  X-rays were again reviewed with patient  Preoperative clearance (4/1/2021) and blood work was reviewed as well  Bloodwork from 4/5/21 is stable for surgery  Patient's surgery is scheduled for a week from Friday at the [facility]  Discussed appropriate use of postop pain medication  Narcotic agreement signed by patient  Discussed signs and symptoms of DVT  Her risk is low for this type of surgery and she does not need any DVT prophylaxis  She will be weight-bearing following surgery and a stiff shoe and crutches which I provided a prescription for  Subjective:      Patient ID: Samira Huston is a 60 y o  female  3/11/2021: PAtient presents with foot pain  She has had a bunion on her left foot for years  There is a large bump  She has been exercising more recently and her bunion is rubbing in her shoes all the time  She is trying different pads and shoes but it isn't helping  The entire medial foot "feels like it will crack in half " No significant PMH  She does not smoke  4/8/21:  Patient returns today for her upcoming surgery next week  She has painful bunion deformity  Her symptoms have not changed and if anything she states I know I need this surgery because I am tired of this type of pain  She did get blood work a few days ago and clearance from her family doctor on April 1st   She has a few cast since about the surgical procedure and recovery that we will discuss at the visit today  She reports no other changes in medical history          The following portions of the patient's history were reviewed and updated as appropriate:   She  has a past medical history of Basal cell carcinoma, Depression with anxiety, Insomnia, and Kidney stone  She   Patient Active Problem List    Diagnosis Date Noted    Chronic migraine without aura without status migrainosus, not intractable 03/11/2019    Cyst of right eyelid 01/28/2019    Attention and concentration deficit 05/05/2017    TIM (generalized anxiety disorder) 05/05/2017    Recurrent major depressive disorder, in remission ([facility] [address] 05/05/2017    Memory difficulties 03/18/2016    Migraine with aura and without status migrainosus, not intractable 10/27/2014    Hyperlipidemia 09/19/2014     She  has a past surgical history that includes Bladder surgery; Gynecologic cryosurgery; Eye surgery (Bilateral); Hysteroscopy w/ endometrial ablation (12/14/2010); Cystoscopy; Tubal ligation; Kidney surgery (kidney stone removal); and FACIAL/NECK BIOPSY (Right, 3/12/2019)  Her family history includes ADD / ADHD in her daughter and son; Arthritis in her father; Atrial fibrillation in her father; Breast cancer (age of onset: 77) in her paternal grandmother; Diabetes in her paternal grandmother; Heart disease in her maternal grandfather; Heart murmur in her mother; Hyperlipidemia in her mother; Hypertension in her paternal grandmother; No Known Problems in her maternal aunt, maternal aunt, maternal grandmother, paternal aunt, and paternal aunt; Psoriasis in her sister; Skin cancer in her paternal grandfather  She  reports that she has never smoked  She has never used smokeless tobacco  She reports current alcohol use  She reports that she does not use drugs  Current Outpatient Medications   Medication Sig Dispense Refill    [START ON 4/24/2021] amphetamine-dextroamphetamine (ADDERALL XR) 20 MG 24 hr capsule Take 1 capsule (20 mg total) by mouth daily as needed (focus and concentration deficit)   Brand Name Necessary  Max Daily Amount: 20 mg 90 capsule 0    Ascorbic Acid (VITAMIN C) 100 MG tablet Take 100 mg by mouth daily      b complex vitamins tablet Take 1 tablet by mouth every other day      buPROPion (WELLBUTRIN XL) 300 mg 24 hr tablet Take 1 tablet (300 mg total) by mouth every morning 90 tablet 1    clonazePAM (KlonoPIN) 0 5 mg tablet Take 1/2 tab daily as needed for anxiety and take 1 tab nightly as needed for insomnia and anxiety 45 tablet 3    conjugated estrogens (PREMARIN) vaginal cream Insert 0 5g vaginally three times per week (Patient taking differently: as needed Insert 0 5g vaginally three times per week) 30 g 2    ferrous sulfate 325 (65 Fe) mg tablet Take 325 mg by mouth daily with breakfast Iron - over the counter      FLUoxetine (PROzac) 40 MG capsule Take 1 capsule (40 mg total) by mouth daily 90 capsule 1    Multiple Vitamins-Minerals (MULTIVITAL-M PO) Take by mouth      Probiotic Product (PROBIOTIC DAILY PO) Take by mouth       No current facility-administered medications for this visit  Current Outpatient Medications on File Prior to Visit   Medication Sig    [START ON 4/24/2021] amphetamine-dextroamphetamine (ADDERALL XR) 20 MG 24 hr capsule Take 1 capsule (20 mg total) by mouth daily as needed (focus and concentration deficit)   Brand Name Necessary  Max Daily Amount: 20 mg    Ascorbic Acid (VITAMIN C) 100 MG tablet Take 100 mg by mouth daily    b complex vitamins tablet Take 1 tablet by mouth every other day    buPROPion (WELLBUTRIN XL) 300 mg 24 hr tablet Take 1 tablet (300 mg total) by mouth every morning    clonazePAM (KlonoPIN) 0 5 mg tablet Take 1/2 tab daily as needed for anxiety and take 1 tab nightly as needed for insomnia and anxiety    conjugated estrogens (PREMARIN) vaginal cream Insert 0 5g vaginally three times per week (Patient taking differently: as needed Insert 0 5g vaginally three times per week)    ferrous sulfate 325 (65 Fe) mg tablet Take 325 mg by mouth daily with breakfast Iron - over the counter    FLUoxetine (PROzac) 40 MG capsule Take 1 capsule (40 mg total) by mouth daily    Multiple Vitamins-Minerals (MULTIVITAL-M PO) Take by mouth    Probiotic Product (PROBIOTIC DAILY PO) Take by mouth     No current facility-administered medications on file prior to visit  She is allergic to butoconazole and tioconazole       Review of Systems   Constitutional: Negative  HENT: Negative for sinus pressure and sinus pain  Respiratory: Negative for cough and shortness of breath  Cardiovascular: Negative for chest pain and leg swelling  Gastrointestinal: Negative for diarrhea, nausea and vomiting  Musculoskeletal: Positive for arthralgias and joint swelling  Negative for gait problem  Skin: Negative for color change and wound  Neurological: Negative for weakness and numbness  Objective:      /80   Pulse 86   Ht 5' 4 5" (1 638 m) Comment: verbal  Wt 56 2 kg (124 lb)   BMI 20 96 kg/m²          Physical Exam  Vitals signs reviewed  Constitutional:       Appearance: She is normal weight  She is not ill-appearing or diaphoretic  HENT:      Nose: No congestion or rhinorrhea  Cardiovascular:      Rate and Rhythm: Normal rate  Pulses: Normal pulses  Dorsalis pedis pulses are 2+ on the right side and 2+ on the left side  Posterior tibial pulses are 2+ on the right side and 2+ on the left side  Pulmonary:      Effort: Pulmonary effort is normal  No respiratory distress  Abdominal:      General: There is no distension  Tenderness: There is no abdominal tenderness  Musculoskeletal:      Left foot: Normal range of motion  Deformity and bunion (Moderate bunion deformity with intermetatarsal angle approximately 14°  No crepitus with range of motion metatarsophalangeal joint but the medial eminence is significant and tender to palpation ) present  Skin:     Capillary Refill: Capillary refill takes less than 2 seconds  Findings: No erythema or rash  Neurological:      Mental Status: She is alert and oriented to person, place, and time  Motor: No weakness        Gait: Gait normal    Psychiatric:         Mood and Affect: Mood normal

## 2021-04-13 NOTE — PRE-PROCEDURE INSTRUCTIONS
Pre-Surgery Instructions:   Medication Instructions    [START ON 4/24/2021] amphetamine-dextroamphetamine (ADDERALL XR) 20 MG 24 hr capsule Patient was instructed by Physician and understands   Ascorbic Acid (VITAMIN C) 100 MG tablet Instructed patient per Anesthesia Guidelines  hold am of sx    b complex vitamins tablet Instructed patient per Anesthesia Guidelines  hold am of sx    buPROPion (WELLBUTRIN XL) 300 mg 24 hr tablet Instructed patient per Anesthesia Guidelines  take am of sx    clonazePAM (KlonoPIN) 0 5 mg tablet Instructed patient per Anesthesia Guidelines  prn    conjugated estrogens (PREMARIN) vaginal cream Instructed patient per Anesthesia Guidelines   ferrous sulfate 325 (65 Fe) mg tablet Instructed patient per Anesthesia Guidelines  hold am of sx    FLUoxetine (PROzac) 40 MG capsule Instructed patient per Anesthesia Guidelines  take am of sx     Multiple Vitamins-Minerals (MULTIVITAL-M PO) Instructed patient per Anesthesia Guidelines  stopping     Probiotic Product (PROBIOTIC DAILY PO) Instructed patient per Anesthesia Guidelines  hold am of sx    You will receive a phone call from hospital for arrival time  Please call surgeons office if any changes in your condition  Wear easy on/off clothing; consider type of surgery;  Valuables, jewelry, piercing's please keep at home  **COVID-19  education/surgical guidelines      Please: No contact lenses or eye make up, artificial eyelashes    Please secure transportation     Follow pre surgery showering or cleaning instructions as  Reviewed by nurse or surgeons office      Questions answered and concerns addressed

## 2021-04-16 ENCOUNTER — HOSPITAL ENCOUNTER (OUTPATIENT)
Facility: HOSPITAL | Age: 56
Setting detail: OUTPATIENT SURGERY
Discharge: HOME | End: 2021-04-16
Attending: PODIATRIST | Admitting: PODIATRIST
Payer: COMMERCIAL

## 2021-04-16 ENCOUNTER — ANESTHESIA EVENT (OUTPATIENT)
Dept: PERIOP | Facility: HOSPITAL | Age: 56
End: 2021-04-16
Payer: COMMERCIAL

## 2021-04-16 ENCOUNTER — ANESTHESIA (OUTPATIENT)
Dept: PERIOP | Facility: HOSPITAL | Age: 56
End: 2021-04-16
Payer: COMMERCIAL

## 2021-04-16 ENCOUNTER — APPOINTMENT (OUTPATIENT)
Dept: RADIOLOGY | Facility: HOSPITAL | Age: 56
End: 2021-04-16
Payer: COMMERCIAL

## 2021-04-16 VITALS
SYSTOLIC BLOOD PRESSURE: 120 MMHG | RESPIRATION RATE: 16 BRPM | HEART RATE: 69 BPM | HEIGHT: 64 IN | DIASTOLIC BLOOD PRESSURE: 57 MMHG | BODY MASS INDEX: 21.54 KG/M2 | OXYGEN SATURATION: 97 % | TEMPERATURE: 97.7 F | WEIGHT: 126.2 LBS

## 2021-04-16 DIAGNOSIS — Z98.890 POST-OPERATIVE STATE: Primary | ICD-10-CM

## 2021-04-16 PROCEDURE — C1713 ANCHOR/SCREW BN/BN,TIS/BN: HCPCS | Performed by: PODIATRIST

## 2021-04-16 PROCEDURE — 73630 X-RAY EXAM OF FOOT: CPT

## 2021-04-16 PROCEDURE — 28296 COR HLX VLGS DSTL MTAR OSTEO: CPT | Performed by: PODIATRIST

## 2021-04-16 PROCEDURE — 99024 POSTOP FOLLOW-UP VISIT: CPT | Performed by: PODIATRIST

## 2021-04-16 DEVICE — CANNULATED SCREW
Type: IMPLANTABLE DEVICE | Site: FOOT | Status: FUNCTIONAL
Brand: ASNIS

## 2021-04-16 RX ORDER — HYDROMORPHONE HCL/PF 1 MG/ML
0.5 SYRINGE (ML) INJECTION
Status: DISCONTINUED | OUTPATIENT
Start: 2021-04-16 | End: 2021-04-16 | Stop reason: HOSPADM

## 2021-04-16 RX ORDER — PROPOFOL 10 MG/ML
INJECTION, EMULSION INTRAVENOUS CONTINUOUS PRN
Status: DISCONTINUED | OUTPATIENT
Start: 2021-04-16 | End: 2021-04-16

## 2021-04-16 RX ORDER — BUPIVACAINE HYDROCHLORIDE 2.5 MG/ML
INJECTION, SOLUTION EPIDURAL; INFILTRATION; INTRACAUDAL AS NEEDED
Status: DISCONTINUED | OUTPATIENT
Start: 2021-04-16 | End: 2021-04-16 | Stop reason: HOSPADM

## 2021-04-16 RX ORDER — FENTANYL CITRATE/PF 50 MCG/ML
25 SYRINGE (ML) INJECTION
Status: DISCONTINUED | OUTPATIENT
Start: 2021-04-16 | End: 2021-04-16 | Stop reason: HOSPADM

## 2021-04-16 RX ORDER — ONDANSETRON 2 MG/ML
4 INJECTION INTRAMUSCULAR; INTRAVENOUS ONCE
Status: DISCONTINUED | OUTPATIENT
Start: 2021-04-16 | End: 2021-04-16 | Stop reason: HOSPADM

## 2021-04-16 RX ORDER — OXYCODONE AND ACETAMINOPHEN 2.5; 325 MG/1; MG/1
1 TABLET ORAL EVERY 4 HOURS PRN
Qty: 24 TABLET | Refills: 0 | Status: SHIPPED | OUTPATIENT
Start: 2021-04-16 | End: 2021-04-22 | Stop reason: ALTCHOICE

## 2021-04-16 RX ORDER — PROPOFOL 10 MG/ML
INJECTION, EMULSION INTRAVENOUS AS NEEDED
Status: DISCONTINUED | OUTPATIENT
Start: 2021-04-16 | End: 2021-04-16

## 2021-04-16 RX ORDER — OXYCODONE HYDROCHLORIDE 5 MG/1
5 TABLET ORAL EVERY 4 HOURS PRN
Status: DISCONTINUED | OUTPATIENT
Start: 2021-04-16 | End: 2021-04-19 | Stop reason: HOSPADM

## 2021-04-16 RX ORDER — CEFAZOLIN SODIUM 1 G/50ML
1000 SOLUTION INTRAVENOUS ONCE
Status: COMPLETED | OUTPATIENT
Start: 2021-04-16 | End: 2021-04-16

## 2021-04-16 RX ORDER — FENTANYL CITRATE 50 UG/ML
INJECTION, SOLUTION INTRAMUSCULAR; INTRAVENOUS AS NEEDED
Status: DISCONTINUED | OUTPATIENT
Start: 2021-04-16 | End: 2021-04-16

## 2021-04-16 RX ORDER — MIDAZOLAM HYDROCHLORIDE 2 MG/2ML
INJECTION, SOLUTION INTRAMUSCULAR; INTRAVENOUS AS NEEDED
Status: DISCONTINUED | OUTPATIENT
Start: 2021-04-16 | End: 2021-04-16

## 2021-04-16 RX ORDER — SODIUM CHLORIDE, SODIUM LACTATE, POTASSIUM CHLORIDE, CALCIUM CHLORIDE 600; 310; 30; 20 MG/100ML; MG/100ML; MG/100ML; MG/100ML
INJECTION, SOLUTION INTRAVENOUS CONTINUOUS PRN
Status: DISCONTINUED | OUTPATIENT
Start: 2021-04-16 | End: 2021-04-16

## 2021-04-16 RX ADMIN — CEFAZOLIN SODIUM 1000 MG: 1 SOLUTION INTRAVENOUS at 09:46

## 2021-04-16 RX ADMIN — MIDAZOLAM 2 MG: 1 INJECTION INTRAMUSCULAR; INTRAVENOUS at 09:31

## 2021-04-16 RX ADMIN — OXYCODONE HYDROCHLORIDE 5 MG: 5 TABLET ORAL at 11:15

## 2021-04-16 RX ADMIN — SODIUM CHLORIDE, SODIUM LACTATE, POTASSIUM CHLORIDE, AND CALCIUM CHLORIDE: .6; .31; .03; .02 INJECTION, SOLUTION INTRAVENOUS at 09:22

## 2021-04-16 RX ADMIN — PROPOFOL 40 MG: 10 INJECTION, EMULSION INTRAVENOUS at 09:37

## 2021-04-16 RX ADMIN — PROPOFOL 60 MCG/KG/MIN: 10 INJECTION, EMULSION INTRAVENOUS at 09:37

## 2021-04-16 RX ADMIN — FENTANYL CITRATE 100 MCG: 50 INJECTION, SOLUTION INTRAMUSCULAR; INTRAVENOUS at 09:37

## 2021-04-16 NOTE — ANESTHESIA PREPROCEDURE EVALUATION
Procedure:  BUNIONECTOMY JENNA left foot (Left Foot)    Relevant Problems   ANESTHESIA (within normal limits)      CARDIO   (+) Chronic migraine without aura without status migrainosus, not intractable   (+) Hyperlipidemia   (+) Migraine with aura and without status migrainosus, not intractable      ENDO (within normal limits)      GI/HEPATIC (within normal limits)      /RENAL (within normal limits)      GYN (within normal limits)      HEMATOLOGY (within normal limits)      MUSCULOSKELETAL (within normal limits)      NEURO/PSYCH   (+) TIM (generalized anxiety disorder)   (+) Recurrent major depressive disorder, in remission ([facility] [address] )      PULMONARY (within normal limits)   (-) Smoking      Other   (+) Attention and concentration deficit        Physical Exam    Airway    Mallampati score: II  TM Distance: >3 FB  Neck ROM: full     Dental   No notable dental hx     Cardiovascular  Rhythm: regular, Rate: normal, Cardiovascular exam normal    Pulmonary  Pulmonary exam normal Breath sounds clear to auscultation,     Other Findings        Anesthesia Plan  ASA Score- 2     Anesthesia Type- IV sedation with anesthesia with ASA Monitors  Additional Monitors:   Airway Plan:     Comment: Discussed plan for MAC/TIVA w/GA as back-up  aDvid Dupree Plan Factors-Exercise tolerance (METS): >4 METS  Chart reviewed  Existing labs reviewed  Patient summary reviewed  Patient is not a current smoker  Induction-     Postoperative Plan- Plan for postoperative opioid use  Informed Consent- Anesthetic plan and risks discussed with patient

## 2021-04-16 NOTE — DISCHARGE SUMMARY
Discharge Summary Outpatient Procedure Podiatry -   Erendira Mendoza 60 y o  female MRN: [id number]  Unit/Bed#: OR [place] Encounter: [id number]    Admission Date: 4/16/2021     Admitting Diagnosis: Hallux abducto valgus, left [M20 12]  Left foot pain [M79 672]    Discharge Diagnosis: same    Procedures Performed: BUNIONECTOMY JENNA left foot: 49318 (CPT®)    Complications: none    Condition at Discharge: stable    Discharge instructions/Information to patient and family:   See after visit summary for information provided to patient and family  Provisions for Follow-Up Care/Important appointments:  See after visit summary for information related to follow-up care and any pertinent home health orders  Discharge Medications:  See after visit summary for reconciled discharge medications provided to patient and family

## 2021-04-16 NOTE — DISCHARGE INSTRUCTIONS
Manfred Mor[address] Dr Everlene Mew  Post-Operative Instructions    1  Take your prescribed medication as directed  2  Upon arrival at home, lie down and elevate your surgical foot on 2 pillows  3  Remain quiet, off your feet as much as possible, for the first 24-48 hours  This is when your feet first swell and may become painful  After 48 hours you may begin limited walking following these restrictions:   Weightbear as tolerated to surgical foot in post operative shoe  4  Drink large quantities of water  Consume no alcohol  Continue a well-balanced diet  5  Report any unusual discomfort or fever to this office  6  A limited amount of discomfort and swelling is to be expected  In some cases the skin may take on a bruised appearance  The surgical solution that was applied to your foot prior to the operation is dark in color and the operation site may appear to be oozing when it actually is not  7  A slight amount of blood is to be expected, and is no cause for alarm  Do not remove the dressings  If there is active bleeding and if the bleeding persists, add additional gauze to the bandage, apply direct pressure, elevate your feet and call this office  8  Do not get the dressings wet  As regular bathing may be inconvenient, sponge baths are recommended  9  When anesthesia wears off and if any discomfort should be present, apply an ice pack directly over the operated area for 15 minute intervals for several hours or until the pain leaves  (USE IN EXCESS OF 15 MINUTES COULD CAUSE FROSTBITE)  Do not use hot water bags or electric pads  A convenient icepack can be made by placing ice cubes in a plastic bag and covering this with a towel  10  If necessary, take a mild laxative before retiring  11  Wear your special open shoes anytime you put weight on your foot, even if it is just to walk to the bathroom and back  It will probably be 2 or 3 weeks before you will be permitted to try regular shoes    12  Having performed the operation, we are interested in a prompt recovery  Please cooperate by following the above instructions  13  Please call to confirm your post-op appointment or call with any other questions

## 2021-04-16 NOTE — OP NOTE
OPERATIVE REPORT - Podiatry  PATIENT NAME: Jackie De Jesus    :  1965  MRN: [id number]  Pt Location:  OR ROOM 01    SURGERY DATE: 2021    Surgeon(s) and Role:     * Ibrahima Walden DPM - Primary     * Bethany Sheehan DPM - Assisting    Pre-op Diagnosis:  Hallux abducto valgus, left [M20 12]  Left foot pain [M79 672]    Post-Op Diagnosis Codes:     * Hallux abducto valgus, left [M20 12]     * Left foot pain [M79 672]    Procedure(s) (LRB):  BUNIONECTOMY JENNA left foot (Left)    Specimen(s):  * No specimens in log *    Estimated Blood Loss:   Minimal    Drains:  * No LDAs found *    Anesthesia Type:   General/LMA with 15 ml of 1% Lidocaine and 0 5% Bupivacaine in a 1:1 mixture    Hemostasis:  Pneumatic ankle tourniquet set at 250 mmHg for 31 mins  Surgical dissection, direct compression, electrocautery    Materials:  Implant Name Type Inv  Item Serial No   Lot No  LRB No  Used Action   SCREW REYNALDO 3 X 18MM ASNIS MICRO - [id number]  SCREW REYNALDO 3 X 18MM ASNIS MICRO  [employer]  Left 2 Implanted     3-0 Vicryl  4-0 Monocryl    Injectables:  10 mL 0 5% bupivacaine plain    Operative Findings:  Consistent with Diagnosis    Complications:   None    Procedure and Technique:     Under mild sedation, the patient was brought into the operating room and placed on the operating room table in the supine position  IV sedation was achieved by anesthesia team and a universal timeout was performed where all parties are in agreement of correct patient, correct procedure and correct site  A pneumatic tourniquet was then placed over the patient's left lower extremity with ample padding  A cabello block was performed consisting of 15 ml of 1% Lidocaine and 0 5% Bupivacaine in a 1:1 mixture  The foot was then prepped and draped in the usual aseptic manner  An esmarch bandage was used to exsangunate the foot and the pneumatic tourniquet was then inflated to 250 mmHg      Attention was then directed to the dorsal aspect of the first metatarsal where an approximately 5 cm linear incision was made  The incision was deepened through the subcutaneous tissues using sharp and blunt dissection  Care was taken to identify and retract all vital neural and vascular structures  All bleeders were cauterized and ligated as necessary  A linear capsuloptomy was performed over the dorsal aspect of the MPJ  The periosteal and capsular structures were then carefully dissected free of their osseous attachments and reflected medially and laterally, thus exposing the head of the first metatarsal at the operative site  Attention was then directed to the 1st interspace via the original skin incision where the dissection was continued deep using sharp dissection down to the level of the fibular sesamoid which was free from its soft tissue attachments proximally, laterally and distally  The conjoined tendon of the adductor halluces was then identified and transected at its attachment  At this time the lateral contraction presents on the hallux was noted to be reduced and the sesamoid apparatus was noted to float into a more corrected medial position  Attention was then directed to the first metatarsal head where the medial prominence was resected by the sagittal bone saw  A k-wire was used as a guidewire at the medial aspect of the 1st metatarsal head  A through and through V type osteotomy was made at a 60 degree angle  This cut was created in the metataphyseal region of the bone utilizing a sagittal bone saw and the apices of this osteotomy pointing proximal plantarly and proximal dorsally  Upon completion of this osteotomy, the capital fragment was distracted and shifted laterally into a more corrected position and impacted onto the shaft of the first metatarsal  K wires were used as temp fixation across the osteotomy site  With proper AO technique, compression screws x2 (see implants above) serve as fixation across the osteotomy site  Attention was directed to the remaining medial bone shelf proximal to the osteotomy site which was resected using a sagittal saw and passed from operative field  Correction of the deformity was assessed at this time and noted to be adequate  The surgical incision was irrigated with copious amounts of normal sterile saline  The periosteal and capsular structures were reapproximated using 3-0 Vicryl  Subcutaneous closure was obtained utilizing 4-0 Monocryl  Skin edges were reapproximated and closure was obtained utilizing 4-0 Monocryl in a running subcuticular fashion  The foot was then cleansed and dried  A postoperative injection consisting of 10 ml of 0 5% Bupivacaine was performed  The incision site was dressed with Adaptic, 4 x 4 gauze  This was then covered with a Kathie and an ACE wrap  The tourniquet was deflated at approximately 31 min and normal hyperemic response was noted to all digits  The patient tolerated the procedure and anesthesia well without immediate complications and transferred to PACU with vital signs stable  Dr Janelle Christian was present during the entire procedure and participated in all key aspects  SIGNATURE: Elroy Jacome DPM  DATE: April 16, 2021  TIME: 10:35 AM      Portions of the record may have been created with voice recognition software  Occasional wrong word or "sound a like" substitutions may have occurred due to the inherent limitations of voice recognition software  Read the chart carefully and recognize, using context, where substitutions have occurred

## 2021-04-16 NOTE — INTERVAL H&P NOTE
H&P reviewed  After examining the patient I find no changes in the patients condition since the H&P had been written      Vitals:    04/16/21 0849   BP: 132/73   Pulse: 73   Resp: 20   Temp: 97 6 °F (36 4 °C)   SpO2: 100%

## 2021-04-16 NOTE — ANESTHESIA POSTPROCEDURE EVALUATION
Post-Op Assessment Note    CV Status:  Stable    Pain management: adequate     Mental Status:  Sleepy   Hydration Status:  Euvolemic and stable   PONV Controlled:  None   Airway Patency:  Patent      Post Op Vitals Reviewed: Yes      Staff: Anesthesiologist         No complications documented      BP      Temp      Pulse     Resp      SpO2

## 2021-04-20 ENCOUNTER — TELEPHONE (OUTPATIENT)
Dept: NEUROLOGY | Facility: CLINIC | Age: 56
End: 2021-04-20

## 2021-04-22 ENCOUNTER — OFFICE VISIT (OUTPATIENT)
Dept: PODIATRY | Facility: CLINIC | Age: 56
End: 2021-04-22

## 2021-04-22 VITALS
SYSTOLIC BLOOD PRESSURE: 147 MMHG | WEIGHT: 126 LBS | HEIGHT: 64 IN | BODY MASS INDEX: 21.51 KG/M2 | HEART RATE: 97 BPM | DIASTOLIC BLOOD PRESSURE: 80 MMHG

## 2021-04-22 DIAGNOSIS — M79.672 LEFT FOOT PAIN: ICD-10-CM

## 2021-04-22 DIAGNOSIS — M20.12 HALLUX ABDUCTO VALGUS, LEFT: Primary | ICD-10-CM

## 2021-04-22 PROCEDURE — 99024 POSTOP FOLLOW-UP VISIT: CPT | Performed by: PODIATRIST

## 2021-04-22 NOTE — PROGRESS NOTES
Assessment/Plan:      Diagnoses and all orders for this visit:    Hallux abducto valgus, left    Left foot pain      Patient is doing very well 1 week status post bunionectomy left foot  She is very pleased with the appearance in her recovery so far  Is no sign of complication  X-rays reviewed with patient  She may weight bear in surgical shoe and shower  Dry dressing over incision  Suture removal next week  Subjective:     Patient ID: Dorcas Sen is a 60 y o  female  Patient underwent left bunionectomy on April 16, 2021  Her pain is minimal   This is a 1st postop appointment  Dressing is clean and intact  Review of Systems      Objective:     Physical Exam      Left foot exam   Shows neurovascular status intact  Incision over 1st ray is stable  No sign of complication  Passive range of motion of the 1st metatarsophalangeal joint is stiff but not painful  Extensor and flexor tendons are intact  The great toe is rectus on stance and purchasing the floor  Deformity is corrected

## 2021-04-29 ENCOUNTER — OFFICE VISIT (OUTPATIENT)
Dept: PODIATRY | Facility: CLINIC | Age: 56
End: 2021-04-29

## 2021-04-29 VITALS
SYSTOLIC BLOOD PRESSURE: 115 MMHG | BODY MASS INDEX: 21.51 KG/M2 | WEIGHT: 126 LBS | HEART RATE: 82 BPM | DIASTOLIC BLOOD PRESSURE: 77 MMHG | HEIGHT: 64 IN

## 2021-04-29 DIAGNOSIS — M79.672 LEFT FOOT PAIN: ICD-10-CM

## 2021-04-29 DIAGNOSIS — M20.12 HALLUX ABDUCTO VALGUS, LEFT: Primary | ICD-10-CM

## 2021-04-29 PROCEDURE — 99024 POSTOP FOLLOW-UP VISIT: CPT | Performed by: PODIATRIST

## 2021-04-29 NOTE — LETTER
April 29, 2021     Patient: Myrna Amanda   YOB: 1965   Date of Visit: 4/29/2021       To Whom it May Concern:    Myrna Amanda is under my professional care  She was seen in my office on 4/29/2021  She may return to work on 4/5/4/21  Patient must wear postop shoe for another month       If you have any questions or concerns, please don't hesitate to call           Sincerely,          Marycruz Montez DPM        CC: No Recipients

## 2021-04-29 NOTE — PROGRESS NOTES
Assessment/Plan:      Diagnoses and all orders for this visit:    Hallux abducto valgus, left    Left foot pain      PAtient is healing well  Steri strips reapplied over central incision  Continue use of steri strips for another week  Keep incision dry  WBAT surgical shoe  Serical XRay in 4 weeks    Subjective:     Patient ID: Reid Bhatt is a 60 y o  female  HPI  Patient underwent left bunionectomy April 16, 2021  This is her 2nd week postop for suture removal   Overall she states she is recovering well  Review of Systems      Objective:     Physical Exam    Left foot exam shows overall incision healed  SMall superficial skin disruption central but no significant dehiscence  Passive MTPJ ROM is minimal discomfort, anatomic correction maintained       Negative hohmans sign

## 2021-05-03 ENCOUNTER — OFFICE VISIT (OUTPATIENT)
Dept: NEUROLOGY | Facility: CLINIC | Age: 56
End: 2021-05-03
Payer: COMMERCIAL

## 2021-05-03 ENCOUNTER — TELEPHONE (OUTPATIENT)
Dept: NEUROLOGY | Facility: CLINIC | Age: 56
End: 2021-05-03

## 2021-05-03 VITALS
SYSTOLIC BLOOD PRESSURE: 126 MMHG | BODY MASS INDEX: 21.85 KG/M2 | HEIGHT: 64 IN | HEART RATE: 91 BPM | DIASTOLIC BLOOD PRESSURE: 56 MMHG | WEIGHT: 128 LBS

## 2021-05-03 DIAGNOSIS — F33.40 RECURRENT MAJOR DEPRESSIVE DISORDER, IN REMISSION (HCC): ICD-10-CM

## 2021-05-03 DIAGNOSIS — R41.3 MEMORY DIFFICULTIES: ICD-10-CM

## 2021-05-03 DIAGNOSIS — G43.709 CHRONIC MIGRAINE WITHOUT AURA WITHOUT STATUS MIGRAINOSUS, NOT INTRACTABLE: Primary | ICD-10-CM

## 2021-05-03 PROCEDURE — 99215 OFFICE O/P EST HI 40 MIN: CPT | Performed by: PHYSICIAN ASSISTANT

## 2021-05-03 NOTE — PATIENT INSTRUCTIONS
Continue BOTOX every 3 months  At onset of migraine, take 2-3 advil  May repeat in 6 hours if needed  Limit of less than 3 doses a week      Medication overuse headaches:   - We discussed medication overuse headache [facility]) and how to avoid it in the future  It was explained that all analgesics have the potential to cause medication overuse headache [facility]) and analgesic overuse can negate the effectiveness of headache preventive measures  After successful [address] treatment, preventive medications for an underlying primary headache disorder have a greater chance for success  Avoid medications with narcotics, barbiturates, or caffeine in them as these can cause rebound headaches after very few doses and can interfere with other headache medicine efficacy  Taking any analgesics for more than 2-3 days a week can cause medication overuse headache  Reproductive age women: Should take folic acid daily when taking anti-seizure drugs especially Depakote  Over-the-counter supplements: to decrease intensity and frequency of migraines  - Magnesium Oxide 400-500 mg a day  If any diarrhea or upset stomach, decrease dose  as tolerated  -  B2 200 mg twice a day  This supplement will change the color of the urine to fluorescent yellow no matter how hydrated, which is normal       Headache management instructions  - When patient has a moderate to severe headache, they should seek rest, initiate relaxation and apply cold compresses to the head  - Maintain regular sleep schedule  Adults need at least 7-8 hours of uninterrupted a night  - Limit over the counter medications such as Tylenol, Ibuprofen, Aleve, Excedrin  (No more than 3 times a week)  - Maintain headache diary  We discussed an ROBBY for a smart phone is "Migraine mel"  - Limit caffeine to 1-2 cups 8 to 16 oz a day or less  - Avoid dietary trigger  (aged cheese, peanuts, MSG, aspartame and nitrates)    - Patient is to have regular frequent meals to prevent headache onset  - Please drink at least 64 ounces of water a day to help remain hydrated  Please call with any questions or concerns   Office number is [phone]

## 2021-05-03 NOTE — PROGRESS NOTES
Review of Systems   Constitutional: Negative  Negative for appetite change and fever  HENT: Negative  Negative for hearing loss, tinnitus, trouble swallowing and voice change  Eyes: Negative  Negative for photophobia and pain  Respiratory: Negative  Negative for shortness of breath  Cardiovascular: Negative  Negative for palpitations  Gastrointestinal: Negative  Negative for nausea and vomiting  Endocrine: Negative  Negative for cold intolerance  Genitourinary: Negative  Negative for dysuria, frequency and urgency  Musculoskeletal: Negative  Negative for myalgias and neck pain  Skin: Negative  Negative for rash  Neurological: Positive for headaches (3 per month)  Negative for dizziness, tremors, seizures, syncope, facial asymmetry, speech difficulty, weakness, light-headedness and numbness  Hematological: Negative  Does not bruise/bleed easily  Psychiatric/Behavioral: Negative  Negative for confusion, hallucinations and sleep disturbance

## 2021-05-03 NOTE — PROGRESS NOTES
[facility]  PATIENT:  Kirk Chase  MRN:  [id number]  :  1965  DATE OF SERVICE:  5/3/2021      Assessment/Plan:     Chronic migraine without aura without status migrainosus, not intractable  Continue BOTOX every 3 months  At onset of migraine, take 2-3 advil  May repeat in 6 hours if needed  Limit of less than 3 doses a week           Problem List Items Addressed This Visit        Cardiovascular and Mediastinum    Chronic migraine without aura without status migrainosus, not intractable - Primary     Continue BOTOX every 3 months  At onset of migraine, take 2-3 advil  May repeat in 6 hours if needed  Limit of less than 3 doses a week              Other    Recurrent major depressive disorder, in remission [facility])    Memory difficulties              History of Present Illness: We had the pleasure of evaluating Kirk Chase in neurological follow up  today for headaches  As you know,  she is a 60 y o   right handed female  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    PREVENTATIVE:  Prozac, Wellbutrin, Klonopin, Depakote, Topamax, Effexor, Lexapro, Zoloft, Celexa, zonisamide, verapamil, gabapentin, Botox  ABORTIVE:  Aleve, Tylenol, ibuprofen     Alternative therapies used in the past for headaches? none  Headache are worse if the patient: cough, sneeze, bending over  Headache triggers:  Lack of sleep, fatigue, stress/tension, hot flashes     Aura and how long does it last -  Yes, confusion a few minutes before migraine begins     What is your current pain level - 0/10     Any family history of migraines? No  Any family history of aneurysms? Yes maternal cousin     Headaches started at what age? 27years old  How often do the headaches occur?   1-3 month; prior to botox was more than 15 a month  What time of the day do the headaches start? varies  How long do the headaches last?   1-3 hours; prior to botox was over 4 hours  Are you ever headache free? Yes  Describe your usual headache - Throbbing, Pressure, Dull  Where is your headache located?   Bilateral frontal, bilateral temporalis and occipitalis  What is the intensity of pain? 5-6/10    Associated symptoms:   · Decrease of appetite, nausea  · Photophobia, phonophobia, sensitivity to smell   · Problem with concentration  · light-headed or dizzy, stiff or sore neck,   · Hands or feet tingle or feel numb, prefer to be alone and in a dark room, unable to work     Number of days missed per month because of headaches:  Work (or school) days: 0  Social or Family activities: 0      What time of the year do headaches occur more frequently?   no  Have you seen someone else for headaches or pain? No  Have you had trigger point injection performed and how often? No  Have you had Botox injection performed and how often? Yes   Have you had epidural injections or transforaminal injections performed? No     Have you used CBD or THC for your headaches and how often? No  Are you current pregnant or planning on getting pregnant? No, done with family planning  Have you ever had any Brain imaging? yes      11/2014 MRI brain: Several nonspecific subcortical white matter lesions bilaterally in   the frontal lobes, nonspecific findings which may be related to early   changes of microangiopathy, in the appropriate clinical setting   Other   postinfectious, postinflammatory or demyelinating processes not   excluded   Patients with migraine headaches can have white matter   lesions as well   Consider followup repeat contrast enhanced MRI of the   brain in 6-12 months to establish stability and to exclude abnormal   enhancement   No acute infarction, intracranial hemorrhage or mass effect       11/2014 MRA head: No intracranial aneurysm or major intracranial arterial stenosis        6/2/2015 MRI brain: No acute disease   Stable white matter changes, nonspecific   Selective hippocampal volume loss with normal sized lateral and temporal horn volume   This may be a congenital in etiology   Repeat marrow quantitative imaging in one year to document trajectory of volume loss       10/2016 MRI NQ Small white matter lesions are noted within the frontal lobes which are nonspecific and 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   Small white matter lesions have been described within the frontal lobes in patients with chronic   migraine headaches   Overall no significant change noted      10/2016 PET brain Symmetric decreased FDG activity in the medial temporal lobes bilaterally, corresponding with prior MRI findings   FDG distribution is otherwise unremarkable   If early Alzheimer's disease is a clinical consideration, beta amyloid PET CT brain imaging may be useful      NeuroQuant analysis was performed: Measurements suggest significantly decreased hippocampal volume and mild local ex-vacuo dilatation of the adjacent inferior lateral ventricles : 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  Mele Andrade patient's age and anatomic imaging, consider 6-12 month follow-up examination      4/2017 MRI NQ No acute intracranial abnormality  NeuroQuant analysis was performed: Low hippocampal volume without ex-vacuo dilatation: 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   F/U to establish presence and nature of volume change over time     No substantial interval change from the prior examination of 10/10/2016 when given measurement error     Nonspecific white matter changes suggestive of mild chronic microvascular ischemia      4/28/2018 MRI NQ Brain   Several tiny supratentorial white matter T2 and FLAIR hyperintense foci suspicious for mild chronic microangiopathic changes such as may accompany migraine headaches      NeuroQuant analysis was performed: Low hippocampal volume without ex-vacuo dilatation: 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  F/U to establish presence and nature of volume change over time      6/16/2020 MRI NQ brain  No acute intracranial disease  Footprint of what most probably represents mild degree of chronic small vessel disease is stable  NeuroQuant analysis was performed: Low hippocampal volume without ex-vacuo dilatation: 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        I personally reviewed these images  Reviewed old notes from physician seen in the past- see above HPI for summary of previous encounters  Past Medical History:   Diagnosis Date    Anxiety     Basal cell carcinoma     Last assessed: 9/26/14    Cancer ([facility] )     B[facility]    Depression     Depression with anxiety     Last assessed: 1/13/17    Insomnia     Last assessed: 9/26/14    Kidney stone        Patient Active Problem List   Diagnosis    Attention and concentration deficit    TIM (generalized anxiety disorder)    Recurrent major depressive disorder, in remission ([facility] )    Migraine with aura and without status migrainosus, not intractable    Hyperlipidemia    Memory difficulties    Cyst of right eyelid    Chronic migraine without aura without status migrainosus, not intractable       Medications:      Current Outpatient Medications   Medication Sig Dispense Refill    amphetamine-dextroamphetamine (ADDERALL XR) 20 MG 24 hr capsule Take 1 capsule (20 mg total) by mouth daily as needed (focus and concentration deficit)   Brand Name Necessary  Max Daily Amount: 20 mg 90 capsule 0    Ascorbic Acid (VITAMIN C) 100 MG tablet Take 100 mg by mouth daily      b complex vitamins tablet Take 1 tablet by mouth every other day      buPROPion (WELLBUTRIN XL) 300 mg 24 hr tablet Take 1 tablet (300 mg total) by mouth every morning 90 tablet 1    clonazePAM (KlonoPIN) 0 5 mg tablet Take 1/2 tab daily as needed for anxiety and take 1 tab nightly as needed for insomnia and anxiety 45 tablet 3    conjugated estrogens (PREMARIN) vaginal cream Insert 0 5g vaginally three times per week (Patient taking differently: as needed Insert 0 5g vaginally three times per week) 30 g 2    ferrous sulfate 325 (65 Fe) mg tablet Take 325 mg by mouth daily with breakfast Iron - over the counter      FLUoxetine (PROzac) 40 MG capsule Take 1 capsule (40 mg total) by mouth daily 90 capsule 1    Multiple Vitamins-Minerals (MULTIVITAL-M PO) Take by mouth      Probiotic Product (PROBIOTIC DAILY PO) Take by mouth       No current facility-administered medications for this visit  Allergies:       Allergies   Allergen Reactions    Butoconazole Hives    Tioconazole Hives       Family History:     Family History   Problem Relation Age of Onset    Heart murmur Mother     Hyperlipidemia Mother     Atrial fibrillation Father     Arthritis Father     Psoriasis Sister     Heart disease Maternal Grandfather     Breast cancer Paternal Grandmother 77    Diabetes Paternal Grandmother         Mellitus    Hypertension Paternal Grandmother     Skin cancer Paternal Grandfather     ADD / ADHD Son     ADD / ADHD Daughter     No Known Problems Maternal Grandmother     No Known Problems Maternal Aunt     No Known Problems Maternal Aunt     No Known Problems Paternal Aunt     No Known Problems Paternal Aunt     Alcohol abuse Neg Hx     Substance Abuse Neg Hx     Mental illness Neg Hx        Social History:     Social History     Socioeconomic History    Marital status:      Spouse name: Not on file    Number of children: Not on file    Years of education: Not on file    Highest education level: Not on file   Occupational History    Not on file   Social Needs    Financial resource strain: Not on file    Food insecurity     Worry: Not on file     Inability: Not on file    Transportation needs     Medical: Not on file Non-medical: Not on file   Tobacco Use    Smoking status: Never Smoker    Smokeless tobacco: Never Used   Substance and Sexual Activity    Alcohol use: Yes     Frequency: 2-4 times a month     Drinks per session: 1 or 2     Binge frequency: Never     Comment: socially    Drug use: No    Sexual activity: Not Currently     Partners: Male     Birth control/protection: Post-menopausal   Lifestyle    Physical activity     Days per week: Not on file     Minutes per session: Not on file    Stress: Not on file   Relationships    Social connections     Talks on phone: Not on file     Gets together: Not on file     Attends Sikh service: Not on file     Active member of club or organization: Not on file     Attends meetings of clubs or organizations: Not on file     Relationship status: Not on file    Intimate partner violence     Fear of current or ex partner: Not on file     Emotionally abused: Not on file     Physically abused: Not on file     Forced sexual activity: Not on file   Other Topics Concern    Not on file   Social History Narrative    Caffeine use    Drinks coffee         Objective:   Physical Exam:                                                                   Vitals:            /56 (BP Location: Left arm, Patient Position: Sitting, Cuff Size: Standard)   Pulse 91   Ht 5' 4" (1 626 m)   Wt 58 1 kg (128 lb)   BMI 21 97 kg/m²   BP Readings from Last 3 Encounters:   05/03/21 126/56   04/29/21 115/77   04/22/21 147/80     Pulse Readings from Last 3 Encounters:   05/03/21 91   04/29/21 82   04/22/21 97            CONSTITUTIONAL: Well developed, well nourished, well groomed  No dysmorphic features  Eyes:  EOM normal      Neck:  Normal ROM, neck supple  HEENT:  Normocephalic atraumatic  Chest:  Respirations regular and unlabored  Psychiatric:  Normal behavior and appropriate affect      MENTAL STATUS  Orientation: Alert and oriented x 3  Fund of knowledge: Intact      MOTOR (Upper and lower extremities)   Bulk/tone/abnormal movement: Normal muscle bulk and tone  COORDINATION   Station/Gait: slow gait with boot on foot s/p surgery       Review of Systems:   Review of Systems  Constitutional: Negative  Negative for appetite change and fever  HENT: Negative  Negative for hearing loss, tinnitus, trouble swallowing and voice change  Eyes: Negative  Negative for photophobia and pain  Respiratory: Negative  Negative for shortness of breath  Cardiovascular: Negative  Negative for palpitations  Gastrointestinal: Negative  Negative for nausea and vomiting  Endocrine: Negative  Negative for cold intolerance  Genitourinary: Negative  Negative for dysuria, frequency and urgency  Musculoskeletal: Negative  Negative for myalgias and neck pain  Skin: Negative  Negative for rash  Neurological: Positive for headaches (3 per month)  Negative for dizziness, tremors, seizures, syncope, facial asymmetry, speech difficulty, weakness, light-headedness and numbness  Hematological: Negative  Does not bruise/bleed easily  Psychiatric/Behavioral: Negative  Negative for confusion, hallucinations and sleep disturbance  I personally reviewed the ROS entered by the MA    I spent 26 minutes in face-to-face discussion regarding  the pathophysiology of her current symptoms and further plan, as well as counseling, educating, and coordinating the patient's care including prognosis of diagnosis, diagnostic results, impression, and recommendations, risks and benefits of treatment, instructions for disease self management, treatment instructions and follow up requirements and spent 15 minutes non-face to face    Author:  Arleen Ulrich PA-C 5/3/2021 3:47 PM

## 2021-05-03 NOTE — ASSESSMENT & PLAN NOTE
Continue BOTOX every 3 months  At onset of migraine, take 2-3 advil  May repeat in 6 hours if needed    Limit of less than 3 doses a week

## 2021-05-19 ENCOUNTER — DOCUMENTATION (OUTPATIENT)
Dept: NEUROLOGY | Facility: CLINIC | Age: 56
End: 2021-05-19

## 2021-05-19 NOTE — PROGRESS NOTES
Type Date User Summary Attachment   General 05/18/2021  2:04 PM Jania Lopez care coordination  -   Note    Botox- authorization #: [id number]- valid for 4 visits (800 units total)- from 6/1/2021 until 6/1/2022   Please use our stock      Thank you,     Dillon Nelson

## 2021-06-04 ENCOUNTER — APPOINTMENT (OUTPATIENT)
Dept: RADIOLOGY | Facility: CLINIC | Age: 56
End: 2021-06-04
Payer: COMMERCIAL

## 2021-06-04 ENCOUNTER — OFFICE VISIT (OUTPATIENT)
Dept: PODIATRY | Facility: CLINIC | Age: 56
End: 2021-06-04

## 2021-06-04 VITALS
SYSTOLIC BLOOD PRESSURE: 130 MMHG | DIASTOLIC BLOOD PRESSURE: 88 MMHG | HEIGHT: 64 IN | WEIGHT: 128 LBS | BODY MASS INDEX: 21.85 KG/M2 | HEART RATE: 93 BPM

## 2021-06-04 DIAGNOSIS — M20.12 HALLUX ABDUCTO VALGUS, LEFT: ICD-10-CM

## 2021-06-04 DIAGNOSIS — M20.12 HALLUX ABDUCTO VALGUS, LEFT: Primary | ICD-10-CM

## 2021-06-04 PROCEDURE — 99024 POSTOP FOLLOW-UP VISIT: CPT | Performed by: PODIATRIST

## 2021-06-04 PROCEDURE — 73620 X-RAY EXAM OF FOOT: CPT

## 2021-06-04 NOTE — PROGRESS NOTES
Assessment/Plan:      Diagnoses and all orders for this visit:    Hallux abducto valgus, left  -     XR foot 2 vw left; Future      XRay 2 views left foot shows fusion of osteotomy  Screws intact  No sign of complications  Patient has normal gait is very pleased with her recovery  Advance as tolerated  PRN    Subjective:     Patient ID: Harleen Toscano is a 60 y o  female  Left bunionectomy 4/16/21  She is 6 weeks postop  She reports minimal pain  She has bene in a srgical shoe  She is pleased with her recovery  Review of Systems      Objective:     Physical Exam    Left foot exam shows neurovascular status intact  Patient has 15° of dorsiflexion and 5° of plantar flexion without pain  No crepitus with great toe range of motion  No pain on palpation  Patient able to perform single heel raise without great toe pain  On stance her 1st ray is corrected without deformity

## 2021-06-09 ENCOUNTER — HOSPITAL ENCOUNTER (OUTPATIENT)
Dept: MAMMOGRAPHY | Facility: MEDICAL CENTER | Age: 56
Discharge: HOME | End: 2021-06-09
Payer: COMMERCIAL

## 2021-06-09 VITALS — WEIGHT: 128 LBS | BODY MASS INDEX: 21.85 KG/M2 | HEIGHT: 64 IN

## 2021-06-09 DIAGNOSIS — Z12.31 ENCOUNTER FOR SCREENING MAMMOGRAM FOR MALIGNANT NEOPLASM OF BREAST: ICD-10-CM

## 2021-06-09 PROCEDURE — 77063 BREAST TOMOSYNTHESIS BI: CPT

## 2021-06-09 PROCEDURE — 77067 SCR MAMMO BI INCL CAD: CPT

## 2021-06-10 ENCOUNTER — PROCEDURE VISIT (OUTPATIENT)
Dept: NEUROLOGY | Facility: CLINIC | Age: 56
End: 2021-06-10
Payer: COMMERCIAL

## 2021-06-10 VITALS — HEART RATE: 92 BPM | TEMPERATURE: 97 F | DIASTOLIC BLOOD PRESSURE: 88 MMHG | SYSTOLIC BLOOD PRESSURE: 118 MMHG

## 2021-06-10 DIAGNOSIS — G43.709 CHRONIC MIGRAINE WITHOUT AURA WITHOUT STATUS MIGRAINOSUS, NOT INTRACTABLE: Primary | ICD-10-CM

## 2021-06-10 PROCEDURE — 64615 CHEMODENERV MUSC MIGRAINE: CPT | Performed by: PHYSICIAN ASSISTANT

## 2021-06-10 NOTE — PROGRESS NOTES
Universal Protocol   Consent: Verbal consent obtained  Written consent obtained  Risks and benefits: risks, benefits and alternatives were discussed  Consent given by: patient  Time out: Immediately prior to procedure a "time out" was called to verify the correct patient, procedure, equipment, support staff and site/side marked as required  Patient understanding: patient states understanding of the procedure being performed  Patient consent: the patient's understanding of the procedure matches consent given  Procedure consent: procedure consent matches procedure scheduled  Relevant documents: relevant documents present and verified  Patient identity confirmed: verbally with patient        Chemodenervation     Date/Time 6/10/2021 9:30 AM     Performed by  Maria Elena Peraza PA-C     Authorized by Maria Elena Peraza PA-C        Pre-procedure details      Prepped With: Alcohol     Anesthesia  (see MAR for exact dosages):      Anesthesia method:  None   Procedure details     Position:  Upright   Botox     Botox Type:  Type A    Brand:  Botox    mL's of Botulinum Toxin:  200    Final Concentration per CC:  50 units    Needle Gauge:  30 G 2 5 inch   Procedures     Botox Procedures: chronic headache      Indications: migraines     Injection Location      Head / Face:  L superior cervical paraspinal, R superior cervical paraspinal, L , R , L frontalis, R frontalis, L medial occipitalis, R medial occipitalis, procerus, R temporalis, L temporalis, R superior trapezius and L superior trapezius    L  injection amount:  5 unit(s)    R  injection amount:  5 unit(s)    L lateral frontalis:  5 unit(s)    R lateral frontalis:  5 unit(s)    L medial frontalis:  5 unit(s)    R medial frontalis:  5 unit(s)    L temporalis injection amount:  20 unit(s)    R temporalis injection amount:  20 unit(s)    Procerus injection amount:  5 unit(s)    L medial occipitalis injection amount:  15 unit(s)    R medial occipitalis injection amount:  15 unit(s)    L superior cervical paraspinal injection amount:  10 unit(s)    R superior cervical paraspinal injection amount:  10 unit(s)    L superior trapezius injection amount:  15 unit(s)    R superior trapezius injection amount:  15 unit(s)   Total Units     Total units used:  200    Total units discarded:  0   Post-procedure details      Chemodenervation:  Chronic migraine    Facial Nerve Location[de-identified]  Bilateral facial nerve    Patient tolerance of procedure:   Tolerated well, no immediate complications   Comments      5 units orbicularis oculi bilaterally  35 units frontalis  All medically necessary

## 2021-07-12 ENCOUNTER — TELEPHONE (OUTPATIENT)
Dept: PSYCHIATRY | Facility: CLINIC | Age: 56
End: 2021-07-12

## 2021-08-12 ENCOUNTER — TELEPHONE (OUTPATIENT)
Dept: PSYCHIATRY | Facility: CLINIC | Age: 56
End: 2021-08-12

## 2021-08-12 DIAGNOSIS — F41.1 GAD (GENERALIZED ANXIETY DISORDER): ICD-10-CM

## 2021-08-12 RX ORDER — CLONAZEPAM 0.5 MG/1
TABLET ORAL
Qty: 45 TABLET | Refills: 3 | Status: SHIPPED | OUTPATIENT
Start: 2021-09-08 | End: 2021-10-21 | Stop reason: SDUPTHER

## 2021-08-20 PROCEDURE — U0003 INFECTIOUS AGENT DETECTION BY NUCLEIC ACID (DNA OR RNA); SEVERE ACUTE RESPIRATORY SYNDROME CORONAVIRUS 2 (SARS-COV-2) (CORONAVIRUS DISEASE [COVID-19]), AMPLIFIED PROBE TECHNIQUE, MAKING USE OF HIGH THROUGHPUT TECHNOLOGIES AS DESCRIBED BY CMS-2020-01-R: HCPCS | Performed by: PHYSICIAN ASSISTANT

## 2021-08-20 PROCEDURE — U0005 INFEC AGEN DETEC AMPLI PROBE: HCPCS | Performed by: PHYSICIAN ASSISTANT

## 2021-09-01 ENCOUNTER — TELEPHONE (OUTPATIENT)
Dept: DERMATOLOGY | Facility: CLINIC | Age: 56
End: 2021-09-01

## 2021-09-01 NOTE — TELEPHONE ENCOUNTER
Returned patient's voice message regarding scheduling her yearly checkup  LVM for patient to call the office to schedule her appointment

## 2021-09-02 NOTE — TELEPHONE ENCOUNTER
Patient called back and left another message  Returned call and lvm for patient to call the office to schedule appt

## 2021-09-03 ENCOUNTER — TELEPHONE (OUTPATIENT)
Dept: DERMATOLOGY | Facility: CLINIC | Age: 56
End: 2021-09-03

## 2021-09-10 ENCOUNTER — PROCEDURE VISIT (OUTPATIENT)
Dept: NEUROLOGY | Facility: CLINIC | Age: 56
End: 2021-09-10
Payer: COMMERCIAL

## 2021-09-10 VITALS — TEMPERATURE: 97.2 F | HEART RATE: 81 BPM | DIASTOLIC BLOOD PRESSURE: 79 MMHG | SYSTOLIC BLOOD PRESSURE: 133 MMHG

## 2021-09-10 DIAGNOSIS — G43.709 CHRONIC MIGRAINE WITHOUT AURA WITHOUT STATUS MIGRAINOSUS, NOT INTRACTABLE: Primary | ICD-10-CM

## 2021-09-10 PROCEDURE — 64615 CHEMODENERV MUSC MIGRAINE: CPT | Performed by: PHYSICIAN ASSISTANT

## 2021-09-10 NOTE — PROGRESS NOTES

## 2021-10-21 ENCOUNTER — OFFICE VISIT (OUTPATIENT)
Dept: PSYCHIATRY | Facility: CLINIC | Age: 56
End: 2021-10-21
Payer: COMMERCIAL

## 2021-10-21 DIAGNOSIS — F41.1 GAD (GENERALIZED ANXIETY DISORDER): ICD-10-CM

## 2021-10-21 DIAGNOSIS — F33.40 RECURRENT MAJOR DEPRESSIVE DISORDER, IN REMISSION (HCC): ICD-10-CM

## 2021-10-21 DIAGNOSIS — R41.3 MEMORY DIFFICULTIES: ICD-10-CM

## 2021-10-21 DIAGNOSIS — R41.840 ATTENTION AND CONCENTRATION DEFICIT: ICD-10-CM

## 2021-10-21 PROCEDURE — 90833 PSYTX W PT W E/M 30 MIN: CPT | Performed by: PSYCHIATRY & NEUROLOGY

## 2021-10-21 PROCEDURE — 99213 OFFICE O/P EST LOW 20 MIN: CPT | Performed by: PSYCHIATRY & NEUROLOGY

## 2021-10-21 RX ORDER — DEXTROAMPHETAMINE SACCHARATE, AMPHETAMINE ASPARTATE MONOHYDRATE, DEXTROAMPHETAMINE SULFATE AND AMPHETAMINE SULFATE 5; 5; 5; 5 MG/1; MG/1; MG/1; MG/1
20 CAPSULE, EXTENDED RELEASE ORAL DAILY PRN
Qty: 90 CAPSULE | Refills: 0 | Status: SHIPPED | OUTPATIENT
Start: 2021-10-21 | End: 2022-02-21 | Stop reason: SDUPTHER

## 2021-10-21 RX ORDER — CLONAZEPAM 0.5 MG/1
TABLET ORAL
Qty: 45 TABLET | Refills: 5 | Status: SHIPPED | OUTPATIENT
Start: 2021-10-27 | End: 2022-03-29 | Stop reason: SDUPTHER

## 2021-10-21 RX ORDER — BUPROPION HYDROCHLORIDE 300 MG/1
300 TABLET ORAL EVERY MORNING
Qty: 90 TABLET | Refills: 1 | Status: SHIPPED | OUTPATIENT
Start: 2021-10-21 | End: 2022-05-19 | Stop reason: SDUPTHER

## 2021-10-21 RX ORDER — FLUOXETINE HYDROCHLORIDE 40 MG/1
40 CAPSULE ORAL DAILY
Qty: 90 CAPSULE | Refills: 1 | Status: SHIPPED | OUTPATIENT
Start: 2021-10-21 | End: 2022-05-19 | Stop reason: SDUPTHER

## 2021-11-05 ENCOUNTER — TELEPHONE (OUTPATIENT)
Dept: OBGYN CLINIC | Facility: CLINIC | Age: 56
End: 2021-11-05

## 2021-11-05 DIAGNOSIS — R39.9 UTI SYMPTOMS: Primary | ICD-10-CM

## 2021-11-05 RX ORDER — NITROFURANTOIN 25; 75 MG/1; MG/1
100 CAPSULE ORAL 2 TIMES DAILY
Qty: 14 CAPSULE | Refills: 0 | Status: SHIPPED | OUTPATIENT
Start: 2021-11-05 | End: 2021-11-12

## 2021-11-05 RX ORDER — FLUCONAZOLE 150 MG/1
150 TABLET ORAL ONCE
Qty: 1 TABLET | Refills: 0 | Status: SHIPPED | OUTPATIENT
Start: 2021-11-05 | End: 2021-11-05

## 2021-11-18 ENCOUNTER — TELEPHONE (OUTPATIENT)
Dept: DERMATOLOGY | Facility: CLINIC | Age: 56
End: 2021-11-18

## 2021-11-18 ENCOUNTER — OFFICE VISIT (OUTPATIENT)
Dept: DERMATOLOGY | Facility: CLINIC | Age: 56
End: 2021-11-18
Payer: COMMERCIAL

## 2021-11-18 VITALS — BODY MASS INDEX: 21.46 KG/M2 | TEMPERATURE: 97.7 F | WEIGHT: 125 LBS

## 2021-11-18 DIAGNOSIS — D22.60 MULTIPLE BENIGN MELANOCYTIC NEVI OF UPPER AND LOWER EXTREMITIES AND TRUNK: Primary | ICD-10-CM

## 2021-11-18 DIAGNOSIS — D22.70 MULTIPLE BENIGN MELANOCYTIC NEVI OF UPPER AND LOWER EXTREMITIES AND TRUNK: Primary | ICD-10-CM

## 2021-11-18 DIAGNOSIS — D18.01 CHERRY ANGIOMA: ICD-10-CM

## 2021-11-18 DIAGNOSIS — L82.1 SEBORRHEIC KERATOSIS: ICD-10-CM

## 2021-11-18 DIAGNOSIS — D22.5 MULTIPLE BENIGN MELANOCYTIC NEVI OF UPPER AND LOWER EXTREMITIES AND TRUNK: Primary | ICD-10-CM

## 2021-11-18 PROCEDURE — 99213 OFFICE O/P EST LOW 20 MIN: CPT | Performed by: STUDENT IN AN ORGANIZED HEALTH CARE EDUCATION/TRAINING PROGRAM

## 2021-11-18 PROCEDURE — 17110 DESTRUCTION B9 LES UP TO 14: CPT | Performed by: STUDENT IN AN ORGANIZED HEALTH CARE EDUCATION/TRAINING PROGRAM

## 2021-11-18 RX ORDER — FLUCONAZOLE 150 MG/1
TABLET ORAL
COMMUNITY
Start: 2021-11-05 | End: 2022-03-13 | Stop reason: ALTCHOICE

## 2021-12-10 ENCOUNTER — PROCEDURE VISIT (OUTPATIENT)
Dept: NEUROLOGY | Facility: CLINIC | Age: 56
End: 2021-12-10
Payer: COMMERCIAL

## 2021-12-10 VITALS — HEART RATE: 88 BPM | TEMPERATURE: 96.7 F | DIASTOLIC BLOOD PRESSURE: 62 MMHG | SYSTOLIC BLOOD PRESSURE: 131 MMHG

## 2021-12-10 DIAGNOSIS — G43.709 CHRONIC MIGRAINE WITHOUT AURA WITHOUT STATUS MIGRAINOSUS, NOT INTRACTABLE: Primary | ICD-10-CM

## 2021-12-10 PROCEDURE — 64615 CHEMODENERV MUSC MIGRAINE: CPT | Performed by: PHYSICIAN ASSISTANT

## 2022-01-24 ENCOUNTER — TELEPHONE (OUTPATIENT)
Dept: PSYCHIATRY | Facility: CLINIC | Age: 57
End: 2022-01-24

## 2022-01-26 NOTE — TELEPHONE ENCOUNTER
Notified patient appointment on 4/14 has been cancelled as Paulino Wen will not be in the office that day  Requested a call back to reschedule

## 2022-02-09 ENCOUNTER — TELEPHONE (OUTPATIENT)
Dept: NEUROLOGY | Facility: CLINIC | Age: 57
End: 2022-02-09

## 2022-02-09 NOTE — TELEPHONE ENCOUNTER
Submitted prior authorization request for Botox 200 units via Fax to [facility]   Awaiting approval/denial

## 2022-02-21 DIAGNOSIS — R41.840 ATTENTION AND CONCENTRATION DEFICIT: ICD-10-CM

## 2022-02-21 DIAGNOSIS — F33.40 RECURRENT MAJOR DEPRESSIVE DISORDER, IN REMISSION (HCC): ICD-10-CM

## 2022-02-21 DIAGNOSIS — R41.3 MEMORY DIFFICULTIES: ICD-10-CM

## 2022-02-21 RX ORDER — DEXTROAMPHETAMINE SACCHARATE, AMPHETAMINE ASPARTATE MONOHYDRATE, DEXTROAMPHETAMINE SULFATE AND AMPHETAMINE SULFATE 5; 5; 5; 5 MG/1; MG/1; MG/1; MG/1
20 CAPSULE, EXTENDED RELEASE ORAL DAILY PRN
Qty: 90 CAPSULE | Refills: 0 | Status: SHIPPED | OUTPATIENT
Start: 2022-02-21 | End: 2022-05-19 | Stop reason: SDUPTHER

## 2022-02-21 NOTE — TELEPHONE ENCOUNTER
Jackie De Jesus is requesting a refill on their Adderall   Patient's next appointment with Mychal Munoz is on 4/18

## 2022-02-23 ENCOUNTER — TELEPHONE (OUTPATIENT)
Dept: PSYCHIATRY | Facility: CLINIC | Age: 57
End: 2022-02-23

## 2022-02-23 NOTE — TELEPHONE ENCOUNTER
Prior Authorization for Adderall XR 20 MG ER capsules (brand only) faxed to [facility] [employer] via [email or web address]  Decision pending  Will refer to Dr Greta Boyle for his information

## 2022-03-07 NOTE — TELEPHONE ENCOUNTER
Called [employer] and spoke with Belen Nazario who processed an escalation message request to supervising nurse for me regarding this patients pending prior-authorization b/c patients appointment is scheduled for 3/14/2022  Nathan informed me that I should hear from someone regarding this patients prior-auth status within the next 24-48 HRS     Will follow up on this within the next 48hrs

## 2022-03-08 NOTE — TELEPHONE ENCOUNTER
Received approved authorization via Fax       Approved   Botox 200 units   DWKG# [id number]  Valid- 3/07/2022 until 6/07/2022   2 visits      Please use our [employer]

## 2022-03-09 ENCOUNTER — TELEPHONE (OUTPATIENT)
Dept: PSYCHIATRY | Facility: CLINIC | Age: 57
End: 2022-03-09

## 2022-03-09 NOTE — TELEPHONE ENCOUNTER
LM on Carolin's VM due to receiving response from [employer] [facility] stating there is a paid claim for formulary agent, amphetamine-dextroamphetamine ER 20 MG  Prior Auth was initiated for brand  Requested that she call me back to review this to see if she is staying on generic or if I need to call insurance company to review request for brand again  Will refer to Dr Maria E Wolff for his information

## 2022-03-13 ENCOUNTER — OFFICE VISIT (OUTPATIENT)
Dept: URGENT CARE | Facility: CLINIC | Age: 57
End: 2022-03-13
Payer: COMMERCIAL

## 2022-03-13 VITALS
BODY MASS INDEX: 21.85 KG/M2 | SYSTOLIC BLOOD PRESSURE: 118 MMHG | OXYGEN SATURATION: 99 % | HEIGHT: 64 IN | HEART RATE: 96 BPM | RESPIRATION RATE: 20 BRPM | WEIGHT: 128 LBS | DIASTOLIC BLOOD PRESSURE: 68 MMHG

## 2022-03-13 DIAGNOSIS — Z86.19 HISTORY OF CANDIDAL VULVOVAGINITIS: ICD-10-CM

## 2022-03-13 DIAGNOSIS — R30.0 DYSURIA: Primary | ICD-10-CM

## 2022-03-13 PROCEDURE — G0382 LEV 3 HOSP TYPE B ED VISIT: HCPCS | Performed by: PHYSICIAN ASSISTANT

## 2022-03-13 PROCEDURE — 87077 CULTURE AEROBIC IDENTIFY: CPT | Performed by: PHYSICIAN ASSISTANT

## 2022-03-13 PROCEDURE — S9083 URGENT CARE CENTER GLOBAL: HCPCS | Performed by: PHYSICIAN ASSISTANT

## 2022-03-13 PROCEDURE — 87086 URINE CULTURE/COLONY COUNT: CPT | Performed by: PHYSICIAN ASSISTANT

## 2022-03-13 PROCEDURE — 87186 SC STD MICRODIL/AGAR DIL: CPT | Performed by: PHYSICIAN ASSISTANT

## 2022-03-13 RX ORDER — SULFAMETHOXAZOLE AND TRIMETHOPRIM 800; 160 MG/1; MG/1
1 TABLET ORAL EVERY 12 HOURS SCHEDULED
Qty: 14 TABLET | Refills: 0 | Status: SHIPPED | OUTPATIENT
Start: 2022-03-13 | End: 2022-03-20

## 2022-03-13 RX ORDER — FLUCONAZOLE 150 MG/1
150 TABLET ORAL ONCE
Qty: 2 TABLET | Refills: 0 | Status: SHIPPED | OUTPATIENT
Start: 2022-03-13 | End: 2022-03-13

## 2022-03-13 NOTE — PROGRESS NOTES
[facility]        NAME: Kirk Chase is a 60 y o  female  : 1965    MRN: [id number]  DATE:  2022  TIME: 11:23 AM    Assessment and Plan   Dysuria [R30 0]  1  Dysuria  Urine culture    sulfamethoxazole-trimethoprim (BACTRIM DS) 800-160 mg per tablet   2  History of candidal vulvovaginitis  fluconazole (DIFLUCAN) 150 mg tablet         Patient Instructions     Discussed symptoms and UA results with pt  I suspect an acute uncomplicated UTI  Will start pt on an oral abx and send out sample for culture to further evaluate  I rec increased hydration, rest, and observation  She was also given prescription for fluconazole tablets she has history of yeast infections on oral antibiotics  Follow up with PCP in 3-5 days  Proceed to  ER if symptoms worsen  Chief Complaint     Chief Complaint   Patient presents with    Possible UTI     bladder swelling, frequency, dysuria x 2 days         History of Present Illness       Patient presents with 2 day history of dysuria, frequency, urgency, pressure  Denies flank pain, hematuria, fever, chills, N/V, vaginal discharge  She has been hydrating  Review of Systems   Review of Systems   Constitutional: Negative  Respiratory: Negative  Cardiovascular: Negative  Gastrointestinal: Negative  Genitourinary: Positive for dysuria, frequency, pelvic pain (Pressure) and urgency  Negative for flank pain, hematuria and vaginal discharge  Current Medications       Current Outpatient Medications:     amphetamine-dextroamphetamine (ADDERALL XR) 20 MG 24 hr capsule, Take 1 capsule (20 mg total) by mouth daily as needed (focus and concentration deficit)   Brand Name Necessary   Max Daily Amount: 20 mg, Disp: 90 capsule, Rfl: 0    buPROPion (WELLBUTRIN XL) 300 mg 24 hr tablet, Take 1 tablet (300 mg total) by mouth every morning, Disp: 90 tablet, Rfl: 1    clonazePAM (KlonoPIN) 0 5 mg tablet, Take 1/2 tab daily as needed for anxiety and take 1 tab nightly as needed for insomnia and anxiety, Disp: 45 tablet, Rfl: 5    conjugated estrogens (PREMARIN) vaginal cream, Insert 0 5g vaginally three times per week (Patient taking differently: as needed Insert 0 5g vaginally three times per week), Disp: 30 g, Rfl: 2    FLUoxetine (PROzac) 40 MG capsule, Take 1 capsule (40 mg total) by mouth daily, Disp: 90 capsule, Rfl: 1    Multiple Vitamins-Minerals (MULTIVITAL-M PO), Take by mouth, Disp: , Rfl:     Probiotic Product (PROBIOTIC DAILY PO), Take by mouth, Disp: , Rfl:     ondansetron (Zofran ODT) 4 mg disintegrating tablet, Take 1 tablet (4 mg total) by mouth every 6 (six) hours as needed for nausea or vomiting, Disp: 20 tablet, Rfl: 0    sulfamethoxazole-trimethoprim (BACTRIM DS) 800-160 mg per tablet, Take 1 tablet by mouth every 12 (twelve) hours for 7 days Take with food  , Disp: 14 tablet, Rfl: 0    Current Allergies     Allergies as of 03/13/2022 - Reviewed 03/13/2022   Allergen Reaction Noted    Butoconazole Hives 07/01/2013    Tioconazole Hives 07/01/2013            The following portions of the patient's history were reviewed and updated as appropriate: allergies, current medications, past family history, past medical history, past social history, past surgical history and problem list      Past Medical History:   Diagnosis Date    Anxiety     Basal cell carcinoma     Last assessed: 9/26/14    Cancer ([facility] [address]     [address]    Depression     Depression with anxiety     Last assessed: 1/13/17    Insomnia     Last assessed: 9/26/14    Kidney stone        Past Surgical History:   Procedure Laterality Date    BLADDER SURGERY      lift of bladder    CYSTOSCOPY      Theurapeutic   TVT-O    EYE SURGERY Bilateral     LASIK    FACIAL/NECK BIOPSY Right 3/12/2019    Procedure: UPPER EYELID CYST EXCISION;  Surgeon: Freida East MD;  [facility];  Service: Plastics    GYNECOLOGIC CRYOSURGERY      Cervix    HYSTEROSCOPY W/ ENDOMETRIAL ABLATION  12/14/2010    [facility]    KIDNEY SURGERY  kidney stone removal    1983    MOHS SURGERY      OK CORRJ HALLUX VALGUS W/SESMDC W/DIST METAR OSTEOT Left 4/16/2021    Procedure: BUNIONECTOMY JENNA left foot;  Surgeon: Bud Arora DPM;  Location:  MAIN OR;  Service: Podiatry    SKIN BIOPSY      TUBAL LIGATION      WISDOM TOOTH EXTRACTION         Family History   Problem Relation Age of Onset    Heart murmur Mother     Hyperlipidemia Mother     Atrial fibrillation Father     Arthritis Father     Basal cell carcinoma Father     Psoriasis Sister     Heart disease Maternal Grandfather     Breast cancer Paternal Grandmother 77    Diabetes Paternal Grandmother         Mellitus    Hypertension Paternal Grandmother     Arthritis Paternal Grandmother     Cancer Paternal Grandmother     Skin cancer Paternal Grandfather     ADD / ADHD Son     No Known Problems Brother     No Known Problems Maternal Grandmother     No Known Problems Maternal Aunt     No Known Problems Maternal Aunt     No Known Problems Paternal Aunt     No Known Problems Paternal Aunt     No Known Problems Daughter     No Known Problems Son     Alcohol abuse Neg Hx     Substance Abuse Neg Hx     Mental illness Neg Hx          Medications have been verified  Objective   /68   Pulse 96   Resp 20   Ht 5' 4" (1 626 m)   Wt 58 1 kg (128 lb)   SpO2 99%   BMI 21 97 kg/m²   No LMP recorded  Patient is postmenopausal        Physical Exam     Physical Exam  Vitals reviewed  Constitutional:       General: She is not in acute distress  Appearance: She is well-developed  HENT:      Mouth/Throat:      Mouth: Mucous membranes are moist       Pharynx: Oropharynx is clear  Cardiovascular:      Rate and Rhythm: Normal rate and regular rhythm  Pulses: Normal pulses  Heart sounds: Normal heart sounds  No murmur heard        Pulmonary:      Effort: Pulmonary effort is normal  No respiratory distress  Breath sounds: Normal breath sounds  Abdominal:      Tenderness: There is no right CVA tenderness or left CVA tenderness  Neurological:      Mental Status: She is alert and oriented to person, place, and time

## 2022-03-13 NOTE — PATIENT INSTRUCTIONS
Urinary Tract Infection in Women   WHAT YOU NEED TO KNOW:   A urinary tract infection (UTI) is caused by bacteria that get inside your urinary tract  Most bacteria that enter your urinary tract come out when you urinate  If the bacteria stay in your urinary tract, you may get an infection  Your urinary tract includes your kidneys, ureters, bladder, and urethra  Urine is made in your kidneys, and it flows from the ureters to the bladder  Urine leaves the bladder through the urethra  A UTI is more common in your lower urinary tract, which includes your bladder and urethra  DISCHARGE INSTRUCTIONS:   Return to the emergency department if:   · You are urinating very little or not at all  · You have a high fever with shaking chills  · You have side or back pain that gets worse  Call your doctor if:   · You have a fever  · You do not feel better after 2 days of taking antibiotics  · You are vomiting  · You have questions or concerns about your condition or care  Medicines:   · Antibiotics  help fight a bacterial infection  If you have UTIs often (called recurrent UTIs), you may be given antibiotics to take regularly  You will be given directions for when and how to use antibiotics  The goal is to prevent UTIs but not cause antibiotic resistance by using antibiotics too often  · Medicines  may be given to decrease pain and burning when you urinate  They will also help decrease the feeling that you need to urinate often  These medicines will make your urine orange or red  · Take your medicine as directed  Contact your healthcare provider if you think your medicine is not helping or if you have side effects  Tell him or her if you are allergic to any medicine  Keep a list of the medicines, vitamins, and herbs you take  Include the amounts, and when and why you take them  Bring the list or the pill bottles to follow-up visits   Carry your medicine list with you in case of an emergency  Follow up with your doctor as directed:  Write down your questions so you remember to ask them during your visits  Prevent another UTI:   · Empty your bladder often  Urinate and empty your bladder as soon as you feel the need  Do not hold your urine for long periods of time  · Wipe from front to back after you urinate or have a bowel movement  This will help prevent germs from getting into your urinary tract through your urethra  · Drink liquids as directed  Ask how much liquid to drink each day and which liquids are best for you  You may need to drink more liquids than usual to help flush out the bacteria  Do not drink alcohol, caffeine, or citrus juices  These can irritate your bladder and increase your symptoms  Your healthcare provider may recommend cranberry juice to help prevent a UTI  · Urinate after you have sex  This can help flush out bacteria passed during sex  · Do not douche or use feminine deodorants  These can change the chemical balance in your vagina  · Change sanitary pads or tampons often  This will help prevent germs from getting into your urinary tract  · Talk to your healthcare provider about your birth control method  You may need to change your method if it is increasing your risk for UTIs  · Wear cotton underwear and clothes that are loose  Tight pants and nylon underwear can trap moisture and cause bacteria to grow  · Vaginal estrogen may be recommended  This medicine helps prevent UTIs in women who have gone through menopause or are in rema-menopause  · Do pelvic muscle exercises often  Pelvic muscle exercises may help you start and stop urinating  Strong pelvic muscles may help you empty your bladder easier  Squeeze these muscles tightly for 5 seconds like you are trying to hold back urine  Then relax for 5 seconds  Gradually work up to squeezing for 10 seconds  Do 3 sets of 15 repetitions a day, or as directed      © Copyright [employer] 2022 Information is for [employer] use only and may not be sold, redistributed or otherwise used for commercial purposes  All illustrations and images included in CareNotes® are the copyrighted property of A D A M [employer] Inc  or Alexis Rodriguez  The above information is an  only  It is not intended as medical advice for individual conditions or treatments  Talk to your doctor, nurse or pharmacist before following any medical regimen to see if it is safe and effective for you

## 2022-03-14 ENCOUNTER — PROCEDURE VISIT (OUTPATIENT)
Dept: NEUROLOGY | Facility: CLINIC | Age: 57
End: 2022-03-14
Payer: COMMERCIAL

## 2022-03-14 ENCOUNTER — TELEPHONE (OUTPATIENT)
Dept: OBGYN CLINIC | Facility: CLINIC | Age: 57
End: 2022-03-14

## 2022-03-14 VITALS — HEART RATE: 91 BPM | DIASTOLIC BLOOD PRESSURE: 79 MMHG | TEMPERATURE: 97.1 F | SYSTOLIC BLOOD PRESSURE: 136 MMHG

## 2022-03-14 DIAGNOSIS — G43.709 CHRONIC MIGRAINE WITHOUT AURA WITHOUT STATUS MIGRAINOSUS, NOT INTRACTABLE: Primary | ICD-10-CM

## 2022-03-14 PROCEDURE — 64615 CHEMODENERV MUSC MIGRAINE: CPT | Performed by: PHYSICIAN ASSISTANT

## 2022-03-14 NOTE — TELEPHONE ENCOUNTER
I returned pt call, pt stated she couldn't wait as her sx got worse and no reply soi she went to urgent care and they performed a ua and culture and ordered her bactrim  I apologized to pt for the delay, but informed her if she writes in when the office is closed there is no one here on the weekends to read it until the next business day  Pt informed we ask that she call as we have an on call provider that can address her concerns in real time on those days  Pt was grateful for the information and agreed

## 2022-03-14 NOTE — PROGRESS NOTES

## 2022-03-14 NOTE — TELEPHONE ENCOUNTER
----- Message from Felicitas Jefferson sent at 3/11/2022  4:47 PM EST -----  Regarding: UTI  Ambar[address] Dr Elder Rogers, I am quite certain that I have a UTI  Would you please call in a script for medication and one diflucan (for the yeast infection from the meds)  If you would like me to get a UA, please let me know and I will go to the care now on [address]  The burning and frequency started on this past Thursday night  Thank you! My  is 1965  My cell is [phone]  Have a great weekend! [facility] pharmacy [facility]

## 2022-03-16 DIAGNOSIS — O21.9 NAUSEA AND VOMITING DURING PREGNANCY: Primary | ICD-10-CM

## 2022-03-16 LAB
BACTERIA UR CULT: ABNORMAL
BACTERIA UR CULT: ABNORMAL

## 2022-03-16 NOTE — TELEPHONE ENCOUNTER
----- Message from John Addison sent at 3/16/2022 11:45 AM EDT -----  Regarding: Medication for UTI Prescribed on 3/13/2022  Good morning! I currently have a UTI and I went to the urgent care in [facility] on Sunday, 3/13/22  I am on day 3 of the Bactrim (I have the generic )and I  have had nausea since day 2  I have 4 days left on the Bactrim  Could you recommend what I could take to stop the nausea? Thank you

## 2022-03-16 NOTE — TELEPHONE ENCOUNTER
LM on Carolin's VM that I was following up on previous call to discuss her Adderall  Requested that she call me and let me know if she will continue taking the amphetamine-dextroamphetamine or if she needs me to further pursue for brand Adderall  Nursing number and front dest number provided for return call

## 2022-03-17 RX ORDER — ONDANSETRON 4 MG/1
4 TABLET, ORALLY DISINTEGRATING ORAL EVERY 6 HOURS PRN
Qty: 20 TABLET | Refills: 0 | Status: SHIPPED | OUTPATIENT
Start: 2022-03-17 | End: 2022-04-22 | Stop reason: ALTCHOICE

## 2022-03-24 ENCOUNTER — ANNUAL EXAM (OUTPATIENT)
Dept: OBGYN CLINIC | Facility: CLINIC | Age: 57
End: 2022-03-24
Payer: COMMERCIAL

## 2022-03-24 VITALS
SYSTOLIC BLOOD PRESSURE: 122 MMHG | HEIGHT: 64 IN | BODY MASS INDEX: 21.51 KG/M2 | WEIGHT: 126 LBS | DIASTOLIC BLOOD PRESSURE: 82 MMHG

## 2022-03-24 DIAGNOSIS — Z11.51 SCREENING FOR HUMAN PAPILLOMAVIRUS (HPV): ICD-10-CM

## 2022-03-24 DIAGNOSIS — N30.00 ACUTE CYSTITIS WITHOUT HEMATURIA: ICD-10-CM

## 2022-03-24 DIAGNOSIS — R10.2 PELVIC PAIN: ICD-10-CM

## 2022-03-24 DIAGNOSIS — Z12.31 ENCOUNTER FOR SCREENING MAMMOGRAM FOR MALIGNANT NEOPLASM OF BREAST: ICD-10-CM

## 2022-03-24 DIAGNOSIS — Z01.419 ENCOUNTER FOR ANNUAL ROUTINE GYNECOLOGICAL EXAMINATION: Primary | ICD-10-CM

## 2022-03-24 PROCEDURE — G0145 SCR C/V CYTO,THINLAYER,RESCR: HCPCS | Performed by: OBSTETRICS & GYNECOLOGY

## 2022-03-24 PROCEDURE — G0476 HPV COMBO ASSAY CA SCREEN: HCPCS | Performed by: OBSTETRICS & GYNECOLOGY

## 2022-03-24 PROCEDURE — S0612 ANNUAL GYNECOLOGICAL EXAMINA: HCPCS | Performed by: OBSTETRICS & GYNECOLOGY

## 2022-03-25 ENCOUNTER — HOSPITAL ENCOUNTER (OUTPATIENT)
Dept: RADIOLOGY | Facility: HOSPITAL | Age: 57
Discharge: HOME | End: 2022-03-25
Attending: OBSTETRICS & GYNECOLOGY
Payer: COMMERCIAL

## 2022-03-25 ENCOUNTER — TELEPHONE (OUTPATIENT)
Dept: PSYCHIATRY | Facility: CLINIC | Age: 57
End: 2022-03-25

## 2022-03-25 DIAGNOSIS — R10.2 PELVIC PAIN: ICD-10-CM

## 2022-03-25 PROCEDURE — 76830 TRANSVAGINAL US NON-OB: CPT

## 2022-03-25 PROCEDURE — 76856 US EXAM PELVIC COMPLETE: CPT

## 2022-03-25 NOTE — TELEPHONE ENCOUNTER
Call from Shyam Howe states that she is fine with staying on the generic at this time  Informed her that if she decides she would like to go back to brand, she should call me back and I will initiate another Prior Authorization  Will refer to Dr Kati Lance for his information

## 2022-03-28 ENCOUNTER — APPOINTMENT (OUTPATIENT)
Dept: LAB | Facility: HOSPITAL | Age: 57
End: 2022-03-28

## 2022-03-28 ENCOUNTER — APPOINTMENT (OUTPATIENT)
Dept: LAB | Facility: HOSPITAL | Age: 57
End: 2022-03-28
Attending: OBSTETRICS & GYNECOLOGY
Payer: COMMERCIAL

## 2022-03-28 DIAGNOSIS — Z00.8 HEALTH EXAMINATION IN POPULATION SURVEY: ICD-10-CM

## 2022-03-28 DIAGNOSIS — N30.00 ACUTE CYSTITIS WITHOUT HEMATURIA: ICD-10-CM

## 2022-03-28 LAB
BACTERIA UR QL AUTO: ABNORMAL /HPF
BILIRUB UR QL STRIP: NEGATIVE
CHOLEST SERPL-MCNC: 295 MG/DL
CLARITY UR: CLEAR
COLOR UR: YELLOW
EST. AVERAGE GLUCOSE BLD GHB EST-MCNC: 111 MG/DL
GLUCOSE UR STRIP-MCNC: NEGATIVE MG/DL
HBA1C MFR BLD: 5.5 %
HDLC SERPL-MCNC: 84 MG/DL
HGB UR QL STRIP.AUTO: NEGATIVE
HPV HR 12 DNA CVX QL NAA+PROBE: NEGATIVE
HPV16 DNA CVX QL NAA+PROBE: NEGATIVE
HPV18 DNA CVX QL NAA+PROBE: NEGATIVE
KETONES UR STRIP-MCNC: NEGATIVE MG/DL
LDLC SERPL CALC-MCNC: 183 MG/DL (ref 0–100)
LEUKOCYTE ESTERASE UR QL STRIP: NEGATIVE
NITRITE UR QL STRIP: NEGATIVE
NON-SQ EPI CELLS URNS QL MICRO: ABNORMAL /HPF
NONHDLC SERPL-MCNC: 211 MG/DL
PH UR STRIP.AUTO: 7.5 [PH]
PROT UR STRIP-MCNC: NEGATIVE MG/DL
RBC #/AREA URNS AUTO: ABNORMAL /HPF
SP GR UR STRIP.AUTO: 1.02 (ref 1–1.03)
TRIGL SERPL-MCNC: 140 MG/DL
UROBILINOGEN UR QL STRIP.AUTO: 0.2 E.U./DL
WBC #/AREA URNS AUTO: ABNORMAL /HPF

## 2022-03-28 PROCEDURE — 81001 URINALYSIS AUTO W/SCOPE: CPT

## 2022-03-28 PROCEDURE — 80061 LIPID PANEL: CPT

## 2022-03-28 PROCEDURE — 87086 URINE CULTURE/COLONY COUNT: CPT

## 2022-03-28 PROCEDURE — 83036 HEMOGLOBIN GLYCOSYLATED A1C: CPT

## 2022-03-28 PROCEDURE — 36415 COLL VENOUS BLD VENIPUNCTURE: CPT

## 2022-03-28 PROCEDURE — 87186 SC STD MICRODIL/AGAR DIL: CPT

## 2022-03-28 NOTE — PROGRESS NOTES
Assessment/Plan:      Encounter for annual routine gynecological examination  -     Liquid-based pap, screening  -     HPV High Risk    Encounter for screening mammogram for malignant neoplasm of breast  -     Mammo screening bilateral w 3d & cad; Future    Screening for human papillomavirus (HPV)  -     Liquid-based pap, screening  -     HPV High Risk    Pelvic pain  -     US pelvis complete w transvaginal; Future    Acute cystitis without hematuria  -     Urinalysis with microscopic; Future  -     Urine culture; Future           Subjective      Con Route is a 60 y o  female who presents for annual exam  The patient c/o continued lower pelvic discomfort s/p treatment for UTI  Completed course of antibiotics Monday  The patient is sexually active  The patient is not taking hormone replacement therapy  Patient denies post-menopausal vaginal bleeding  Menstrual History:  OB History        3    Para   3    Term   0            AB        Living           SAB        IAB        Ectopic        Multiple        Live Births                   No LMP recorded   Patient is postmenopausal      Past Medical History:   Diagnosis Date    Anxiety     Basal cell carcinoma     Last assessed: 14    Cancer ([facility] [address] [facility]     [facility]    Depression     Depression with anxiety     Last assessed: 17    Insomnia     Last assessed: 14    Kidney stone        Family History   Problem Relation Age of Onset    Heart murmur Mother     Hyperlipidemia Mother     Atrial fibrillation Father     Arthritis Father     Basal cell carcinoma Father     Psoriasis Sister     Heart disease Maternal Grandfather     Breast cancer Paternal Grandmother 77    Diabetes Paternal Grandmother         Mellitus    Hypertension Paternal Grandmother     Arthritis Paternal Grandmother     Cancer Paternal Grandmother     Skin cancer Paternal Grandfather     ADD / ADHD Son     No Known Problems Brother     No Known Problems Maternal Grandmother     No Known Problems Maternal Aunt     No Known Problems Maternal Aunt     No Known Problems Paternal Aunt     No Known Problems Paternal Aunt     No Known Problems Daughter     No Known Problems Son     Alcohol abuse Neg Hx     Substance Abuse Neg Hx     Mental illness Neg Hx        The following portions of the patient's history were reviewed and updated as appropriate: allergies, current medications, past family history, past medical history, past social history, past surgical history and problem list     Review of Systems  Pertinent items are noted in HPI  Objective      /82 (BP Location: Right arm, Patient Position: Sitting, Cuff Size: Standard)   Ht 5' 4" (1 626 m)   Wt 57 2 kg (126 lb)   BMI 21 63 kg/m²     General:   alert and oriented, in no acute distress   Heart:    Breasts: regular rate and rhythm, S1, S2 normal, no murmur, click, rub or gallop   appear normal, no suspicious masses, no skin or nipple changes or axillary nodes     Lungs: clear to auscultation bilaterally   Abdomen: soft, non-tender, without masses or organomegaly   Vulva: normal   Vagina: normal mucosa   Cervix: no lesions   Uterus: normal size, mobile, non-tender   Adnexa: normal adnexa and no mass, fullness, tenderness

## 2022-03-29 DIAGNOSIS — F41.1 GAD (GENERALIZED ANXIETY DISORDER): ICD-10-CM

## 2022-03-29 RX ORDER — CLONAZEPAM 0.5 MG/1
TABLET ORAL
Qty: 45 TABLET | Refills: 5 | Status: SHIPPED | OUTPATIENT
Start: 2022-04-22 | End: 2022-05-19 | Stop reason: SDUPTHER

## 2022-03-30 ENCOUNTER — TELEPHONE (OUTPATIENT)
Dept: OBGYN CLINIC | Facility: CLINIC | Age: 57
End: 2022-03-30

## 2022-03-30 ENCOUNTER — TELEPHONE (OUTPATIENT)
Dept: FAMILY MEDICINE CLINIC | Facility: CLINIC | Age: 57
End: 2022-03-30

## 2022-03-30 LAB
BACTERIA UR CULT: ABNORMAL
LAB AP GYN PRIMARY INTERPRETATION: NORMAL
Lab: NORMAL

## 2022-03-30 NOTE — TELEPHONE ENCOUNTER
----- Message from Johana Loving PA-C sent at 3/30/2022 10:35 AM EDT -----  Patient needs an appointment for her yearly physical and to discuss labs AFTER 4/1/2022

## 2022-03-30 NOTE — TELEPHONE ENCOUNTER
----- Message from Dianna Urbina MD sent at 3/30/2022  6:58 AM EDT -----  Your urine culture is negative  Make sure you go for your ultrasound - I will touch base with you afterwards

## 2022-03-30 NOTE — TELEPHONE ENCOUNTER
Lmom regarding scheduling a yearly pe after 04/01/2022 and discussing the blood work at that visit   MRP

## 2022-04-15 ENCOUNTER — TELEPHONE (OUTPATIENT)
Dept: PSYCHIATRY | Facility: CLINIC | Age: 57
End: 2022-04-15

## 2022-04-15 NOTE — TELEPHONE ENCOUNTER
Writer requested a call back to alia/suresh Coe's appointment form Monday 4/18/22 with Dr Hernandez

## 2022-04-15 NOTE — TELEPHONE ENCOUNTER
Frederic Johnson returned writer's call  She is scheduled with Dr Hernandez for 5/19  Patient reports that she doesn't feel like she needs an adjustment and is okay with waiting until May

## 2022-04-22 ENCOUNTER — OFFICE VISIT (OUTPATIENT)
Dept: FAMILY MEDICINE CLINIC | Facility: CLINIC | Age: 57
End: 2022-04-22
Payer: COMMERCIAL

## 2022-04-22 VITALS
HEIGHT: 64 IN | RESPIRATION RATE: 16 BRPM | DIASTOLIC BLOOD PRESSURE: 80 MMHG | SYSTOLIC BLOOD PRESSURE: 136 MMHG | WEIGHT: 126.4 LBS | BODY MASS INDEX: 21.58 KG/M2 | HEART RATE: 88 BPM

## 2022-04-22 DIAGNOSIS — R19.5 POSITIVE COLORECTAL CANCER SCREENING USING COLOGUARD TEST: ICD-10-CM

## 2022-04-22 DIAGNOSIS — E78.01 FAMILIAL HYPERCHOLESTEROLEMIA: ICD-10-CM

## 2022-04-22 DIAGNOSIS — Z12.11 ENCOUNTER FOR SCREENING FOR MALIGNANT NEOPLASM OF COLON: Primary | ICD-10-CM

## 2022-04-22 DIAGNOSIS — Z00.00 ANNUAL PHYSICAL EXAM: ICD-10-CM

## 2022-04-22 PROCEDURE — 99213 OFFICE O/P EST LOW 20 MIN: CPT | Performed by: PHYSICIAN ASSISTANT

## 2022-04-22 PROCEDURE — 99396 PREV VISIT EST AGE 40-64: CPT | Performed by: PHYSICIAN ASSISTANT

## 2022-04-22 RX ORDER — ATORVASTATIN CALCIUM 10 MG/1
10 TABLET, FILM COATED ORAL DAILY
Qty: 90 TABLET | Refills: 3 | Status: SHIPPED | OUTPATIENT
Start: 2022-04-22

## 2022-04-22 NOTE — PROGRESS NOTES
ADULT ANNUAL PHYSICAL  [facility]    NAME: Janae Bates  AGE: 60 y o  SEX: female  : 1965     DATE: 2022     Assessment and Plan:     Healthy 60year old female    Immunizations and preventive care screenings were discussed with patient today  Appropriate education was printed on patient's after visit summary  Counseling:  Alcohol/drug use: discussed moderation in alcohol intake, the recommendations for healthy alcohol use, and avoidance of illicit drug use  Dental Health: discussed importance of regular tooth brushing, flossing, and dental visits  Injury prevention: discussed safety/seat belts, safety helmets, smoke detectors, carbon dioxide detectors, and smoking near bedding or upholstery  · Exercise: the importance of regular exercise/physical activity was discussed  Recommend exercise 3-5 times per week for at least 30 minutes  Return in 1 year (on 2023)  Chief Complaint:     Chief Complaint   Patient presents with    Physical Exam      History of Present Illness:     Adult Annual Physical   Patient here for a comprehensive physical exam  The patient reports problems - high cholesterol  Diet and Physical Activity  · Diet/Nutrition: well balanced diet  · Exercise: walking        Depression Screening  PHQ-2/9 Depression Screening    Little interest or pleasure in doing things: 0 - not at all  Feeling down, depressed, or hopeless: 0 - not at all  Trouble falling or staying asleep, or sleeping too much: 0 - not at all  Feeling tired or having little energy: 1 - several days  Poor appetite or overeatin - several days  Feeling bad about yourself - or that you are a failure or have let yourself or your family down: 0 - not at all  Trouble concentrating on things, such as reading the newspaper or watching television: 1 - several days  Moving or speaking so slowly that other people could have noticed  Or the opposite - being so fidgety or restless that you have been moving around a lot more than usual: 0 - not at all  Thoughts that you would be better off dead, or of hurting yourself in some way: 0 - not at all  PHQ-9 Score: 3   PHQ-9 Interpretation: No or Minimal depression        General Health  · Sleep: sleeps well and gets 7-8 hours of sleep on average  · Hearing: normal - bilateral   · Vision: no vision problems and goes for regular eye exams  · Dental: regular dental visits and brushes teeth twice daily  /GYN Health  · Patient is: postmenopausal  · Mammo - up to date  · Colonoscopy due     Review of Systems:     Review of Systems   Constitutional: Negative  HENT: Negative  Eyes: Negative  Respiratory: Negative  Cardiovascular: Negative  Gastrointestinal: Negative  Endocrine: Negative  Genitourinary: Negative  Musculoskeletal: Negative  Skin: Negative  Allergic/Immunologic: Negative  Neurological: Negative  Hematological: Negative  Psychiatric/Behavioral: Negative  Past Medical History:     Past Medical History:   Diagnosis Date    Anxiety     Basal cell carcinoma     Last assessed: 9/26/14    Cancer ([facility] [address] )     BCC    Depression     Depression with anxiety     Last assessed: 1/13/17    Insomnia     Last assessed: 9/26/14    Kidney stone       Past Surgical History:     Past Surgical History:   Procedure Laterality Date    BLADDER SURGERY      lift of bladder    CYSTOSCOPY      Theurapeutic   TVT-O    EYE SURGERY Bilateral     LASIK    FACIAL/NECK BIOPSY Right 3/12/2019    Procedure: UPPER EYELID CYST EXCISION;  Surgeon: Cristina Carvajal MD;  [facility];  Service: Plastics    GYNECOLOGIC CRYOSURGERY      Cervix    HYSTEROSCOPY W/ ENDOMETRIAL ABLATION  12/14/2010    Novasure    KIDNEY SURGERY  kidney stone removal    1983    MOHS SURGERY      [place] CORRJ HALLUX VALGUS W/SESMDC W/DIST Stacey Dylan Left 4/16/2021 Procedure: Sarmad Campbell left foot;  Surgeon: Nayely Salomon DPM;  Location:  MAIN OR;  Service: Podiatry    SKIN BIOPSY      TUBAL LIGATION      WISDOM TOOTH EXTRACTION        Social History:     Social History     Socioeconomic History    Marital status:      Spouse name: None    Number of children: None    Years of education: None    Highest education level: None   Occupational History    None   Tobacco Use    Smoking status: Never Smoker    Smokeless tobacco: Never Used   Vaping Use    Vaping Use: Never used   Substance and Sexual Activity    Alcohol use:  Yes     Alcohol/week: 3 0 standard drinks     Types: 3 Standard drinks or equivalent per week     Comment: socially    Drug use: Never    Sexual activity: Yes     Partners: Male     Birth control/protection: None   Other Topics Concern    None   Social History Narrative    Caffeine use    Drinks coffee     Social Determinants of Health     Financial Resource Strain: Not on file   Food Insecurity: Not on file   Transportation Needs: Not on file   Physical Activity: Not on file   Stress: Not on file   Social Connections: Not on file   Intimate Partner Violence: Not on file   Housing Stability: Not on file      Family History:     Family History   Problem Relation Age of Onset    Heart murmur Mother     Hyperlipidemia Mother     Atrial fibrillation Father     Arthritis Father     Basal cell carcinoma Father     Psoriasis Sister     No Known Problems Brother     ADD / ADHD Son     No Known Problems Son     No Known Problems Daughter     No Known Problems Maternal Grandmother     Coronary artery disease Maternal Grandfather     Heart disease Maternal Grandfather     Breast cancer Paternal Grandmother 77    Diabetes Paternal Grandmother         Mellitus    Arthritis Paternal Grandmother     Cancer Paternal Grandmother     Hypertension Paternal Grandfather     Hyperlipidemia Paternal Grandfather     Skin cancer Paternal Grandfather     Hyperlipidemia Maternal Aunt     Coronary artery disease Maternal Aunt     No Known Problems Maternal Aunt     Hyperlipidemia Maternal Uncle     Coronary artery disease Maternal Uncle     No Known Problems Paternal Aunt     No Known Problems Paternal Aunt     Alcohol abuse Neg Hx     Substance Abuse Neg Hx     Mental illness Neg Hx       Current Medications:     Current Outpatient Medications   Medication Sig Dispense Refill    amphetamine-dextroamphetamine (ADDERALL XR) 20 MG 24 hr capsule Take 1 capsule (20 mg total) by mouth daily as needed (focus and concentration deficit)   Brand Name Necessary  Max Daily Amount: 20 mg 90 capsule 0    buPROPion (WELLBUTRIN XL) 300 mg 24 hr tablet Take 1 tablet (300 mg total) by mouth every morning 90 tablet 1    clonazePAM (KlonoPIN) 0 5 mg tablet Take 1/2 tab daily as needed for anxiety and take 1 tab nightly as needed for insomnia and anxiety 45 tablet 5    conjugated estrogens (PREMARIN) vaginal cream Insert 0 5g vaginally three times per week (Patient taking differently: as needed Insert 0 5g vaginally three times per week) 30 g 2    CRANBERRY EXTRACT PO Take by mouth      FLUoxetine (PROzac) 40 MG capsule Take 1 capsule (40 mg total) by mouth daily 90 capsule 1    Multiple Vitamins-Minerals (MULTIVITAL-M PO) Take by mouth        Probiotic Product (PROBIOTIC DAILY PO) Take by mouth      atorvastatin (LIPITOR) 10 mg tablet Take 1 tablet (10 mg total) by mouth daily 90 tablet 3     No current facility-administered medications for this visit  Allergies: Allergies   Allergen Reactions    Butoconazole Hives    Tioconazole Hives      Physical Exam:     /80 (BP Location: Left arm, Patient Position: Sitting, Cuff Size: Standard)   Pulse 88   Resp 16   Ht 5' 4 25" (1 632 m)   Wt 57 3 kg (126 lb 6 4 oz)   Breastfeeding No   BMI 21 53 kg/m²     Physical Exam  Constitutional:       Appearance: Normal appearance   She is well-developed and normal weight  HENT:      Right Ear: Hearing normal       Left Ear: Hearing normal       Mouth/Throat:      Pharynx: Uvula midline  Eyes:      Extraocular Movements: Extraocular movements intact  Conjunctiva/sclera: Conjunctivae normal       Pupils: Pupils are equal, round, and reactive to light  Neck:      Thyroid: No thyromegaly  Cardiovascular:      Rate and Rhythm: Normal rate and regular rhythm  Pulses: Normal pulses  Heart sounds: Normal heart sounds  No murmur heard  Pulmonary:      Effort: Pulmonary effort is normal       Breath sounds: Normal breath sounds  Abdominal:      General: Abdomen is flat  Bowel sounds are normal  There is no distension  Palpations: Abdomen is soft  There is no mass  Tenderness: There is no abdominal tenderness  Musculoskeletal:         General: Normal range of motion  Cervical back: Normal range of motion and neck supple  Lymphadenopathy:      Cervical: No cervical adenopathy  Skin:     General: Skin is warm  Neurological:      General: No focal deficit present  Mental Status: She is alert and oriented to person, place, and time  Cranial Nerves: No cranial nerve deficit  Deep Tendon Reflexes: Reflexes normal    Psychiatric:         Mood and Affect: Mood normal          Behavior: Behavior normal          Thought Content:  Thought content normal          Judgment: Judgment normal           JACKY Santoyo

## 2022-04-22 NOTE — PATIENT INSTRUCTIONS

## 2022-04-22 NOTE — PROGRESS NOTES
Assessment/Plan:    1  Hyperlipidemia - familial, c/w healthy diet, exercise, will start lipitor 10 mg once daily at night, repeat labs in 3 months, discussed side effects    2  Abdominal bloating - discussed trying a probiotic for her GI tract and scheduling colonoscopy  Strongly encouraged colonoscopy especially since her cologard was positive back 4/2021  Referral placed again for this    F/u as needed  F/u 1 year for physical    Subjective:   Chief Complaint   Patient presents with    Physical Exam      Patient ID: Nilesh Rapp is a 60 y o  female  Patient here to discuss labs  Strong family history of hyperlipidemia, multiple family members with CAD, cardiovascular events in their 46s  Patient is a healthy eater and active  Also with abdominal bloating, feels like she looks 3 months pregnant frequently  Moving bowels normally  Has tried no OTC  The following portions of the patient's history were reviewed and updated as appropriate: allergies, current medications, past family history, past medical history, past social history, past surgical history and problem list     Past Medical History:   Diagnosis Date    Anxiety     Basal cell carcinoma     Last assessed: 9/26/14    Cancer ([address] )     [address]    Depression     Depression with anxiety     Last assessed: 1/13/17    Insomnia     Last assessed: 9/26/14    Kidney stone      Past Surgical History:   Procedure Laterality Date    BLADDER SURGERY      lift of bladder    CYSTOSCOPY      Theurapeutic   TVT-O    EYE SURGERY Bilateral     LASIK    FACIAL/NECK BIOPSY Right 3/12/2019    Procedure: UPPER EYELID CYST EXCISION;  Surgeon: Leyda Yousif MD;  [facility];  Service: Plastics    GYNECOLOGIC CRYOSURGERY      Cervix    HYSTEROSCOPY W/ ENDOMETRIAL ABLATION  12/14/2010    Novasure    KIDNEY SURGERY  kidney stone removal    1983    MOHS SURGERY      [place] CORRJ HALLUX VALGUS W/SESMDC W/DIST Eddie Kevck Left 4/16/2021 Procedure: BUNIONECTOMY JENNA left foot;  Surgeon: Vaishali Rodriguez DPM;  Location:  MAIN OR;  Service: Podiatry    SKIN BIOPSY      TUBAL LIGATION      WISDOM TOOTH EXTRACTION       Family History   Problem Relation Age of Onset    Heart murmur Mother     Hyperlipidemia Mother     Atrial fibrillation Father     Arthritis Father     Basal cell carcinoma Father     Psoriasis Sister     No Known Problems Brother     ADD / ADHD Son     No Known Problems Son     No Known Problems Daughter     No Known Problems Maternal Grandmother     Coronary artery disease Maternal Grandfather     Heart disease Maternal Grandfather     Breast cancer Paternal Grandmother 77    Diabetes Paternal Grandmother         Mellitus    Arthritis Paternal Grandmother     Cancer Paternal Grandmother     Hypertension Paternal Grandfather     Hyperlipidemia Paternal Grandfather     Skin cancer Paternal Grandfather     Hyperlipidemia Maternal Aunt     Coronary artery disease Maternal Aunt     No Known Problems Maternal Aunt     Hyperlipidemia Maternal Uncle     Coronary artery disease Maternal Uncle     No Known Problems Paternal Aunt     No Known Problems Paternal Aunt     Alcohol abuse Neg Hx     Substance Abuse Neg Hx     Mental illness Neg Hx      Social History     Socioeconomic History    Marital status:      Spouse name: Not on file    Number of children: Not on file    Years of education: Not on file    Highest education level: Not on file   Occupational History    Not on file   Tobacco Use    Smoking status: Never Smoker    Smokeless tobacco: Never Used   Vaping Use    Vaping Use: Never used   Substance and Sexual Activity    Alcohol use:  Yes     Alcohol/week: 3 0 standard drinks     Types: 3 Standard drinks or equivalent per week     Comment: socially    Drug use: Never    Sexual activity: Yes     Partners: Male     Birth control/protection: None   Other Topics Concern    Not on file   Social History Narrative    Caffeine use    Drinks coffee     Social Determinants of Health     Financial Resource Strain: Not on file   Food Insecurity: Not on file   Transportation Needs: Not on file   Physical Activity: Not on file   Stress: Not on file   Social Connections: Not on file   Intimate Partner Violence: Not on file   Housing Stability: Not on file       Current Outpatient Medications:     amphetamine-dextroamphetamine (ADDERALL XR) 20 MG 24 hr capsule, Take 1 capsule (20 mg total) by mouth daily as needed (focus and concentration deficit)   Brand Name Necessary  Max Daily Amount: 20 mg, Disp: 90 capsule, Rfl: 0    buPROPion (WELLBUTRIN XL) 300 mg 24 hr tablet, Take 1 tablet (300 mg total) by mouth every morning, Disp: 90 tablet, Rfl: 1    clonazePAM (KlonoPIN) 0 5 mg tablet, Take 1/2 tab daily as needed for anxiety and take 1 tab nightly as needed for insomnia and anxiety, Disp: 45 tablet, Rfl: 5    conjugated estrogens (PREMARIN) vaginal cream, Insert 0 5g vaginally three times per week (Patient taking differently: as needed Insert 0 5g vaginally three times per week), Disp: 30 g, Rfl: 2    CRANBERRY EXTRACT PO, Take by mouth, Disp: , Rfl:     FLUoxetine (PROzac) 40 MG capsule, Take 1 capsule (40 mg total) by mouth daily, Disp: 90 capsule, Rfl: 1    Multiple Vitamins-Minerals (MULTIVITAL-M PO), Take by mouth  , Disp: , Rfl:     Probiotic Product (PROBIOTIC DAILY PO), Take by mouth, Disp: , Rfl:     atorvastatin (LIPITOR) 10 mg tablet, Take 1 tablet (10 mg total) by mouth daily, Disp: 90 tablet, Rfl: 3    Review of Systems          Objective:    Vitals:    04/22/22 0932   BP: 136/80   BP Location: Left arm   Patient Position: Sitting   Cuff Size: Standard   Pulse: 88   Resp: 16   Weight: 57 3 kg (126 lb 6 4 oz)   Height: 5' 4 25" (1 632 m)        Physical Exam  Constitutional:       Appearance: Normal appearance  She is well-developed and normal weight     HENT:      Head: Normocephalic and atraumatic  Cardiovascular:      Rate and Rhythm: Normal rate and regular rhythm  Pulses: Normal pulses  Heart sounds: Normal heart sounds  Pulmonary:      Effort: Pulmonary effort is normal       Breath sounds: Normal breath sounds  Skin:     General: Skin is warm  Neurological:      General: No focal deficit present  Mental Status: She is alert and oriented to person, place, and time  Psychiatric:         Mood and Affect: Mood normal          Behavior: Behavior normal          Thought Content:  Thought content normal          Judgment: Judgment normal

## 2022-05-11 ENCOUNTER — TELEPHONE (OUTPATIENT)
Dept: NEUROLOGY | Facility: CLINIC | Age: 57
End: 2022-05-11

## 2022-05-11 NOTE — TELEPHONE ENCOUNTER
Called patient and I left a message making her aware of the time change in her appointment 05/13/22 from 12:30pm to 12:15pm

## 2022-05-13 ENCOUNTER — TELEMEDICINE (OUTPATIENT)
Dept: NEUROLOGY | Facility: CLINIC | Age: 57
End: 2022-05-13
Payer: COMMERCIAL

## 2022-05-13 DIAGNOSIS — G43.709 CHRONIC MIGRAINE WITHOUT AURA WITHOUT STATUS MIGRAINOSUS, NOT INTRACTABLE: Primary | ICD-10-CM

## 2022-05-13 PROCEDURE — 99214 OFFICE O/P EST MOD 30 MIN: CPT | Performed by: PHYSICIAN ASSISTANT

## 2022-05-13 RX ORDER — ASPIRIN 81 MG/1
81 TABLET ORAL DAILY
COMMUNITY

## 2022-05-13 NOTE — PROGRESS NOTES
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    PREVENTATIVE:  Prozac, Wellbutrin, Klonopin, Depakote, Topamax, Effexor, Lexapro, Zoloft, Celexa, zonisamide, verapamil, gabapentin, Botox  ABORTIVE:  Aleve, Tylenol, ibuprofen     Alternative therapies used in the past for headaches? none  Headache are worse if the patient: cough, sneeze, bending over  Headache triggers:  Lack of sleep, fatigue, stress/tension, hot flashes     Aura and how long does it last -  Yes, confusion a few minutes before migraine begins     What is your current pain level - 0/10     Any family history of migraines? No  Any family history of aneurysms? Yes maternal cousin     Headaches started at what age? 27years old  How often do the headaches occur?   1-3 month; prior to botox was more than 15 a month  What time of the day do the headaches start? varies  How long do the headaches last?   1-3 hours; prior to botox was over 4 hours  Are you ever headache free? Yes  Describe your usual headache - Throbbing, Pressure, Dull  Where is your headache located?   Bilateral frontal, bilateral temporalis and occipitalis  What is the intensity of pain? 5-6/10     Associated symptoms:   · Decrease of appetite, nausea  · Photophobia, phonophobia, sensitivity to smell   · Problem with concentration  · light-headed or dizzy, stiff or sore neck,   · Hands or feet tingle or feel numb, prefer to be alone and in a dark room, unable to work     Number of days missed per month because of headaches:  Work (or school) days: 0  Social or Family activities: 0      What time of the year do headaches occur more frequently?   no  Have you seen someone else for headaches or pain? No  Have you had trigger point injection performed and how often? No  Have you had Botox injection performed and how often? Yes   Have you had epidural injections or transforaminal injections performed? No     Have you used CBD or THC for your headaches and how often? No  Are you current pregnant or planning on getting pregnant? No, done with family planning  Have you ever had any Brain imaging? yes      11/2014 MRI brain: Several nonspecific subcortical white matter lesions bilaterally in   the frontal lobes, nonspecific findings which may be related to early   changes of microangiopathy, in the appropriate clinical setting   Other   postinfectious, postinflammatory or demyelinating processes not   excluded   Patients with migraine headaches can have white matter   lesions as well   Consider followup repeat contrast enhanced MRI of the   brain in 6-12 months to establish stability and to exclude abnormal   enhancement  No acute infarction, intracranial hemorrhage or mass effect       11/2014 MRA head: No intracranial aneurysm or major intracranial arterial stenosis        6/2/2015 MRI brain: No acute disease   Stable white matter changes, nonspecific   Selective hippocampal volume loss with normal sized lateral and temporal horn volume   This may be a congenital in etiology   Repeat marrow quantitative imaging in one year to document trajectory of volume loss       10/2016 MRI NQ Small white matter lesions are noted within the frontal lobes which are nonspecific and 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   Small white matter lesions have been described within the frontal lobes in patients with chronic   migraine headaches   Overall no significant change noted      10/2016 PET brain Symmetric decreased FDG activity in the medial temporal lobes bilaterally, corresponding with prior MRI findings    FDG distribution is otherwise unremarkable   If early Alzheimer's disease is a clinical consideration, beta amyloid PET CT brain imaging may be useful      NeuroQuant analysis was performed: Measurements suggest significantly decreased hippocampal volume and mild local ex-vacuo dilatation of the adjacent inferior lateral ventricles : 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  Larry Buerger patient's age and anatomic imaging, consider 6-12 month follow-up examination      4/2017 MRI NQ No acute intracranial abnormality  NeuroQuant analysis was performed: Low hippocampal volume without ex-vacuo dilatation: 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  F/U to establish presence and nature of volume change over time     No substantial interval change from the prior examination of 10/10/2016 when given measurement error     Nonspecific white matter changes suggestive of mild chronic microvascular ischemia      4/28/2018 MRI NQ Brain   Several tiny supratentorial white matter T2 and FLAIR hyperintense foci suspicious for mild chronic microangiopathic changes such as may accompany migraine headaches      NeuroQuant analysis was performed: Low hippocampal volume without ex-vacuo dilatation: 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

## 2022-05-13 NOTE — PROGRESS NOTES
Virtual Regular Visit    Verification of patient location:    Patient is located in the following state in which I hold an active license [place]      Assessment/Plan:    Problem List Items Addressed This Visit        Cardiovascular and Mediastinum    Chronic migraine without aura without status migrainosus, not intractable - Primary     Continue BOTOX every 3 months  At onset of migraine, take 2-3 advil  May repeat in 6 hours if needed  Limit of less than 3 doses a wee           Relevant Medications    aspirin (ECOTRIN LOW STRENGTH) 81 mg EC tablet               Reason for visit is   Chief Complaint   Patient presents with    Migraine    Virtual Regular Visit        Encounter provider Owen Wu PA-C    Provider located at [place] [address]  [phone]      Recent Visits  Date Type Provider Dept   05/11/22 Telephone [facility], [address] [place] [address] recent visits within past 7 days and meeting all other requirements  Today's Visits  Date Type Provider Dept   05/13/22 Telemedicine Owen Wu PA-C  [facility]   Showing today's visits and meeting all other requirements  Future Appointments  No visits were found meeting these conditions  Showing future appointments within next 150 days and meeting all other requirements       The patient was identified by name and date of birth  Zbigniew Barrow was informed that this is a telemedicine visit and that the visit is being conducted through 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    Current Preventative:  Botox  Bupropion, clonazepam, fluoxetine  MVI  ASA    Current Abortive:  advil    Prior PREVENTATIVE:  Depakote, Topamax, zonisamide, gabapentin,  Effexor, Lexapro, Zoloft, Celexa,   verapamil,    Prior ABORTIVE:  OTC     Alternative therapies used in the past for headaches? none  Headache are worse if the patient: cough, sneeze, bending over  Headache triggers:  Lack of sleep, fatigue, stress/tension, hot flashes     Aura and how long does it last -  Yes, confusion a few minutes before migraine begins, but this had lessened     What is your current pain level - 0/10     Any family history of migraines? No  Any family history of aneurysms? Yes maternal cousin     Headaches started at what age? 27years old  How often do the headaches occur?   a bit sporadic, 3-6 month; prior to botox was more than 15 a month  What time of the day do the headaches start? varies  How long do the headaches last?   1-3 hours; prior to botox was over 4 hours  Are you ever headache free? Yes  Describe your usual headache - Throbbing, Pressure, Dull  Where is your headache located?   Bilateral frontal, bilateral temporalis and occipitalis  What is the intensity of pain? 5/10     Associated symptoms:   Decrease of appetite, nausea  Photophobia, phonophobia, sensitivity to smell   Problem with concentration  light-headed or dizzy, stiff or sore neck,   Hands or feet tingle or feel numb, prefer to be alone and in a dark room, unable to work     Number of days missed per month because of headaches:  Work (or school) days: 0  Social or Family activities: 0      What time of the year do headaches occur more frequently? no  Have you seen someone else for headaches or pain? No  Have you had trigger point injection performed and how often? No  Have you had Botox injection performed and how often? Yes   Have you had epidural injections or transforaminal injections performed? No     Have you used CBD or THC for your headaches and how often? No  Are you current pregnant or planning on getting pregnant? No, done with family planning  Have you ever had any Brain imaging? yes      11/2014 MRI brain: Several nonspecific subcortical white matter lesions bilaterally in the frontal lobes, nonspecific findings which may be related to early changes of microangiopathy, in the appropriate clinical setting   Other   postinfectious, postinflammatory or demyelinating processes not   excluded   Patients with migraine headaches can have white matter   lesions as well   Consider followup repeat contrast enhanced MRI of the   brain in 6-12 months to establish stability and to exclude abnormal   enhancement  No acute infarction, intracranial hemorrhage or mass effect       11/2014 MRA head: No intracranial aneurysm or major intracranial arterial stenosis        6/2/2015 MRI brain: No acute disease   Stable white matter changes, nonspecific   Selective hippocampal volume loss with normal sized lateral and temporal horn volume   This may be a congenital in etiology   Repeat marrow quantitative imaging in one year to document trajectory of volume loss       10/2016 MRI NQ Small white matter lesions are noted within the frontal lobes which are nonspecific and 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   Small white matter lesions have been described within the frontal lobes in patients with chronic   migraine headaches   Overall no significant change noted      10/2016 PET brain Symmetric decreased FDG activity in the medial temporal lobes bilaterally, corresponding with prior MRI findings   FDG distribution is otherwise unremarkable   If early Alzheimer's disease is a clinical consideration, beta amyloid PET CT brain imaging may be useful      NeuroQuant analysis was performed: Measurements suggest significantly decreased hippocampal volume and mild local ex-vacuo dilatation of the adjacent inferior lateral ventricles : 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  Nuala Severs patient's age and anatomic imaging, consider 6-12 month follow-up examination      4/2017 MRI NQ No acute intracranial abnormality  NeuroQuant analysis was performed: Low hippocampal volume without ex-vacuo dilatation: 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  F/U to establish presence and nature of volume change over time     No substantial interval change from the prior examination of 10/10/2016 when given measurement error     Nonspecific white matter changes suggestive of mild chronic microvascular ischemia      4/28/2018 MRI NQ Brain   Several tiny supratentorial white matter T2 and FLAIR hyperintense foci suspicious for mild chronic microangiopathic changes such as may accompany migraine headaches      NeuroQuant analysis was performed: Low hippocampal volume without ex-vacuo dilatation: 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       With botox has had a reduction of at least 7 migraine days with less abortive medication, less ER visits which correlates to headache diary        Past Medical History:   Diagnosis Date    Anxiety     Basal cell carcinoma     Last assessed: 9/26/14    Cancer ([facility] [address] )     BCC    Depression     Depression with anxiety     Last assessed: 1/13/17    Insomnia     Last assessed: 9/26/14    Kidney stone        Past Surgical History:   Procedure Laterality Date    BLADDER SURGERY      lift of bladder    CYSTOSCOPY      [place]  TVT-O    EYE SURGERY Bilateral     LASIK    FACIAL/NECK BIOPSY Right 3/12/2019    Procedure: UPPER EYELID CYST EXCISION;  Surgeon: Joe Macario MD;  [facility]: [facility];  Service: Plastics    GYNECOLOGIC CRYOSURGERY      Cervix    HYSTEROSCOPY W/ ENDOMETRIAL ABLATION  12/14/2010    Novasure    KIDNEY SURGERY  kidney stone removal    1983    MOHS SURGERY      ID CORRJ HALLUX VALGUS W/SESMDC W/DIST Radha Snare Left 4/16/2021    Procedure: BUNIONECTOMY JENNA left foot;  Surgeon: Mckayla Mansfield DPM;  Location:  MAIN OR;  Service: Podiatry    SKIN BIOPSY      TUBAL LIGATION      WISDOM TOOTH EXTRACTION         Current Outpatient Medications   Medication Sig Dispense Refill    amphetamine-dextroamphetamine (ADDERALL XR) 20 MG 24 hr capsule Take 1 capsule (20 mg total) by mouth daily as needed (focus and concentration deficit)   Brand Name Necessary  Max Daily Amount: 20 mg 90 capsule 0    aspirin (ECOTRIN LOW STRENGTH) 81 mg EC tablet Take 81 mg by mouth in the morning        atorvastatin (LIPITOR) 10 mg tablet Take 1 tablet (10 mg total) by mouth daily 90 tablet 3    buPROPion (WELLBUTRIN XL) 300 mg 24 hr tablet Take 1 tablet (300 mg total) by mouth every morning 90 tablet 1    clonazePAM (KlonoPIN) 0 5 mg tablet Take 1/2 tab daily as needed for anxiety and take 1 tab nightly as needed for insomnia and anxiety 45 tablet 5    conjugated estrogens (PREMARIN) vaginal cream Insert 0 5g vaginally three times per week (Patient taking differently: as needed Insert 0 5g vaginally three times per week) 30 g 2    CRANBERRY EXTRACT PO Take 1 tablet by mouth in the morning      FLUoxetine (PROzac) 40 MG capsule Take 1 capsule (40 mg total) by mouth daily 90 capsule 1    Multiple Vitamins-Minerals (MULTIVITAL-M PO) Take 1 tablet by mouth in the morning      Probiotic Product (PROBIOTIC DAILY PO) Take by mouth (Patient not taking: Reported on 5/13/2022)       No current facility-administered medications for this visit  Allergies   Allergen Reactions    Butoconazole Hives    Tioconazole Hives    I have reviewed the patient's medical, social and surgical history as well as medications in detail and updated the computerized patient record  Review of Systems   Constitutional: Negative  HENT: Negative  Eyes: Negative  Respiratory: Negative  Cardiovascular: Negative  Gastrointestinal: Negative  Endocrine: Negative  Genitourinary: Negative  Musculoskeletal: Negative  Skin: Negative  Allergic/Immunologic: Negative  Neurological: Positive for headaches  Hematological: Negative  Psychiatric/Behavioral: Negative  I personally reviewed and updated the ROS that was entered by the medical assistant      Video Exam    There were no vitals filed for this visit  Physical Exam   CONSTITUTIONAL: Well developed, well nourished, well groomed  No dysmorphic features  Eyes:  EOM normal      Neck:  Normal ROM, neck supple  HEENT:  Normocephalic atraumatic  Chest:  Respirations regular and unlabored  Psychiatric:  Normal behavior and appropriate affect      MENTAL STATUS  Orientation: Alert and oriented x 3  Fund of knowledge: Intact  MOTOR (Upper and lower extremities)   Bulk/tone/abnormal movement: Normal muscle bulk and tone  COORDINATION   Station/Gait: Normal baseline gait      I spent 10 minutes with patient today in which greater than 50% of the time was spent in counseling/coordination of care regarding as above and 20 minutes of non-face to face time    VIRTUAL VISIT DISCLAIMER      Katlin Childress verbally agrees to participate in [employer]  Pt is aware that [employer] could be limited without vital signs or the ability to perform a full hands-on physical exam  Carolin Hardy understands she or the provider may request at any time to terminate the video visit and request the patient to seek care or treatment in person

## 2022-05-13 NOTE — PATIENT INSTRUCTIONS
Continue BOTOX every 3 months  At onset of migraine, take 2-3 advil  May repeat in 6 hours if needed  Limit of less than 3 doses a week      Medication overuse headaches:   - We discussed medication overuse headache [facility]) and how to avoid it in the future  It was explained that all analgesics have the potential to cause medication overuse headache [facility]) and analgesic overuse can negate the effectiveness of headache preventive measures  After successful [address] treatment, preventive medications for an underlying primary headache disorder have a greater chance for success  Avoid medications with narcotics, barbiturates, or caffeine in them as these can cause rebound headaches after very few doses and can interfere with other headache medicine efficacy  Taking any analgesics for more than 2-3 days a week can cause medication overuse headache  Reproductive age women: Should take folic acid daily when taking anti-seizure drugs especially Depakote  Over-the-counter supplements: to decrease intensity and frequency of migraines  - Magnesium Oxide 400-500 mg a day  If any diarrhea or upset stomach, decrease dose  as tolerated  -  B2 200 mg twice a day  This supplement will change the color of the urine to fluorescent yellow no matter how hydrated, which is normal       Headache management instructions  - When patient has a moderate to severe headache, they should seek rest, initiate relaxation and apply cold compresses to the head  - Maintain regular sleep schedule  Adults need at least 7-8 hours of uninterrupted a night  - Limit over the counter medications such as Tylenol, Ibuprofen, Aleve, Excedrin  (No more than 3 times a week)  - Maintain headache diary  We discussed an ROBBY for a smart phone is "Migraine mel"  - Limit caffeine to 1-2 cups 8 to 16 oz a day or less  - Avoid dietary trigger  (aged cheese, peanuts, MSG, aspartame and nitrates)    - Patient is to have regular frequent meals to prevent headache onset  - Please drink at least 64 ounces of water a day to help remain hydrated  Please call with any questions or concerns   Office number is [phone]

## 2022-05-13 NOTE — ASSESSMENT & PLAN NOTE
Continue BOTOX every 3 months  At onset of migraine, take 2-3 advil  May repeat in 6 hours if needed    Limit of less than 3 doses korey benjamin

## 2022-05-17 ENCOUNTER — OFFICE VISIT (OUTPATIENT)
Dept: DERMATOLOGY | Facility: CLINIC | Age: 57
End: 2022-05-17
Payer: COMMERCIAL

## 2022-05-17 VITALS — HEIGHT: 64 IN | BODY MASS INDEX: 21.85 KG/M2 | WEIGHT: 128 LBS | TEMPERATURE: 97.7 F

## 2022-05-17 DIAGNOSIS — L70.0 BLACKHEAD: ICD-10-CM

## 2022-05-17 DIAGNOSIS — D18.01 CHERRY ANGIOMA: ICD-10-CM

## 2022-05-17 DIAGNOSIS — Z85.828 HISTORY OF BASAL CELL CARCINOMA: Primary | ICD-10-CM

## 2022-05-17 DIAGNOSIS — L57.0 ACTINIC KERATOSIS: ICD-10-CM

## 2022-05-17 PROCEDURE — 17000 DESTRUCT PREMALG LESION: CPT | Performed by: DERMATOLOGY

## 2022-05-17 PROCEDURE — 99213 OFFICE O/P EST LOW 20 MIN: CPT | Performed by: DERMATOLOGY

## 2022-05-17 NOTE — PATIENT INSTRUCTIONS
HISTORY OF BASAL CELL CARCINOMA    Assessment and Plan:  Based on a thorough discussion of this condition and the management approach to it (including a comprehensive discussion of the known risks, side effects and potential benefits of treatment), the patient (family) agrees to implement the following specific plan: Will monitor    How can basal cell carcinoma be prevented? The most important way to prevent BCC is to avoid sunburn  This is especially important in childhood and early life  Fair skinned individuals and those with a personal or family history of BCC should protect their skin from sun exposure daily, year-round and lifelong  Stay indoors or under the shade in the middle of the day   Wear covering clothing   Apply high protection factor SPF50+ broad-spectrum sunscreens generously to exposed skin if outdoors   Avoid indoor tanning (sun beds, solaria)  Oral nicotinamide (vitamin B3) in a dose of 500 mg twice daily may reduce the number and severity of BCCs  What is the outlook for basal cell carcinoma? Most BCCs are cured by treatment  Cure is most likely if treatment is undertaken when the lesion is small  About 50% of people with BCC develop a second one within 3 years of the first  They are also at increased risk of other skin cancers, especially melanoma  Regular self-skin examinations and long-term annual skin checks by an experienced health professional are recommended  ACTINIC KERATOSIS    Assessment and Plan:  Based on a thorough discussion of this condition and the management approach to it (including a comprehensive discussion of the known risks, side effects and potential benefits of treatment), the patient (family) agrees to implement the following specific plan:    Liquid nitrogen was applied for 10-12 seconds to the skin lesion and the expected blistering or scabbing reaction explained  Do not pick at the area  Patient reminded to expect hypopigmented scars from the procedure  Return if lesion fails to fully resolve  Yearly skin exam    Actinic keratoses are very common on sites repeatedly exposed to the sun, especially the backs of the hands and the face, most often affecting the ears, nose, cheeks, upper lip, vermilion of the lower lip, temples, forehead and balding scalp  In severely chronically sun-damaged individuals, they may also be found on the upper trunk, upper and lower limbs, and dorsum of feet  We discussed the theoretical premalignant (pre-cancerous) nature and etiology of these growths  We discussed the prevailing notion that actinic keratoses are a reflection of abnormal skin cell development due to DNA damage by short wavelength UVB  They are more likely to appear if the immune function is poor, due to aging, recent sun exposure, predisposing disease or certain drugs  We discussed that the main concern is that actinic keratoses may predispose to squamous cell carcinoma  It is rare for a solitary actinic keratosis to evolve to squamous cell carcinoma (SCC), but the risk of SCC occurring at some stage in a patient with more than 10 actinic keratoses is thought to be about 10 to 15%  A tender, thickened, ulcerated or enlarging actinic keratosis is suspicious of SCC  Actinic keratoses may be prevented by strict sun protection  If already present, keratoses may improve with a very high sun protection factor (50+) broad-spectrum sunscreen applied at least daily to affected areas, year-round  We recommend that UPF-rated clothing and hats and sunglasses be worn whenever possible and that a sunscreen-moisturizer combination product such as Neutrogena Daily Defense be applied at least three times a day      We performed a thorough discussion of treatment options and specific risk/benefits/alternatives including but not limited to medical field treatment with medications such as the following:    Topical field area medications such as 5-fluorouracil or Aldara (specifically, the trouble with long-term compliance, blistering and local skin reaction versus the convenience of at-home therapy and that field therapy gets what is not yet seen)  Cryotherapy (specifically, local pain, scarring, dyspigmentation, blistering, possible superinfection, and treats only what we see versus directed treatment today)  Photodynamic therapy (specifically, local pain, scarring, dyspigmentation, blistering, possible superinfection, need to schedule for a later date, and time spent in the office versus field therapy that gets what is not yet seen)  ALSTON ANGIOMAS    Assessment and Plan:  Based on a thorough discussion of this condition and the management approach to it (including a comprehensive discussion of the known risks, side effects and potential benefits of treatment), the patient (family) agrees to implement the following specific plan:  Reassured benign    Assessment and Plan:    Cherry angioma, also known as [employer] spots, are benign vascular skin lesions  A "cherry angioma" is a firm red, blue or purple papule, 0 1-1 cm in diameter  When thrombosed, they can appear black in colour until evaluated with a dermatoscope when the red or purple colour is more easily seen  Cherry angioma may develop on any part of the body but most often appear on the scalp, face, lips and trunk  An angioma is due to proliferating endothelial cells; these are the cells that line the inside of a blood vessel  Angiomas can arise in early life or later in life; the most common type of angioma is a cherry angioma  Cherry angiomas are very common in males and females of any age or race  They are more noticeable in white skin than in skin of colour  They markedly increase in number from about the age of 36  There may be a family history of similar lesions  Eruptive cherry angiomas have been rarely reported to be associated with internal malignancy  The cause of angiomas is unknown  Genetic analysis of cherry angiomas has shown that they frequently carry specific somatic missense mutations in the GNAQ and GNA11 (Q209H) genes, which are involved in other vascular and melanocytic proliferations  Cherry angioma is usually diagnosed clinically and no investigations are necessary for the majority of lesions  It has a characteristic red-clod or lobular pattern on dermatoscopy (called lacunar pattern using conventional pattern analysis)  When there is uncertainty about the diagnosis, a biopsy may be performed  The angioma is composed of venules in a thickened papillary dermis  Collagen bundles may be prominent between the lobules  Cherry angiomas are harmless, so they do not usually have to be treated  Occasionally, they are removed to exclude a malignant skin lesion such as a nodular melanoma or because they are irritated or bleeding (and a subsequent risk for infection)  To decrease friction over the lesions, we recommend Neutrogena Daily Defense SPF 50+ at least 3 times a day      BLACK HEADS    Assessment and Plan:  Based on a thorough discussion of this condition and the management approach to it (including a comprehensive discussion of the known risks, side effects and potential benefits of treatment), the patient (family) agrees to implement the following specific plan:  Consider over the counter Neutrogena

## 2022-05-17 NOTE — PROGRESS NOTES
[facility] Follow Up Note    Patient Name: Josias Castaneda  Encounter Date: 05/17/22    Today's Chief Concerns:  Chuck Flower Concern #1:  Skin check      Current Medications:    Current Outpatient Medications:     amphetamine-dextroamphetamine (ADDERALL XR) 20 MG 24 hr capsule, Take 1 capsule (20 mg total) by mouth daily as needed (focus and concentration deficit)   Brand Name Necessary  Max Daily Amount: 20 mg, Disp: 90 capsule, Rfl: 0    aspirin (ECOTRIN LOW STRENGTH) 81 mg EC tablet, Take 81 mg by mouth in the morning , Disp: , Rfl:     atorvastatin (LIPITOR) 10 mg tablet, Take 1 tablet (10 mg total) by mouth daily, Disp: 90 tablet, Rfl: 3    buPROPion (WELLBUTRIN XL) 300 mg 24 hr tablet, Take 1 tablet (300 mg total) by mouth every morning, Disp: 90 tablet, Rfl: 1    clonazePAM (KlonoPIN) 0 5 mg tablet, Take 1/2 tab daily as needed for anxiety and take 1 tab nightly as needed for insomnia and anxiety, Disp: 45 tablet, Rfl: 5    conjugated estrogens (PREMARIN) vaginal cream, Insert 0 5g vaginally three times per week (Patient taking differently: as needed Insert 0 5g vaginally three times per week), Disp: 30 g, Rfl: 2    CRANBERRY EXTRACT PO, Take 1 tablet by mouth in the morning, Disp: , Rfl:     FLUoxetine (PROzac) 40 MG capsule, Take 1 capsule (40 mg total) by mouth daily, Disp: 90 capsule, Rfl: 1    Multiple Vitamins-Minerals (MULTIVITAL-M PO), Take 1 tablet by mouth in the morning, Disp: , Rfl:     Probiotic Product (PROBIOTIC DAILY PO), Take by mouth (Patient not taking: Reported on 5/13/2022), Disp: , Rfl:     CONSTITUTIONAL:   Vitals:    05/17/22 1633   Temp: 97 7 °F (36 5 °C)   TempSrc: Temporal   Weight: 58 1 kg (128 lb)   Height: 5' 4" (1 626 m)     Specific Alerts:    Have you been seen by a [facility] Dermatologist in the last 3 years? YES    Are you pregnant or planning to become pregnant? No    Are you currently or planning to be nursing or breast feeding?  No    Allergies   Allergen Reactions    Butoconazole Hives    Tioconazole Hives       May we call your Preferred Phone number to discuss your specific medical information? YES    May we leave a detailed message that includes your specific medical information? YES    Have you traveled outside of the [facility] in the past 3 months? No    Do you currently have a pacemaker or defibrillator? No    Do you have any artificial heart valves, joints, plates, screws, rods, stents, pins, etc? YES   - If Yes, were any placed within the last 2 years? Screws in foot, 1 year ago    Do you require any medications prior to a surgical procedure? No      Are you taking any medications that cause you to bleed more easily ("blood thinners") No    Have you ever experienced a rapid heartbeat with epinephrine? No    Have you ever been treated with "gold" (gold sodium thiomalate) therapy? No    [facility] can help with wrinkles, "laugh lines," facial volume loss, "double chin," "love handles," age spots, and more  Are you interested in learning today about some of the skin enhancement procedures that we offer? (If Yes, please provide more detail) No    Review of Systems:  Have you recently had or currently have any of the following?     · Fever or chills: No  · Night Sweats: No  · Headaches: No  · Weight Gain: No  · Weight Loss: No  · Blurry Vision: No  · Nausea: No  · Vomiting: No  · Diarrhea: No  · Blood in Stool: No  · Abdominal Pain: No  · Itchy Skin: No  · Painful Joints: No  · Swollen Joints: No  · Muscle Pain: No  · Irregular Mole: No  · Sun Burn: No  · Dry Skin: YES  · Skin Color Changes: No  · Scar or Keloid: No  · Cold Sores/Fever Blisters: No  · Bacterial Infections/MRSA: No  · Anxiety: No  · Depression: No  · Suicidal or Homicidal Thoughts: No      PSYCH: Normal mood and affect  EYES: Normal conjunctiva  ENT: Normal lips and oral mucosa  CARDIOVASCULAR: No edema  RESPIRATORY: Normal respirations  HEME/LYMPH/IMMUNO:  No regional lymphadenopathy except as noted below in ASSESSMENT AND PLAN BY DIAGNOSIS    FULL ORGAN SYSTEM SKIN EXAM (SKIN)   Hair, Scalp, Ears, Face Normal except as noted below in Assessment   Neck, Cervical Chain Nodes Normal except as noted below in Assessment   Right Arm/Hand/Fingers Normal except as noted below in Assessment   Left Arm/Hand/Fingers Normal except as noted below in Assessment   Chest/Axillae Viewed areas Normal except as noted below in Assessment   Abdomen, Umbilicus Normal except as noted below in Assessment   Back/Spine Normal except as noted below in Assessment   Right Leg, Foot, Toes Normal except as noted below in Assessment   Left Leg, Foot, Toes Normal except as noted below in Assessment     HISTORY OF BASAL CELL CARCINOMA    Physical Exam:  · Anatomic Location Affected: On forehead and left upper arm   Morphological Description of scar:  Well healed   Suspected Recurrence: No   Pertinent Positives:   Pertinent Negatives: Additional History of Present Condition:  History of basal cell carcinoma with no sign of recurrence    Assessment and Plan:  Based on a thorough discussion of this condition and the management approach to it (including a comprehensive discussion of the known risks, side effects and potential benefits of treatment), the patient (family) agrees to implement the following specific plan:   Will monitor    How can basal cell carcinoma be prevented? The most important way to prevent BCC is to avoid sunburn  This is especially important in childhood and early life  Fair skinned individuals and those with a personal or family history of BCC should protect their skin from sun exposure daily, year-round and lifelong     Stay indoors or under the shade in the middle of the day    Wear covering clothing    Apply high protection factor SPF50+ broad-spectrum sunscreens generously to exposed skin if outdoors    Avoid indoor tanning (sun beds, solaria)   Oral nicotinamide (vitamin B3) in a dose of 500 mg twice daily may reduce the number and severity of BCCs  What is the outlook for basal cell carcinoma? Most BCCs are cured by treatment  Cure is most likely if treatment is undertaken when the lesion is small  About 50% of people with BCC develop a second one within 3 years of the first  They are also at increased risk of other skin cancers, especially melanoma  Regular self-skin examinations and long-term annual skin checks by an experienced health professional are recommended  ACTINIC KERATOSIS    Physical Exam:   Anatomic Location Affected:  Right forehead   Morphological Description:  Pink scaly macule    Additional History of Present Condition:  Patient used  Efudex in past    Assessment and Plan:  Based on a thorough discussion of this condition and the management approach to it (including a comprehensive discussion of the known risks, side effects and potential benefits of treatment), the patient (family) agrees to implement the following specific plan:     Liquid nitrogen was applied for 10-12 seconds to the skin lesion and the expected blistering or scabbing reaction explained  Do not pick at the area  Patient reminded to expect hypopigmented scars from the procedure  Return if lesion fails to fully resolve   Follow up yearly    Actinic keratoses are very common on sites repeatedly exposed to the sun, especially the backs of the hands and the face, most often affecting the ears, nose, cheeks, upper lip, vermilion of the lower lip, temples, forehead and balding scalp  In severely chronically sun-damaged individuals, they may also be found on the upper trunk, upper and lower limbs, and dorsum of feet  We discussed the theoretical premalignant (pre-cancerous) nature and etiology of these growths  We discussed the prevailing notion that actinic keratoses are a reflection of abnormal skin cell development due to DNA damage by short wavelength UVB    They are more likely to appear if the immune function is poor, due to aging, recent sun exposure, predisposing disease or certain drugs  We discussed that the main concern is that actinic keratoses may predispose to squamous cell carcinoma  It is rare for a solitary actinic keratosis to evolve to squamous cell carcinoma (SCC), but the risk of SCC occurring at some stage in a patient with more than 10 actinic keratoses is thought to be about 10 to 15%  A tender, thickened, ulcerated or enlarging actinic keratosis is suspicious of SCC  Actinic keratoses may be prevented by strict sun protection  If already present, keratoses may improve with a very high sun protection factor (50+) broad-spectrum sunscreen applied at least daily to affected areas, year-round  We recommend that UPF-rated clothing and hats and sunglasses be worn whenever possible and that a sunscreen-moisturizer combination product such as Neutrogena Daily Defense be applied at least three times a day  We performed a thorough discussion of treatment options and specific risk/benefits/alternatives including but not limited to medical field treatment with medications such as the following:     Topical field area medications such as 5-fluorouracil or Aldara (specifically, the trouble with long-term compliance, blistering and local skin reaction versus the convenience of at-home therapy and that field therapy gets what is not yet seen)   Cryotherapy (specifically, local pain, scarring, dyspigmentation, blistering, possible superinfection, and treats only what we see versus directed treatment today)   Photodynamic therapy (specifically, local pain, scarring, dyspigmentation, blistering, possible superinfection, need to schedule for a later date, and time spent in the office versus field therapy that gets what is not yet seen)      PROCEDURE:  DESTRUCTION OF PRE-MALIGNANT LESIONS  After a thorough discussion of treatment options and risk/benefits/alternatives (including but not limited to local pain, scarring, dyspigmentation, blistering, and possible superinfection), verbal and written consent were obtained and the aforementioned lesions were treated on with cryotherapy using liquid nitrogen x 1 cycle for 5-10 seconds   TOTAL NUMBER of 1 pre-malignant lesions were treated today on the ANATOMIC LOCATION: right forehead  The patient tolerated the procedure well, and after-care instructions were provided  ALSTON ANGIOMAS    Physical Exam:  · Anatomic Location Affected:  Trunk and extremities    Morphological Description:  Scattered cherry red, 1-4 mm papules   Pertinent Positives:   Pertinent Negatives: Additional History of Present Condition:  Present for years    Assessment and Plan:  Based on a thorough discussion of this condition and the management approach to it (including a comprehensive discussion of the known risks, side effects and potential benefits of treatment), the patient (family) agrees to implement the following specific plan:   Reassured benign    Assessment and Plan:    Cherry angioma, also known as [employer] spots, are benign vascular skin lesions  A "cherry angioma" is a firm red, blue or purple papule, 0 1-1 cm in diameter  When thrombosed, they can appear black in colour until evaluated with a dermatoscope when the red or purple colour is more easily seen  Cherry angioma may develop on any part of the body but most often appear on the scalp, face, lips and trunk  An angioma is due to proliferating endothelial cells; these are the cells that line the inside of a blood vessel  Angiomas can arise in early life or later in life; the most common type of angioma is a cherry angioma  Cherry angiomas are very common in males and females of any age or race  They are more noticeable in white skin than in skin of colour  They markedly increase in number from about the age of 36   There may be a family history of similar lesions  Eruptive cherry angiomas have been rarely reported to be associated with internal malignancy  The cause of angiomas is unknown  Genetic analysis of cherry angiomas has shown that they frequently carry specific somatic missense mutations in the GNAQ and GNA11 (Q209H) genes, which are involved in other vascular and melanocytic proliferations  Cherry angioma is usually diagnosed clinically and no investigations are necessary for the majority of lesions  It has a characteristic red-clod or lobular pattern on dermatoscopy (called lacunar pattern using conventional pattern analysis)  When there is uncertainty about the diagnosis, a biopsy may be performed  The angioma is composed of venules in a thickened papillary dermis  Collagen bundles may be prominent between the lobules  Cherry angiomas are harmless, so they do not usually have to be treated  Occasionally, they are removed to exclude a malignant skin lesion such as a nodular melanoma or because they are irritated or bleeding (and a subsequent risk for infection)  To decrease friction over the lesions, we recommend Neutrogena Daily Defense SPF 50+ at least 3 times a day  BLACK HEADS  Physical Exam:   Anatomic Location Affected:  face   Morphological Description:  Black heads   Pertinent Positives:   Pertinent Negatives:     Additional History of Present Condition:  Present for years    Assessment and Plan:  Based on a thorough discussion of this condition and the management approach to it (including a comprehensive discussion of the known risks, side effects and potential benefits of treatment), the patient (family) agrees to implement the following specific plan:   Consider over the counter Neutrogena    Scribe Attestation    I,:  Keenan Cabrera am acting as a scribe while in the presence of the attending physician :       I,:  Jordy Rehman MD personally performed the services described in this documentation    as scribed in my presence :

## 2022-05-19 ENCOUNTER — OFFICE VISIT (OUTPATIENT)
Dept: PSYCHIATRY | Facility: CLINIC | Age: 57
End: 2022-05-19
Payer: COMMERCIAL

## 2022-05-19 DIAGNOSIS — R41.3 MEMORY DIFFICULTIES: ICD-10-CM

## 2022-05-19 DIAGNOSIS — F33.40 RECURRENT MAJOR DEPRESSIVE DISORDER, IN REMISSION (HCC): ICD-10-CM

## 2022-05-19 DIAGNOSIS — R41.840 ATTENTION AND CONCENTRATION DEFICIT: ICD-10-CM

## 2022-05-19 DIAGNOSIS — F41.1 GAD (GENERALIZED ANXIETY DISORDER): ICD-10-CM

## 2022-05-19 DIAGNOSIS — N94.10 DYSPAREUNIA, FEMALE: ICD-10-CM

## 2022-05-19 PROCEDURE — 90833 PSYTX W PT W E/M 30 MIN: CPT | Performed by: PSYCHIATRY & NEUROLOGY

## 2022-05-19 PROCEDURE — 99213 OFFICE O/P EST LOW 20 MIN: CPT | Performed by: PSYCHIATRY & NEUROLOGY

## 2022-05-19 RX ORDER — BUPROPION HYDROCHLORIDE 300 MG/1
300 TABLET ORAL EVERY MORNING
Qty: 90 TABLET | Refills: 1 | Status: SHIPPED | OUTPATIENT
Start: 2022-05-19

## 2022-05-19 RX ORDER — FLUOXETINE HYDROCHLORIDE 40 MG/1
40 CAPSULE ORAL DAILY
Qty: 90 CAPSULE | Refills: 1 | Status: SHIPPED | OUTPATIENT
Start: 2022-05-19

## 2022-05-19 RX ORDER — DEXTROAMPHETAMINE SACCHARATE, AMPHETAMINE ASPARTATE MONOHYDRATE, DEXTROAMPHETAMINE SULFATE AND AMPHETAMINE SULFATE 5; 5; 5; 5 MG/1; MG/1; MG/1; MG/1
20 CAPSULE, EXTENDED RELEASE ORAL DAILY PRN
Qty: 90 CAPSULE | Refills: 0 | Status: SHIPPED | OUTPATIENT
Start: 2022-05-19

## 2022-05-19 RX ORDER — CLONAZEPAM 0.5 MG/1
TABLET ORAL
Qty: 45 TABLET | Refills: 5 | Status: SHIPPED | OUTPATIENT
Start: 2022-05-19

## 2022-05-19 NOTE — PSYCH
MEDICATION MANAGEMENT NOTE        [place] [address] [facility]      Name and Date of Birth:  Twyla Singh 60 y o  1965    Date of Visit: May 19, 2022    SUBJECTIVE:  CC: Natalie Levin presents today for follow up on "I am good"; anxiety and depression     Natalie Levin notes work has been stressful  Working Tue and Wed only, but travelling a lot between offices  A lot of new people in the offices as well so she has a high learning curve, and it feels awkward having to look at name tags  People are quick to point fingers instead of fixing issues  But all and all she says it is fine, "I just have 6 years left", and "I go in with a great attitude"  Managers good, and likes staff 'except when they do something mean", particularly to others  On a personal level, still living with her son, Radha Vera, and his 4 kids  A lot of work but loves the children  (Kids ~2021, 2020, 2018, 2016)  Parents are good, in the area  Brother trying to get them to move to [facility] for [place]  Dad not really interested, not good with change  Cognitive situation is unchanged still, no decline  Neurologist periodically  Still for migraines and botox  And they check talk about her memory while there  Since our last visit, overall symptoms have been unchanged  Med Compliance: yes    HPI ROS:               ('was' notes: prior visit)  Medication Side Effects:  no     Depression (10 worst):  low (Was low)   Anxiety (10 worst):  low (Was low)   Hallucinations or Psychosis  no (Was no)    Safety concerns (SI, HI, etc):  no (Was no)   Sleep: (NM = Nightmares)  good (Was good)   Energy:  good (Was good)   Appetite:  good (Was good)   Weight Change:  no      PHQ-2/9 Depression Screening               Natalie Levin denies any side effects from medications unless noted above    Review Of Systems as noted above  Otherwise A comprehensive review of systems was negative  History Review:  The following portions of the patient's history were reviewed and documented: allergies, current medications, past family history, past medical history, past social history and problem list      Lab Review: No new labs or no relevant labs needing review with patient today      OBJECTIVE:     MENTAL STATUS EXAM  Appearance:  age appropriate   Behavior:  pleasant, cooperative, with good eye contact   Speech:  Normal volume, regular rate and rhythm   Mood:  euthymic   Affect:  mood congruent   Language: intact and appropriate for age, education, and intellect   Thought Process:  Linear and goal directed   Associations: intact associations   Thought Content:  normal and appropriate   Perceptual Disturbances: no auditory or visual hallcunations   Risk Potential / Abnormal Thoughts: Suicidal ideation - None  Homicidal ideation - None  Potential for aggression - No       Consciousness:  Alert & Awake   Sensorium:  Grossly oriented   Attention: attention span and concentration are age appropriate       Fund of Knowledge:  Memory: awareness of current events: yes  recent and remote memory grossly intact   Insight:  good   Judgment: good   Muscle Strength Muscle Tone: normal  normal   Gait/Station: normal gait/station with good balance   Motor Activity: no abnormal movements       Risks, Benefits And Possible Side Effects Of Medications:    AGREE: Risks, benefits, and possible side effects of medications explained to Shyam and she (or legal representative) verbalizes understanding and agreement for treatment  Controlled Medication Discussion:     Shyam has been filling controlled prescriptions on time as prescribed according to [place] [address] program    _____________________________________________________________      Recent labs:  No visits with results within 1 Month(s) from this visit     Latest known visit with results is:   Appointment on 03/28/2022   Component Date Value    Clarity, UA 03/28/2022 Clear     Color, UA 03/28/2022 Yellow     Specific Gravity, UA 03/28/2022 1 025     pH, UA 03/28/2022 7 5     Glucose, UA 03/28/2022 Negative     Ketones, UA 03/28/2022 Negative     Blood, UA 03/28/2022 Negative     Protein, UA 03/28/2022 Negative     Nitrite, UA 03/28/2022 Negative     Bilirubin, UA 03/28/2022 Negative     Urobilinogen, UA 03/28/2022 0 2     Leukocytes, UA 03/28/2022 Negative     WBC, UA 03/28/2022 0-1 (A)    RBC, UA 03/28/2022 None Seen     Bacteria, UA 03/28/2022 Occasional     Epithelial Cells 03/28/2022 Occasional     Urine Culture 03/28/2022 10,000-19,000 cfu/ml Staphylococcus coagulase negative (A)       Psychiatric History   ALEX    Patient did used to see Ratna Perdomo in early 2016 when she had a change providers due to insurance  She does not have a therapist     She been hospitalized once in 2000  She says that it was related to having a breakup with her boyfriend at the time and then finding out that she lost her job because she was  at his place of employment  Losing her boyfriend because she did not want to get  and he did and also having job issues led her to crisis and overdose although it was not a dramatic overdose it was a time when she said that she was trying to take pills to go to sleep and that she did have some suicidal thoughts associated with it  She denies any self-harm homicidal ideation in the past or present  She's never been violent  She says that she's not had any suicidal ideation since that time  Social History:  Jefferson Witt was raised in [facility] to a "good" childhood  Her parents were together and still are  She denies ever having physical or sexual abuse or other forms neglect now or in the past as a child  She has one brother and one sister  She developed normally  She finished high school and got associates degree in business  Her daughter currently lives with her  She has 3 children 2 boys and one girl  She is good relationships with her family   She's been  and  twice  Currently not in a serious relationship at intake    Her support system includes her children her parents and her cousins  She has friends that are close but she doesn't typically opened up to them about mental health  She is [place] and attends [employer] sometimes  No  history no legal issues and no weapons at home  She does not use tobacco drinks about 1 cup of coffee a day and is a social drinker and when she does drink it's very rare and not much at that time  She denies ever using substances and has never been to rehabilitation  Medical / Surgical History:    Past Medical History:   Diagnosis Date    Anxiety     Basal cell carcinoma     Last assessed: 9/26/14    Cancer ([address] )     [address]    Depression     Depression with anxiety     Last assessed: 1/13/17    Insomnia     Last assessed: 9/26/14    Kidney stone      Past Surgical History:   Procedure Laterality Date    BLADDER SURGERY      lift of bladder    CYSTOSCOPY      Theurapeutic   TVT-O    EYE SURGERY Bilateral     LASIK    FACIAL/NECK BIOPSY Right 3/12/2019    Procedure: UPPER EYELID CYST EXCISION;  Surgeon: Nini Perez MD;  Location: [facility]  MAIN [facility];  Service: Plastics    GYNECOLOGIC CRYOSURGERY      Cervix    HYSTEROSCOPY W/ ENDOMETRIAL ABLATION  12/14/2010    Novasure    KIDNEY SURGERY  kidney stone removal    1983    MOHS SURGERY      NH CORRJ HALLUX VALGUS W/SESMDC W/DIST Hezzie Milks Left 4/16/2021    Procedure: Nas Adrien left foot;  Surgeon: Krystina Merino DPM;  Location:  MAIN OR;  Service: Podiatry    SKIN BIOPSY      TUBAL LIGATION      WISDOM TOOTH EXTRACTION         Family Psychiatric History:     Family History   Problem Relation Age of Onset    Heart murmur Mother     Hyperlipidemia Mother     Atrial fibrillation Father     Arthritis Father     Basal cell carcinoma Father     Psoriasis Sister     No Known Problems Brother     ADD / ADHD Son  No Known Problems Son     No Known Problems Daughter     No Known Problems Maternal Grandmother     Coronary artery disease Maternal Grandfather     Heart disease Maternal Grandfather     Breast cancer Paternal Grandmother 77    Diabetes Paternal Grandmother         Mellitus    Arthritis Paternal Grandmother     Cancer Paternal Grandmother     Hypertension Paternal Grandfather     Hyperlipidemia Paternal Grandfather     Skin cancer Paternal Grandfather     Hyperlipidemia Maternal Aunt     Coronary artery disease Maternal Aunt     No Known Problems Maternal Aunt     Hyperlipidemia Maternal Uncle     Coronary artery disease Maternal Uncle     No Known Problems Paternal Aunt     No Known Problems Paternal Aunt     Alcohol abuse Neg Hx     Substance Abuse Neg Hx     Mental illness Neg Hx        Daughter -  Family history of ADD (attention deficit disorder)  Son - Family history of ADD (attention deficit disorder)  Family History    15  Denied: Family history of bipolar disorder   14  Denied: Family history of substance abuse   15  Denied: Family history of suicide attempt      Confidential Assessment:  Scales:    Assessment/Plan:        Diagnoses and all orders for this visit:    Recurrent major depressive disorder, in remission ([facility] [address] )    Attention and concentration deficit    Memory difficulties    TIM (generalized anxiety disorder)        ______________________________________________________________________    Generalized anxiety disorder  MDD, recurrent, in remission    Attention and concentration deficit  Memory difficulties    ---  Generalized anxiety disorder - managed   MDD, recurrent, in remission   - managed   Attention and concentration deficit - managed  Memory difficulties    Darinel Jacome is still doing well, likes meds where they are  Anxiety at times with workspace but manageable  Attention and concentration deficit may be related to organic issues, anxiety, or underlying ADD/ADHD  Ongoing f/u with Neurology  Discussed long term klonopin risks, goals to reduce, but nothing acute  She prefers med, but open in distant future to discuss reduction as stressors (eg job) change    BP better (high in past at time), asymptomatic  Safety Risk Assessment: see above   In considering risk and protective factors, suicide risk and safety risk are low  Past medications include   Prozac which helps but does have some decreased orgasm  Topamax for migraines but this seemed to lead to confusion ( years ago)  ON but no detail recall - Zoloft, Celexa or Lexapro  Effexor seemed to cause weight gain  Wellbutrin  Ambien caused oversedation  melatonin did not help  Treatment Plan:        Patient has been educated about their diagnosis and treatment modalities  They voiced understanding and agreement with the following plan:    1) Meds:   - Prozac 40mg daily  (consider increase but orgasm affected  Was on 50mg in past per charts)   - Wellbutrin XL 300mg daily   - Klonopin 0 25mg in day PRN and 0 5mg HS PRN  - Adderal XR 20mg Daily     2) Labs: PRN   - 2/2017: CBC, CMP WNL  TSH 1 57, TG 56, HDL 98,     3) Therapy:   - not in therapy, revisit PRN   - Provided progressive muscle relaxation handout, asked her to look into belly breathing and guided imagery  (2/9/2018)    4) Medical:  Concentration issues, MRI abnormalities, family h/o early onset dementia (PAunt), migraines (gets Botox)   - pt to f/u with neurologist   - pt to f/u with other providers PRN    5) Other: support prn   - works at [address] [place] [address]  Reception   - good support from family, daughter lives with her   - 3 kids,   Daughter graduates from [employer] with [employer] psychology, healthcare    6) Follow up:   - 6 months, but patient to call if issues or concerns    7) Treatment Plan: Enacted 2/9/2018, 12/7/2018, 7/26/2019, 8/14/2020, 4/1/21, 10/21/2021, 5/19/2022        Discussed self monitoring of symptoms, and symptom monitoring tools  Patient has been informed of 24 hours and weekend coverage for urgent situations accessed by calling the main clinic phone number             Psychotherapy in session:  Time spent performing psychotherapy: 17 Minutes on supportive therapy related to family, relationships, and work

## 2022-05-19 NOTE — BH TREATMENT PLAN
TREATMENT PLAN (Medication Management Only)        [facility]    Name/Date of Birth/MRN:  Fabiano Bowers 60 y o  1965 MRN: [id number]  Date of Treatment Plan: May 19, 2022  Diagnosis/Diagnoses:   1  Recurrent major depressive disorder, in remission ([facility] [address] )    2  Attention and concentration deficit    3  Memory difficulties    4  TIM (generalized anxiety disorder)      Strengths/Personal Resources for Self-Care: very good work ethics, honest, kind  Area/Areas of need (in own words): speaking my mind, making my decisions  1  Long Term Goal: "keep doing well"   Target Date: 180 days from treatment plan  Person/Persons responsible for completion of goal: Dr Tegan Yap and Self  2  Short Term Objective (s) - How will we reach this goal?:   A  Provider new recommended medication/dosage changes and/or continue medication(s): discussed  B  Continue to stay active and involved with family  C  Follow up with all providers, take meds as prescribed  D  Target Date: 6 months from treatment plan unless noted otherwise  Person/Persons Responsible for Completion of Goal: Dr Tegan Yap and Self   Progress Towards Goals: continuing treatment   Treatment Modality: Medication management and therapy PRN  Review due 180 days from date of this plan: Approximately 6 months from today ( 8/19/2022 )    Expected length of service: ongoing treatment  My Physician/PA/NP and I have developed this plan together and I agree to work on the goals and objectives  I understand the treatment goals that were developed for my treatment    Signature:       Date and time:  Signature of parent/guardian if under age of 15 years: Date and time:  Signature of provider:      Date and time:  Signature of Supervising Physician:    Date and time: 5/19/2022      Jeff Persaud III, DO

## 2022-06-08 ENCOUNTER — TELEPHONE (OUTPATIENT)
Dept: NEUROLOGY | Facility: CLINIC | Age: 57
End: 2022-06-08

## 2022-06-08 NOTE — TELEPHONE ENCOUNTER
Submitted prior-authorization request via fax to [facility] for:  Botox-200 units, [id number], [id number]  Awaiting approval/denial response status  Will follow up on the status of this order

## 2022-06-15 NOTE — TELEPHONE ENCOUNTER
Called [employer] and spoke with Rocky Maldonado who processed an escalation message request to supervising nurse for me regarding this patients pending prior-authorization b/c patients appointment is scheduled for 6/16/2022  Rocky Maldonado informed me that I should hear from someone regarding this patients prior-auth status within the next 24-48 HRS  Will follow up on this within the next 48hrs

## 2022-06-16 ENCOUNTER — HOSPITAL ENCOUNTER (OUTPATIENT)
Dept: MAMMOGRAPHY | Facility: IMAGING CENTER | Age: 57
Discharge: HOME | End: 2022-06-16
Payer: COMMERCIAL

## 2022-06-16 VITALS — BODY MASS INDEX: 21.68 KG/M2 | WEIGHT: 127 LBS | HEIGHT: 64 IN

## 2022-06-16 DIAGNOSIS — Z12.31 ENCOUNTER FOR SCREENING MAMMOGRAM FOR MALIGNANT NEOPLASM OF BREAST: ICD-10-CM

## 2022-06-16 PROCEDURE — 77067 SCR MAMMO BI INCL CAD: CPT

## 2022-06-16 PROCEDURE — 77063 BREAST TOMOSYNTHESIS BI: CPT

## 2022-06-17 ENCOUNTER — PROCEDURE VISIT (OUTPATIENT)
Dept: NEUROLOGY | Facility: CLINIC | Age: 57
End: 2022-06-17
Payer: COMMERCIAL

## 2022-06-17 VITALS
HEIGHT: 64 IN | DIASTOLIC BLOOD PRESSURE: 65 MMHG | BODY MASS INDEX: 21.68 KG/M2 | TEMPERATURE: 97.2 F | WEIGHT: 127 LBS | SYSTOLIC BLOOD PRESSURE: 143 MMHG | HEART RATE: 91 BPM

## 2022-06-17 DIAGNOSIS — G43.709 CHRONIC MIGRAINE WITHOUT AURA WITHOUT STATUS MIGRAINOSUS, NOT INTRACTABLE: Primary | ICD-10-CM

## 2022-06-17 PROCEDURE — 64615 CHEMODENERV MUSC MIGRAINE: CPT | Performed by: PHYSICIAN ASSISTANT

## 2022-06-17 NOTE — PROGRESS NOTES

## 2022-07-21 ENCOUNTER — APPOINTMENT (OUTPATIENT)
Dept: LAB | Facility: CLINIC | Age: 57
End: 2022-07-21
Payer: COMMERCIAL

## 2022-07-21 DIAGNOSIS — E78.01 FAMILIAL HYPERCHOLESTEROLEMIA: ICD-10-CM

## 2022-07-21 LAB
ALBUMIN SERPL BCP-MCNC: 3.8 G/DL (ref 3.5–5)
ALP SERPL-CCNC: 73 U/L (ref 46–116)
ALT SERPL W P-5'-P-CCNC: 37 U/L (ref 12–78)
AST SERPL W P-5'-P-CCNC: 31 U/L (ref 5–45)
BILIRUB DIRECT SERPL-MCNC: 0.13 MG/DL (ref 0–0.2)
BILIRUB SERPL-MCNC: 0.57 MG/DL (ref 0.2–1)
CHOLEST SERPL-MCNC: 203 MG/DL
HDLC SERPL-MCNC: 85 MG/DL
LDLC SERPL CALC-MCNC: 107 MG/DL (ref 0–100)
PROT SERPL-MCNC: 7.8 G/DL (ref 6.4–8.4)
TRIGL SERPL-MCNC: 56 MG/DL

## 2022-07-21 PROCEDURE — 36415 COLL VENOUS BLD VENIPUNCTURE: CPT

## 2022-07-21 PROCEDURE — 80061 LIPID PANEL: CPT

## 2022-07-21 PROCEDURE — 80076 HEPATIC FUNCTION PANEL: CPT

## 2022-07-25 ENCOUNTER — TELEPHONE (OUTPATIENT)
Dept: OBGYN CLINIC | Facility: HOSPITAL | Age: 57
End: 2022-07-25

## 2022-07-25 NOTE — TELEPHONE ENCOUNTER
Hello,  Please advise if the following patient can be forced onto the schedule:    Patient: Madyson Pang    : 1965    MRN: [id number]    Call back #: [phone]    Reason for appointment:L shoulder-patient states her son spoke to Dr Sam Allison who advised he will squeeze her in this week    Requested doctor/location: Dr Deisy Cabral       Thank you

## 2022-07-27 DIAGNOSIS — R52 ACUTE PAIN: Primary | ICD-10-CM

## 2022-07-28 ENCOUNTER — APPOINTMENT (OUTPATIENT)
Dept: RADIOLOGY | Facility: OTHER | Age: 57
End: 2022-07-28
Payer: COMMERCIAL

## 2022-07-28 ENCOUNTER — OFFICE VISIT (OUTPATIENT)
Dept: OBGYN CLINIC | Facility: OTHER | Age: 57
End: 2022-07-28
Payer: COMMERCIAL

## 2022-07-28 VITALS
SYSTOLIC BLOOD PRESSURE: 119 MMHG | DIASTOLIC BLOOD PRESSURE: 80 MMHG | WEIGHT: 123.4 LBS | HEIGHT: 64 IN | BODY MASS INDEX: 21.07 KG/M2 | HEART RATE: 90 BPM

## 2022-07-28 DIAGNOSIS — M75.42 IMPINGEMENT SYNDROME OF LEFT SHOULDER: ICD-10-CM

## 2022-07-28 DIAGNOSIS — M25.512 ACUTE PAIN OF LEFT SHOULDER: ICD-10-CM

## 2022-07-28 DIAGNOSIS — M75.32 CALCIFIC TENDONITIS OF LEFT SHOULDER: Primary | ICD-10-CM

## 2022-07-28 PROCEDURE — 73030 X-RAY EXAM OF SHOULDER: CPT

## 2022-07-28 PROCEDURE — 20610 DRAIN/INJ JOINT/BURSA W/O US: CPT | Performed by: ORTHOPAEDIC SURGERY

## 2022-07-28 PROCEDURE — 99204 OFFICE O/P NEW MOD 45 MIN: CPT | Performed by: ORTHOPAEDIC SURGERY

## 2022-07-28 RX ORDER — BUPIVACAINE HYDROCHLORIDE 2.5 MG/ML
2 INJECTION, SOLUTION INFILTRATION; PERINEURAL
Status: COMPLETED | OUTPATIENT
Start: 2022-07-28 | End: 2022-07-28

## 2022-07-28 RX ORDER — BETAMETHASONE SODIUM PHOSPHATE AND BETAMETHASONE ACETATE 3; 3 MG/ML; MG/ML
6 INJECTION, SUSPENSION INTRA-ARTICULAR; INTRALESIONAL; INTRAMUSCULAR; SOFT TISSUE
Status: COMPLETED | OUTPATIENT
Start: 2022-07-28 | End: 2022-07-28

## 2022-07-28 RX ADMIN — BUPIVACAINE HYDROCHLORIDE 2 ML: 2.5 INJECTION, SOLUTION INFILTRATION; PERINEURAL at 10:19

## 2022-07-28 RX ADMIN — BETAMETHASONE SODIUM PHOSPHATE AND BETAMETHASONE ACETATE 6 MG: 3; 3 INJECTION, SUSPENSION INTRA-ARTICULAR; INTRALESIONAL; INTRAMUSCULAR; SOFT TISSUE at 10:19

## 2022-07-28 NOTE — PROGRESS NOTES
I personally examined the patient and reviewed the history provided  I agree with the note and the assessment and plan by Dr Tiffany Ruvalcaba MD      Assessment:    Left shoulder calcific tendinitis    Plan:    The patient does have symptoms consistent with an acute exacerbation of her calcific tendinitis and I do feel that she would benefit greatly from corticosteroid injection and referral to physical therapy  The patient agreed this was performed today without complication as described in the procedure note  We will see her back in 6 to 8 weeks and if she fails to improve we can either consider referral to Radiology for ultrasound-guided lavage or imaging the form of an MRI scan to confirm there is no other pathology  Assessment  Diagnoses and all orders for this visit:    Calcific tendonitis of left shoulder      Impingement syndrome of left shoulder          Discussion and Plan:    Patient has calcific tendonitis of her left shoulder  She was counseled on nonoperative management including physical therapy and CSI  She received a CSI in clinic today  She will try PT and will see us back in clinic in 6-8 weeks for follow up  At that time, we may obtain further imaging such as an MRI if her symptoms do not resolve  Subjective:   Patient ID: Jena Boyce is a 60 y o  female      Patient works for [employer] as a    Patient is right-hand dominant  Patient states that she was dragon boat racing about 6 weeks ago, when she noticed pain and tenderness in her left shoulder afterward  This pain has been persistent and has been keeping her awake at night, and has made other activities of daily living more uncomfortable  Patient has not tried any remedy for this pain, not using over-the-counter medications, physical therapy, or an injection  She is here today for further workup of her left shoulder symptoms            The following portions of the patient's history were reviewed and updated as appropriate: allergies, current medications, past family history, past medical history, past social history, past surgical history and problem list     Review of Systems   All other systems reviewed and are negative  Objective:  /80 (BP Location: Right arm, Patient Position: Sitting, Cuff Size: Adult)   Pulse 90   Ht 5' 4" (1 626 m)   Wt 56 kg (123 lb 6 4 oz)   BMI 21 18 kg/m²       Left Shoulder Exam     Tenderness   The patient is experiencing tenderness in the acromioclavicular joint  Range of Motion   Active abduction: 90   Passive abduction: normal   Extension: 30   External rotation: 30   Forward flexion: 90   Internal rotation 0 degrees: normal   Internal rotation 90 degrees: normal     Muscle Strength   Abduction: 5/5   External rotation: 5/5   Supraspinatus: 4/5     Tests   Impingement: positive    Other   Sensation: normal  Pulse: present             Physical Exam  Vitals reviewed  Constitutional:       Appearance: Normal appearance  HENT:      Head: Normocephalic and atraumatic  Mouth/Throat:      Mouth: Mucous membranes are moist    Cardiovascular:      Pulses: Normal pulses  Pulmonary:      Effort: Pulmonary effort is normal    Abdominal:      General: Abdomen is flat  Palpations: Abdomen is soft  Skin:     General: Skin is warm and dry  Capillary Refill: Capillary refill takes less than 2 seconds  Neurological:      General: No focal deficit present  Mental Status: She is alert and oriented to person, place, and time  Psychiatric:         Mood and Affect: Mood normal          Behavior: Behavior normal              I have personally reviewed pertinent films in PACS and my interpretation is as follows  XR left shoulder demonstrates calcific tendonitis and acromioclavicular arthritis with no evidence of fracture or dislocation      Large joint arthrocentesis: L subacromial bursa  Universal Protocol:  Risks and benefits: risks, benefits and alternatives were discussed  Consent given by: patient  Patient understanding: patient states understanding of the procedure being performed  Patient identity confirmed: verbally with patient    Supporting Documentation  Indications: pain   Procedure Details  Location: shoulder - L subacromial bursa  Needle size: 22 G  Ultrasound guidance: no  Approach: lateral  Medications administered: 2 mL bupivacaine 0 25 %; 6 mg betamethasone acetate-betamethasone sodium phosphate 6 (3-3) mg/mL    Patient tolerance: patient tolerated the procedure well with no immediate complications

## 2022-07-29 ENCOUNTER — ANESTHESIA (OUTPATIENT)
Dept: GASTROENTEROLOGY | Facility: HOSPITAL | Age: 57
End: 2022-07-29

## 2022-07-29 ENCOUNTER — ANESTHESIA EVENT (OUTPATIENT)
Dept: GASTROENTEROLOGY | Facility: HOSPITAL | Age: 57
End: 2022-07-29

## 2022-07-29 ENCOUNTER — HOSPITAL ENCOUNTER (OUTPATIENT)
Dept: GASTROENTEROLOGY | Facility: HOSPITAL | Age: 57
Setting detail: OUTPATIENT SURGERY
Discharge: HOME | End: 2022-07-29
Attending: COLON & RECTAL SURGERY | Admitting: COLON & RECTAL SURGERY
Payer: COMMERCIAL

## 2022-07-29 VITALS
DIASTOLIC BLOOD PRESSURE: 61 MMHG | TEMPERATURE: 97.4 F | OXYGEN SATURATION: 97 % | RESPIRATION RATE: 16 BRPM | HEART RATE: 71 BPM | SYSTOLIC BLOOD PRESSURE: 113 MMHG

## 2022-07-29 DIAGNOSIS — Z12.11 COLON CANCER SCREENING: ICD-10-CM

## 2022-07-29 PROCEDURE — 88305 TISSUE EXAM BY PATHOLOGIST: CPT | Performed by: PATHOLOGY

## 2022-07-29 PROCEDURE — 99213 OFFICE O/P EST LOW 20 MIN: CPT | Performed by: COLON & RECTAL SURGERY

## 2022-07-29 PROCEDURE — 45385 COLONOSCOPY W/LESION REMOVAL: CPT | Performed by: COLON & RECTAL SURGERY

## 2022-07-29 RX ORDER — LIDOCAINE HYDROCHLORIDE 10 MG/ML
INJECTION, SOLUTION EPIDURAL; INFILTRATION; INTRACAUDAL; PERINEURAL AS NEEDED
Status: DISCONTINUED | OUTPATIENT
Start: 2022-07-29 | End: 2022-07-29

## 2022-07-29 RX ORDER — SODIUM CHLORIDE 9 MG/ML
INJECTION, SOLUTION INTRAVENOUS CONTINUOUS PRN
Status: DISCONTINUED | OUTPATIENT
Start: 2022-07-29 | End: 2022-07-29

## 2022-07-29 RX ORDER — PROPOFOL 10 MG/ML
INJECTION, EMULSION INTRAVENOUS AS NEEDED
Status: DISCONTINUED | OUTPATIENT
Start: 2022-07-29 | End: 2022-07-29

## 2022-07-29 RX ADMIN — PROPOFOL 50 MG: 10 INJECTION, EMULSION INTRAVENOUS at 10:02

## 2022-07-29 RX ADMIN — PROPOFOL 100 MG: 10 INJECTION, EMULSION INTRAVENOUS at 09:53

## 2022-07-29 RX ADMIN — LIDOCAINE HYDROCHLORIDE 50 MG: 10 INJECTION, SOLUTION EPIDURAL; INFILTRATION; INTRACAUDAL; PERINEURAL at 09:49

## 2022-07-29 RX ADMIN — PROPOFOL 100 MG: 10 INJECTION, EMULSION INTRAVENOUS at 09:49

## 2022-07-29 RX ADMIN — PROPOFOL 100 MG: 10 INJECTION, EMULSION INTRAVENOUS at 09:56

## 2022-07-29 RX ADMIN — PROPOFOL 50 MG: 10 INJECTION, EMULSION INTRAVENOUS at 10:06

## 2022-07-29 RX ADMIN — SODIUM CHLORIDE: 0.9 INJECTION, SOLUTION INTRAVENOUS at 09:30

## 2022-07-29 RX ADMIN — PROPOFOL 50 MG: 10 INJECTION, EMULSION INTRAVENOUS at 09:59

## 2022-07-29 NOTE — ANESTHESIA POSTPROCEDURE EVALUATION
Post-Op Assessment Note    CV Status:  Stable  Pain Score: 0    Pain management: adequate     Mental Status:  Alert and awake   Hydration Status:  Euvolemic   PONV Controlled:  Controlled   Airway Patency:  Patent      Post Op Vitals Reviewed: Yes      Staff: Anesthesiologist, CRNA         No complications documented      BP   106/59   Temp  97 4   Pulse  74   Resp   16   SpO2   98%

## 2022-07-29 NOTE — ANESTHESIA PREPROCEDURE EVALUATION
Procedure:  COLONOSCOPY    Relevant Problems   CARDIO   (+) Chronic migraine without aura without status migrainosus, not intractable   (+) Hyperlipidemia   (+) Migraine with aura and without status migrainosus, not intractable      NEURO/PSYCH   (+) Chronic migraine without aura without status migrainosus, not intractable   (+) TIM (generalized anxiety disorder)   (+) Migraine with aura and without status migrainosus, not intractable   (+) Recurrent major depressive disorder, in remission ([facility] [address] )      Recent labs personally reviewed:  Lab Results   Component Value Date    WBC 6 06 04/05/2021    HGB 13 1 04/05/2021     04/05/2021     Lab Results   Component Value Date     09/22/2014    K 3 6 04/05/2021    BUN 15 04/05/2021    CREATININE 0 90 04/05/2021    GLUCOSE 98 09/22/2014     No results found for: PTT   No results found for: INR    Lab Results   Component Value Date    HGBA1C 5 5 03/28/2022              Anesthesia Plan  ASA Score- 2     Anesthesia Type- IV sedation with anesthesia with ASA Monitors  Additional Monitors:   Airway Plan:     Comment: Supplemental O2, etco2 monitoring    Plan Factors-Exercise tolerance (METS): >4 METS  Chart reviewed  Patient summary reviewed  Patient is not a current smoker  Patient not instructed to abstain from smoking on day of procedure  Patient did not smoke on day of surgery  Induction- intravenous  Postoperative Plan-     Informed Consent- Anesthetic plan and risks discussed with patient  I personally reviewed this patient with the CRNA  Discussed and agreed on the Anesthesia Plan with the CRNA  David Dupree

## 2022-09-02 ENCOUNTER — EVALUATION (OUTPATIENT)
Dept: PHYSICAL THERAPY | Facility: CLINIC | Age: 57
End: 2022-09-02
Payer: COMMERCIAL

## 2022-09-02 DIAGNOSIS — R41.840 ATTENTION AND CONCENTRATION DEFICIT: ICD-10-CM

## 2022-09-02 DIAGNOSIS — R41.3 MEMORY DIFFICULTIES: ICD-10-CM

## 2022-09-02 DIAGNOSIS — F33.40 RECURRENT MAJOR DEPRESSIVE DISORDER, IN REMISSION (HCC): ICD-10-CM

## 2022-09-02 DIAGNOSIS — M75.32 CALCIFIC TENDONITIS OF LEFT SHOULDER: ICD-10-CM

## 2022-09-02 DIAGNOSIS — M75.42 IMPINGEMENT SYNDROME OF LEFT SHOULDER: Primary | ICD-10-CM

## 2022-09-02 PROCEDURE — 97161 PT EVAL LOW COMPLEX 20 MIN: CPT | Performed by: PHYSICAL THERAPIST

## 2022-09-02 RX ORDER — DEXTROAMPHETAMINE SACCHARATE, AMPHETAMINE ASPARTATE MONOHYDRATE, DEXTROAMPHETAMINE SULFATE AND AMPHETAMINE SULFATE 5; 5; 5; 5 MG/1; MG/1; MG/1; MG/1
20 CAPSULE, EXTENDED RELEASE ORAL DAILY PRN
Qty: 90 CAPSULE | Refills: 0 | Status: SHIPPED | OUTPATIENT
Start: 2022-09-02 | End: 2022-09-06

## 2022-09-02 NOTE — PROGRESS NOTES
PT Evaluation     Today's date: 2022  Patient name: Juanita Darnell  : 1965  MRN: [id number]  Referring provider: Sirisha Gomez MD  Dx:   Encounter Diagnosis     ICD-10-CM    1  Impingement syndrome of left shoulder  M75 42 Ambulatory Referral to Physical Therapy   2  Calcific tendonitis of left shoulder  M75 32 Ambulatory Referral to Physical Therapy                  Assessment  Assessment details: Pt is a 59 y o  female who presents to outpatient PT with pain, decreased rom, decreased strength and decreased functional mobility  She will benefit from skilled PT to address these deficits in order to achieve her goals and maximize her functional mobility  Goals  Pt is a 59 y o  female who presents to outpatient PT with pain, decreased rom, decreased strength and decreased functional mobility  She will benefit from skilled PT to address these deficits in order to achieve her goals and maximize her functional mobility  Able to reach overhead with min pain  Able to reach behind her back with min pain    Plan  Planned therapy interventions: abdominal trunk stabilization, IADL retraining, joint mobilization, manual therapy, massage, stretching, strengthening, therapeutic activities, therapeutic exercise, therapeutic training and home exercise program  Frequency: 2x week  Duration in weeks: 6        Subjective Evaluation    History of Present Illness  Mechanism of injury: Pt reports that she participated in rowing competition in  and started to have increased pain follow this  X-ray revealed calcific tendonitis  Had an injection with min pain reduction for approx 1 week but pain has returned  Reports that she has a constant ache at night that will radiate down her arm, shaper pain elicits with L sidelying  Takes occasional naproxen to help her sleep at night      Pain  Current pain rating: 3  At worst pain ratin    Patient Goals  Patient goals for therapy: decreased edema, improved balance, increased motion, increased strength, independence with ADLs/IADLs and return to sport/leisure activities          Objective     L shoulder    AROM:   Flexion:  102   Abduction: 90       PROM:   Flexion: 135   Abduction: 110   ER:  85, pain at end range   IR: 45, pain at end range    Strength:    Flexion:  4-/5   Abduction:  4- /5   ER:    3+/5   IR:     4-/5, slight pain produced      Tender to palpation: bicipital groove and over posterior cuff            Melton: pos  Neer: neg  Empty can: pos  Painful arc: neg  Scour: neg  Speeds: pos                               Precautions: OA      Manuals             laser             prom             jnt mobs                          Neuro Re-Ed                                                                                                        Ther Ex             Tb rows             lpd             Tb ir/er             Sleeper strech             scap punches             scap retraction with cane ext                                       Ther Activity             ube                          Gait Training                                       Modalities

## 2022-09-02 NOTE — TELEPHONE ENCOUNTER
Edgard Singleton is requesting a refill on their adderall 20mg  Patient's next appointment with Ravinder Degroot DO is on 11/14

## 2022-09-08 ENCOUNTER — APPOINTMENT (OUTPATIENT)
Dept: PHYSICAL THERAPY | Facility: CLINIC | Age: 57
End: 2022-09-08
Payer: COMMERCIAL

## 2022-09-12 ENCOUNTER — OFFICE VISIT (OUTPATIENT)
Dept: PHYSICAL THERAPY | Facility: CLINIC | Age: 57
End: 2022-09-12
Payer: COMMERCIAL

## 2022-09-12 DIAGNOSIS — R41.3 MEMORY DIFFICULTIES: ICD-10-CM

## 2022-09-12 DIAGNOSIS — M75.42 IMPINGEMENT SYNDROME OF LEFT SHOULDER: ICD-10-CM

## 2022-09-12 DIAGNOSIS — M75.32 CALCIFIC TENDONITIS OF LEFT SHOULDER: Primary | ICD-10-CM

## 2022-09-12 DIAGNOSIS — R41.840 ATTENTION AND CONCENTRATION DEFICIT: ICD-10-CM

## 2022-09-12 DIAGNOSIS — F33.40 RECURRENT MAJOR DEPRESSIVE DISORDER, IN REMISSION (HCC): ICD-10-CM

## 2022-09-12 PROCEDURE — 97140 MANUAL THERAPY 1/> REGIONS: CPT

## 2022-09-12 PROCEDURE — 97110 THERAPEUTIC EXERCISES: CPT

## 2022-09-12 NOTE — TELEPHONE ENCOUNTER
Alberto Flanagan from [facility], called re: getting generic med in place of "brand necessary"   Generic mediacation is more cost effective  Would it be possible to send in a new scrip?

## 2022-09-13 DIAGNOSIS — R41.3 MEMORY DIFFICULTIES: ICD-10-CM

## 2022-09-13 DIAGNOSIS — R41.840 ATTENTION AND CONCENTRATION DEFICIT: ICD-10-CM

## 2022-09-13 DIAGNOSIS — F33.40 RECURRENT MAJOR DEPRESSIVE DISORDER, IN REMISSION (HCC): ICD-10-CM

## 2022-09-13 RX ORDER — DEXTROAMPHETAMINE SACCHARATE, AMPHETAMINE ASPARTATE MONOHYDRATE, DEXTROAMPHETAMINE SULFATE AND AMPHETAMINE SULFATE 5; 5; 5; 5 MG/1; MG/1; MG/1; MG/1
20 CAPSULE, EXTENDED RELEASE ORAL EVERY MORNING
Qty: 90 CAPSULE | Refills: 0 | Status: SHIPPED | OUTPATIENT
Start: 2022-09-13 | End: 2022-11-07 | Stop reason: SDUPTHER

## 2022-09-13 RX ORDER — DEXTROAMPHETAMINE SULFATE, DEXTROAMPHETAMINE SACCHARATE, AMPHETAMINE SULFATE AND AMPHETAMINE ASPARTATE 5; 5; 5; 5 MG/1; MG/1; MG/1; MG/1
20 CAPSULE, EXTENDED RELEASE ORAL EVERY MORNING
Qty: 90 CAPSULE | Refills: 0 | Status: SHIPPED | OUTPATIENT
Start: 2022-09-13 | End: 2022-09-13 | Stop reason: SDUPTHER

## 2022-09-14 ENCOUNTER — TELEPHONE (OUTPATIENT)
Dept: NEUROLOGY | Facility: CLINIC | Age: 57
End: 2022-09-14

## 2022-09-14 NOTE — TELEPHONE ENCOUNTER
I sent a iovox message to the patient as well  I will call again for a third time at the end of the day

## 2022-09-15 ENCOUNTER — PROCEDURE VISIT (OUTPATIENT)
Dept: NEUROLOGY | Facility: CLINIC | Age: 57
End: 2022-09-15
Payer: COMMERCIAL

## 2022-09-15 VITALS — TEMPERATURE: 97.4 F | SYSTOLIC BLOOD PRESSURE: 124 MMHG | HEART RATE: 102 BPM | DIASTOLIC BLOOD PRESSURE: 88 MMHG

## 2022-09-15 DIAGNOSIS — G43.709 CHRONIC MIGRAINE WITHOUT AURA WITHOUT STATUS MIGRAINOSUS, NOT INTRACTABLE: Primary | ICD-10-CM

## 2022-09-15 PROCEDURE — 64615 CHEMODENERV MUSC MIGRAINE: CPT | Performed by: PHYSICIAN ASSISTANT

## 2022-09-15 NOTE — PROGRESS NOTES

## 2022-09-19 ENCOUNTER — OFFICE VISIT (OUTPATIENT)
Dept: PHYSICAL THERAPY | Facility: CLINIC | Age: 57
End: 2022-09-19
Payer: COMMERCIAL

## 2022-09-19 DIAGNOSIS — M75.42 IMPINGEMENT SYNDROME OF LEFT SHOULDER: ICD-10-CM

## 2022-09-19 DIAGNOSIS — M75.32 CALCIFIC TENDONITIS OF LEFT SHOULDER: Primary | ICD-10-CM

## 2022-09-19 PROCEDURE — 97140 MANUAL THERAPY 1/> REGIONS: CPT

## 2022-09-19 PROCEDURE — 97110 THERAPEUTIC EXERCISES: CPT

## 2022-09-19 NOTE — PROGRESS NOTES
Daily Note     Today's date: 2022  Patient name: Tristan Betancur  : 1965  MRN: [id number]  Referring provider: Liliana Zamora MD  Dx:   Encounter Diagnosis     ICD-10-CM    1  Calcific tendonitis of left shoulder  M75 32    2  Impingement syndrome of left shoulder  M75 42        Start Time: 1147  Stop Time: 1232  Total time in clinic (min): 45 minutes       Subjective: Patient reports left shoulder pain continues to be the most bothersome at night  Patient denies any issues performing HEP  Patient is scheduled to follow up with the doctor on 22  Objective: See treatment diary below  Assessment: Progression of therapeutic exercise program is tolerated well therefore provided patient with blue theraband for home use  Left shoulder ROM visibly improved with the manual stretching  Plan: Continue treatment as per PT plan of care         Precautions: OA      Manuals            laser 5' JLW 5' JLW           prom JLW JLW           jnt mobs                          Neuro Re-Ed                                                                                                        Ther Ex             tb rows green  20 blue  30           tb lpd green  20 blue  30           tb ir/er green  20 ea blue ir  green er  20 ea           strap IR stretch 20"x4 30"x4           Sleeper strech             scap punches 2#  5"x20 3#  30           scap retraction with cane ext 10"x10 10"x10           ube 6' 8'                        Ther Activity                                       Gait Training                                       Modalities

## 2022-09-22 ENCOUNTER — OFFICE VISIT (OUTPATIENT)
Dept: OBGYN CLINIC | Facility: OTHER | Age: 57
End: 2022-09-22
Payer: COMMERCIAL

## 2022-09-22 VITALS
HEIGHT: 64 IN | WEIGHT: 128.4 LBS | DIASTOLIC BLOOD PRESSURE: 79 MMHG | SYSTOLIC BLOOD PRESSURE: 125 MMHG | HEART RATE: 89 BPM | BODY MASS INDEX: 21.92 KG/M2

## 2022-09-22 DIAGNOSIS — M75.32 CALCIFIC TENDONITIS OF LEFT SHOULDER: Primary | ICD-10-CM

## 2022-09-22 PROCEDURE — 99214 OFFICE O/P EST MOD 30 MIN: CPT | Performed by: PHYSICIAN ASSISTANT

## 2022-09-22 NOTE — PROGRESS NOTES
Assessment  Diagnoses and all orders for this visit:    Calcific tendonitis of left shoulder      Discussion and Plan:    Akhil Gaxiola states her left shoulder symptoms have not improved from initial visit  Recommend she continue with treatment as recommended at last office visit  MRI of the left shoulder will be ordered to confirm diagnosis  We will send Akhil Gaxiola for US guided Lavage if MRI does not show any other structural pathology    1  MRI left shoulder  2  Follow up after MRI  3  Continue with PT  4  HEP per therapist  5  Tylenol PRN  6  Activities to tolerance    Subjective:   Patient ID: Todd Germain is a 59 y o  female      Akhil Gaxiola is here for follow up of her left shoulder  She was diagnosed with calcific tendinitis at last office visit 8 weeks ago  Akhil Gaxiola was provided a steroid injection for pain relief and provided a referral to physical therapy for treatment  She has attended 3 formal therapy sessions and those were in the past 3 weeks  She reports compliance with her HEP  She reports continued left shoulder pain without any improvement  Pain is made worse by abduction with IR and reaching behind her back  Pain interferes with Sleep  Pain improves with rest   Occasionally she will use OTC or heating pad for relief  Admits pain sometimes will radiate past the elbow  Denies new injury or trauma    The following portions of the patient's history were reviewed and updated as appropriate: allergies, current medications, past family history, past medical history, past social history, past surgical history and problem list     Review of Systems   Constitutional: Negative for chills and fever  HENT: Negative for hearing loss  Eyes: Negative for visual disturbance  Respiratory: Negative for shortness of breath  Cardiovascular: Negative for chest pain  Gastrointestinal: Negative for abdominal pain  Musculoskeletal:        As reviewed in the HPI   Skin: Negative for rash     Neurological:        As reviewed in the HPI   Psychiatric/Behavioral: Negative for agitation  Objective:  /79 (BP Location: Right arm, Patient Position: Sitting, Cuff Size: Adult)   Pulse 89   Ht 5' 4" (1 626 m)   Wt 58 2 kg (128 lb 6 4 oz)   BMI 22 04 kg/m²       Left Shoulder Exam     Tenderness   The patient is experiencing no tenderness  Range of Motion   Passive abduction: normal   External rotation: normal   Forward flexion: 170   Internal rotation 0 degrees: Lumbar     Muscle Strength   External rotation: 5/5   Supraspinatus: 5/5     Tests   Melton test: positive  Impingement: positive  Drop arm: negative    Other   Erythema: absent  Sensation: normal  Pulse: present             Physical Exam  Constitutional:       Appearance: She is well-developed  HENT:      Head: Normocephalic  Pulmonary:      Breath sounds: Normal breath sounds  No wheezing  Musculoskeletal:      Cervical back: Normal range of motion  Skin:     General: Skin is warm and dry  Neurological:      Mental Status: She is alert and oriented to person, place, and time  Psychiatric:         Behavior: Behavior normal          Thought Content:  Thought content normal          Judgment: Judgment normal

## 2022-09-29 ENCOUNTER — OFFICE VISIT (OUTPATIENT)
Dept: PHYSICAL THERAPY | Facility: CLINIC | Age: 57
End: 2022-09-29
Payer: COMMERCIAL

## 2022-09-29 DIAGNOSIS — M75.32 CALCIFIC TENDONITIS OF LEFT SHOULDER: Primary | ICD-10-CM

## 2022-09-29 PROCEDURE — 97140 MANUAL THERAPY 1/> REGIONS: CPT | Performed by: PHYSICAL THERAPIST

## 2022-09-29 PROCEDURE — 97110 THERAPEUTIC EXERCISES: CPT | Performed by: PHYSICAL THERAPIST

## 2022-09-29 NOTE — PROGRESS NOTES
Daily Note     Today's date: 2022  Patient name: Yissel Thompson  : 1965  MRN: [id number]  Referring provider: Priyanka Bhakta MD  Dx:   Encounter Diagnosis     ICD-10-CM    1  Calcific tendonitis of left shoulder  M75 32                      Subjective pt reports that she has noticed her rom is improving but that she continues to have sig pain when reaching into abduction  Reports that she has been referred for and MRI and is scheduled next week  Objective: See treatment diary below  Assessment: rom continues to improve and pt is comfortable t/o therex  Discussed hold formal PT until MRI and f/u is completed  Pt agrees with this poc  Plan: Continue treatment as per PT plan of care         Precautions: OA      Manuals           laser 5' JLW 5' JLW 5'          prom JLW JLW kl          jnt mobs   kl                       Neuro Re-Ed                                                                                                        Ther Ex             tb rows green  20 blue  30 Blue x30          tb lpd green  20 blue  30           tb ir/er green  20 ea blue ir  green er  20 ea Blue tb B/L          strap IR stretch 20"x4 30"x4 30"x4          Sleeper strech             scap punches 2#  5"x20 3#  30           scap retraction with cane ext 10"x10 10"x10 10"x10          ube 6' 8' 8'                       Ther Activity                                       Gait Training                                       Modalities

## 2022-10-05 ENCOUNTER — HOSPITAL ENCOUNTER (OUTPATIENT)
Dept: MRI IMAGING | Facility: HOSPITAL | Age: 57
Discharge: HOME | End: 2022-10-05
Payer: COMMERCIAL

## 2022-10-05 DIAGNOSIS — M75.32 CALCIFIC TENDONITIS OF LEFT SHOULDER: ICD-10-CM

## 2022-10-05 PROCEDURE — G1004 CDSM NDSC: HCPCS

## 2022-10-05 PROCEDURE — 73221 MRI JOINT UPR EXTREM W/O DYE: CPT

## 2022-10-10 ENCOUNTER — OFFICE VISIT (OUTPATIENT)
Dept: OBGYN CLINIC | Facility: OTHER | Age: 57
End: 2022-10-10
Payer: COMMERCIAL

## 2022-10-10 VITALS
SYSTOLIC BLOOD PRESSURE: 114 MMHG | BODY MASS INDEX: 21.99 KG/M2 | HEIGHT: 64 IN | WEIGHT: 128.8 LBS | DIASTOLIC BLOOD PRESSURE: 72 MMHG | HEART RATE: 82 BPM

## 2022-10-10 DIAGNOSIS — M75.32 CALCIFIC TENDONITIS OF LEFT SHOULDER: Primary | ICD-10-CM

## 2022-10-10 PROCEDURE — 99214 OFFICE O/P EST MOD 30 MIN: CPT | Performed by: ORTHOPAEDIC SURGERY

## 2022-10-10 NOTE — PROGRESS NOTES
Assessment  Diagnoses and all orders for this visit:    Calcific tendonitis of left shoulder    Discussion and Plan:    · Explained to the patient that her MRI reveals calcific tendonitis without full thickness rotator cuff tear  At this time, we will refer patient for an US guided lavage of the left shoulder for her calcific tendonitis  She understood and all questions were answered  · Continue HEP as tolerated with modifications to avoid pain  · Follow up on an as needed basis    Subjective:   Patient ID: Cleopatra Barrios is a 59 y o  female    58 y/o female who presents today for a follow up visit for her left shoulder as well as to discuss MRI results  Patient continues to note persistent pain about the shoulder, especially with overhead and repetitive motions  She also reports that the pain sometimes wakes her up at night if she rolls over onto the shoulder  She continues her HEP with minimal benefit  No numbness or tingling  No fevers or chills  The following portions of the patient's history were reviewed and updated as appropriate: allergies, current medications, past family history, past medical history, past social history, past surgical history and problem list     Objective:  /72 (BP Location: Right arm, Patient Position: Sitting, Cuff Size: Adult)   Pulse 82   Ht 5' 4" (1 626 m)   Wt 58 4 kg (128 lb 12 8 oz)   BMI 22 11 kg/m²       Left Shoulder Exam     Tenderness   The patient is experiencing no tenderness  Range of Motion   External rotation: 60   Forward flexion: 170   Internal rotation 0 degrees: Lumbar     Muscle Strength   Abduction: 5/5   External rotation: 5/5     Tests   Melton test: positive  Impingement: positive  Drop arm: negative    Other   Erythema: absent  Sensation: normal  Pulse: present             Physical Exam  Constitutional:       Appearance: She is well-developed  Eyes:      Pupils: Pupils are equal, round, and reactive to light     Pulmonary: Effort: Pulmonary effort is normal       Breath sounds: Normal breath sounds  Skin:     General: Skin is warm and dry  Neurological:      Mental Status: She is alert and oriented to person, place, and time  Psychiatric:         Behavior: Behavior normal          Thought Content: Thought content normal          Judgment: Judgment normal          I have personally reviewed pertinent films in PACS and my interpretation is as follows  MRI Left Shoulder 10/5/22: Calcific tendinosis within the anterior distal supraspinatus tendon  No full thickness rotator cuff tear      Scribe Attestation    I,:  Cate Boone am acting as a scribe while in the presence of the attending physician :       I,:  Juan Jurado MD personally performed the services described in this documentation    as scribed in my presence :

## 2022-10-19 ENCOUNTER — TELEPHONE (OUTPATIENT)
Dept: OBGYN CLINIC | Facility: HOSPITAL | Age: 57
End: 2022-10-19

## 2022-10-19 NOTE — TELEPHONE ENCOUNTER
Kori Salas     Doctor: Kyle Lee    Reason for call: Calling for number of dept who schedules US lavage for left shoulder    Gave pt [phone]     Call back#: n/a

## 2022-10-28 DIAGNOSIS — N94.10 DYSPAREUNIA, FEMALE: Primary | ICD-10-CM

## 2022-10-28 RX ORDER — CONJUGATED ESTROGENS 0.62 MG/G
CREAM VAGINAL
Qty: 30 G | Refills: 1 | Status: SHIPPED | OUTPATIENT
Start: 2022-10-28 | End: 2023-10-28

## 2022-10-31 ENCOUNTER — TELEPHONE (OUTPATIENT)
Dept: PSYCHIATRY | Facility: CLINIC | Age: 57
End: 2022-10-31

## 2022-10-31 NOTE — TELEPHONE ENCOUNTER
Writer left message for patient advising appt on 11/14 with Dr Cesar Andrew is cancelled and to contact office to reschedule    Options for rescheduling are 11/25 @ 8, 10, 1030 am

## 2022-11-07 DIAGNOSIS — R41.3 MEMORY DIFFICULTIES: ICD-10-CM

## 2022-11-07 DIAGNOSIS — R41.840 ATTENTION AND CONCENTRATION DEFICIT: ICD-10-CM

## 2022-11-07 DIAGNOSIS — F41.1 GAD (GENERALIZED ANXIETY DISORDER): ICD-10-CM

## 2022-11-07 DIAGNOSIS — F33.40 RECURRENT MAJOR DEPRESSIVE DISORDER, IN REMISSION (HCC): ICD-10-CM

## 2022-11-07 RX ORDER — CLONAZEPAM 0.5 MG/1
TABLET ORAL
Qty: 45 TABLET | Refills: 5 | Status: SHIPPED | OUTPATIENT
Start: 2022-11-08

## 2022-11-07 RX ORDER — BUPROPION HYDROCHLORIDE 300 MG/1
300 TABLET ORAL EVERY MORNING
Qty: 90 TABLET | Refills: 1 | Status: SHIPPED | OUTPATIENT
Start: 2022-11-07

## 2022-11-07 RX ORDER — FLUOXETINE HYDROCHLORIDE 40 MG/1
40 CAPSULE ORAL DAILY
Qty: 90 CAPSULE | Refills: 1 | Status: SHIPPED | OUTPATIENT
Start: 2022-11-07

## 2022-11-07 RX ORDER — DEXTROAMPHETAMINE SACCHARATE, AMPHETAMINE ASPARTATE MONOHYDRATE, DEXTROAMPHETAMINE SULFATE AND AMPHETAMINE SULFATE 5; 5; 5; 5 MG/1; MG/1; MG/1; MG/1
20 CAPSULE, EXTENDED RELEASE ORAL EVERY MORNING
Qty: 90 CAPSULE | Refills: 0 | Status: SHIPPED | OUTPATIENT
Start: 2022-12-10

## 2022-11-17 NOTE — NURSING NOTE
Call placed to patient to discuss upcoming appointment at [facility] radiology department and complete consultation with patient  Patient is having  left calcific tendinitis lavage procedure utilizing US guidance  Reviewed patient's allergies, no current anticoagulant medication present per patient , also discussed procedure in some detail, as well as the post procedure care instructions  Patient was instructed that pain may get worse for the first few days until the subacromial bursa steroid injection takes affect  Patient was instructed that tylenol or a non-steriodal anti-inflammatory medication may be taken to assist with the pain  Informed patient that the limb must be rested for 48 hours and to avoid heavy lifting for 2 weeks (weight no more than 5 lbs)  Physiotherapy can be resumed in a week  Instructed patient to keep the site of procedure dry and clean for 4-6 hours and may remove bandage in 4-6 hours  Reminded patient of location and time expected for procedure, Patient expressed understanding by verbalizing and repeating instructions

## 2022-11-25 ENCOUNTER — HOSPITAL ENCOUNTER (OUTPATIENT)
Dept: RADIOLOGY | Facility: HOSPITAL | Age: 57
Discharge: HOME | End: 2022-11-25
Attending: ORTHOPAEDIC SURGERY

## 2022-11-25 DIAGNOSIS — M75.32 CALCIFIC TENDONITIS OF LEFT SHOULDER: ICD-10-CM

## 2022-11-25 RX ORDER — METHYLPREDNISOLONE ACETATE 80 MG/ML
80 INJECTION, SUSPENSION INTRA-ARTICULAR; INTRALESIONAL; INTRAMUSCULAR; SOFT TISSUE ONCE
Status: COMPLETED | OUTPATIENT
Start: 2022-11-25 | End: 2022-11-25

## 2022-11-25 RX ORDER — BUPIVACAINE HYDROCHLORIDE 2.5 MG/ML
10 INJECTION, SOLUTION EPIDURAL; INFILTRATION; INTRACAUDAL ONCE
Status: COMPLETED | OUTPATIENT
Start: 2022-11-25 | End: 2022-11-25

## 2022-11-25 RX ORDER — SODIUM CHLORIDE 9 MG/ML
20 INJECTION INTRAVENOUS ONCE
Status: COMPLETED | OUTPATIENT
Start: 2022-11-25 | End: 2022-11-25

## 2022-11-25 RX ORDER — LIDOCAINE HYDROCHLORIDE 10 MG/ML
30 INJECTION, SOLUTION EPIDURAL; INFILTRATION; INTRACAUDAL; PERINEURAL ONCE
Status: COMPLETED | OUTPATIENT
Start: 2022-11-25 | End: 2022-11-25

## 2022-11-25 RX ADMIN — METHYLPREDNISOLONE ACETATE 80 MG: 80 INJECTION, SUSPENSION INTRA-ARTICULAR; INTRALESIONAL; INTRAMUSCULAR; SOFT TISSUE at 10:49

## 2022-11-25 RX ADMIN — LIDOCAINE HYDROCHLORIDE 30 ML: 10 INJECTION, SOLUTION EPIDURAL; INFILTRATION; INTRACAUDAL; PERINEURAL at 10:43

## 2022-11-25 RX ADMIN — SODIUM CHLORIDE 20 ML: 9 INJECTION, SOLUTION INTRAMUSCULAR; INTRAVENOUS; SUBCUTANEOUS at 10:45

## 2022-11-25 RX ADMIN — BUPIVACAINE HYDROCHLORIDE 10 ML: 2.5 INJECTION, SOLUTION EPIDURAL; INFILTRATION; INTRACAUDAL; PERINEURAL at 10:49

## 2022-12-15 ENCOUNTER — PROCEDURE VISIT (OUTPATIENT)
Dept: NEUROLOGY | Facility: CLINIC | Age: 57
End: 2022-12-15

## 2022-12-15 VITALS — TEMPERATURE: 95.9 F | HEART RATE: 83 BPM | DIASTOLIC BLOOD PRESSURE: 78 MMHG | SYSTOLIC BLOOD PRESSURE: 139 MMHG

## 2022-12-15 DIAGNOSIS — R41.3 MEMORY DIFFICULTIES: ICD-10-CM

## 2022-12-15 DIAGNOSIS — F33.40 RECURRENT MAJOR DEPRESSIVE DISORDER, IN REMISSION (HCC): ICD-10-CM

## 2022-12-15 DIAGNOSIS — G43.709 CHRONIC MIGRAINE WITHOUT AURA WITHOUT STATUS MIGRAINOSUS, NOT INTRACTABLE: Primary | ICD-10-CM

## 2022-12-15 DIAGNOSIS — R41.840 ATTENTION AND CONCENTRATION DEFICIT: ICD-10-CM

## 2022-12-15 RX ORDER — DEXTROAMPHETAMINE SACCHARATE, AMPHETAMINE ASPARTATE MONOHYDRATE, DEXTROAMPHETAMINE SULFATE AND AMPHETAMINE SULFATE 5; 5; 5; 5 MG/1; MG/1; MG/1; MG/1
20 CAPSULE, EXTENDED RELEASE ORAL EVERY MORNING
Qty: 90 CAPSULE | Refills: 0 | Status: SHIPPED | OUTPATIENT
Start: 2022-12-15

## 2022-12-15 NOTE — PROGRESS NOTES

## 2022-12-22 ENCOUNTER — OFFICE VISIT (OUTPATIENT)
Dept: FAMILY MEDICINE CLINIC | Facility: CLINIC | Age: 57
End: 2022-12-22

## 2022-12-22 VITALS
WEIGHT: 125 LBS | TEMPERATURE: 97.5 F | HEIGHT: 64 IN | DIASTOLIC BLOOD PRESSURE: 80 MMHG | BODY MASS INDEX: 21.34 KG/M2 | SYSTOLIC BLOOD PRESSURE: 130 MMHG | OXYGEN SATURATION: 99 % | RESPIRATION RATE: 16 BRPM | HEART RATE: 72 BPM

## 2022-12-22 DIAGNOSIS — J02.9 SORE THROAT: Primary | ICD-10-CM

## 2022-12-22 LAB
S PYO AG THROAT QL: NEGATIVE
SARS-COV-2 AG UPPER RESP QL IA: NEGATIVE
VALID CONTROL: NORMAL

## 2022-12-22 NOTE — PROGRESS NOTES
Assessment/Plan:     Diagnoses and all orders for this visit:    Sore throat  -     POCT rapid strepA  -     POCT Rapid Covid Ag  -     Throat culture      Rapid strep negative  Throat culture sent  Pt may gargle with warm salty water and use Chloraseptic spray PRN  She is to rest and keep well hydrated  Patient is encouraged to call our office for any questions/concerns, persistent or worsening symptoms  Patient states they understand and agree with treatment plan  Pt to f/u PRN  Subjective:      Patient ID: Umang Skinner is a 59 y o  female  Pt presents for sore throat that started last week  She also notes a dry cough  At night she feels like her throat is slightly tight  She denies difficulty breathing, talking or eating  Denies fever, chills, body aches, sinus pressure/ear pressure, headache, NVD  Pt's children tested positive for strep and she wanted to get tested  The following portions of the patient's history were reviewed and updated as appropriate: allergies, current medications, past family history, past medical history, past social history, past surgical history and problem list     Review of Systems    As noted per HPI  Objective:      /80 (BP Location: Left arm, Patient Position: Sitting, Cuff Size: Standard)   Pulse 72   Temp 97 5 °F (36 4 °C) (Oral)   Resp 16   Ht 5' 4" (1 626 m)   Wt 56 7 kg (125 lb)   SpO2 99%   BMI 21 46 kg/m²          Physical Exam  Vitals reviewed  Constitutional:       Appearance: Normal appearance  She is well-developed  HENT:      Head: Normocephalic  Right Ear: Tympanic membrane normal       Left Ear: Tympanic membrane and ear canal normal       Nose: No congestion or rhinorrhea  Mouth/Throat:      Pharynx: No oropharyngeal exudate or posterior oropharyngeal erythema  Cardiovascular:      Rate and Rhythm: Normal rate and regular rhythm  Pulses: Normal pulses  Heart sounds: Normal heart sounds     Pulmonary: Effort: Pulmonary effort is normal       Breath sounds: Normal breath sounds  Abdominal:      General: Bowel sounds are normal    Neurological:      Mental Status: She is alert and oriented to person, place, and time  Mental status is at baseline  Psychiatric:         Mood and Affect: Mood normal          Behavior: Behavior normal          Thought Content:  Thought content normal          Judgment: Judgment normal

## 2022-12-24 LAB — BACTERIA THROAT CULT: NORMAL

## 2023-01-19 ENCOUNTER — TELEMEDICINE (OUTPATIENT)
Dept: PSYCHIATRY | Facility: CLINIC | Age: 58
End: 2023-01-19

## 2023-01-19 ENCOUNTER — TELEPHONE (OUTPATIENT)
Dept: NEUROLOGY | Facility: CLINIC | Age: 58
End: 2023-01-19

## 2023-01-19 DIAGNOSIS — F33.40 RECURRENT MAJOR DEPRESSIVE DISORDER, IN REMISSION (HCC): ICD-10-CM

## 2023-01-19 DIAGNOSIS — R41.840 ATTENTION AND CONCENTRATION DEFICIT: ICD-10-CM

## 2023-01-19 DIAGNOSIS — F41.1 GAD (GENERALIZED ANXIETY DISORDER): ICD-10-CM

## 2023-01-19 DIAGNOSIS — R41.3 MEMORY DIFFICULTIES: ICD-10-CM

## 2023-01-19 RX ORDER — DEXTROAMPHETAMINE SACCHARATE, AMPHETAMINE ASPARTATE MONOHYDRATE, DEXTROAMPHETAMINE SULFATE AND AMPHETAMINE SULFATE 5; 5; 5; 5 MG/1; MG/1; MG/1; MG/1
20 CAPSULE, EXTENDED RELEASE ORAL EVERY MORNING
Qty: 90 CAPSULE | Refills: 0 | Status: SHIPPED | OUTPATIENT
Start: 2023-01-19

## 2023-01-19 RX ORDER — FLUOXETINE HYDROCHLORIDE 40 MG/1
40 CAPSULE ORAL DAILY
Qty: 90 CAPSULE | Refills: 1 | Status: SHIPPED | OUTPATIENT
Start: 2023-01-19

## 2023-01-19 RX ORDER — CLONAZEPAM 0.5 MG/1
TABLET ORAL
Qty: 45 TABLET | Refills: 5 | Status: SHIPPED | OUTPATIENT
Start: 2023-01-30

## 2023-01-19 RX ORDER — BUPROPION HYDROCHLORIDE 300 MG/1
300 TABLET ORAL EVERY MORNING
Qty: 90 TABLET | Refills: 1 | Status: SHIPPED | OUTPATIENT
Start: 2023-01-19

## 2023-01-19 NOTE — BH TREATMENT PLAN
TREATMENT PLAN (Medication Management Only)        [place]    Name/Date of Birth/MRN:  Emely Number 59 y o  1965 MRN: [id number]  Date of Treatment Plan: January 19, 2023  Diagnosis/Diagnoses:   1  Recurrent major depressive disorder, in remission ([facility] [address] )    2  TIM (generalized anxiety disorder)    3  Attention and concentration deficit    4  Memory difficulties      Strengths/Personal Resources for Self-Care: very good work ethics, honest, kind  Area/Areas of need (in own words): speaking my mind, making my decisions  1  Long Term Goal: "keep doing what I am doing"   Target Date: 180 days from treatment plan  Person/Persons responsible for completion of goal: Dr Rayna Heck and Self  2  Short Term Objective (s) - How will we reach this goal?:   A  Provider new recommended medication/dosage changes and/or continue medication(s): discussed  B  Continue to stay active and involved with family  C  Follow up with all providers, take meds as prescribed  D  Target Date: 6 months from treatment plan unless noted otherwise  Person/Persons Responsible for Completion of Goal: Dr Rayna Heck and Self   Progress Towards Goals: continuing treatment   Treatment Modality: Medication management and therapy PRN  Review due 180 days from date of this plan: Approximately 6 months from today ( 4/19/2023 )    Expected length of service: ongoing treatment  My Physician/PA/NP and I have developed this plan together and I agree to work on the goals and objectives  I understand the treatment goals that were developed for my treatment    Signature:  Emely Cruz  1/19/2023  9:29 AM  Treatment Plan done but not signed at time of office visit due to:  Plan reviewed by phone or in person  and verbal consent given due to virtual visit       Date and time:  Signature of parent/guardian if under age of 15 years: Date and time:  Signature of provider:      Date and time:  Signature of Supervising Physician:    Date and time: 1/19/2023      Nona Hernadez III

## 2023-01-19 NOTE — PSYCH
Virtual Regular Visit    Verification of patient location:    Patient is located in the following state in which I hold an active license [place]      Assessment/Plan:    Problem List Items Addressed This Visit        Other    Attention and concentration deficit    Relevant Medications    buPROPion (WELLBUTRIN XL) 300 mg 24 hr tablet    amphetamine-dextroamphetamine (ADDERALL XR, 20MG,) 20 MG 24 hr capsule    TIM (generalized anxiety disorder)    Relevant Medications    FLUoxetine (PROzac) 40 MG capsule    clonazePAM (KlonoPIN) 0 5 mg tablet (Start on 1/30/2023)    buPROPion (WELLBUTRIN XL) 300 mg 24 hr tablet    amphetamine-dextroamphetamine (ADDERALL XR, 20MG,) 20 MG 24 hr capsule    Recurrent major depressive disorder, in remission (HCC)    Relevant Medications    FLUoxetine (PROzac) 40 MG capsule    buPROPion (WELLBUTRIN XL) 300 mg 24 hr tablet    amphetamine-dextroamphetamine (ADDERALL XR, 20MG,) 20 MG 24 hr capsule    Memory difficulties    Relevant Medications    buPROPion (WELLBUTRIN XL) 300 mg 24 hr tablet    amphetamine-dextroamphetamine (ADDERALL XR, 20MG,) 20 MG 24 hr capsule            Reason for visit is   Chief Complaint   Patient presents with   • Virtual Regular Visit        Encounter provider Brandon Elmore DO    Provider located at [address] [place] [address]   [address] [address] [phone]      Recent Visits  No visits were found meeting these conditions  Showing recent visits within past 7 days and meeting all other requirements  Today's Visits  Date Type Provider Dept   01/19/23 [address] [place] [address] III,  [facility]   Showing today's visits and meeting all other requirements  Future Appointments  No visits were found meeting these conditions  Showing future appointments within next 150 days and meeting all other requirements       The patient was identified by name and date of birth   Catrachitaluigi Pereira Domingo Banuelos was informed that this is a telemedicine visit and that the visit is being conducted throughthe [email or web address] platform  She agrees to proceed     My office door was closed  No one else was in the room  She acknowledged consent and understanding of privacy and security of the video platform  The patient has agreed to participate and understands they can discontinue the visit at any time  Patient is aware this is a billable service  Subjective  See below    HPI     Past Medical History:   Diagnosis Date   • Anxiety    • Basal cell carcinoma     Last assessed: 9/26/14   • Cancer ([facility] [address] )     BCC   • Depression    • Depression with anxiety     Last assessed: 1/13/17   • Insomnia     Last assessed: 9/26/14   • Kidney stone        Past Surgical History:   Procedure Laterality Date   • BLADDER SURGERY      lift of bladder   • CYSTOSCOPY      Theurapeutic   TVT-O   • EYE SURGERY Bilateral     LASIK   • FACIAL/NECK BIOPSY Right 3/12/2019    Procedure: UPPER EYELID CYST EXCISION;  Surgeon: Willian Hodgson MD;  Location: [facility]  MAIN OR;  Service: Plastics   • GYNECOLOGIC CRYOSURGERY      Cervix   • HYSTEROSCOPY W/ ENDOMETRIAL ABLATION  12/14/2010    Debor[facility]   • KIDNEY SURGERY  kidney stone removal    1983   • MOHS SURGERY     • NC CORRJ HALLUX VALGUS W/SESMDC W/DIST METAR OSTEOT Left 4/16/2021    Procedure: BUNIONECTOMY JENNA left foot;  Surgeon: Carly Dykes DPM;  Location:  MAIN OR;  Service: Podiatry   • SKIN BIOPSY     • TUBAL LIGATION     • WISDOM TOOTH EXTRACTION         Current Outpatient Medications   Medication Sig Dispense Refill   • amphetamine-dextroamphetamine (ADDERALL XR, 20MG,) 20 MG 24 hr capsule Take 1 capsule (20 mg total) by mouth every morning Max Daily Amount: 20 mg 90 capsule 0   • buPROPion (WELLBUTRIN XL) 300 mg 24 hr tablet Take 1 tablet (300 mg total) by mouth every morning 90 tablet 1   • [START ON 1/30/2023] clonazePAM (KlonoPIN) 0 5 mg tablet Take 1/2 tab daily as needed for anxiety and take 1 tab nightly as needed for insomnia and anxiety Do not start before January 30, 2023  45 tablet 5   • FLUoxetine (PROzac) 40 MG capsule Take 1 capsule (40 mg total) by mouth daily 90 capsule 1   • aspirin (ECOTRIN LOW STRENGTH) 81 mg EC tablet Take 81 mg by mouth in the morning  • atorvastatin (LIPITOR) 10 mg tablet Take 1 tablet (10 mg total) by mouth daily 90 tablet 3   • conjugated estrogens (PREMARIN) vaginal cream Insert 0 5g vaginally three times per week 30 g 0   • CRANBERRY EXTRACT PO Take 1 tablet by mouth in the morning     • estrogens, conjugated (Premarin) vaginal cream Insert 0 5g vaginally three times per week 30 g 1   • Multiple Vitamins-Minerals (MULTIVITAL-M PO) Take 1 tablet by mouth in the morning       No current facility-administered medications for this visit  Allergies   Allergen Reactions   • Butoconazole Hives   • Tioconazole Hives       Review of Systems    Video Exam    There were no vitals filed for this visit  Physical Exam     Visit Time    Visit Start Time: 574N  Visit Stop Time: 456C  Total Visit Duration: 26 minutes             MEDICATION MANAGEMENT NOTE        [facility]      Name and Date of Birth:  Ho Cabello 59 y o  1965    Date of Visit: January 19, 2023    SUBJECTIVE:  CC: Amarilis Rosado presents today for follow up on "its going really good"; anxiety and depression     Amarilis Rosado says the holidays were great, grand kids doing well  Going to [facility] every couple of months to see boyfriend, Juan R  13yr together, took a break with his going to [facility] but going well now  Daughter and son actually get along great with him now (daughter used to have issues years ago but much improved)  Still 2d/wk work then rest of time with grand kids  2,4,6, 7 are going to different institutions  4yo doing Martiniquais immersion  [facility] likely ~2028  Still bouncing around to different offices   A high turn over, and Amarilis Rosado does train new on boards  Stressful but manageable  Works at [employer] and it is very demanding, so people do not stay long or transfer to other offices  Still ''making the best of it, and it really works"  Lives with son, Ana M Velazquez and his 4 kids   (Kids ~2021, 2020, 2018, 2016)  Her other 2 kids have not had children yet  Sees her mom/dad too  They live close  "Same old memory thing"  Cognitive situation is unchanged still, no decline  Neurologist periodically  Still for migraines and botox  She was in a cognitive study years ago, no amyloid  However, she feels issues predated klonopin  "Maybe it is just me"  Talked about cognitive concerns in context benzodiazepines, but she is comfortable taking them  Since our last visit, overall symptoms have been stable  Med Compliance: yes    HPI ROS:               ('was' notes: prior visit)  Medication Side Effects:  no     Depression (10 worst):  low (Was low)   Anxiety (10 worst):  low (Was low)   Hallucinations or Psychosis  no (Was no)    Safety concerns (SI, HI, etc):  no (Was no)   Sleep: (NM = Nightmares)  good (Was good)   Energy:  good (Was good)   Appetite:  good (Was good)   Weight Change:  no      PHQ-2/9 Depression Screening               Dhruv Session denies any side effects from medications unless noted above    Review Of Systems as noted above  Otherwise A relevant review of symptoms was otherwise negative    History Review:  The following portions of the patient's history were reviewed and documented: allergies, current medications, past family history, past medical history, past social history and problem list      Lab Review: No new labs or no relevant labs needing review with patient today      OBJECTIVE:     MENTAL STATUS EXAM  Appearance:  age appropriate   Behavior:  pleasant, cooperative, with good eye contact   Speech:  Normal volume, regular rate and rhythm   Mood:  euthymic   Affect:  mood congruent   Language: intact and appropriate for age, education, and intellect   Thought Process:  Linear and goal directed   Associations: intact associations   Thought Content:  normal and appropriate   Perceptual Disturbances: no auditory or visual hallcunations   Risk Potential / Abnormal Thoughts: Suicidal ideation - None  Homicidal ideation - None  Potential for aggression - No       Consciousness:  Alert & Awake   Sensorium:  Grossly oriented   Attention: attention span and concentration are age appropriate       Fund of Knowledge:  Memory: awareness of current events: yes  recent and remote memory grossly intact   Insight:  good   Judgment: good   Muscle Strength Muscle Tone:      Gait/Station:    Motor Activity: no abnormal movements       Risks, Benefits And Possible Side Effects Of Medications:    AGREE: Risks, benefits, and possible side effects of medications explained to Eliza Jacob and she (or legal representative) verbalizes understanding and agreement for treatment  Controlled Medication Discussion:     Eliza Jacob has been filling controlled prescriptions on time as prescribed according to [facility] program    _____________________________________________________________      Recent labs:  Office Visit on 12/22/2022   Component Date Value   •  RAPID STREP A 12/22/2022 Negative    • POCT SARS-CoV-2 Ag 12/22/2022 Negative    • VALID CONTROL 12/22/2022 Valid    • Throat Culture 12/22/2022 Negative for beta-hemolytic Streptococcus        Psychiatric History   ALEX    Patient did used to see Renée Infante in early 2016 when she had a change providers due to insurance  She does not have a therapist     She been hospitalized once in 2000  She says that it was related to having a breakup with her boyfriend at the time and then finding out that she lost her job because she was  at his place of employment   Losing her boyfriend because she did not want to get  and he did and also having job issues led her to crisis and overdose although it was not a dramatic overdose it was a time when she said that she was trying to take pills to go to sleep and that she did have some suicidal thoughts associated with it  She denies any self-harm homicidal ideation in the past or present  She's never been violent  She says that she's not had any suicidal ideation since that time  Social History:  Shyam was raised in [facility] to a "good" childhood  Her parents were together and still are  She denies ever having physical or sexual abuse or other forms neglect now or in the past as a child  She has one brother and one sister  She developed normally  She finished high school and got associates degree in business  Her daughter currently lives with her  She has 3 children 2 boys and one girl  She is good relationships with her family  She's been  and  twice  Currently not in a serious relationship at intake    Her support system includes her children her parents and her cousins  She has friends that are close but she doesn't typically opened up to them about mental health  She is [place] and attends [employer] sometimes  No  history no legal issues and no weapons at home  She does not use tobacco drinks about 1 cup of coffee a day and is a social drinker and when she does drink it's very rare and not much at that time  She denies ever using substances and has never been to rehabilitation  Medical / Surgical History:    Past Medical History:   Diagnosis Date   • Anxiety    • Basal cell carcinoma     Last assessed: 9/26/14   • Cancer ([facility] [address] )     BCC   • Depression    • Depression with anxiety     Last assessed: 1/13/17   • Insomnia     Last assessed: 9/26/14   • Kidney stone      Past Surgical History:   Procedure Laterality Date   • BLADDER SURGERY      lift of bladder   • CYSTOSCOPY      Theurapeutic   TVT-O   • EYE SURGERY Bilateral     LASIK   • FACIAL/NECK BIOPSY Right 3/12/2019    Procedure: UPPER EYELID CYST EXCISION;  Surgeon: Kevan Song MD;  [facility];  Service: Plastics   • GYNECOLOGIC CRYOSURGERY      Cervix   • HYSTEROSCOPY W/ ENDOMETRIAL ABLATION  12/14/2010    Deborah   • KIDNEY SURGERY  kidney stone removal    1983   • MOHS SURGERY     • [address] U  97  W/SESMDC W/DIST Easter Petite Left 4/16/2021    Procedure: BUNIONECTOMY JENNA left foot;  Surgeon: Darylene Bleak, DPM;  Location[facility];  Service: Podiatry   • SKIN BIOPSY     • TUBAL LIGATION     • WISDOM TOOTH EXTRACTION         Family Psychiatric History:     Family History   Problem Relation Age of Onset   • Heart murmur Mother    • Hyperlipidemia Mother    • Atrial fibrillation Father    • Arthritis Father    • Basal cell carcinoma Father 61   • Psoriasis Sister    • No Known Problems Daughter    • No Known Problems Maternal Grandmother    • Coronary artery disease Maternal Grandfather    • Heart disease Maternal Grandfather    • Breast cancer Paternal Grandmother 77   • Diabetes Paternal Grandmother         Mellitus   • Arthritis Paternal Grandmother    • Cancer Paternal Grandmother    • Hypertension Paternal Grandfather    • Hyperlipidemia Paternal Grandfather    • Skin cancer Paternal Grandfather 79   • No Known Problems Brother    • ADD / ADHD Son    • No Known Problems Son    • Hyperlipidemia Maternal Aunt    • Coronary artery disease Maternal Aunt    • No Known Problems Maternal Aunt    • Hyperlipidemia Maternal Uncle    • Coronary artery disease Maternal Uncle    • No Known Problems Paternal Aunt    • No Known Problems Paternal Aunt    • Alcohol abuse Neg Hx    • Substance Abuse Neg Hx    • Mental illness Neg Hx        Daughter -  Family history of ADD (attention deficit disorder)  Son - Family history of ADD (attention deficit disorder)  Family History    15  Denied: Family history of bipolar disorder   14  Denied: Family history of substance abuse   15   Denied: Family history of suicide attempt      Confidential Assessment:  Scales:    Assessment/Plan:        Diagnoses and all orders for this visit:    Recurrent major depressive disorder, in remission ([facility] [address] )  -     FLUoxetine (PROzac) 40 MG capsule; Take 1 capsule (40 mg total) by mouth daily  -     buPROPion (WELLBUTRIN XL) 300 mg 24 hr tablet; Take 1 tablet (300 mg total) by mouth every morning  -     amphetamine-dextroamphetamine (ADDERALL XR, 20MG,) 20 MG 24 hr capsule; Take 1 capsule (20 mg total) by mouth every morning Max Daily Amount: 20 mg    TIM (generalized anxiety disorder)  -     FLUoxetine (PROzac) 40 MG capsule; Take 1 capsule (40 mg total) by mouth daily  -     clonazePAM (KlonoPIN) 0 5 mg tablet; Take 1/2 tab daily as needed for anxiety and take 1 tab nightly as needed for insomnia and anxiety Do not start before January 30, 2023  Attention and concentration deficit  -     buPROPion (WELLBUTRIN XL) 300 mg 24 hr tablet; Take 1 tablet (300 mg total) by mouth every morning  -     amphetamine-dextroamphetamine (ADDERALL XR, 20MG,) 20 MG 24 hr capsule; Take 1 capsule (20 mg total) by mouth every morning Max Daily Amount: 20 mg    Memory difficulties  -     buPROPion (WELLBUTRIN XL) 300 mg 24 hr tablet; Take 1 tablet (300 mg total) by mouth every morning  -     amphetamine-dextroamphetamine (ADDERALL XR, 20MG,) 20 MG 24 hr capsule; Take 1 capsule (20 mg total) by mouth every morning Max Daily Amount: 20 mg        ______________________________________________________________________    Generalized anxiety disorder  MDD, recurrent, in remission    Attention and concentration deficit  Memory difficulties    ---  Generalized anxiety disorder - manageable   MDD, recurrent, in remission   - manageable  Attention and concentration deficit - manageable  Memory difficulties - stable    Logan Anger is satisfied with where she is on medications   Changes discussed, particularly lowering klonopin as feasible although she was not interested and I see no reason to push this due to risks/benefits, quality of life, her preference tyson since she is well aware of side effect profile and important context  Attention and concentration deficit may be related to organic issues, anxiety, or underlying ADD/ADHD  Ongoing f/u with Neurology for this and migraines  She was in a cognitive study years ago, no amyloid  However, she feels issues predated klonopin  "Maybe it is just me"  Talked about cognitive concerns in context benzodiazepines, but she is comfortable taking them, but understands coming off would be long term goal as feasible and weighing risks/benefits  Also we discussed long term risks of all medications as well, including cardiovascular, bone density, falls, etc but she very much appreciates the "quality of life" she receives not matter what was mentioned  She will still f/u with PCP re: getting a Dexa scan  Safety Risk Assessment: see above   In considering risk and protective factors, suicide risk and safety risk are low  Past medications include   Prozac which helps but does have some decreased orgasm  Topamax for migraines but this seemed to lead to confusion ( years ago)  ON but no detail recall - Zoloft, Celexa or Lexapro  Effexor seemed to cause weight gain  Wellbutrin  Ambien caused oversedation  melatonin did not help  Treatment Plan:        Patient has been educated about their diagnosis and treatment modalities  They voiced understanding and agreement with the following plan:    1) Meds:   - Prozac 40mg daily  (consider increase but orgasm affected  Was on 50mg in past per charts)   - Wellbutrin XL 300mg daily   - Klonopin 0 25mg in day PRN and 0 5mg HS PRN  - Adderal XR 20mg Daily     2) Labs: PRN   - 2/2017: CBC, CMP WNL   TSH 1 57, TG 56, HDL 98,     3) Therapy:   - not in therapy, revisit PRN   - Provided progressive muscle relaxation handout, asked her to look into belly breathing and guided imagery  (2/9/2018)    4) Medical:  Concentration issues, MRI abnormalities, family h/o early onset dementia (PAunt), migraines (gets Botox)   - pt to f/u with neurologist   - pt to f/u with other providers PRN    5) Other: support prn   - works at [address] [place] [address]  Reception   - good support from family, daughter lives with her   - 3 kids,   Daughter graduates from [employer] with [address] [employer]  Likes psychology, healthcare    6) Follow up:   - 6 months, but patient to call if issues or concerns    7) Treatment Plan: Enacted 2/9/2018, 12/7/2018, 7/26/2019, 8/14/2020, 4/1/21, 10/21/2021, 5/19/2022, 1/19/2023        Discussed self monitoring of symptoms, and symptom monitoring tools  Patient has been informed of 24 hours and weekend coverage for urgent situations accessed by calling the main clinic phone number             Psychotherapy in session:  Time spent performing psychotherapy: 16 Minutes on supportive therapy related to family, relationship, work, life balance, self care, detention

## 2023-01-26 ENCOUNTER — OFFICE VISIT (OUTPATIENT)
Dept: NEUROLOGY | Facility: CLINIC | Age: 58
End: 2023-01-26

## 2023-01-26 VITALS
HEART RATE: 64 BPM | WEIGHT: 125.7 LBS | BODY MASS INDEX: 21.58 KG/M2 | SYSTOLIC BLOOD PRESSURE: 122 MMHG | DIASTOLIC BLOOD PRESSURE: 78 MMHG

## 2023-01-26 DIAGNOSIS — G43.709 CHRONIC MIGRAINE WITHOUT AURA WITHOUT STATUS MIGRAINOSUS, NOT INTRACTABLE: Primary | ICD-10-CM

## 2023-01-26 NOTE — PROGRESS NOTES
Patient ID: Dicie Fabry is a 59 y o  female  Assessment/Plan:    Chronic migraine without aura without status migrainosus, not intractable  Patient returns for follow up for migraine headaches  Prior to botox she had 10-15 headache days per month with severe migraines that would not respond to abortive medication  Since the start of botox, she has had improvement in headache severity-now 1-3 migraines per month, in which do respond to abortive medication  With botox has had a reduction of at least 7 migraine days with less abortive medication, less ER visits which correlates to headache diary      Plan:  Recommend to Continue BOTOX every 3 months  At onset of migraine, take 2-3 advil  May repeat in 6 hours if needed  Limit of less than 3 doses a week      Plan for follow up as scheduled for botox injections  To contact the office sooner with any concerns or worsening symptoms  Diagnoses and all orders for this visit:    Chronic migraine without aura without status migrainosus, not intractable           Subjective:    HPI    We had the pleasure of evaluating Dicie Fabry in neurological follow up  today for headaches  As you know,  she is a 60 y o   right handed female  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 [employer]      She states prior to botox her migraines were severe, she would have to go home from work due to migraines as she would need to sleep  Her frequency would vary-sometimes would have 3 headaches  Per week, on average 10-15 migraines days per month per patient  Since starting botox her headaches have reduced, she has on average 2 headaches per month, she is able to now work with migraines with rescue medication on boards-respond to advil  She used to have an aura of confusion with her headaches that would interfere with her functioning-this has stopped since starting botox  She continues to follow with psych-she remains on wellbutrin and prozac         What medications do you take or have you taken for your headaches?    PREVENTATIVE:  Prozac, Wellbutrin, Klonopin, Depakote, Topamax, Effexor, Lexapro, Zoloft, Celexa, zonisamide, verapamil, gabapentin, Botox  ABORTIVE:  Aleve, Tylenol, ibuprofen     Alternative therapies used in the past for headaches? none  Headache are worse if the patient: cough, sneeze, bending over  Headache triggers:  Lack of sleep, fatigue, stress/tension, hot flashes     Aura and how long does it last -  Yes, confusion a few minutes before migraine begins-has not occurred since botox was started      What is your current pain level - 0/10     Any family history of migraines? No  Any family history of aneurysms? Yes maternal cousin     Headaches started at what age? 27years old  How often do the headaches occur?   1-3 month; prior to botox was more than 15 a month  What time of the day do the headaches start? varies  How long do the headaches last?   1-3 hours; prior to botox was over 4 hours or rest of the day  Are you ever headache free? Yes  Describe your usual headache - Throbbing, Pressure, Dull  Where is your headache located?   Bilateral frontal, bilateral temporalis and occipitalis  What is the intensity of pain? 5-6/10     Associated symptoms: unchanged  • Decrease of appetite, nausea  • Photophobia, phonophobia, sensitivity to smell   • Problem with concentration  • light-headed or dizzy, stiff or sore neck,   • Hands or feet tingle or feel numb, prefer to be alone and in a dark room, unable to work     Number of days missed per month because of headaches: prior to botox she was not working but would miss some family activities at least a few   Work (or school) days: 0  Social or Family activities: 0      What time of the year do headaches occur more frequently?   no  Have you seen someone else for headaches or pain? No  Have you had trigger point injection performed and how often? No  Have you had Botox injection performed and how often? Yes   Have you had epidural injections or transforaminal injections performed? No     Have you used CBD or THC for your headaches and how often? No  Are you current pregnant or planning on getting pregnant? No, done with family planning  Have you ever had any Brain imaging? yes      11/2014 MRI brain: Several nonspecific subcortical white matter lesions bilaterally in   the frontal lobes, nonspecific findings which may be related to early   changes of microangiopathy, in the appropriate clinical setting   Other   postinfectious, postinflammatory or demyelinating processes not   excluded   Patients with migraine headaches can have white matter   lesions as well   Consider followup repeat contrast enhanced MRI of the   brain in 6-12 months to establish stability and to exclude abnormal   enhancement  No acute infarction, intracranial hemorrhage or mass effect       11/2014 MRA head: No intracranial aneurysm or major intracranial arterial stenosis        6/2/2015 MRI brain: No acute disease   Stable white matter changes, nonspecific   Selective hippocampal volume loss with normal sized lateral and temporal horn volume   This may be a congenital in etiology   Repeat marrow quantitative imaging in one year to document trajectory of volume loss       10/2016 MRI NQ Small white matter lesions are noted within the frontal lobes which are nonspecific and 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   Small white matter lesions have been described within the frontal lobes in patients with chronic   migraine headaches   Overall no significant change noted      10/2016 PET brain Symmetric decreased FDG activity in the medial temporal lobes bilaterally, corresponding with prior MRI findings    FDG distribution is otherwise unremarkable   If early Alzheimer's disease is a clinical consideration, beta amyloid PET CT brain imaging may be useful      NeuroQuant analysis was performed: Measurements suggest significantly decreased hippocampal volume and mild local ex-vacuo dilatation of the adjacent inferior lateral ventricles : 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  Miguel Olmos patient's age and anatomic imaging, consider 6-12 month follow-up examination      4/2017 MRI NQ No acute intracranial abnormality  NeuroQuant analysis was performed: Low hippocampal volume without ex-vacuo dilatation: 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  F/U to establish presence and nature of volume change over time     No substantial interval change from the prior examination of 10/10/2016 when given measurement error     Nonspecific white matter changes suggestive of mild chronic microvascular ischemia      4/28/2018 MRI NQ Brain   Several tiny supratentorial white matter T2 and FLAIR hyperintense foci suspicious for mild chronic microangiopathic changes such as may accompany migraine headaches      NeuroQuant analysis was performed: Low hippocampal volume without ex-vacuo dilatation: 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  F/U to establish presence and nature of volume change over time      6/16/2020 MRI NQ brain  No acute intracranial disease   Footprint of what most probably represents mild degree of chronic small vessel disease is stable  NeuroQuant analysis was performed: Low hippocampal volume without ex-vacuo dilatation: 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  The following portions of the patient's history were reviewed and updated as appropriate: allergies, current medications, past family history, past medical history, past social history, past surgical history and problem list        Objective:    Blood pressure 122/78, pulse 64, weight 57 kg (125 lb 11 2 oz), not currently breastfeeding  Physical Exam  Constitutional:       General: She is awake  HENT:      Right Ear: Hearing normal       Left Ear: Hearing normal    Eyes:      General: Lids are normal       Extraocular Movements: Extraocular movements intact        Pupils: Pupils are equal, round, and reactive to light  Neurological:      Mental Status: She is alert  Motor: Motor strength is normal       Deep Tendon Reflexes:      Reflex Scores:       Bicep reflexes are 2+ on the right side and 2+ on the left side  Brachioradialis reflexes are 2+ on the right side and 2+ on the left side  Patellar reflexes are 2+ on the right side and 2+ on the left side  Achilles reflexes are 2+ on the right side and 2+ on the left side  Psychiatric:         Speech: Speech normal          Neurological Exam  Mental Status  Awake and alert  Speech is normal  Language is fluent with no aphasia  Cranial Nerves  CN III, IV, VI: Extraocular movements intact bilaterally  Normal lids and orbits bilaterally  Pupils equal round and reactive to light bilaterally  CN V:  Right: Facial sensation is normal   Left: Facial sensation is normal on the left  CN VII:  Right: There is no facial weakness  Left: There is no facial weakness  CN VIII:  Right: Hearing is normal   Left: Hearing is normal   CN IX, X: Palate elevates symmetrically  CN XI:  Right: Trapezius strength is normal   Left: Trapezius strength is normal   CN XII: Tongue midline without atrophy or fasciculations  Motor   Strength is 5/5 throughout all four extremities  Sensory  Light touch is normal in upper and lower extremities  Temperature is normal in upper and lower extremities  Reflexes                                            Right                      Left  Brachioradialis                    2+                         2+  Biceps                                 2+                         2+  Patellar                                2+                         2+  Achilles                                2+                         2+    Coordination  Right: Finger-to-nose normal Left: Finger-to-nose normal     Gait  Casual gait is normal including stance, stride, and arm swing   Able to rise from chair without using arms       I have personally reviewed the ROS performed by the MA     ROS:    Review of Systems   Constitutional: Positive for fatigue  Negative for appetite change and fever  HENT: Negative  Negative for hearing loss, tinnitus, trouble swallowing and voice change  Eyes: Negative  Negative for photophobia, pain and visual disturbance  Respiratory: Negative  Negative for shortness of breath  Cardiovascular: Negative  Negative for palpitations  Gastrointestinal: Negative  Negative for nausea and vomiting  Endocrine: Negative  Negative for cold intolerance  Genitourinary: Negative  Negative for dysuria, frequency and urgency  Musculoskeletal: Negative  Negative for gait problem, myalgias and neck pain  Skin: Negative  Negative for rash  Allergic/Immunologic: Negative  Neurological: Positive for headaches  Negative for dizziness, tremors, seizures, syncope, facial asymmetry, speech difficulty, weakness, light-headedness and numbness  Hematological: Negative  Does not bruise/bleed easily  Psychiatric/Behavioral: Negative  Negative for confusion, hallucinations and sleep disturbance  All other systems reviewed and are negative

## 2023-01-26 NOTE — ASSESSMENT & PLAN NOTE
Patient returns for follow up for migraine headaches  Prior to botox she had 10-15 headache days per month with severe migraines that would not respond to abortive medication  Since the start of botox, she has had improvement in headache severity-now 1-3 migraines per month, in which do respond to abortive medication  With botox has had a reduction of at least 7 migraine days with less abortive medication, less ER visits which correlates to headache diary      Plan:  Recommend to Continue BOTOX every 3 months  At onset of migraine, take 2-3 advil  May repeat in 6 hours if needed  Limit of less than 3 doses a week      Plan for follow up as scheduled for botox injections  To contact the office sooner with any concerns or worsening symptoms

## 2023-02-02 ENCOUNTER — OFFICE VISIT (OUTPATIENT)
Dept: FAMILY MEDICINE CLINIC | Facility: CLINIC | Age: 58
End: 2023-02-02

## 2023-02-02 VITALS
BODY MASS INDEX: 21.34 KG/M2 | DIASTOLIC BLOOD PRESSURE: 80 MMHG | SYSTOLIC BLOOD PRESSURE: 120 MMHG | WEIGHT: 125 LBS | TEMPERATURE: 97.9 F | HEIGHT: 64 IN | RESPIRATION RATE: 16 BRPM | OXYGEN SATURATION: 98 % | HEART RATE: 78 BPM

## 2023-02-02 DIAGNOSIS — H10.33 ACUTE CONJUNCTIVITIS OF BOTH EYES, UNSPECIFIED ACUTE CONJUNCTIVITIS TYPE: Primary | ICD-10-CM

## 2023-02-02 DIAGNOSIS — J20.9 ACUTE BRONCHITIS, UNSPECIFIED ORGANISM: Primary | ICD-10-CM

## 2023-02-02 DIAGNOSIS — H10.33 ACUTE CONJUNCTIVITIS OF BOTH EYES, UNSPECIFIED ACUTE CONJUNCTIVITIS TYPE: ICD-10-CM

## 2023-02-02 RX ORDER — SULFACETAMIDE SODIUM 100 MG/ML
1 SOLUTION/ DROPS OPHTHALMIC
Qty: 5 ML | Refills: 0 | Status: SHIPPED | OUTPATIENT
Start: 2023-02-02 | End: 2023-02-02 | Stop reason: RX

## 2023-02-02 RX ORDER — HYDROCODONE POLISTIREX AND CHLORPHENIRAMINE POLISTIREX 10; 8 MG/5ML; MG/5ML
5 SUSPENSION, EXTENDED RELEASE ORAL EVERY 12 HOURS PRN
Qty: 115 ML | Refills: 0 | Status: SHIPPED | OUTPATIENT
Start: 2023-02-02

## 2023-02-02 RX ORDER — AZITHROMYCIN 250 MG/1
TABLET, FILM COATED ORAL
Qty: 6 TABLET | Refills: 0 | Status: SHIPPED | OUTPATIENT
Start: 2023-02-02 | End: 2023-02-07

## 2023-02-02 RX ORDER — PREDNISONE 10 MG/1
TABLET ORAL
Qty: 20 TABLET | Refills: 0 | Status: SHIPPED | OUTPATIENT
Start: 2023-02-02

## 2023-02-02 RX ORDER — POLYMYXIN B SULFATE AND TRIMETHOPRIM 1; 10000 MG/ML; [USP'U]/ML
1 SOLUTION OPHTHALMIC EVERY 4 HOURS
Qty: 10 ML | Refills: 0 | Status: SHIPPED | OUTPATIENT
Start: 2023-02-02

## 2023-02-02 NOTE — PROGRESS NOTES
Assessment/Plan:     Diagnoses and all orders for this visit:    Acute bronchitis, unspecified organism  -     azithromycin (Zithromax) 250 mg tablet; Take 2 tablets (500 mg total) by mouth daily for 1 day, THEN 1 tablet (250 mg total) daily for 4 days  -     predniSONE 10 mg tablet; Take 4 tablets with food on days 1 & 2  Then take 3 tablets with food on days 3 &4  Then take 2 tablets with food on days 5 & 6  Then take 1 tablet with food on days 7 & 8   -     Hydrocod Rubén-Chlorphe Rubén ER (TUSSIONEX) 10-8 mg/5 mL ER suspension; Take 5 mL by mouth every 12 (twelve) hours as needed for cough Max Daily Amount: 10 mL    Zpak, Prednisone & Tussionex prescribed  Medication and s/e reviewed  Pt not to drive while taking Tussionex  Pt to rest and keep well hydrated  Patient is encouraged to call our office for any questions/concerns, persistent or worsening symptoms  Patient states they understand and agree with treatment plan  Acute conjunctivitis of both eyes, unspecified acute conjunctivitis type  -     sulfacetamide (BLEPH-10) 10 % ophthalmic solution; Administer 1 drop to both eyes every 3 (three) hours for 7 days      Bleph eye drops ordered  Medication reviewed  Pt avoid touching eyes  Patient is encouraged to call our office for any questions/concerns, persistent or worsening symptoms  Patient states they understand and agree with treatment plan  Pt to f/u PRN  Subjective:      Patient ID: Celina Zabala is a 59 y o  female  Pt presents for semi-productive cough that started before lucinda   Since then she has felt run down and has had off/on sore throat due to coughing frequently  She notes she has not been able to sleep well d/t the cough  She denies fever, chills, body aches, NVD  She has been taking Delsym and Robitussin without any relief  Pt also notes that both of her eyes have become red and "goopy" over the last week    She denies changes in vision but notes they are uncomfortable  The following portions of the patient's history were reviewed and updated as appropriate: allergies, current medications, past family history, past medical history, past social history, past surgical history and problem list     Review of Systems    As noted per HPI  Objective:      /80 (BP Location: Left arm, Patient Position: Sitting, Cuff Size: Standard)   Pulse 78   Temp 97 9 °F (36 6 °C) (Oral)   Resp 16   Ht 5' 4" (1 626 m)   Wt 56 7 kg (125 lb)   SpO2 98%   BMI 21 46 kg/m²          Physical Exam  Vitals reviewed  Constitutional:       General: She is not in acute distress  Appearance: Normal appearance  She is not ill-appearing  HENT:      Head: Normocephalic  Right Ear: Tympanic membrane, ear canal and external ear normal       Left Ear: Tympanic membrane, ear canal and external ear normal       Nose: No congestion or rhinorrhea  Mouth/Throat:      Pharynx: No oropharyngeal exudate or posterior oropharyngeal erythema  Cardiovascular:      Rate and Rhythm: Normal rate and regular rhythm  Pulses: Normal pulses  Heart sounds: Normal heart sounds  Pulmonary:      Effort: Pulmonary effort is normal       Breath sounds: Wheezing (very faint expiratory wheezes to lidia upper lung fields) present  Neurological:      Mental Status: She is alert and oriented to person, place, and time  Mental status is at baseline  Psychiatric:         Mood and Affect: Mood normal          Behavior: Behavior normal          Thought Content:  Thought content normal          Judgment: Judgment normal

## 2023-02-13 DIAGNOSIS — J20.9 ACUTE BRONCHITIS, UNSPECIFIED ORGANISM: ICD-10-CM

## 2023-02-13 RX ORDER — HYDROCODONE POLISTIREX AND CHLORPHENIRAMINE POLISTIREX 10; 8 MG/5ML; MG/5ML
5 SUSPENSION, EXTENDED RELEASE ORAL EVERY 12 HOURS PRN
Qty: 115 ML | Refills: 0 | Status: SHIPPED | OUTPATIENT
Start: 2023-02-13

## 2023-03-02 ENCOUNTER — OFFICE VISIT (OUTPATIENT)
Dept: FAMILY MEDICINE CLINIC | Facility: CLINIC | Age: 58
End: 2023-03-02

## 2023-03-02 VITALS
RESPIRATION RATE: 18 BRPM | DIASTOLIC BLOOD PRESSURE: 84 MMHG | HEART RATE: 91 BPM | OXYGEN SATURATION: 100 % | WEIGHT: 129.6 LBS | TEMPERATURE: 98.1 F | HEIGHT: 64 IN | BODY MASS INDEX: 22.13 KG/M2 | SYSTOLIC BLOOD PRESSURE: 120 MMHG

## 2023-03-02 DIAGNOSIS — R05.1 ACUTE COUGH: Primary | ICD-10-CM

## 2023-03-02 RX ORDER — HYDROCODONE POLISTIREX AND CHLORPHENIRAMINE POLISTIREX 10; 8 MG/5ML; MG/5ML
5 SUSPENSION, EXTENDED RELEASE ORAL EVERY 12 HOURS PRN
Qty: 115 ML | Refills: 0 | Status: SHIPPED | OUTPATIENT
Start: 2023-03-02

## 2023-03-02 RX ORDER — BUDESONIDE AND FORMOTEROL FUMARATE DIHYDRATE 80; 4.5 UG/1; UG/1
2 AEROSOL RESPIRATORY (INHALATION) EVERY 6 HOURS PRN
Qty: 10.2 G | Refills: 0 | Status: SHIPPED | OUTPATIENT
Start: 2023-03-02

## 2023-03-02 RX ORDER — BENZONATATE 100 MG/1
100 CAPSULE ORAL 3 TIMES DAILY PRN
Qty: 20 CAPSULE | Refills: 0 | Status: SHIPPED | OUTPATIENT
Start: 2023-03-02

## 2023-03-02 NOTE — PROGRESS NOTES
Assessment/Plan:     Diagnoses and all orders for this visit:    Acute cough  -     benzonatate (TESSALON PERLES) 100 mg capsule; Take 1 capsule (100 mg total) by mouth 3 (three) times a day as needed for cough  -     Hydrocod Rubén-Chlorphe Rubén ER (TUSSIONEX) 10-8 mg/5 mL ER suspension; Take 5 mL by mouth every 12 (twelve) hours as needed for cough Max Daily Amount: 10 mL  -     budesonide-formoterol (Symbicort) 80-4 5 MCG/ACT inhaler; Inhale 2 puffs every 6 (six) hours as needed (wheezing or shortness of breath) Rinse mouth after use  Tessalon perles, Tussionex, & Symbicort PRN ordered  Pt also encouraged to use Mucinex BID  She is encouraged to keep well hydrated  We reviewed deep breathing exercises and patient is to practice this during the day  If not better in 1 week, consider CXR  Patient is encouraged to call our office for any questions/concerns, persistent or worsening symptoms  Patient states they understand and agree with treatment plan  Pt to f/u PRN  Subjective:      Patient ID: Gayle Luna is a 59 y o  female  Pt presents today for a dry non-productive cough that started several weeks ago  No sore throat, nasal congestion, fever, chills, body aches  She was treated for bronchitis 1 month ago w/ Zpak and Prednisone  She admits she did feel good during and after treatment, however she cannot shake this dry cough  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 workday  She has tried Robitussin without much relief  The following portions of the patient's history were reviewed and updated as appropriate: allergies, current medications, past family history, past medical history, past social history, past surgical history and problem list     Review of Systems    As noted per HPI      Objective:      /84   Pulse 91   Temp 98 1 °F (36 7 °C) (Oral)   Resp 18   Ht 5' 4 25" (1 632 m)   Wt 58 8 kg (129 lb 9 6 oz)   SpO2 100% BMI 22 07 kg/m²          Physical Exam  Vitals reviewed  Constitutional:       General: She is not in acute distress  Appearance: Normal appearance  She is not ill-appearing  HENT:      Head: Normocephalic  Right Ear: Tympanic membrane, ear canal and external ear normal       Left Ear: Tympanic membrane, ear canal and external ear normal       Nose: No congestion or rhinorrhea  Mouth/Throat:      Pharynx: No oropharyngeal exudate or posterior oropharyngeal erythema  Cardiovascular:      Rate and Rhythm: Normal rate and regular rhythm  Pulses: Normal pulses  Heart sounds: Normal heart sounds  No murmur heard  Pulmonary:      Effort: Pulmonary effort is normal  No respiratory distress  Breath sounds: Normal breath sounds  No wheezing  Lymphadenopathy:      Cervical: No cervical adenopathy  Neurological:      Mental Status: She is alert and oriented to person, place, and time  Mental status is at baseline     Psychiatric:         Mood and Affect: Mood normal          Behavior: Behavior normal

## 2023-03-15 ENCOUNTER — TELEPHONE (OUTPATIENT)
Dept: PSYCHIATRY | Facility: CLINIC | Age: 58
End: 2023-03-15

## 2023-03-15 ENCOUNTER — TELEPHONE (OUTPATIENT)
Dept: NEUROLOGY | Facility: CLINIC | Age: 58
End: 2023-03-15

## 2023-03-15 NOTE — TELEPHONE ENCOUNTER
Submitted Re-Authorization request via fax to [facility] for:     Botox-200 units, [id number], [id number]  DX: [id number] [phone], Chronic Migraine  Awaiting approval/denial response  Will follow up on the status of pending authorization requested

## 2023-03-16 DIAGNOSIS — R41.3 MEMORY DIFFICULTIES: ICD-10-CM

## 2023-03-16 DIAGNOSIS — R41.840 ATTENTION AND CONCENTRATION DEFICIT: ICD-10-CM

## 2023-03-16 DIAGNOSIS — F33.40 RECURRENT MAJOR DEPRESSIVE DISORDER, IN REMISSION (HCC): ICD-10-CM

## 2023-03-16 RX ORDER — DEXTROAMPHETAMINE SACCHARATE, AMPHETAMINE ASPARTATE MONOHYDRATE, DEXTROAMPHETAMINE SULFATE AND AMPHETAMINE SULFATE 5; 5; 5; 5 MG/1; MG/1; MG/1; MG/1
20 CAPSULE, EXTENDED RELEASE ORAL EVERY MORNING
Qty: 90 CAPSULE | Refills: 0 | Status: SHIPPED | OUTPATIENT
Start: 2023-03-16

## 2023-03-17 NOTE — TELEPHONE ENCOUNTER
Received the following authorization approval info via fax from [facility]:    Approved: Nelia Wilson & [id number]  Botox-200 units  Auth# [id number]  Valid: 3/15/2023 until 3/14/2024  4 visits    Please use our Stock

## 2023-03-23 ENCOUNTER — PROCEDURE VISIT (OUTPATIENT)
Dept: NEUROLOGY | Facility: CLINIC | Age: 58
End: 2023-03-23

## 2023-03-23 VITALS — SYSTOLIC BLOOD PRESSURE: 122 MMHG | DIASTOLIC BLOOD PRESSURE: 84 MMHG | TEMPERATURE: 96.6 F | HEART RATE: 97 BPM

## 2023-03-23 DIAGNOSIS — G43.709 CHRONIC MIGRAINE WITHOUT AURA WITHOUT STATUS MIGRAINOSUS, NOT INTRACTABLE: Primary | ICD-10-CM

## 2023-03-23 NOTE — PROGRESS NOTES

## 2023-03-27 DIAGNOSIS — E78.01 FAMILIAL HYPERCHOLESTEROLEMIA: ICD-10-CM

## 2023-03-27 RX ORDER — ATORVASTATIN CALCIUM 10 MG/1
10 TABLET, FILM COATED ORAL DAILY
Qty: 90 TABLET | Refills: 3 | Status: SHIPPED | OUTPATIENT
Start: 2023-03-27

## 2023-03-27 NOTE — TELEPHONE ENCOUNTER
Harmony Renee sent a My Chart scheduling message requesting a refill of atorvastatin for [facility] [place]

## 2023-03-30 ENCOUNTER — APPOINTMENT (OUTPATIENT)
Dept: LAB | Facility: CLINIC | Age: 58
End: 2023-03-30

## 2023-03-30 ENCOUNTER — ANNUAL EXAM (OUTPATIENT)
Dept: OBGYN CLINIC | Facility: CLINIC | Age: 58
End: 2023-03-30

## 2023-03-30 VITALS
DIASTOLIC BLOOD PRESSURE: 82 MMHG | BODY MASS INDEX: 22.53 KG/M2 | SYSTOLIC BLOOD PRESSURE: 138 MMHG | HEIGHT: 64 IN | WEIGHT: 132 LBS

## 2023-03-30 DIAGNOSIS — Z01.419 ENCOUNTER FOR GYNECOLOGICAL EXAMINATION (GENERAL) (ROUTINE) WITHOUT ABNORMAL FINDINGS: Primary | ICD-10-CM

## 2023-03-30 DIAGNOSIS — Z12.31 ENCOUNTER FOR SCREENING MAMMOGRAM FOR MALIGNANT NEOPLASM OF BREAST: ICD-10-CM

## 2023-03-30 DIAGNOSIS — Z00.8 HEALTH EXAMINATION IN POPULATION SURVEY: ICD-10-CM

## 2023-03-30 LAB
CHOLEST SERPL-MCNC: 217 MG/DL
HDLC SERPL-MCNC: 89 MG/DL
LDLC SERPL CALC-MCNC: 116 MG/DL (ref 0–100)
NONHDLC SERPL-MCNC: 128 MG/DL
TRIGL SERPL-MCNC: 61 MG/DL

## 2023-03-30 NOTE — PROGRESS NOTES
Assessment/Plan:     Diagnoses and all orders for this visit:    Encounter for gynecological examination (general) (routine) without abnormal findings    Encounter for screening mammogram for malignant neoplasm of breast  -     Mammo screening bilateral w 3d & cad; Future         Subjective      Misha Mood is a 59 y o  female who presents for annual exam  The patient has no complaints today  The patient is sexually active  The patient is not taking hormone replacement therapy  Patient denies post-menopausal vaginal bleeding  She denies any urinary concerns  Menstrual History:  OB History        3    Para   3    Term   0            AB        Living           SAB        IAB        Ectopic        Multiple        Live Births                    No LMP recorded   Patient is postmenopausal      Past Medical History:   Diagnosis Date   • Anxiety    • Basal cell carcinoma     Last assessed: 14   • Cancer ([facility] [address] )     BCC   • Depression    • Depression with anxiety     Last assessed: 17   • Headache, tension-type    • Insomnia     Last assessed: 14   • Kidney stone    • Memory loss    • Migraine    • Varicella 1970       Family History   Problem Relation Age of Onset   • Heart murmur Mother    • Hyperlipidemia Mother    • Atrial fibrillation Father    • Arthritis Father    • Basal cell carcinoma Father 61   • Psoriasis Sister    • No Known Problems Daughter    • No Known Problems Maternal Grandmother    • Coronary artery disease Maternal Grandfather    • Heart disease Maternal Grandfather    • Breast cancer Paternal Grandmother    • Diabetes Paternal Grandmother         Mellitus   • Arthritis Paternal Grandmother    • Cancer Paternal Grandmother    • Hyperlipidemia Paternal Grandmother    • Hypertension Paternal Grandmother    • Hypertension Paternal Grandfather    • Hyperlipidemia Paternal Grandfather    • Skin cancer Paternal Grandfather 79   • No Known Problems Brother    • ADD / ADHD "Son    • No Known Problems Son    • Hyperlipidemia Maternal Aunt    • Coronary artery disease Maternal Aunt    • No Known Problems Maternal Aunt    • Hyperlipidemia Maternal Uncle    • Coronary artery disease Maternal Uncle    • Dementia Paternal Aunt             • No Known Problems Paternal Aunt    • Alcohol abuse Neg Hx    • Substance Abuse Neg Hx    • Mental illness Neg Hx        The following portions of the patient's history were reviewed and updated as appropriate: allergies, current medications, past family history, past medical history, past social history, past surgical history and problem list     Review of Systems  Pertinent items are noted in HPI  Objective      /82 (BP Location: Right arm, Patient Position: Sitting, Cuff Size: Large)   Ht 5' 4\" (1 626 m)   Wt 59 9 kg (132 lb)   BMI 22 66 kg/m²     General:   alert and oriented, in no acute distress   Heart:  Breasts: regular rate and rhythm   appear normal, no suspicious masses, no skin or nipple changes or axillary nodes     Lungs: effort normal   Abdomen: soft, non-tender, without masses or organomegaly   Vulva: normal   Vagina: normal mucosa   Cervix: no lesions   Uterus: normal size, mobile, non-tender   Adnexa: normal adnexa and no mass, fullness, tenderness          "

## 2023-04-01 LAB
EST. AVERAGE GLUCOSE BLD GHB EST-MCNC: 105 MG/DL
HBA1C MFR BLD: 5.3 %

## 2023-06-16 DIAGNOSIS — R41.3 MEMORY DIFFICULTIES: ICD-10-CM

## 2023-06-16 DIAGNOSIS — R41.840 ATTENTION AND CONCENTRATION DEFICIT: ICD-10-CM

## 2023-06-16 DIAGNOSIS — F33.40 RECURRENT MAJOR DEPRESSIVE DISORDER, IN REMISSION (HCC): ICD-10-CM

## 2023-06-16 RX ORDER — DEXTROAMPHETAMINE SACCHARATE, AMPHETAMINE ASPARTATE MONOHYDRATE, DEXTROAMPHETAMINE SULFATE AND AMPHETAMINE SULFATE 5; 5; 5; 5 MG/1; MG/1; MG/1; MG/1
20 CAPSULE, EXTENDED RELEASE ORAL EVERY MORNING
Qty: 90 CAPSULE | Refills: 0 | Status: SHIPPED | OUTPATIENT
Start: 2023-06-16

## 2023-06-19 ENCOUNTER — OFFICE VISIT (OUTPATIENT)
Dept: DERMATOLOGY | Facility: CLINIC | Age: 58
End: 2023-06-19
Payer: COMMERCIAL

## 2023-06-19 VITALS — HEIGHT: 64 IN | TEMPERATURE: 98.7 F | WEIGHT: 131 LBS | BODY MASS INDEX: 22.36 KG/M2

## 2023-06-19 DIAGNOSIS — D18.01 CHERRY ANGIOMA: ICD-10-CM

## 2023-06-19 DIAGNOSIS — D22.60 MULTIPLE BENIGN NEVI OF UPPER EXTREMITY, LOWER EXTREMITY, AND TRUNK: Primary | ICD-10-CM

## 2023-06-19 DIAGNOSIS — L81.4 LENTIGINES: ICD-10-CM

## 2023-06-19 DIAGNOSIS — L57.0 KERATOSIS, ACTINIC: ICD-10-CM

## 2023-06-19 DIAGNOSIS — Z85.828 HISTORY OF BASAL CELL CARCINOMA: ICD-10-CM

## 2023-06-19 DIAGNOSIS — D48.5 NEOPLASM OF UNCERTAIN BEHAVIOR OF SKIN: ICD-10-CM

## 2023-06-19 DIAGNOSIS — D22.70 MULTIPLE BENIGN NEVI OF UPPER EXTREMITY, LOWER EXTREMITY, AND TRUNK: Primary | ICD-10-CM

## 2023-06-19 DIAGNOSIS — D22.5 MULTIPLE BENIGN NEVI OF UPPER EXTREMITY, LOWER EXTREMITY, AND TRUNK: Primary | ICD-10-CM

## 2023-06-19 PROCEDURE — 88342 IMHCHEM/IMCYTCHM 1ST ANTB: CPT | Performed by: STUDENT IN AN ORGANIZED HEALTH CARE EDUCATION/TRAINING PROGRAM

## 2023-06-19 PROCEDURE — 11102 TANGNTL BX SKIN SINGLE LES: CPT | Performed by: STUDENT IN AN ORGANIZED HEALTH CARE EDUCATION/TRAINING PROGRAM

## 2023-06-19 PROCEDURE — 17000 DESTRUCT PREMALG LESION: CPT | Performed by: STUDENT IN AN ORGANIZED HEALTH CARE EDUCATION/TRAINING PROGRAM

## 2023-06-19 PROCEDURE — 88305 TISSUE EXAM BY PATHOLOGIST: CPT | Performed by: STUDENT IN AN ORGANIZED HEALTH CARE EDUCATION/TRAINING PROGRAM

## 2023-06-19 PROCEDURE — 99212 OFFICE O/P EST SF 10 MIN: CPT | Performed by: STUDENT IN AN ORGANIZED HEALTH CARE EDUCATION/TRAINING PROGRAM

## 2023-06-19 NOTE — PROGRESS NOTES
"[facility] Note     Patient Name: Lara Root  Encounter Date: 6/16/2023     Have you been cared for by a [place] [id number] Dermatologist in the last 3 years and, if so, which description applies to you? Yes  I have been here within the last 3 years, and my medical history has NOT changed since that time  I am FEMALE/of child-bearing potential     REVIEW OF SYSTEMS:  Have you recently had or currently have any of the following? · No changes in my recent health  PAST MEDICAL HISTORY:  Have you personally ever had or currently have any of the following? If \"YES,\" then please provide more detail  · No changes in my medical history  FAMILY HISTORY:  Any \"first degree relatives\" (parent, brother, sister, or child) with the following? • No changes in my family's known health  PATIENT EXPERIENCE:    • Do you want the Dermatologist to perform a COMPLETE skin exam today including a clinical examination under the \"bra and underwear\" areas? Yes  • If necessary, do we have your permission to call and leave a detailed message on your Preferred Phone number that includes your specific medical information?   Yes      Allergies   Allergen Reactions   • Butoconazole Hives   • Tioconazole Hives      Current Outpatient Medications:   •  amphetamine-dextroamphetamine (ADDERALL XR, 20MG,) 20 MG 24 hr capsule, Take 1 capsule (20 mg total) by mouth every morning Max Daily Amount: 20 mg, Disp: 90 capsule, Rfl: 0  •  atorvastatin (LIPITOR) 10 mg tablet, Take 1 tablet (10 mg total) by mouth daily, Disp: 90 tablet, Rfl: 3  •  buPROPion (WELLBUTRIN XL) 300 mg 24 hr tablet, Take 1 tablet (300 mg total) by mouth every morning, Disp: 90 tablet, Rfl: 1  •  clonazePAM (KlonoPIN) 0 5 mg tablet, Take 1/2 tab daily as needed for anxiety and take 1 tab nightly as needed for insomnia and anxiety Do not start before January 30, 2023 , Disp: 45 tablet, Rfl: 5  •  estrogens, conjugated (Premarin) vaginal cream, Insert 0 5g " vaginally three times per week, Disp: 30 g, Rfl: 1  •  FLUoxetine (PROzac) 40 MG capsule, Take 1 capsule (40 mg total) by mouth daily, Disp: 90 capsule, Rfl: 1  •  Multiple Vitamins-Minerals (MULTIVITAL-M PO), Take 1 tablet by mouth in the morning, Disp: , Rfl:   •  budesonide-formoterol (Symbicort) 80-4 5 MCG/ACT inhaler, Inhale 2 puffs every 6 (six) hours as needed (wheezing or shortness of breath) Rinse mouth after use , Disp: 10 2 g, Rfl: 0  •  polymyxin b-trimethoprim (POLYTRIM) ophthalmic solution, Administer 1 drop to both eyes every 4 (four) hours, Disp: 10 mL, Rfl: 0          • Whom besides the patient is providing clinical information about today's encounter?   o NO ADDITIONAL HISTORIAN (patient alone provided history)    Physical Exam and Assessment/Plan by Diagnosis:      MELANOCYTIC NEVI  -Relevant exam: Scattered over the trunk/extremities are homogenously pigmented brown macules and papules  ELM performed and without concerning findings  - Exam and clinical history consistent with melanocytic nevi  - Educated on the ABCDE's of melanoma; handout provided  - Counseled to return to clinic prior to scheduled appointment should any of these lesions change or should any new lesions of concern arise  - Counseled on use of sun protection daily  Reviewed latest FDA sunscreen guidelines, including use of broad spectrum (UVA and UVB blocking) sunscreen or sun protective clothing with SPF 30-50 every 2-3 hours and reapplied after exposure to water; use of photoprotective clothing, including a broad brim hat and UPF rated clothing if outdoors for several hours; avoid use of tanning beds as these pose significant risk for melanoma and skin cancer  LENTIGINES  OTHER SKIN CHANGES DUE TO CHRONIC EXPOSURE TO NONIONIZING RADIATION  - Relevant exam: Over sun exposed areas are brown macules  ELM performed and without concerning findings  - Exam and clinical history consistent with lentigines    - Educated that these are indicative of prior sun exposure  - Counseled to return to clinic prior to scheduled appointment should any of these lesions change or should any new lesions of concern arise   - Recommended use of sunscreen as above and below  - Counseled on use of sun protection daily  Reviewed latest FDA sunscreen guidelines, including use of broad spectrum (UVA and UVB blocking) sunscreen or sun protective clothing with SPF 30-50 every 2-3 hours and reapplied after exposure to water; use of photoprotective clothing, including a broad brim hat and UPF rated clothing if outdoors for several hours; avoid use of tanning beds as these pose significant risk for melanoma and skin cancer  CHERRY ANGIOMAS  - Relevant exam: Scattered over the trunk/extremities are red papules  - Exam and clinical history consistent with cherry angiomas  - Educated that these are benign  - Educated that removal is considered aesthetic and would incur a fee  - Patient does not wish to pursue removal at this time but will contact us should this change  ACTINIC KERATOSES  - Relevant exam: On the right temple are gritty pink papules  - Exam and clinical history consistent with actinic keratoses  - Discussed that these lesions are considered premalignant with the potential to evolve into squamous cell carcinoma     - Discussed that these are due to chronic sun exposure and therefore recommend use of sunscreen/sun protection to prevent further sun damage  - Discussed treatment options, including liquid nitrogen destruction, topical immunotherapy, and photodynamic therapy, including risks, benefits    OPTION 3:    PROCEDURE:  DESTRUCTION OF PRE-MALIGNANT LESIONS    - After a thorough discussion of treatment options and risk/benefits/alternatives (including but not limited to local pain, scarring, dyspigmentation, blistering, and possible superinfection), verbal and written consent were obtained and the aforementioned lesions were treated on with cryotherapy using liquid nitrogen x 1 cycle for 5-10 seconds  • TOTAL NUMBER of 1 pre-malignant lesions were treated today on the ANATOMIC LOCATION: Right temple  The patient tolerated the procedure well, and after-care instructions were provided  HISTORY OF NMSC  - Relevant exam: Areas of prior NMSC treatment without evidence of recurrence  - Counseled on use of sun protection daily  Reviewed latest FDA sunscreen guidelines, including use of broad spectrum (UVA and UVB blocking) sunscreen or sun protective clothing with SPF 30-50 every 2-3 hours and reapplied after exposure to water; use of photoprotective clothing, including a broad brim hat and UPF rated clothing if outdoors for several hours; avoid use of tanning beds as these pose significant risk for melanoma and skin cancer   - Counseled to return to clinic prior to scheduled appointment should any of these areas change or should any new lesions of concern arise    NEOPLASM OF UNCERTAIN BEHAVIOR OF SKIN    Physical Exam:  • Anatomic Location Affected:  Left forearm  • Morphological Description: 1 cm pink round patch    Additional History of Present Condition:    - Duration: 2 months  - Always present/intermittently present: always present  - Symptoms: sometimes it gets itchy and scaly  - Changing?: maybe has grown some     - Educated patient that given history and physical exam, recommendation is to biopsy lesion for definitive diagnosis  However, educated that in rare instances, we cannot determine the exact diagnosis after biopsy       ROCEDURE TANGENTIAL (SHAVE) BIOPSY NOTE:    • Performing Physician: Anne Marie Parnell  • Anatomic Location; Clinical Description with size (cm); Pre-Op Diagnosis:   o Specimen A: left forearm; 1 cm pink round patch; DDX: actinic keratosis vs squamous cell carcinoma in situ    • Post-op diagnosis: Same     • Local anesthesia: 1% Lidocaine HCL     • Topical anesthesia: None    • Hemostasis: Electrocautery "              After obtaining informed consent  at which time there was a discussion about the purpose of biopsy  and low risks of infection and bleeding  The area was prepped and draped in the usual fashion  Anesthesia was obtained with 1% lidocaine with epinephrine  A shave biopsy to an appropriate sampling depth was obtained by Shave (Dermablade or 15 blade) The resulting wound was covered with surgical ointment and bandaged appropriately  The patient tolerated the procedure well without complications and was without signs of functional compromise  Specimen has been sent for review by Dermatopathology  Standard post-procedure care has been explained and has been included in written form within the patient's copy of Informed Consent  INFORMED CONSENT DISCUSSION AND POST-OPERATIVE INSTRUCTIONS FOR PATIENT    I   RATIONALE FOR PROCEDURE  I understand that a skin biopsy allows the Dermatologist to test a lesion or rash under the microscope to obtain a diagnosis  It usually involves numbing the area with numbing medication and removing a small piece of skin; sometimes the area will be closed with sutures  In this specific procedure, sutures are not usually needed  If any sutures are placed, then they are usually need to be removed in 2 weeks or less  I understand that my Dermatologist recommends that a skin \"shave\" biopsy be performed today  A local anesthetic, similar to the kind that a dentist uses when filling a cavity, will be injected with a very small needle into the skin area to be sampled  The injected skin and tissue underneath \"will go to sleep” and become numb so no pain should be felt afterwards  An instrument shaped like a tiny \"razor blade\" (shave biopsy instrument) will be used to cut a small piece of tissue and skin from the area so that a sample of tissue can be taken and examined more closely under the microscope    A slight amount of bleeding will occur, but it will be stopped " "with direct pressure and a pressure bandage and any other appropriate methods  I understands that a scar will form where the wound was created  Surgical ointment will be applied to help protect the wound  Sutures are not usually needed  II   RISKS AND POTENTIAL COMPLICATIONS   I understand the risks and potential complications of a skin biopsy include but are not limited to the following:  • Bleeding  • Infection  • Pain  • Scar/keloid  • Skin discoloration  • Incomplete Removal  • Recurrence  • Nerve Damage/Numbness/Loss of Function  • Allergic Reaction to Anesthesia  • Biopsies are diagnostic procedures and based on findings additional treatment or evaluation may be required  • Loss or destruction of specimen resulting in no additional findings    My Dermatologist has explained to me the nature of the condition, the nature of the procedure, and the benefits to be reasonably expected compared with alternative approaches  My Dermatologist has discussed the likelihood of major risks or complications of this procedure including the specific risks listed above, such as bleeding, infection, and scarring/keloid  I understand that a scar is expected after this procedure  I understand that my physician cannot predict if the scar will form a \"keloid,\" which extends beyond the borders of the wound that is created  A keloid is a thick, painful, and bumpy scar  A keloid can be difficult to treat, as it does not always respond well to therapy, which includes injecting cortisone directly into the keloid every few weeks  While this usually reduces the pain and size of the scar, it does not eliminate it  I understand that photographs may be taken before and after the procedure  These will be maintained as part of the medical providers confidential records and may not be made available to me    I further authorize the medical provider to use the photographs for teaching purposes or to illustrate scientific papers, " "books, or lectures if in his/her judgment, medical research, education, or science may benefit from its use  I have had an opportunity to fully inquire about the risks and benefits of this procedure and its alternatives  I have been given ample time and opportunity to ask questions and to seek a second opinion if I wished to do so  I acknowledge that there have specifically been no guarantees as to the cosmetic results from the procedure  I am aware that with any procedure there is always the possibility of an unexpected complication  III  POST-PROCEDURAL CARE (WHAT YOU WILL NEED TO DO \"AFTER THE BIOPSY\" TO OPTIMIZE HEALING)    • Keep the area clean and dry  Try NOT to remove the bandage or get it wet for the first 24 hours  • Gently clean the area and apply surgical ointment (such as Vaseline petrolatum ointment, which is available \"over the counter\" and not a prescription) to the biopsy site for up to 2 weeks straight  This acts to protect the wound from the outside world  • Sutures are not usually placed in this procedure  If any sutures were placed, return for suture removal as instructed (generally 1 week for the face, 2 weeks for the body)  • Take Acetaminophen (Tylenol) for discomfort, if no contraindications  Ibuprofen or aspirin could make bleeding worse  • Call our office immediately for signs of infection: fever, chills, increased redness, warmth, tenderness, discomfort/pain, or pus or foul smell coming from the wound  WHAT TO DO IF THERE IS ANY BLEEDING? If a small amount of bleeding is noticed, place a clean cloth over the area and apply firm pressure for ten minutes  Check the wound after 10 minutes of direct pressure  If bleeding persists, try one more time for an additional 10 minutes of direct pressure on the area    If the bleeding becomes heavier or does not stop after the second attempt, or if you have any other questions about this procedure, then please call " your [facility] Dermatologist by calling [phone] (SKIN)  Scribe Attestation    I,:  Rosalina Hodgson MA am acting as a scribe while in the presence of the attending physician :       I,:  Juan Mack MD personally performed the services described in this documentation    as scribed in my presence :         The patient was seen and discussed with Dr Ivan Multani MD  PGY-4 Dermatology Resident Physician    RTC: will call patient with results and schedule next steps in treatment accordingly

## 2023-06-19 NOTE — PATIENT INSTRUCTIONS
MELANOCYTIC NEVI  -Relevant exam: Scattered over the trunk/extremities are homogenously pigmented brown macules and papules  ELM performed and without concerning findings  - Exam and clinical history consistent with melanocytic nevi  - Educated on the ABCDE's of melanoma; handout provided  - Counseled to return to clinic prior to scheduled appointment should any of these lesions change or should any new lesions of concern arise  - Counseled on use of sun protection daily  Reviewed latest FDA sunscreen guidelines, including use of broad spectrum (UVA and UVB blocking) sunscreen or sun protective clothing with SPF 30-50 every 2-3 hours and reapplied after exposure to water; use of photoprotective clothing, including a broad brim hat and UPF rated clothing if outdoors for several hours; avoid use of tanning beds as these pose significant risk for melanoma and skin cancer  LENTIGINES  OTHER SKIN CHANGES DUE TO CHRONIC EXPOSURE TO NONIONIZING RADIATION  - Relevant exam: Over sun exposed areas are brown macules  ELM performed and without concerning findings  - Exam and clinical history consistent with lentigines  - Educated that these are indicative of prior sun exposure  - Counseled to return to clinic prior to scheduled appointment should any of these lesions change or should any new lesions of concern arise   - Recommended use of sunscreen as above and below  - Counseled on use of sun protection daily  Reviewed latest FDA sunscreen guidelines, including use of broad spectrum (UVA and UVB blocking) sunscreen or sun protective clothing with SPF 30-50 every 2-3 hours and reapplied after exposure to water; use of photoprotective clothing, including a broad brim hat and UPF rated clothing if outdoors for several hours; avoid use of tanning beds as these pose significant risk for melanoma and skin cancer      CHERRY ANGIOMAS  - Relevant exam: Scattered over the trunk/extremities are red papules  - Exam and clinical history consistent with cherry angiomas  - Educated that these are benign  - Educated that removal is considered aesthetic and would incur a fee  - Patient does not wish to pursue removal at this time but will contact us should this change  ACTINIC KERATOSES  - Relevant exam: On the right temple are gritty pink papules  - Exam and clinical history consistent with actinic keratoses  - Discussed that these lesions are considered premalignant with the potential to evolve into squamous cell carcinoma  - Discussed that these are due to chronic sun exposure and therefore recommend use of sunscreen/sun protection to prevent further sun damage  - Discussed treatment options, including liquid nitrogen destruction, topical immunotherapy, and photodynamic therapy, including risks, benefits    OPTION 3:    PROCEDURE:  DESTRUCTION OF PRE-MALIGNANT LESIONS    - After a thorough discussion of treatment options and risk/benefits/alternatives (including but not limited to local pain, scarring, dyspigmentation, blistering, and possible superinfection), verbal and written consent were obtained and the aforementioned lesions were treated on with cryotherapy using liquid nitrogen x 1 cycle for 5-10 seconds  TOTAL NUMBER of 1 pre-malignant lesions were treated today on the ANATOMIC LOCATION: Right temple  The patient tolerated the procedure well, and after-care instructions were provided  HISTORY OF NMSC  - Relevant exam: Areas of prior NMSC treatment without evidence of recurrence  - Counseled on use of sun protection daily   Reviewed latest FDA sunscreen guidelines, including use of broad spectrum (UVA and UVB blocking) sunscreen or sun protective clothing with SPF 30-50 every 2-3 hours and reapplied after exposure to water; use of photoprotective clothing, including a broad brim hat and UPF rated clothing if outdoors for several hours; avoid use of tanning beds as these pose significant risk for melanoma and skin "cancer   - Counseled to return to clinic prior to scheduled appointment should any of these areas change or should any new lesions of concern arise    NEOPLASM OF UNCERTAIN BEHAVIOR OF SKIN    Physical Exam:  Anatomic Location Affected:  Left forearm  Morphological Description: 1 cm pink round patch      - Educated patient that given history and physical exam, recommendation is to biopsy lesion for definitive diagnosis  However, educated that in rare instances, we cannot determine the exact diagnosis after biopsy  INFORMED CONSENT DISCUSSION AND POST-OPERATIVE INSTRUCTIONS FOR PATIENT    I   RATIONALE FOR PROCEDURE  I understand that a skin biopsy allows the Dermatologist to test a lesion or rash under the microscope to obtain a diagnosis  It usually involves numbing the area with numbing medication and removing a small piece of skin; sometimes the area will be closed with sutures  In this specific procedure, sutures are not usually needed  If any sutures are placed, then they are usually need to be removed in 2 weeks or less  I understand that my Dermatologist recommends that a skin \"shave\" biopsy be performed today  A local anesthetic, similar to the kind that a dentist uses when filling a cavity, will be injected with a very small needle into the skin area to be sampled  The injected skin and tissue underneath \"will go to sleep” and become numb so no pain should be felt afterwards  An instrument shaped like a tiny \"razor blade\" (shave biopsy instrument) will be used to cut a small piece of tissue and skin from the area so that a sample of tissue can be taken and examined more closely under the microscope  A slight amount of bleeding will occur, but it will be stopped with direct pressure and a pressure bandage and any other appropriate methods  I understands that a scar will form where the wound was created  Surgical ointment will be applied to help protect the wound    Sutures are not usually " "needed  II   RISKS AND POTENTIAL COMPLICATIONS   I understand the risks and potential complications of a skin biopsy include but are not limited to the following:  Bleeding  Infection  Pain  Scar/keloid  Skin discoloration  Incomplete Removal  Recurrence  Nerve Damage/Numbness/Loss of Function  Allergic Reaction to Anesthesia  Biopsies are diagnostic procedures and based on findings additional treatment or evaluation may be required  Loss or destruction of specimen resulting in no additional findings    My Dermatologist has explained to me the nature of the condition, the nature of the procedure, and the benefits to be reasonably expected compared with alternative approaches  My Dermatologist has discussed the likelihood of major risks or complications of this procedure including the specific risks listed above, such as bleeding, infection, and scarring/keloid  I understand that a scar is expected after this procedure  I understand that my physician cannot predict if the scar will form a \"keloid,\" which extends beyond the borders of the wound that is created  A keloid is a thick, painful, and bumpy scar  A keloid can be difficult to treat, as it does not always respond well to therapy, which includes injecting cortisone directly into the keloid every few weeks  While this usually reduces the pain and size of the scar, it does not eliminate it  I understand that photographs may be taken before and after the procedure  These will be maintained as part of the medical providers confidential records and may not be made available to me  I further authorize the medical provider to use the photographs for teaching purposes or to illustrate scientific papers, books, or lectures if in his/her judgment, medical research, education, or science may benefit from its use  I have had an opportunity to fully inquire about the risks and benefits of this procedure and its alternatives     I have been given ample time " "and opportunity to ask questions and to seek a second opinion if I wished to do so  I acknowledge that there have specifically been no guarantees as to the cosmetic results from the procedure  I am aware that with any procedure there is always the possibility of an unexpected complication  III  POST-PROCEDURAL CARE (WHAT YOU WILL NEED TO DO \"AFTER THE BIOPSY\" TO OPTIMIZE HEALING)    Keep the area clean and dry  Try NOT to remove the bandage or get it wet for the first 24 hours  Gently clean the area and apply surgical ointment (such as Vaseline petrolatum ointment, which is available \"over the counter\" and not a prescription) to the biopsy site for up to 2 weeks straight  This acts to protect the wound from the outside world  Sutures are not usually placed in this procedure  If any sutures were placed, return for suture removal as instructed (generally 1 week for the face, 2 weeks for the body)  Take Acetaminophen (Tylenol) for discomfort, if no contraindications  Ibuprofen or aspirin could make bleeding worse  Call our office immediately for signs of infection: fever, chills, increased redness, warmth, tenderness, discomfort/pain, or pus or foul smell coming from the wound  WHAT TO DO IF THERE IS ANY BLEEDING? If a small amount of bleeding is noticed, place a clean cloth over the area and apply firm pressure for ten minutes  Check the wound after 10 minutes of direct pressure  If bleeding persists, try one more time for an additional 10 minutes of direct pressure on the area  If the bleeding becomes heavier or does not stop after the second attempt, or if you have any other questions about this procedure, then please call [facility] by calling [phone] (SKIN)       "

## 2023-06-22 ENCOUNTER — PROCEDURE VISIT (OUTPATIENT)
Dept: NEUROLOGY | Facility: CLINIC | Age: 58
End: 2023-06-22
Payer: COMMERCIAL

## 2023-06-22 VITALS
HEIGHT: 64 IN | TEMPERATURE: 97.2 F | BODY MASS INDEX: 22.36 KG/M2 | SYSTOLIC BLOOD PRESSURE: 123 MMHG | WEIGHT: 131 LBS | HEART RATE: 86 BPM | DIASTOLIC BLOOD PRESSURE: 71 MMHG

## 2023-06-22 DIAGNOSIS — G43.109 MIGRAINE WITH AURA AND WITHOUT STATUS MIGRAINOSUS, NOT INTRACTABLE: ICD-10-CM

## 2023-06-22 DIAGNOSIS — G43.709 CHRONIC MIGRAINE WITHOUT AURA WITHOUT STATUS MIGRAINOSUS, NOT INTRACTABLE: Primary | ICD-10-CM

## 2023-06-22 PROCEDURE — 64615 CHEMODENERV MUSC MIGRAINE: CPT | Performed by: PHYSICIAN ASSISTANT

## 2023-06-22 RX ORDER — SUMATRIPTAN 100 MG/1
100 TABLET, FILM COATED ORAL AS NEEDED
Qty: 9 TABLET | Refills: 0 | Status: SHIPPED | OUTPATIENT
Start: 2023-06-22

## 2023-06-22 NOTE — PROGRESS NOTES
"  Universal Protocol   Consent: Verbal consent obtained  Written consent obtained  Risks and benefits: risks, benefits and alternatives were discussed  Consent given by: patient  Time out: Immediately prior to procedure a \"time out\" was called to verify the correct patient, procedure, equipment, support staff and site/side marked as required  Patient understanding: patient states understanding of the procedure being performed  Patient consent: the patient's understanding of the procedure matches consent given  Procedure consent: procedure consent matches procedure scheduled  Relevant documents: relevant documents present and verified  Patient identity confirmed: verbally with patient        Chemodenervation     Date/Time 6/22/2023 9:30 AM     Performed by  Zoë Sanchez PA-C   Authorized by Zoë Sanchez PA-C       Pre-procedure details      Prepped With: Alcohol     Anesthesia  (see MAR for exact dosages):      Anesthesia method:  None   Procedure details     Position:  Upright   Botox     Botox Type:  Type A    Brand:  Botox    mL's of Botulinum Toxin:  200    Final Concentration per CC:  50 units    Needle Gauge:  30 G 2 5 inch   Procedures     Botox Procedures: chronic headache      Indications: migraines     Injection Location      Head / Face:  L superior cervical paraspinal, R superior cervical paraspinal, L , R , L frontalis, R frontalis, L medial occipitalis, R medial occipitalis, procerus, R temporalis, L temporalis, R superior trapezius and L superior trapezius    L  injection amount:  5 unit(s)    R  injection amount:  5 unit(s)    L lateral frontalis:  5 unit(s)    R lateral frontalis:  5 unit(s)    L medial frontalis:  5 unit(s)    R medial frontalis:  5 unit(s)    L temporalis injection amount:  20 unit(s)    R temporalis injection amount:  20 unit(s)    Procerus injection amount:  5 unit(s)    L medial occipitalis injection amount:  15 unit(s)    R medial " occipitalis injection amount:  15 unit(s)    L superior cervical paraspinal injection amount:  10 unit(s)    R superior cervical paraspinal injection amount:  10 unit(s)    L superior trapezius injection amount:  15 unit(s)    R superior trapezius injection amount:  15 unit(s)   Total Units     Total units used:  200    Total units discarded:  0   Post-procedure details      Chemodenervation:  Chronic migraine    Facial Nerve Location[de-identified]  Bilateral facial nerve    Patient tolerance of procedure:   Tolerated well, no immediate complications   Comments       5 units orbicularis oculi bilaterally  35 units frontalis  All medically necessary

## 2023-06-23 PROCEDURE — 88342 IMHCHEM/IMCYTCHM 1ST ANTB: CPT | Performed by: STUDENT IN AN ORGANIZED HEALTH CARE EDUCATION/TRAINING PROGRAM

## 2023-06-23 PROCEDURE — 88305 TISSUE EXAM BY PATHOLOGIST: CPT | Performed by: STUDENT IN AN ORGANIZED HEALTH CARE EDUCATION/TRAINING PROGRAM

## 2023-06-26 DIAGNOSIS — E78.01 FAMILIAL HYPERCHOLESTEROLEMIA: ICD-10-CM

## 2023-06-27 RX ORDER — ATORVASTATIN CALCIUM 10 MG/1
10 TABLET, FILM COATED ORAL DAILY
Qty: 90 TABLET | Refills: 0 | Status: SHIPPED | OUTPATIENT
Start: 2023-06-27

## 2023-07-07 DIAGNOSIS — G43.109 MIGRAINE WITH AURA AND WITHOUT STATUS MIGRAINOSUS, NOT INTRACTABLE: Primary | ICD-10-CM

## 2023-07-07 RX ORDER — RIZATRIPTAN BENZOATE 10 MG/1
10 TABLET ORAL AS NEEDED
Qty: 9 TABLET | Refills: 0 | Status: SHIPPED | OUTPATIENT
Start: 2023-07-07

## 2023-07-11 NOTE — PSYCH
Virtual Regular Visit    Verification of patient location:    Patient is located at Home in the following state in which I hold an active license [place]      Assessment/Plan:    Problem List Items Addressed This Visit        Other    Attention and concentration deficit    TIM (generalized anxiety disorder)    Recurrent major depressive disorder, in remission [facility])    Memory difficulties            Reason for visit is No chief complaint on file. Encounter provider Junior Manda DO    Provider located at [address] [id number] [phone]      Recent Visits  No visits were found meeting these conditions. Showing recent visits within past 7 days and meeting all other requirements  Today's Visits  Date Type Provider Dept   07/13/23 [facility]   Showing today's visits and meeting all other requirements  Future Appointments  No visits were found meeting these conditions. Showing future appointments within next 150 days and meeting all other requirements       The patient was identified by name and date of birth. Silver Whittaker was informed that this is a telemedicine visit and that the visit is being conducted throughthe eWise platform. She agrees to proceed. .  My office door was closed. No one else was in the room. She acknowledged consent and understanding of privacy and security of the video platform. The patient has agreed to participate and understands they can discontinue the visit at any time. Patient is aware this is a billable service.      Subjective  See below    HPI     Past Medical History:   Diagnosis Date   • Anxiety    • Basal cell carcinoma     Last assessed: 9/26/14   • Cancer ([address])     [facility]   • Depression    • Depression with anxiety     Last assessed: 1/13/17   • Headache, tension-type    • Insomnia     Last assessed: 9/26/14   • Kidney stone    • Memory loss    • Migraine 1998   • Varicella 1970       Past Surgical History:   Procedure Laterality Date   • BLADDER SURGERY      lift of bladder   • CYSTOSCOPY      Theurapeutic. TVT-O   • EYE SURGERY Bilateral     LASIK   • FACIAL/NECK BIOPSY Right 03/12/2019    Procedure: UPPER EYELID CYST EXCISION;  Surgeon: Michelle Melissa MD;  Location: [facility]  MAIN OR;  Service: Plastics   • GYNECOLOGIC CRYOSURGERY  2010    Cervix   • HYSTEROSCOPY W/ ENDOMETRIAL ABLATION  12/14/2010    Novasure   • KIDNEY SURGERY  kidney stone removal    1983   • MOHS SURGERY  2010    Forehead   • [place] CORRJ HALLUX VALGUS W/SESMDC W/DIST METAR OSTEOT Left 04/16/2021    Procedure: BUNIONECTOMY JENNA left foot;  Surgeon: Chandni Rae DPM;  Location:  MAIN OR;  Service: Podiatry   • SKIN BIOPSY     • TUBAL LIGATION     • WISDOM TOOTH EXTRACTION         Current Outpatient Medications   Medication Sig Dispense Refill   • rizatriptan (MAXALT) 10 mg tablet Take 1 tablet (10 mg total) by mouth as needed for migraine May repeat in 2 hours if needed.   Limit 3 a week or 9 a month 9 tablet 0   • amphetamine-dextroamphetamine (ADDERALL XR, 20MG,) 20 MG 24 hr capsule Take 1 capsule (20 mg total) by mouth every morning Max Daily Amount: 20 mg 90 capsule 0   • atorvastatin (LIPITOR) 10 mg tablet Take 1 tablet (10 mg total) by mouth daily 90 tablet 0   • buPROPion (WELLBUTRIN XL) 300 mg 24 hr tablet Take 1 tablet (300 mg total) by mouth every morning 90 tablet 1   • clonazePAM (KlonoPIN) 0.5 mg tablet Take 1/2 tab daily as needed for anxiety and take 1 tab nightly as needed for insomnia and anxiety Do not start before January 30, 2023. 45 tablet 5   • estrogens, conjugated (Premarin) vaginal cream Insert 0.5g vaginally three times per week 30 g 1   • FLUoxetine (PROzac) 40 MG capsule Take 1 capsule (40 mg total) by mouth daily 90 capsule 1   • Multiple Vitamins-Minerals (MULTIVITAL-M PO) Take 1 tablet by mouth in the morning     • Ubrogepant (UBRELVY) 100 MG tablet Take 1 tablet (100 mg) one time as needed for migraine. May repeat one additional tablet (100 mg) at least two hours after the first dose. Do not use more than 200 mg a day 16 tablet 3     No current facility-administered medications for this visit. Allergies   Allergen Reactions   • Butoconazole Hives   • Tioconazole Hives       Review of Systems    Video Exam    There were no vitals filed for this visit. Physical Exam     Visit Time    Visit Start Time: 649V  Visit Stop Time: 361N  Total Visit Duration: 11 minutes                MEDICATION MANAGEMENT NOTE        [facility]      Name and Date of Birth:  Libby Elizalde 59 y.o. 1965    Date of Visit: July 13, 2023    SUBJECTIVE:  CC: Trev Bonilla presents today for follow up on "I am good!"; anxiety and depression     Trev Bonilla is planning her daughters baby shower for November, she is the primary planner. So it is a lot but doing well. Will be number 5. She is doing well at [employer], has 5 more years. Well appreciated there. attrition of new hires due to wages more than ever before she feels, but she likes her crew and managers giver her accolades often    Likes treatment currently, feels everything is balanced. "I have nothing to report but that I am doing well". care home likely ~2028    Lives with son, Susette Lundborg and his 4 kids.  (Kids ~2021, 2020, 2018, 2016). Her other 2 kids have not had children yet. F/U PRN- "Same old memory thing". Cognitive situation is unchanged still, no decline. Neurologist periodically    Since our last visit, overall symptoms have been unchanged.       Med Compliance: yes    HPI ROS:                      ('was' below is from prior visit)  Medication Side Effects (other than noted):  no     Depression (10 worst):  low (Was low)   Anxiety (10 worst):  low (Was low)   Hallucinations or Psychosis  no (Was no)    Safety concerns (self harm thoughts, suicidal ideation, HI, etc):  no (Was no)   Sleep: (NM = Nightmares)  good (Was good)   Energy:  good (Was good)   Appetite:  good (Was good)   Weight Change:  no          PHQ-2/9 Depression Screening    Little interest or pleasure in doing things: 1 - several days  Feeling down, depressed, or hopeless: 1 - several days  Trouble falling or staying asleep, or sleeping too much: 1 - several days  Feeling tired or having little energy: 1 - several days  Poor appetite or overeatin - not at all  Feeling bad about yourself - or that you are a failure or have let yourself or your family down: 0 - not at all  Trouble concentrating on things, such as reading the newspaper or watching television: 1 - several days  Moving or speaking so slowly that other people could have noticed. Or the opposite - being so fidgety or restless that you have been moving around a lot more than usual: 0 - not at all             David Reyes denies any side effects from medications unless noted above    Review Of Systems as noted above. Otherwise A relevant review of symptoms was otherwise negative    History Review:  The following portions of the patient's history were reviewed and documented: allergies, current medications, past family history, past medical history, past social history and problem list.     Lab Review: No new labs or no relevant labs needing review with patient today      OBJECTIVE:     MENTAL STATUS EXAM  Appearance:  age appropriate   Behavior:  pleasant, cooperative, with good eye contact   Speech:  Normal volume, regular rate and rhythm   Mood:  euthymic   Affect:  mood congruent   Language: intact and appropriate for age, education, and intellect   Thought Process:  Linear and goal directed   Associations: intact associations   Thought Content:  normal and appropriate   Perceptual Disturbances: no auditory or visual hallcunations   Risk Potential / Abnormal Thoughts: Suicidal ideation - None  Homicidal ideation - None  Potential for aggression - No       Consciousness:  Alert & Awake   Sensorium:  Grossly oriented   Attention: attention span and concentration are age appropriate       Fund of Knowledge:  Memory: awareness of current events: yes  recent and remote memory grossly intact   Insight:  good   Judgment: good   Muscle Strength Muscle Tone:      Gait/Station:    Motor Activity:        Risks, Benefits And Possible Side Effects Of Medications:    AGREE: Risks, benefits, and possible side effects of medications explained to [place] and she (or legal representative) verbalizes understanding and agreement for treatment. Controlled Medication Discussion:     [place] has been filling controlled prescriptions on time as prescribed according to [address] program.   _____________________________________________________________      Recent labs:  Office Visit on 06/19/2023   Component Date Value   • Case Report 06/19/2023                      Value:Surgical Pathology Report                         Case: [id number]                                   Authorizing Provider:  Anuradha Calvert MD          Collected:           06/19/2023 0932              Ordering Location:     [facility]      Received:            06/19/2023 [facility]                                                                Pathologist:           Essie Price MD                                                           Specimen:    Skin, Other, A: Left forearm, shave                                                       • Final Diagnosis 06/19/2023                      Value: This result contains rich text formatting which cannot be displayed here. • Additional Information 06/19/2023                      Value: This result contains rich text formatting which cannot be displayed here. • Gross Description 06/19/2023                      Value: This result contains rich text formatting which cannot be displayed here. • Clinical Information 06/19/2023                      Value:Specimen A: left forearm; Skin; Shave biopsy; 59year old female with a 1 cm pink round patch; DDX: actinic keratosis vs squamous cell carcinoma in situ    ATTN: [facility]    Patient did used to see Omar Tesfaye in early 2016 when she had a change providers due to insurance. She does not have a therapist.    She been hospitalized once in 2000. She says that it was related to having a breakup with her boyfriend at the time and then finding out that she lost her job because she was  at his place of employment. Losing her boyfriend because she did not want to get  and he did and also having job issues led her to crisis and overdose although it was not a dramatic overdose it was a time when she said that she was trying to take pills to go to sleep and that she did have some suicidal thoughts associated with it. She denies any self-harm homicidal ideation in the past or present. She's never been violent. She says that she's not had any suicidal ideation since that time. Social History:  Collins Reno was raised in [facility] to a "good" childhood. Her parents were together and still are. She denies ever having physical or sexual abuse or other forms neglect now or in the past as a child. She has one brother and one sister. She developed normally. She finished high school and got associates degree in business. Her daughter currently lives with her. She has 3 children 2 boys and one girl. She is good relationships with her family. She's been  and  twice. Currently not in a serious relationship at intake    Her support system includes her children her parents and her cousins. She has friends that are close but she doesn't typically opened up to them about mental health. She is [place] and attends [employer] sometimes.  No  history no legal issues and no weapons at home. She does not use tobacco drinks about 1 cup of coffee a day and is a social drinker and when she does drink it's very rare and not much at that time. She denies ever using substances and has never been to rehabilitation. Medical / Surgical History:    Past Medical History:   Diagnosis Date   • Anxiety    • Basal cell carcinoma     Last assessed: 9/26/14   • Cancer ([address])     [facility]   • Depression    • Depression with anxiety     Last assessed: 1/13/17   • Headache, tension-type    • Insomnia     Last assessed: 9/26/14   • Kidney stone    • Memory loss    • Migraine 1998   • Varicella 1970     Past Surgical History:   Procedure Laterality Date   • BLADDER SURGERY      lift of bladder   • CYSTOSCOPY      Theurapeutic.  TVT-O   • EYE SURGERY Bilateral     LASIK   • FACIAL/NECK BIOPSY Right 03/12/2019    Procedure: UPPER EYELID CYST EXCISION;  Surgeon: Fam Arthur MD;  Location: [facility] MAIN OR;  Service: Plastics   • GYNECOLOGIC CRYOSURGERY  2010    Cervix   • HYSTEROSCOPY W/ ENDOMETRIAL ABLATION  12/14/2010    Deborah   • KIDNEY SURGERY  kidney stone removal    1983   • MOHS SURGERY  2010    Forehead   • [place] CORRJ HALLUX VALGUS W/SESMDC W/DIST METAR OSTEOT Left 04/16/2021    Procedure: BUNIONECTOMY JENNA left foot;  Surgeon: Nena Harper DPM;  Location:  MAIN OR;  Service: Podiatry   • SKIN BIOPSY     • TUBAL LIGATION     • WISDOM TOOTH EXTRACTION         Family Psychiatric History:     Family History   Problem Relation Age of Onset   • Heart murmur Mother    • Hyperlipidemia Mother    • Atrial fibrillation Father    • Arthritis Father    • Basal cell carcinoma Father 61   • Psoriasis Sister    • No Known Problems Daughter    • No Known Problems Maternal Grandmother    • Coronary artery disease Maternal Grandfather    • Heart disease Maternal Grandfather    • Breast cancer Paternal Grandmother    • Diabetes Paternal Grandmother         Mellitus   • Arthritis Paternal Grandmother    • Cancer Paternal Grandmother    • Hyperlipidemia Paternal Grandmother    • Hypertension Paternal Grandmother    • Hypertension Paternal Grandfather    • Hyperlipidemia Paternal Grandfather    • Skin cancer Paternal Grandfather 79   • No Known Problems Brother    • ADD / ADHD Son    • No Known Problems Son    • Hyperlipidemia Maternal Aunt    • Coronary artery disease Maternal Aunt    • No Known Problems Maternal Aunt    • Hyperlipidemia Maternal Uncle    • Coronary artery disease Maternal Uncle    • Dementia Paternal Aunt             • No Known Problems Paternal Aunt    • Alcohol abuse Neg Hx    • Substance Abuse Neg Hx    • Mental illness Neg Hx        Daughter -  Family history of ADD (attention deficit disorder)  Son - Family history of ADD (attention deficit disorder)  Family History    15. Denied: Family history of bipolar disorder   14. Denied: Family history of substance abuse   15. Denied: Family history of suicide attempt      Confidential Assessment:  Scales:    Assessment/Plan:        Diagnoses and all orders for this visit:    Recurrent major depressive disorder, in remission ([address])    Attention and concentration deficit    Memory difficulties    TIM (generalized anxiety disorder)        ______________________________________________________________________    Generalized anxiety disorder  MDD, recurrent, in remission    Attention and concentration deficit  Memory difficulties    ---  Generalized anxiety disorder - manageable   MDD, recurrent, in remission   - manageable  Attention and concentration deficit - manageable  Memory difficulties - stable    Oval Latoyaachealia is doing well, likes current treatment. We have discussed reducing klonopin, she likes where it is. Attention and concentration deficit may be related to organic issues, anxiety, or underlying ADD/ADHD. Ongoing f/u with Neurology for this and migraines. She was in a cognitive study years ago, no amyloid.  However, she feels issues predated klonopin. "Maybe it is just me". Talked about cognitive concerns in context benzodiazepines, but she is comfortable taking them, but understands coming off would be long term goal as feasible and weighing risks/benefits. Also we discussed long term risks of all medications as well, including cardiovascular, bone density, falls, etc but she very much appreciates the "quality of life" she receives not matter what was mentioned. She will still f/u with PCP re: getting a Dexa scan. Safety Risk Assessment: see above . In considering risk and protective factors, suicide risk and safety risk are low. Past medications include   Prozac which helps but does have some decreased orgasm  Topamax for migraines but this seemed to lead to confusion ( years ago)  ON but no detail recall - Zoloft, Celexa or Lexapro. Effexor seemed to cause weight gain  Wellbutrin  Ambien caused oversedation  melatonin did not help. Treatment Plan:        Patient has been educated about their diagnosis and treatment modalities. They voiced understanding and agreement with the following plan:    1) Meds:   - Prozac 40mg daily. (consider increase but orgasm affected. Was on 50mg in past per charts)   - Wellbutrin XL 300mg daily   - Klonopin 0.25mg in day PRN and 0.5mg HS PRN. - Adderal XR 20mg Daily     2) Labs: PRN   - 2/2017: CBC, CMP WNL. TSH 1.57, TG 56, HDL 98,     3) Therapy:   - not in therapy, revisit PRN   - Provided progressive muscle relaxation handout, asked her to look into belly breathing and guided imagery. (2/9/2018)    4) Medical:  Concentration issues, MRI abnormalities, family h/o early onset dementia (PAunt), migraines (gets Botox)   - pt to f/u with neurologist   - pt to f/u with other providers PRN    5) Other: support prn   - works at [address] [place] [address]. Reception   - good support from family, daughter lives with her   - 3 kids, . Daughter graduates from [employer] with [address] [place] [address] May.   Likes psychology, healthcare    6) Follow up:   - 6 months, but patient to call if issues or concerns    7) Treatment Plan: Enacted 2/9/2018, 12/7/2018, 7/26/2019, 8/14/2020, 4/1/21, 10/21/2021, 5/19/2022, 1/19/2023, 7/13/2023        Discussed self monitoring of symptoms, and symptom monitoring tools. Patient has been informed of 24 hours and weekend coverage for urgent situations accessed by calling the main clinic phone number.            Psychotherapy in session:  Time spent performing psychotherapy:

## 2023-07-13 ENCOUNTER — TELEMEDICINE (OUTPATIENT)
Dept: PSYCHIATRY | Facility: CLINIC | Age: 58
End: 2023-07-13
Payer: COMMERCIAL

## 2023-07-13 DIAGNOSIS — R41.3 MEMORY DIFFICULTIES: ICD-10-CM

## 2023-07-13 DIAGNOSIS — F41.1 GAD (GENERALIZED ANXIETY DISORDER): ICD-10-CM

## 2023-07-13 DIAGNOSIS — F33.40 RECURRENT MAJOR DEPRESSIVE DISORDER, IN REMISSION (HCC): ICD-10-CM

## 2023-07-13 DIAGNOSIS — R41.840 ATTENTION AND CONCENTRATION DEFICIT: ICD-10-CM

## 2023-07-13 PROCEDURE — 99213 OFFICE O/P EST LOW 20 MIN: CPT | Performed by: PSYCHIATRY & NEUROLOGY

## 2023-07-13 RX ORDER — FLUOXETINE HYDROCHLORIDE 40 MG/1
40 CAPSULE ORAL DAILY
Qty: 90 CAPSULE | Refills: 1 | Status: SHIPPED | OUTPATIENT
Start: 2023-07-13

## 2023-07-13 RX ORDER — CLONAZEPAM 0.5 MG/1
TABLET ORAL
Qty: 45 TABLET | Refills: 5 | Status: SHIPPED | OUTPATIENT
Start: 2023-08-02

## 2023-07-13 RX ORDER — DEXTROAMPHETAMINE SACCHARATE, AMPHETAMINE ASPARTATE MONOHYDRATE, DEXTROAMPHETAMINE SULFATE AND AMPHETAMINE SULFATE 5; 5; 5; 5 MG/1; MG/1; MG/1; MG/1
20 CAPSULE, EXTENDED RELEASE ORAL EVERY MORNING
Qty: 90 CAPSULE | Refills: 0 | Status: SHIPPED | OUTPATIENT
Start: 2023-09-13

## 2023-07-13 RX ORDER — BUPROPION HYDROCHLORIDE 300 MG/1
300 TABLET ORAL EVERY MORNING
Qty: 90 TABLET | Refills: 1 | Status: SHIPPED | OUTPATIENT
Start: 2023-07-13

## 2023-07-13 NOTE — BH TREATMENT PLAN
TREATMENT PLAN (Medication Management Only)        [address] [facility]    Name/Date of Birth/MRN:  Tyler Sam 59 y.o. 1965 MRN: [id number]  Date of Treatment Plan: July 13, 2023  Diagnosis/Diagnoses:   1. Recurrent major depressive disorder, in remission ([address])    2. Attention and concentration deficit    3. Memory difficulties    4. TIM (generalized anxiety disorder)      Strengths/Personal Resources for Self-Care: very good work ethics, honest, kind  Area/Areas of need (in own words): speaking my mind, making my decisions  1. Long Term Goal: "maintain what I am doing"   Target Date: 180 days from treatment plan  Person/Persons responsible for completion of goal: Dr. Ashli Sáncehz and Self  2. Short Term Objective (s) - How will we reach this goal?:   A. Provider new recommended medication/dosage changes and/or continue medication(s): discussed  B. Continue to stay active and involved with family  C. Follow up with all providers, take meds as prescribed  D. Target Date: 6 months from treatment plan unless noted otherwise  Person/Persons Responsible for Completion of Goal: Dr. Ashli Sánchez and Self   Progress Towards Goals: continuing treatment   Treatment Modality: Medication management and therapy PRN  Review due 180 days from date of this plan: Approximately 6 months from today ( 10/13/2023 )    Expected length of service: ongoing treatment  My Physician/PA/NP and I have developed this plan together and I agree to work on the goals and objectives. I understand the treatment goals that were developed for my treatment.   Signature:  Tyler Sam  7/13/2023  9:19 AM  Treatment Plan done but not signed at time of office visit due to:  Plan reviewed by phone or in person  and verbal consent given due to virtual visit       Date and time:  Signature of parent/guardian if under age of 15 years: Date and time:  Signature of provider:      Date and time:  Signature of Supervising Physician:    Date and time: 7/13/2023      Sri Granda III

## 2023-07-18 ENCOUNTER — TELEPHONE (OUTPATIENT)
Dept: OBGYN CLINIC | Facility: CLINIC | Age: 58
End: 2023-07-18

## 2023-07-18 DIAGNOSIS — N30.90 CYSTITIS: Primary | ICD-10-CM

## 2023-07-18 NOTE — TELEPHONE ENCOUNTER
Patient sent message via My Chart reporting symptoms of UTI. Orders placed for UA/UC. Routed to Dr. King Smith for order. Allergies and pharmacy reviewed.

## 2023-07-18 NOTE — TELEPHONE ENCOUNTER
----- Message from Nikolay Perez sent at 7/18/2023  4:40 PM EDT -----  Regarding: UTI  Contact: [address] [place] [address]

## 2023-07-19 ENCOUNTER — APPOINTMENT (OUTPATIENT)
Dept: LAB | Facility: CLINIC | Age: 58
End: 2023-07-19
Payer: COMMERCIAL

## 2023-07-19 DIAGNOSIS — N30.90 CYSTITIS: ICD-10-CM

## 2023-07-19 LAB
BACTERIA UR QL AUTO: ABNORMAL /HPF
BILIRUB UR QL STRIP: ABNORMAL
CLARITY UR: ABNORMAL
COLOR UR: ABNORMAL
GLUCOSE UR STRIP-MCNC: NEGATIVE MG/DL
HGB UR QL STRIP.AUTO: ABNORMAL
KETONES UR STRIP-MCNC: NEGATIVE MG/DL
LEUKOCYTE ESTERASE UR QL STRIP: ABNORMAL
NITRITE UR QL STRIP: POSITIVE
NON-SQ EPI CELLS URNS QL MICRO: ABNORMAL /HPF
PH UR STRIP.AUTO: 5.5 [PH]
PROT UR STRIP-MCNC: NEGATIVE MG/DL
RBC #/AREA URNS AUTO: ABNORMAL /HPF
SP GR UR STRIP.AUTO: 1.01 (ref 1–1.03)
UROBILINOGEN UR STRIP-ACNC: 3 MG/DL
WBC #/AREA URNS AUTO: ABNORMAL /HPF
WBC CLUMPS # UR AUTO: PRESENT /UL

## 2023-07-19 PROCEDURE — 87077 CULTURE AEROBIC IDENTIFY: CPT

## 2023-07-19 PROCEDURE — 81001 URINALYSIS AUTO W/SCOPE: CPT

## 2023-07-19 PROCEDURE — 87186 SC STD MICRODIL/AGAR DIL: CPT

## 2023-07-19 PROCEDURE — 87086 URINE CULTURE/COLONY COUNT: CPT

## 2023-07-20 ENCOUNTER — HOSPITAL ENCOUNTER (OUTPATIENT)
Dept: MAMMOGRAPHY | Facility: IMAGING CENTER | Age: 58
Discharge: HOME | End: 2023-07-20
Payer: COMMERCIAL

## 2023-07-20 VITALS — HEIGHT: 64 IN | WEIGHT: 126 LBS | BODY MASS INDEX: 21.51 KG/M2

## 2023-07-20 DIAGNOSIS — B37.31 YEAST VAGINITIS: Primary | ICD-10-CM

## 2023-07-20 DIAGNOSIS — N30.90 CYSTITIS: Primary | ICD-10-CM

## 2023-07-20 DIAGNOSIS — Z12.31 ENCOUNTER FOR SCREENING MAMMOGRAM FOR MALIGNANT NEOPLASM OF BREAST: ICD-10-CM

## 2023-07-20 PROCEDURE — 77063 BREAST TOMOSYNTHESIS BI: CPT

## 2023-07-20 PROCEDURE — 77067 SCR MAMMO BI INCL CAD: CPT

## 2023-07-20 RX ORDER — FLUCONAZOLE 150 MG/1
150 TABLET ORAL EVERY OTHER DAY
Qty: 2 TABLET | Refills: 0 | Status: SHIPPED | OUTPATIENT
Start: 2023-07-20 | End: 2023-07-23

## 2023-07-20 RX ORDER — NITROFURANTOIN 25; 75 MG/1; MG/1
100 CAPSULE ORAL 2 TIMES DAILY
Qty: 14 CAPSULE | Refills: 0 | Status: SHIPPED | OUTPATIENT
Start: 2023-07-20 | End: 2023-07-27

## 2023-07-20 NOTE — TELEPHONE ENCOUNTER
----- Message from Renay Borges MD sent at 7/20/2023  7:11 AM EDT -----  Regarding: FW: Urine Test  Contact: [phone]  Yes send macrobid    ----- Message -----  From: Lisa Mendez  Sent: 7/19/2023  11:40 AM EDT  To: Renay Borges MD  Subject: FW: Urine Test                                   Want to send something now?    ----- Message -----  From: Ofe Myrick  Sent: 7/19/2023  11:35 AM EDT  To: [address] [facility]  Subject: Urine Test                                       I am at work and very, very uncomfortable with a lot of burning.

## 2023-07-21 ENCOUNTER — TELEPHONE (OUTPATIENT)
Dept: OBGYN CLINIC | Facility: CLINIC | Age: 58
End: 2023-07-21

## 2023-07-21 LAB — BACTERIA UR CULT: ABNORMAL

## 2023-07-21 NOTE — TELEPHONE ENCOUNTER
----- Message from Abbey Pinto sent at 7/20/2023 10:32 PM EDT -----  Regarding: UTI  Contact: [phone]  Would you please place an order for diflucan for me. I get a yeast infection when I take the meds to cure my uti. Thank you.

## 2023-08-23 DIAGNOSIS — N30.00 ACUTE CYSTITIS WITHOUT HEMATURIA: Primary | ICD-10-CM

## 2023-08-23 RX ORDER — NITROFURANTOIN 25; 75 MG/1; MG/1
CAPSULE ORAL
Qty: 14 CAPSULE | Refills: 0 | Status: SHIPPED | OUTPATIENT
Start: 2023-08-23

## 2023-08-23 NOTE — TELEPHONE ENCOUNTER
----- Message from Bee Garrett sent at 8/23/2023  9:48 AM EDT -----  Regarding: Another UTI  Contact: [phone]  I  am getting another UTI. I am leaving for vacation on Saturday, so I would like to start the meds ASAP. Would you please send an order to the [address] [place] [address]?   My phone number is [phone]

## 2023-09-05 DIAGNOSIS — N39.0 RECURRENT UTI: Primary | ICD-10-CM

## 2023-09-06 ENCOUNTER — APPOINTMENT (OUTPATIENT)
Dept: LAB | Facility: CLINIC | Age: 58
End: 2023-09-06
Payer: COMMERCIAL

## 2023-09-06 DIAGNOSIS — N39.0 RECURRENT UTI: ICD-10-CM

## 2023-09-06 PROCEDURE — 87086 URINE CULTURE/COLONY COUNT: CPT

## 2023-09-08 LAB — BACTERIA UR CULT: NORMAL

## 2023-09-21 ENCOUNTER — PROCEDURE VISIT (OUTPATIENT)
Dept: NEUROLOGY | Facility: CLINIC | Age: 58
End: 2023-09-21

## 2023-09-21 ENCOUNTER — OFFICE VISIT (OUTPATIENT)
Dept: UROLOGY | Facility: CLINIC | Age: 58
End: 2023-09-21
Payer: COMMERCIAL

## 2023-09-21 VITALS
SYSTOLIC BLOOD PRESSURE: 110 MMHG | HEART RATE: 80 BPM | BODY MASS INDEX: 22.36 KG/M2 | HEIGHT: 64 IN | DIASTOLIC BLOOD PRESSURE: 64 MMHG | WEIGHT: 131 LBS

## 2023-09-21 VITALS — HEART RATE: 85 BPM | SYSTOLIC BLOOD PRESSURE: 120 MMHG | DIASTOLIC BLOOD PRESSURE: 98 MMHG | TEMPERATURE: 97.8 F

## 2023-09-21 DIAGNOSIS — N39.0 RECURRENT UTI: ICD-10-CM

## 2023-09-21 DIAGNOSIS — N20.0 CALCULUS OF KIDNEY: Primary | ICD-10-CM

## 2023-09-21 DIAGNOSIS — G43.709 CHRONIC MIGRAINE WITHOUT AURA WITHOUT STATUS MIGRAINOSUS, NOT INTRACTABLE: Primary | ICD-10-CM

## 2023-09-21 PROCEDURE — 99204 OFFICE O/P NEW MOD 45 MIN: CPT | Performed by: UROLOGY

## 2023-09-21 RX ORDER — ESTRADIOL 10 UG/1
1 INSERT VAGINAL 2 TIMES WEEKLY
Qty: 10 TABLET | Refills: 3 | Status: SHIPPED | OUTPATIENT
Start: 2023-09-21

## 2023-09-21 RX ORDER — METHENAMINE HIPPURATE 1000 MG/1
1 TABLET ORAL 2 TIMES DAILY WITH MEALS
Qty: 180 TABLET | Refills: 3 | Status: SHIPPED | OUTPATIENT
Start: 2023-09-21

## 2023-09-21 NOTE — PROGRESS NOTES
Universal Protocol   Consent: Verbal consent obtained. Written consent obtained. Risks and benefits: risks, benefits and alternatives were discussed  Consent given by: patient  Time out: Immediately prior to procedure a "time out" was called to verify the correct patient, procedure, equipment, support staff and site/side marked as required. Patient understanding: patient states understanding of the procedure being performed  Patient consent: the patient's understanding of the procedure matches consent given  Procedure consent: procedure consent matches procedure scheduled  Relevant documents: relevant documents present and verified  Patient identity confirmed: verbally with patient        Chemodenervation     Date/Time 9/21/2023 9:00 AM     Performed by  Indiana Vazquez PA-C   Authorized by Indiana Vazquez PA-C       Pre-procedure details      Prepped With: Alcohol     Anesthesia  (see MAR for exact dosages):      Anesthesia method:  None   Procedure details     Position:  Upright   Botox     Botox Type:  Type A    Brand:  Botox    mL's of Botulinum Toxin:  200    Final Concentration per CC:  50 units    Needle Gauge:  30 G 2.5 inch   Procedures     Botox Procedures: chronic headache      Indications: migraines     Injection Location      Head / Face:  L superior cervical paraspinal, R superior cervical paraspinal, L , R , L frontalis, R frontalis, L medial occipitalis, R medial occipitalis, procerus, R temporalis, L temporalis, R superior trapezius and L superior trapezius    L  injection amount:  5 unit(s)    R  injection amount:  5 unit(s)    L lateral frontalis:  5 unit(s)    R lateral frontalis:  5 unit(s)    L medial frontalis:  5 unit(s)    R medial frontalis:  5 unit(s)    L temporalis injection amount:  20 unit(s)    R temporalis injection amount:  20 unit(s)    Procerus injection amount:  5 unit(s)    L medial occipitalis injection amount:  15 unit(s)    R medial occipitalis injection amount:  15 unit(s)    L superior cervical paraspinal injection amount:  10 unit(s)    R superior cervical paraspinal injection amount:  10 unit(s)    L superior trapezius injection amount:  15 unit(s)    R superior trapezius injection amount:  15 unit(s)   Total Units     Total units used:  200    Total units discarded:  0   Post-procedure details      Chemodenervation:  Chronic migraine    Facial Nerve Location[de-identified]  Bilateral facial nerve    Patient tolerance of procedure:   Tolerated well, no immediate complications   Comments      5 units orbicularis oculi bilaterally  35 units frontalis  All medically necessary

## 2023-09-21 NOTE — PROGRESS NOTES
UROLOGY NEW CONSULT NOTE     CHIEF COMPLAINT   Nicole Ignacio is a 59 y.o. female with a complaint of   Chief Complaint   Patient presents with   • New Patient Visit     Recurrent UTI       History of Present Illness:   Nicole Ignacio is a 59 y.o. female here for evaluation of longstanding urologic issues. In 1983, the patient had a significant right-sided kidney stone and underwent percutaneous nephrolithotomy surgery. Dealt with recurrent urinary tract infections by report around this time. At one point was on suppressive antibiotic therapy. More recently, the patient has continued to deal with recurrent infections. Primary heralding symptom is abdominal bloating. She denies dysuria or pain with urination or gross hematuria. She has not had febrile illnesses or admission to the hospital.  When she develops the abdominal bloating, she contacted her GYN team.  Urine cultures are obtained and the patient is treated. Patient had a positive culture in July and a contaminated culture in September. She has not had recent stone imaging. She remains on Premarin replacement cream.  Does take cranberry supplements. Past Medical History:     Past Medical History:   Diagnosis Date   • Anxiety    • Basal cell carcinoma     Last assessed: 9/26/14   • Cancer ([address] [facility])     BCC   • Depression    • Depression with anxiety     Last assessed: 1/13/17   • Headache, tension-type    • Insomnia     Last assessed: 9/26/14   • Kidney stone    • Memory loss    • Migraine 1998   • Varicella 1970       PAST SURGICAL HISTORY:     Past Surgical History:   Procedure Laterality Date   • BLADDER SURGERY      lift of bladder   • CYSTOSCOPY      [place]ur[place].  TVT-O   • EYE SURGERY Bilateral     LASIK   • FACIAL/NECK BIOPSY Right 03/12/2019    Procedure: UPPER EYELID CYST EXCISION;  Surgeon: Damien Brown MD;  Location: [facility];  Service: Plastics   • GYNECOLOGIC CRYOSURGERY  2010    Cervix   • HYSTEROSCOPY W/ ENDOMETRIAL ABLATION  12/14/2010    Deborah   • KIDNEY SURGERY  kidney stone removal    1983   • MOHS SURGERY  2010    Forehead   • TN CORRJ HALLUX VALGUS W/SESMDC W/DIST METAR OSTEOT Left 04/16/2021    Procedure: BUNIONECTOMY JENNA left foot;  Surgeon: Kirsten Barber DPM;  Location:  MAIN OR;  Service: Podiatry   • SKIN BIOPSY     • TUBAL LIGATION     • WISDOM TOOTH EXTRACTION         CURRENT MEDICATIONS:     Current Outpatient Medications   Medication Sig Dispense Refill   • amphetamine-dextroamphetamine (ADDERALL XR, 20MG,) 20 MG 24 hr capsule Take 1 capsule (20 mg total) by mouth every morning Max Daily Amount: 20 mg Do not start before September 13, 2023. 90 capsule 0   • atorvastatin (LIPITOR) 10 mg tablet Take 1 tablet (10 mg total) by mouth daily 90 tablet 0   • buPROPion (WELLBUTRIN XL) 300 mg 24 hr tablet Take 1 tablet (300 mg total) by mouth every morning 90 tablet 1   • clonazePAM (KlonoPIN) 0.5 mg tablet Take 1/2 tab daily as needed for anxiety and take 1 tab nightly as needed for insomnia and anxiety Do not start before August 2, 2023. 45 tablet 5   • estradiol (VAGIFEM, YUVAFEM) 10 MCG TABS vaginal tablet Insert 1 tablet (10 mcg total) into the vagina 2 (two) times a week 10 tablet 3   • estrogens, conjugated (Premarin) vaginal cream Insert 0.5g vaginally three times per week 30 g 1   • FLUoxetine (PROzac) 40 MG capsule Take 1 capsule (40 mg total) by mouth daily 90 capsule 1   • methenamine hippurate (HIPREX) 1 g tablet Take 1 tablet (1 g total) by mouth 2 (two) times a day with meals 180 tablet 3   • Multiple Vitamins-Minerals (MULTIVITAL-M PO) Take 1 tablet by mouth in the morning     • nitrofurantoin (MACROBID) 100 mg capsule Take 1 capsule (100 mg total) by mouth 2 (two) times a day for 7 days (Patient not taking: Reported on 9/21/2023) 14 capsule 0   • rizatriptan (MAXALT) 10 mg tablet Take 1 tablet (10 mg total) by mouth as needed for migraine May repeat in 2 hours if needed.   Limit 3 a week or 9 a month 9 tablet 0   • Ubrogepant (UBRELVY) 100 MG tablet Take 1 tablet (100 mg) one time as needed for migraine. May repeat one additional tablet (100 mg) at least two hours after the first dose. Do not use more than 200 mg a day 16 tablet 3     No current facility-administered medications for this visit. ALLERGIES:     Allergies   Allergen Reactions   • Butoconazole Hives   • Tioconazole Hives       SOCIAL HISTORY:     Social History     Socioeconomic History   • Marital status: Single     Spouse name: None   • Number of children: None   • Years of education: None   • Highest education level: None   Occupational History   • None   Tobacco Use   • Smoking status: Never   • Smokeless tobacco: Never   Vaping Use   • Vaping Use: Never used   Substance and Sexual Activity   • Alcohol use:  Yes     Alcohol/week: 3.0 standard drinks of alcohol     Types: 3 Standard drinks or equivalent per week     Comment: socially   • Drug use: Never   • Sexual activity: Yes     Partners: Male     Birth control/protection: Post-menopausal, None   Other Topics Concern   • None   Social History Narrative    Caffeine use    Drinks coffee     Social Determinants of Health     Financial Resource Strain: Not on file   Food Insecurity: Not on file   Transportation Needs: Not on file   Physical Activity: Not on file   Stress: Not on file   Social Connections: Not on file   Intimate Partner Violence: Not on file   Housing Stability: Not on file       SOCIAL HISTORY:     Family History   Problem Relation Age of Onset   • Heart murmur Mother    • Hyperlipidemia Mother    • Atrial fibrillation Father    • Arthritis Father    • Basal cell carcinoma Father 61   • Psoriasis Sister    • No Known Problems Daughter    • No Known Problems Maternal Grandmother    • Coronary artery disease Maternal Grandfather    • Heart disease Maternal Grandfather    • Breast cancer Paternal Grandmother    • Diabetes Paternal Grandmother         Mellitus   • Arthritis Paternal Grandmother    • Cancer Paternal Grandmother    • Hyperlipidemia Paternal Grandmother    • Hypertension Paternal Grandmother    • Hypertension Paternal Grandfather    • Hyperlipidemia Paternal Grandfather    • Skin cancer Paternal Grandfather 79   • No Known Problems Brother    • ADD / ADHD Son    • No Known Problems Son    • Hyperlipidemia Maternal Aunt    • Coronary artery disease Maternal Aunt    • No Known Problems Maternal Aunt    • Hyperlipidemia Maternal Uncle    • Coronary artery disease Maternal Uncle    • Dementia Paternal Aunt             • No Known Problems Paternal Aunt    • Alcohol abuse Neg Hx    • Substance Abuse Neg Hx    • Mental illness Neg Hx        REVIEW OF SYSTEMS:     Review of Systems   Constitutional: Negative for chills and fever. HENT: Negative for ear pain and sore throat. Eyes: Negative for pain and visual disturbance. Respiratory: Negative for cough and shortness of breath. Cardiovascular: Negative for chest pain and palpitations. Gastrointestinal: Negative for abdominal pain, constipation and vomiting. Genitourinary: Negative for dysuria and hematuria. Musculoskeletal: Negative for arthralgias and back pain. Skin: Negative for color change and rash. Neurological: Negative for seizures and syncope. All other systems reviewed and are negative. PHYSICAL EXAM:     /64 (BP Location: Left arm, Patient Position: Sitting, Cuff Size: Adult)   Pulse 80   Ht 5' 4" (1.626 m)   Wt 59.4 kg (131 lb)   BMI 22.49 kg/m²     Physical Exam  Vitals reviewed. Constitutional:       General: She is not in acute distress. Appearance: She is well-developed. HENT:      Head: Normocephalic and atraumatic. Eyes:      Pupils: Pupils are equal, round, and reactive to light. Cardiovascular:      Rate and Rhythm: Normal rate. Pulmonary:      Effort: Pulmonary effort is normal. No respiratory distress.       Breath sounds: Normal breath sounds. Abdominal:      General: There is no distension. Palpations: Abdomen is soft. Tenderness: There is no abdominal tenderness. Musculoskeletal:         General: Normal range of motion. Cervical back: Normal range of motion and neck supple. Skin:     General: Skin is warm and dry. Neurological:      Mental Status: She is alert and oriented to person, place, and time. Psychiatric:         Behavior: Behavior normal.         LABS:     CBC:   Lab Results   Component Value Date    WBC 6.06 04/05/2021    HGB 13.1 04/05/2021    HCT 41.5 04/05/2021     (H) 04/05/2021     04/05/2021       BMP:   Lab Results   Component Value Date    GLUCOSE 98 09/22/2014    CALCIUM 8.3 04/05/2021     09/22/2014    K 3.6 04/05/2021    CO2 31 04/05/2021     04/05/2021    BUN 15 04/05/2021    CREATININE 0.90 04/05/2021       URINE CULTRE:     7/19/23  Urine Culture >100,000 cfu/ml Escherichia coli Abnormal     This organism has been edited. The previous result was Gram Negative Aurelio Enteric Like on 7/20/2023 at [facility].         Susceptibility     Escherichia coli     JASSI     Amikacin ($$) <=16 ug/ml Susceptible     Amoxicillin + Clavulanate <=8/4 ug/ml Susceptible     Ampicillin ($$) >16.00 ug/ml Resistant     Ampicillin + Sulbactam ($) 8/4 ug/ml Susceptible     Aztreonam ($$$)  <=4 ug/ml Susceptible     Cefazolin ($) 4.00 ug/ml Susceptible     Ciprofloxacin ($)  <=0.25 ug/ml Susceptible     Gentamicin ($$) >8 ug/ml Resistant     Levofloxacin ($) <=0.50 ug/ml Susceptible     Minocycline <=4 ug/ml Susceptible     Nitrofurantoin <=32 ug/ml Susceptible     Tetracycline >8 ug/ml Resistant     Tobramycin ($) 8 ug/ml Intermediate     Trimethoprim + Sulfamethoxazole ($$$) >2/38 ug/ml Resistant     ZID Performed Yes                    Specimen Collected: 07/19/23  7:17 AM            3/28/22  Urine Culture 10,000-19,000 cfu/ml Staphylococcus coagulase negative Abnormal             Susceptibility Staphylococcus coagulase negative     JASSI     Ampicillin ($$) <=2.00 ug/ml Resistant     Cefazolin ($) <=4.00 ug/ml Resistant     Gentamicin ($$) <=1 ug/ml Susceptible     Nitrofurantoin <=32 ug/ml Susceptible     Oxacillin 2.00 ug/ml Resistant     Tetracycline <=2 ug/ml Susceptible     Trimethoprim + Sulfamethoxazole ($$$) 2/38 ug/ml Susceptible     Vancomycin ($) 4.00 ug/ml Susceptible               3/13/23  Urine Culture 80,000-89,000 cfu/ml Escherichia coli Abnormal        <10,000 cfu/ml    Mixed Contaminants X2        Susceptibility     Escherichia coli     JASSI     Ampicillin ($$) <=8.00 ug/ml Susceptible     Aztreonam ($$$)  <=4 ug/ml Susceptible     Cefazolin ($) <=2.00 ug/ml Susceptible     Ciprofloxacin ($)  <=0.25 ug/ml Susceptible     Gentamicin ($$) <=2 ug/ml Susceptible     Levofloxacin ($) <=0.50 ug/ml Susceptible     Nitrofurantoin <=32 ug/ml Susceptible     Tetracycline <=4 ug/ml Susceptible     Tobramycin ($) <=2 ug/ml Susceptible     Trimethoprim + Sulfamethoxazole ($$$) <=0.5/9.5 u... Susceptible     ZID Performed Yes                   ASSESSMENT:     59 y.o. female  with recurrent UTIs    PLAN:     Given the patient's significant stone history, I have recommended updated imaging to ensure there is not residual stone burden at this time. I discussed with the patient typical symptoms that would herald UTI. Patient does not have typical symptoms however this does not mean she is not having pathologic infections. We discussed the difference between asymptomatic bacteriuria and a true pathologic infection. Continue to recommend culture directed testing and treatment. We discussed the continued use of estrogen supplementation vaginally. Patient would be interested in a trial of a vaginal suppository as opposed to cream from a comfort standpoint. Depending on cost, this prescription has been offered.   We discussed the use of Hiprex to acidify the urine in an effort to make the urine less hospitable. Over-the-counter supplements including cranberry, pro or prebiotic's and d-mannose can also be helpful from a preventative standpoint. My hope is to avoid antibiotic suppression in this patient who has not had significant hospitalizations but is having recurrent symptomatology. We will contact her once we have results of the CAT scan.

## 2023-09-21 NOTE — PATIENT INSTRUCTIONS
Good fluid hydration, control of bowel habits with avoidance of constipation, 100% cranberry juice or cranberry supplement tabs, pro- or prebiotics, and D-mannose (a supplement available OTC which reduces bacterial binding to the bladder wall) have all been shown to improve recurrent urinary infection    Ana Dunn - website for UTI treatment/prevention    Cystex - commercial cranberry/UTI/D-mannose

## 2023-10-01 ENCOUNTER — HOSPITAL ENCOUNTER (OUTPATIENT)
Dept: CT IMAGING | Facility: HOSPITAL | Age: 58
Discharge: HOME | End: 2023-10-01
Attending: UROLOGY
Payer: COMMERCIAL

## 2023-10-01 DIAGNOSIS — N39.0 RECURRENT UTI: ICD-10-CM

## 2023-10-01 DIAGNOSIS — N20.0 CALCULUS OF KIDNEY: ICD-10-CM

## 2023-10-01 PROCEDURE — G1004 CDSM NDSC: HCPCS

## 2023-10-01 PROCEDURE — 74176 CT ABD & PELVIS W/O CONTRAST: CPT

## 2023-10-02 DIAGNOSIS — E78.01 FAMILIAL HYPERCHOLESTEROLEMIA: ICD-10-CM

## 2023-10-02 RX ORDER — ATORVASTATIN CALCIUM 10 MG/1
10 TABLET, FILM COATED ORAL DAILY
Qty: 90 TABLET | Refills: 0 | Status: SHIPPED | OUTPATIENT
Start: 2023-10-02

## 2023-10-03 ENCOUNTER — TELEPHONE (OUTPATIENT)
Age: 58
End: 2023-10-03

## 2023-10-03 NOTE — TELEPHONE ENCOUNTER
Caller: Christiano Maurer    Doctor: Dr. Conchita Ibrahim    Reason for call: appt/checked chart to see date last seen as to schedule correctly/2021/follow up/scheduled    Call back#: NA

## 2023-10-04 ENCOUNTER — APPOINTMENT (OUTPATIENT)
Dept: LAB | Facility: CLINIC | Age: 58
End: 2023-10-04
Payer: COMMERCIAL

## 2023-10-04 DIAGNOSIS — N39.0 RECURRENT UTI: Primary | ICD-10-CM

## 2023-10-04 LAB
BACTERIA UR QL AUTO: ABNORMAL /HPF
BILIRUB UR QL STRIP: NEGATIVE
CLARITY UR: CLEAR
COLOR UR: YELLOW
GLUCOSE UR STRIP-MCNC: NEGATIVE MG/DL
HGB UR QL STRIP.AUTO: NEGATIVE
KETONES UR STRIP-MCNC: NEGATIVE MG/DL
LEUKOCYTE ESTERASE UR QL STRIP: ABNORMAL
NITRITE UR QL STRIP: NEGATIVE
NON-SQ EPI CELLS URNS QL MICRO: ABNORMAL /HPF
PH UR STRIP.AUTO: 5.5 [PH]
PROT UR STRIP-MCNC: NEGATIVE MG/DL
RBC #/AREA URNS AUTO: ABNORMAL /HPF
SP GR UR STRIP.AUTO: 1.02 (ref 1–1.03)
UROBILINOGEN UR STRIP-ACNC: <2 MG/DL
WBC #/AREA URNS AUTO: ABNORMAL /HPF

## 2023-10-04 PROCEDURE — 87086 URINE CULTURE/COLONY COUNT: CPT

## 2023-10-04 PROCEDURE — 81001 URINALYSIS AUTO W/SCOPE: CPT

## 2023-10-06 LAB
BACTERIA UR CULT: ABNORMAL
BACTERIA UR CULT: ABNORMAL

## 2023-10-10 DIAGNOSIS — N39.0 ACUTE UTI: Primary | ICD-10-CM

## 2023-10-10 RX ORDER — CEPHALEXIN 500 MG/1
500 CAPSULE ORAL EVERY 6 HOURS SCHEDULED
Qty: 20 CAPSULE | Refills: 0 | Status: SHIPPED | OUTPATIENT
Start: 2023-10-10 | End: 2023-10-15

## 2023-10-11 ENCOUNTER — TELEPHONE (OUTPATIENT)
Dept: UROLOGY | Facility: CLINIC | Age: 58
End: 2023-10-11

## 2023-10-11 NOTE — TELEPHONE ENCOUNTER
----- Message from Haris Russo PA-C sent at 10/10/2023  5:10 PM EDT -----  recent visit with pallavi  reported recurrent symptoms last week  ucx with low colony strep will start keflex  ct scan looks OK. bladder good. kidneys small renal calculi.  no active/obstructing/ureteral stones    can start keflex today

## 2023-10-11 NOTE — TELEPHONE ENCOUNTER
Called and left patient a detailed message per communication consent stating that ucx with low colony strep will start keflex  ct scan looks OK. bladder good. kidneys small renal calculi. no active/obstructing/ureteral stones     can start keflex today     Office number left in message for any questions or concerns.

## 2023-12-04 ENCOUNTER — OFFICE VISIT (OUTPATIENT)
Dept: PODIATRY | Facility: CLINIC | Age: 58
End: 2023-12-04
Payer: COMMERCIAL

## 2023-12-04 VITALS
HEIGHT: 64 IN | BODY MASS INDEX: 22.36 KG/M2 | SYSTOLIC BLOOD PRESSURE: 110 MMHG | WEIGHT: 131 LBS | DIASTOLIC BLOOD PRESSURE: 64 MMHG

## 2023-12-04 DIAGNOSIS — M79.671 RIGHT FOOT PAIN: ICD-10-CM

## 2023-12-04 DIAGNOSIS — Z01.818 PRE-OP TESTING: Primary | ICD-10-CM

## 2023-12-04 DIAGNOSIS — M20.11 HALLUX ABDUCTO VALGUS, RIGHT: Primary | ICD-10-CM

## 2023-12-04 DIAGNOSIS — Z01.818 PREOP TESTING: ICD-10-CM

## 2023-12-04 PROCEDURE — 99214 OFFICE O/P EST MOD 30 MIN: CPT | Performed by: PODIATRIST

## 2023-12-04 NOTE — PROGRESS NOTES
Assessment/Plan:       Diagnoses and all orders for this visit:    Hallux abducto valgus, right  -     Comprehensive metabolic panel; Future  -     CBC and differential; Future  -     Case request operating room: [facility]; Standing  -     Case request operating room: [facility]    Right foot pain  -     X-ray foot right 3+ views; Future  -     Comprehensive metabolic panel; Future  -     CBC and differential; Future  -     Case request operating room: [facility]; Standing  -     Case request operating room: [facility]    Preop testing  -     Comprehensive metabolic panel; Future  -     CBC and differential; Future    Other orders  -     Incentive spirometry; Standing  -     Insert and maintain IV line; Standing  -     [facility] to O.R.; Standing  -     Insert peripheral IV; Standing  -     Place sequential compression device; Standing  -     ceFAZolin (ANCEF) 2,000 mg in dextrose 5 % 100 mL IVPB        Diagnosis and options discussed with patient  Patient agreeable to the plan as stated below    XRay 3 views of the right foot personally read by Dr. Heath Dugan in office today and discussed with patient:    IM angle: 14.5  Sesamoid position: 4/5  Elevatus: mild  Hallux abductus interphalangeus: mild  Degenerative changes: negative  No acute findings    PAtient had similar presentation on the left, now with right foot symptoms. She has failed conservative care. Consent was signed for [facility]    Surgical procedure and recovery discussed as can best be predicted. Alternatives, risks, complications covered with the patient. Appropriate postop medication discussed, educated patient on narcotic medication and its risks. Patient will be sent for preoperative testing and clearance    Patient doen not need DVT prophylaxis for this procedure        Subjective:      Patient ID: Swapnil Gavin is a 59 y.o. female.     Patient is getting burning pain in her right foot (points to first ray). The burning pain comes and goes with no reason. There is a bump on the toe getting larger. She denies trauma. She previous had hammertoe surgery on the left but that feels fine. The following portions of the patient's history were reviewed and updated as appropriate: allergies, current medications, past family history, past medical history, past social history, past surgical history, and problem list.    Review of Systems    As stated in HPI, otherwise normal    Objective:      /64   Ht 5' 4" (1.626 m)   Wt 59.4 kg (131 lb)   BMI 22.49 kg/m²          Physical Exam  Vitals reviewed. Constitutional:       Appearance: She is not ill-appearing or diaphoretic. Cardiovascular:      Rate and Rhythm: Normal rate. Pulses: Normal pulses. Dorsalis pedis pulses are 2+ on the right side. Posterior tibial pulses are 2+ on the right side. Pulmonary:      Effort: Pulmonary effort is normal. No respiratory distress. Musculoskeletal:      Right foot: Normal range of motion. Deformity (moderate HAV< mild hypermobility. No crepitus) present. Feet:      Right foot:      Protective Sensation:   10 sites sensed. Skin integrity: Skin integrity normal.   Skin:     Capillary Refill: Capillary refill takes less than 2 seconds. Findings: No erythema or rash. Neurological:      Mental Status: She is alert and oriented to person, place, and time. Sensory: No sensory deficit.       Gait: Gait normal.   Psychiatric:         Mood and Affect: Mood normal.

## 2023-12-18 DIAGNOSIS — R41.3 MEMORY DIFFICULTIES: ICD-10-CM

## 2023-12-18 DIAGNOSIS — R41.840 ATTENTION AND CONCENTRATION DEFICIT: ICD-10-CM

## 2023-12-18 DIAGNOSIS — F33.40 RECURRENT MAJOR DEPRESSIVE DISORDER, IN REMISSION (HCC): ICD-10-CM

## 2023-12-18 RX ORDER — DEXTROAMPHETAMINE SACCHARATE, AMPHETAMINE ASPARTATE MONOHYDRATE, DEXTROAMPHETAMINE SULFATE AND AMPHETAMINE SULFATE 5; 5; 5; 5 MG/1; MG/1; MG/1; MG/1
20 CAPSULE, EXTENDED RELEASE ORAL EVERY MORNING
Qty: 90 CAPSULE | Refills: 0 | Status: SHIPPED | OUTPATIENT
Start: 2023-12-18

## 2024-01-02 DIAGNOSIS — Z00.6 ENCOUNTER FOR EXAMINATION FOR NORMAL COMPARISON OR CONTROL IN CLINICAL RESEARCH PROGRAM: ICD-10-CM

## 2024-01-03 ENCOUNTER — APPOINTMENT (OUTPATIENT)
Dept: LAB | Facility: MEDICAL CENTER | Age: 59
End: 2024-01-03

## 2024-01-03 DIAGNOSIS — Z00.6 ENCOUNTER FOR EXAMINATION FOR NORMAL COMPARISON OR CONTROL IN CLINICAL RESEARCH PROGRAM: ICD-10-CM

## 2024-01-03 DIAGNOSIS — M79.671 RIGHT FOOT PAIN: ICD-10-CM

## 2024-01-03 DIAGNOSIS — Z01.818 PREOP TESTING: ICD-10-CM

## 2024-01-03 DIAGNOSIS — M20.11 HALLUX ABDUCTO VALGUS, RIGHT: ICD-10-CM

## 2024-01-03 PROCEDURE — 36415 COLL VENOUS BLD VENIPUNCTURE: CPT

## 2024-01-04 ENCOUNTER — PROCEDURE VISIT (OUTPATIENT)
Dept: NEUROLOGY | Facility: CLINIC | Age: 59
End: 2024-01-04
Payer: COMMERCIAL

## 2024-01-04 VITALS
WEIGHT: 124.6 LBS | HEART RATE: 91 BPM | DIASTOLIC BLOOD PRESSURE: 61 MMHG | BODY MASS INDEX: 21.39 KG/M2 | TEMPERATURE: 98.3 F | SYSTOLIC BLOOD PRESSURE: 129 MMHG

## 2024-01-04 DIAGNOSIS — G43.709 CHRONIC MIGRAINE WITHOUT AURA WITHOUT STATUS MIGRAINOSUS, NOT INTRACTABLE: Primary | ICD-10-CM

## 2024-01-04 PROCEDURE — 64615 CHEMODENERV MUSC MIGRAINE: CPT | Performed by: PHYSICIAN ASSISTANT

## 2024-01-04 NOTE — PROGRESS NOTES
"Universal Protocol   Consent: Verbal consent obtained. Written consent obtained.  Risks and benefits: risks, benefits and alternatives were discussed  Consent given by: patient  Time out: Immediately prior to procedure a \"time out\" was called to verify the correct patient, procedure, equipment, support staff and site/side marked as required.  Patient understanding: patient states understanding of the procedure being performed  Patient consent: the patient's understanding of the procedure matches consent given  Procedure consent: procedure consent matches procedure scheduled  Relevant documents: relevant documents present and verified  Patient identity confirmed: verbally with patient      Chemodenervation     Date/Time  1/4/2024 8:30 AM     Performed by  Gege Garcia PA-C   Authorized by  Gege Garcia PA-C     Pre-procedure details      Prepped With: Alcohol     Anesthesia  (see MAR for exact dosages):     Anesthesia method:  None   Procedure details      Position:  Upright   Botox      Botox Type:  Type A    Brand:  Botox    mL's of Botulinum Toxin:  200    Final Concentration per CC:  50 units    Needle Gauge:  30 G 2.5 inch   Procedures      Botox Procedures: chronic headache      Indications: migraines     Injection Location      Head / Face:  L superior cervical paraspinal, R superior cervical paraspinal, L , R , L frontalis, R frontalis, L medial occipitalis, R medial occipitalis, procerus, R temporalis, L temporalis, R superior trapezius and L superior trapezius    L  injection amount:  5 unit(s)    R  injection amount:  5 unit(s)    L lateral frontalis:  5 unit(s)    R lateral frontalis:  5 unit(s)    L medial frontalis:  5 unit(s)    R medial frontalis:  5 unit(s)    L temporalis injection amount:  20 unit(s)    R temporalis injection amount:  20 unit(s)    Procerus injection amount:  5 unit(s)    L medial occipitalis injection amount:  15 unit(s)    R medial " occipitalis injection amount:  15 unit(s)    L superior cervical paraspinal injection amount:  10 unit(s)    R superior cervical paraspinal injection amount:  10 unit(s)    L superior trapezius injection amount:  15 unit(s)    R superior trapezius injection amount:  15 unit(s)   Total Units      Total units used:  200    Total units discarded:  0   Post-procedure details      Chemodenervation:  Chronic migraine    Facial Nerve Location::  Bilateral facial nerve    Patient tolerance of procedure:  Tolerated well, no immediate complications   Comments        5 units orbicularis oculi bilaterally  35 units frontalis  All medically necessary

## 2024-01-11 ENCOUNTER — TELEMEDICINE (OUTPATIENT)
Dept: PSYCHIATRY | Facility: CLINIC | Age: 59
End: 2024-01-11
Payer: COMMERCIAL

## 2024-01-11 ENCOUNTER — OFFICE VISIT (OUTPATIENT)
Dept: FAMILY MEDICINE CLINIC | Facility: CLINIC | Age: 59
End: 2024-01-11
Payer: COMMERCIAL

## 2024-01-11 VITALS
WEIGHT: 125 LBS | DIASTOLIC BLOOD PRESSURE: 82 MMHG | HEART RATE: 91 BPM | TEMPERATURE: 97.5 F | HEIGHT: 64 IN | BODY MASS INDEX: 21.34 KG/M2 | OXYGEN SATURATION: 98 % | RESPIRATION RATE: 16 BRPM | SYSTOLIC BLOOD PRESSURE: 124 MMHG

## 2024-01-11 DIAGNOSIS — R41.840 ATTENTION AND CONCENTRATION DEFICIT: ICD-10-CM

## 2024-01-11 DIAGNOSIS — F33.40 RECURRENT MAJOR DEPRESSIVE DISORDER, IN REMISSION (HCC): ICD-10-CM

## 2024-01-11 DIAGNOSIS — Z13.1 SCREENING FOR DIABETES MELLITUS: ICD-10-CM

## 2024-01-11 DIAGNOSIS — G43.109 MIGRAINE WITH AURA AND WITHOUT STATUS MIGRAINOSUS, NOT INTRACTABLE: ICD-10-CM

## 2024-01-11 DIAGNOSIS — H25.9 AGE-RELATED CATARACT OF BOTH EYES, UNSPECIFIED AGE-RELATED CATARACT TYPE: ICD-10-CM

## 2024-01-11 DIAGNOSIS — Z01.818 PREOP EXAMINATION: Primary | ICD-10-CM

## 2024-01-11 DIAGNOSIS — F41.1 GAD (GENERALIZED ANXIETY DISORDER): ICD-10-CM

## 2024-01-11 DIAGNOSIS — E78.01 FAMILIAL HYPERCHOLESTEROLEMIA: ICD-10-CM

## 2024-01-11 DIAGNOSIS — R41.3 MEMORY DIFFICULTIES: ICD-10-CM

## 2024-01-11 PROCEDURE — 99214 OFFICE O/P EST MOD 30 MIN: CPT | Performed by: PHYSICIAN ASSISTANT

## 2024-01-11 PROCEDURE — 99213 OFFICE O/P EST LOW 20 MIN: CPT | Performed by: PSYCHIATRY & NEUROLOGY

## 2024-01-11 PROCEDURE — 90833 PSYTX W PT W E/M 30 MIN: CPT | Performed by: PSYCHIATRY & NEUROLOGY

## 2024-01-11 RX ORDER — DEXTROAMPHETAMINE SACCHARATE, AMPHETAMINE ASPARTATE MONOHYDRATE, DEXTROAMPHETAMINE SULFATE AND AMPHETAMINE SULFATE 5; 5; 5; 5 MG/1; MG/1; MG/1; MG/1
20 CAPSULE, EXTENDED RELEASE ORAL EVERY MORNING
Qty: 90 CAPSULE | Refills: 0 | Status: SHIPPED | OUTPATIENT
Start: 2024-03-15

## 2024-01-11 RX ORDER — FLUOXETINE HYDROCHLORIDE 40 MG/1
40 CAPSULE ORAL DAILY
Qty: 90 CAPSULE | Refills: 1 | Status: SHIPPED | OUTPATIENT
Start: 2024-01-11

## 2024-01-11 RX ORDER — BUPROPION HYDROCHLORIDE 300 MG/1
300 TABLET ORAL EVERY MORNING
Qty: 90 TABLET | Refills: 1 | Status: SHIPPED | OUTPATIENT
Start: 2024-01-11

## 2024-01-11 RX ORDER — CLONAZEPAM 0.5 MG/1
TABLET ORAL
Qty: 45 TABLET | Refills: 5 | Status: SHIPPED | OUTPATIENT
Start: 2024-01-16

## 2024-01-11 RX ORDER — ATORVASTATIN CALCIUM 10 MG/1
10 TABLET, FILM COATED ORAL DAILY
Qty: 90 TABLET | Refills: 3 | Status: SHIPPED | OUTPATIENT
Start: 2024-01-11

## 2024-01-11 NOTE — PROGRESS NOTES
[facility] PRE-OPERATIVE EVALUATION  [facility] PHYSICIAN GROUP - [facility]    NAME: Carolin Heck  AGE: 58 y.o. SEX: female  : 1965     DATE: 2024    [facility] Pre-Operative Evaluation      Chief Complaint: Pre-operative Evaluation     Surgery: left and right cataract surgery   Anticipated Date of Surgery: 2024 (left) and 2024 (right)  Referring Provider: Dr SHAHAB Huston     History of Present Illness:     Carolin Heck is a 58 y.o. female who presents to the office today for a preoperative consultation at the request of surgeon, Dr Huston, who plans on performing left and right cataract surgery on 2024 and . Planned anesthesia is  MAC and topical . Patient has a bleeding risk of: no recent abnormal bleeding. Patient does not have objections to receiving blood products if needed. Current anti-platelet/anti-coagulation medications that the patient is prescribed includes:  none .      Assessment of Chronic Conditions:   - hyperlipidemia - on lipitor 10 mg, due for labs  - MDD - wellbutrin, prozac, adderall, klonopin  - migraines - maxalat, ubrelvy     Assessment of Cardiac Risk:  Denies unstable or severe angina or MI in the last 6 weeks or history of stent placement in the last year   Denies decompensated heart failure (e.g. New onset heart failure, NYHA functional class IV heart failure, or worsening existing heart failure)  Denies significant arrhythmias such as high grade AV block, symptomatic ventricular arrhythmia, newly recognized ventricular tachycardia, supraventricular tachycardia with resting heart rate >100, or symptomatic bradycardia  Denies severe heart valve disease including aortic stenosis or symptomatic mitral stenosis     Exercise Capacity:  Able to walk 4 blocks without symptoms?: Yes  Able to walk 2 flights without symptoms?: Yes    Prior Anesthesia Reactions: No     Personal history of venous thromboembolic disease?  No    History of steroid use for >2 weeks within last year? No         Review of Systems:     Review of Systems   Constitutional: Negative.    HENT: Negative.     Eyes: Negative.    Respiratory: Negative.     Cardiovascular: Negative.    Gastrointestinal: Negative.    Endocrine: Negative.    Genitourinary: Negative.    Musculoskeletal: Negative.    Skin: Negative.    Allergic/Immunologic: Negative.    Neurological: Negative.    Hematological: Negative.    Psychiatric/Behavioral: Negative.         Current Problem List:     Patient Active Problem List   Diagnosis    Attention and concentration deficit    TIM (generalized anxiety disorder)    Recurrent major depressive disorder, in remission ([facility])    Migraine with aura and without status migrainosus, not intractable    Hyperlipidemia    Memory difficulties    Cyst of right eyelid    Chronic migraine without aura without status migrainosus, not intractable    Calcific tendonitis of left shoulder       Allergies:     Allergies   Allergen Reactions    Butoconazole Hives    Tioconazole Hives       Current Medications:       Current Outpatient Medications:     [START ON 3/15/2024] amphetamine-dextroamphetamine (ADDERALL XR, 20MG,) 20 MG 24 hr capsule, Take 1 capsule (20 mg total) by mouth every morning Max Daily Amount: 20 mg Do not start before March 15, 2024., Disp: 90 capsule, Rfl: 0    atorvastatin (LIPITOR) 10 mg tablet, Take 1 tablet (10 mg total) by mouth daily, Disp: 90 tablet, Rfl: 0    buPROPion (WELLBUTRIN XL) 300 mg 24 hr tablet, Take 1 tablet (300 mg total) by mouth every morning, Disp: 90 tablet, Rfl: 1    [START ON 1/16/2024] clonazePAM (KlonoPIN) 0.5 mg tablet, Take 1/2 tab daily as needed for anxiety and take 1 tab nightly as needed for insomnia and anxiety Do not start before January 16, 2024., Disp: 45 tablet, Rfl: 5    estradiol (VAGIFEM, YUVAFEM) 10 MCG TABS vaginal tablet, Insert 1 tablet (10 mcg total) into the vagina 2 (two) times a week, Disp: 10  tablet, Rfl: 3    FLUoxetine (PROzac) 40 MG capsule, Take 1 capsule (40 mg total) by mouth daily, Disp: 90 capsule, Rfl: 1    methenamine hippurate (HIPREX) 1 g tablet, Take 1 tablet (1 g total) by mouth 2 (two) times a day with meals, Disp: 180 tablet, Rfl: 3    Multiple Vitamins-Minerals (MULTIVITAL-M PO), Take 1 tablet by mouth in the morning, Disp: , Rfl:     rizatriptan (MAXALT) 10 mg tablet, Take 1 tablet (10 mg total) by mouth as needed for migraine May repeat in 2 hours if needed.  Limit 3 a week or 9 a month, Disp: 9 tablet, Rfl: 0    Ubrogepant (UBRELVY) 100 MG tablet, Take 1 tablet (100 mg) one time as needed for migraine. May repeat one additional tablet (100 mg) at least two hours after the first dose. Do not use more than 200 mg a day, Disp: 16 tablet, Rfl: 3    estrogens, conjugated (Premarin) vaginal cream, Insert 0.5g vaginally three times per week, Disp: 30 g, Rfl: 1    Past Medical History:       Past Medical History:   Diagnosis Date    Anxiety     Basal cell carcinoma     Last assessed: 9/26/14    Cancer (HCC)     BCC    Depression     Depression with anxiety     Last assessed: 1/13/17    Headache, tension-type     Insomnia     Last assessed: 9/26/14    Kidney stone     Memory loss     Migraine 1998    Varicella 1970        Past Surgical History:   Procedure Laterality Date    BLADDER SURGERY      lift of bladder    CYSTOSCOPY      Theurapeutic. TVT-O    EYE SURGERY Bilateral     LASIK    FACIAL/NECK BIOPSY Right 03/12/2019    Procedure: UPPER EYELID CYST EXCISION;  Surgeon: Ector Hoover MD;  Location: [facility] MAIN OR;  Service: Plastics    GYNECOLOGIC CRYOSURGERY  2010    Cervix    HYSTEROSCOPY W/ ENDOMETRIAL ABLATION  12/14/2010    Novasure    KIDNEY SURGERY  kidney stone removal    1983    MOHS SURGERY  2010    Forehead    IL CORRJ HLX VLGS BNCTY SESMDC DSTL METAR OSTEOT Left 04/16/2021    Procedure: BUNIONECTOMY JENNA left foot;  Surgeon: Isaias Miranda DPM;  Location:   MAIN OR;  Service: Podiatry    SKIN BIOPSY      TUBAL LIGATION      WISDOM TOOTH EXTRACTION          Family History   Problem Relation Age of Onset    Heart murmur Mother     Hyperlipidemia Mother     Atrial fibrillation Father     Arthritis Father     Basal cell carcinoma Father 60    Psoriasis Sister     No Known Problems Daughter     No Known Problems Maternal Grandmother     Coronary artery disease Maternal Grandfather     Heart disease Maternal Grandfather     Breast cancer Paternal Grandmother 66    Diabetes Paternal Grandmother         Mellitus    Arthritis Paternal Grandmother     Cancer Paternal Grandmother     Hyperlipidemia Paternal Grandmother     Hypertension Paternal Grandmother     Hypertension Paternal Grandfather     Hyperlipidemia Paternal Grandfather     Skin cancer Paternal Grandfather 70    No Known Problems Brother     ADD / ADHD Son     No Known Problems Son     Hyperlipidemia Maternal Aunt     Coronary artery disease Maternal Aunt     No Known Problems Maternal Aunt     Hyperlipidemia Maternal Uncle     Coronary artery disease Maternal Uncle     Dementia Paternal Aunt          2020    No Known Problems Paternal Aunt     Alcohol abuse Neg Hx     Substance Abuse Neg Hx     Mental illness Neg Hx         Social History     Socioeconomic History    Marital status: Single     Spouse name: Not on file    Number of children: Not on file    Years of education: Not on file    Highest education level: Not on file   Occupational History    Not on file   Tobacco Use    Smoking status: Never    Smokeless tobacco: Never   Vaping Use    Vaping status: Never Used   Substance and Sexual Activity    Alcohol use: Yes     Alcohol/week: 3.0 standard drinks of alcohol     Types: 3 Standard drinks or equivalent per week     Comment: socially    Drug use: Never    Sexual activity: Yes     Partners: Male     Birth control/protection: Post-menopausal, None   Other Topics Concern    Not on file   Social History  "Narrative    Caffeine use    Drinks coffee     Social Determinants of Health     Financial Resource Strain: Not on file   Food Insecurity: Not on file   Transportation Needs: Not on file   Physical Activity: Not on file   Stress: Not on file   Social Connections: Not on file   Intimate Partner Violence: Not on file   Housing Stability: Not on file        Physical Exam:     /82   Pulse 91   Temp 97.5 °F (36.4 °C) (Temporal)   Resp 16   Ht 5' 4\" (1.626 m)   Wt 56.7 kg (125 lb)   SpO2 98%   BMI 21.46 kg/m²     Physical Exam  Constitutional:       Appearance: Normal appearance. She is well-developed and normal weight.   HENT:      Head: Normocephalic and atraumatic.      Right Ear: Tympanic membrane, ear canal and external ear normal.      Left Ear: Tympanic membrane, ear canal and external ear normal.      Nose: Nose normal.      Mouth/Throat:      Mouth: Mucous membranes are moist.      Pharynx: Oropharynx is clear.   Eyes:      Extraocular Movements: Extraocular movements intact.      Conjunctiva/sclera: Conjunctivae normal.      Pupils: Pupils are equal, round, and reactive to light.   Neck:      Thyroid: No thyromegaly.   Cardiovascular:      Rate and Rhythm: Normal rate and regular rhythm.      Pulses: Normal pulses.      Heart sounds: Normal heart sounds. No murmur heard.  Pulmonary:      Effort: Pulmonary effort is normal. No respiratory distress.      Breath sounds: Normal breath sounds. No wheezing or rales.   Abdominal:      General: Abdomen is flat. Bowel sounds are normal. There is no distension.      Palpations: Abdomen is soft. There is no mass.      Tenderness: There is no abdominal tenderness.   Musculoskeletal:         General: Normal range of motion.      Cervical back: Normal range of motion and neck supple. No rigidity or tenderness.   Lymphadenopathy:      Cervical: No cervical adenopathy.   Skin:     General: Skin is warm and dry.   Neurological:      General: No focal deficit " present.      Mental Status: She is alert and oriented to person, place, and time.      Cranial Nerves: No cranial nerve deficit.      Deep Tendon Reflexes: Reflexes are normal and symmetric. Reflexes normal.   Psychiatric:         Mood and Affect: Mood normal.         Behavior: Behavior normal.         Thought Content: Thought content normal.         Judgment: Judgment normal.          Data:     Pre-operative work-up    Laboratory Results: not indicated      EKG: I have personally reviewed pertinent reports.      Chest x-ray:  not indicated      Previous cardiopulmonary studies within the past year:  Echocardiogram: none  Cardiac Catheterization: none  Stress Test: none  Pulmonary Function Testing: none      Assessment & Recommendations:     1. Annual physical exam            Pre-Op Evaluation Assessment  58 y.o. female with planned surgery: b/l cataract.    Known risk factors for perioperative complications: None.        Current medications which may produce withdrawal symptoms if withheld perioperatively: none.    Pre-Op Evaluation Plan  1. Further preoperative workup as follows:   - None; no further preoperative work-up is required    2. Medication Management/Recommendations:   - None, continue medication regimen including morning of surgery, with sip of water    3. Prophylaxis for cardiac events with perioperative beta-blockers: not indicated.    4. Patient requires further consultation with: None    Clearance  Patient is CLEARED for surgery without any additional cardiac testing.     Isabelle Viramontes PA-C  [facility]  [address]\[address] [place]  [address] [address]  Phone#  [phone]  Fax#  [phone]

## 2024-01-11 NOTE — PSYCH
Virtual Regular Visit    Verification of patient location:    Patient is located at Home in the following state in which I hold an active license [place]      Assessment/Plan:    Problem List Items Addressed This Visit    None             Reason for visit is   Chief Complaint   Patient presents with    Virtual Regular Visit          Encounter provider David Hernandez III, DO    Provider located at [facility] [address] [place] [id number] [phone]      Recent Visits  No visits were found meeting these conditions.  Showing recent visits within past 7 days and meeting all other requirements  Today's Visits  Date Type Provider Dept   01/11/24 Telemedicine David Hernandez III, DO [facility]   Showing today's visits and meeting all other requirements  Future Appointments  No visits were found meeting these conditions.  Showing future appointments within next 150 days and meeting all other requirements       The patient was identified by name and date of birth. Carolin Heck was informed that this is a telemedicine visit and that the visit is being conducted throughthe [employer] platform. She agrees to proceed..  My office door was closed. No one else was in the room.  She acknowledged consent and understanding of privacy and security of the video platform. The patient has agreed to participate and understands they can discontinue the visit at any time.    Patient is aware this is a billable service.     Subjective  See below      HPI     Past Medical History:   Diagnosis Date    Anxiety     Basal cell carcinoma     Last assessed: 9/26/14    Cancer (HCC)     BCC    Depression     Depression with anxiety     Last assessed: 1/13/17    Headache, tension-type     Insomnia     Last assessed: 9/26/14    Kidney stone     Memory loss     Migraine 1998    Varicella 1970       Past Surgical History:   Procedure Laterality Date    BLADDER  SURGERY      lift of bladder    CYSTOSCOPY      Theurapeutic. TVT-O    EYE SURGERY Bilateral     LASIK    FACIAL/NECK BIOPSY Right 03/12/2019    Procedure: UPPER EYELID CYST EXCISION;  Surgeon: Ector Hoover MD;  Location: [facility];  Service: Plastics    GYNECOLOGIC CRYOSURGERY  2010    Cervix    HYSTEROSCOPY W/ ENDOMETRIAL ABLATION  12/14/2010    Novasure    KIDNEY SURGERY  kidney stone removal    1983    MOHS SURGERY  2010    Forehead    ND CORRJ HALLUX VALGUS W/SESMDC W/DIST METAR OSTEOT Left 04/16/2021    Procedure: BUNIONECTOMY JENNA left foot;  Surgeon: Isaias Miranda DPM;  Location: [facility];  Service: Podiatry    SKIN BIOPSY      TUBAL LIGATION      WISDOM TOOTH EXTRACTION         Current Outpatient Medications   Medication Sig Dispense Refill    nitrofurantoin (MACROBID) 100 mg capsule Take 1 capsule (100 mg total) by mouth 2 (two) times a day for 7 days (Patient not taking: Reported on 9/21/2023) 14 capsule 0    amphetamine-dextroamphetamine (ADDERALL XR, 20MG,) 20 MG 24 hr capsule Take 1 capsule (20 mg total) by mouth every morning Max Daily Amount: 20 mg 90 capsule 0    atorvastatin (LIPITOR) 10 mg tablet Take 1 tablet (10 mg total) by mouth daily 90 tablet 0    buPROPion (WELLBUTRIN XL) 300 mg 24 hr tablet Take 1 tablet (300 mg total) by mouth every morning 90 tablet 1    clonazePAM (KlonoPIN) 0.5 mg tablet Take 1/2 tab daily as needed for anxiety and take 1 tab nightly as needed for insomnia and anxiety Do not start before August 2, 2023. 45 tablet 5    estradiol (VAGIFEM, YUVAFEM) 10 MCG TABS vaginal tablet Insert 1 tablet (10 mcg total) into the vagina 2 (two) times a week 10 tablet 3    estrogens, conjugated (Premarin) vaginal cream Insert 0.5g vaginally three times per week 30 g 1    FLUoxetine (PROzac) 40 MG capsule Take 1 capsule (40 mg total) by mouth daily 90 capsule 1    methenamine hippurate (HIPREX) 1 g tablet Take 1 tablet (1 g total) by mouth 2 (two) times a day  "with meals 180 tablet 3    Multiple Vitamins-Minerals (MULTIVITAL-M PO) Take 1 tablet by mouth in the morning      rizatriptan (MAXALT) 10 mg tablet Take 1 tablet (10 mg total) by mouth as needed for migraine May repeat in 2 hours if needed.  Limit 3 a week or 9 a month 9 tablet 0    Ubrogepant (UBRELVY) 100 MG tablet Take 1 tablet (100 mg) one time as needed for migraine. May repeat one additional tablet (100 mg) at least two hours after the first dose. Do not use more than 200 mg a day 16 tablet 3     No current facility-administered medications for this visit.        Allergies   Allergen Reactions    Butoconazole Hives    Tioconazole Hives       Review of Systems    Video Exam    There were no vitals filed for this visit.    Physical Exam     Visit Time    Visit Start Time: 833a  Visit Stop Time: 900a  Total Visit Duration:  26 minutes                MEDICATION MANAGEMENT NOTE        [facility]      Name and Date of Birth:  Carolin Heck 58 y.o. 1965    Date of Visit: January 11, 2024    SUBJECTIVE:  CC: Carolin presents today for follow up on \"everything is the same, I am feelin good\"; anxiety and depression     Carolin reports a good holidys, stayed home with her dog. Had a nice. Time.    Likes treatment currently, feels everything is balanced.     Recurrent UTIs and urinary issues, being sorted out. Bunion surgery in March. First one very successful. Overall good health.    She and Yogesh doing well, visits every 1-2 mo to [place]. Likely will live up here upon alf but stay there more often.     Daughter doing well with new baby.     She is doing well at [employer], likely retire 2028.     Lives with son, Barry and his (Kids ~2021, 2020, 2018, 2016). She has 2 other kids    F/U PRN- \"Same old memory thing\". Cognitive situation is unchanged still, no decline. Neurologist periodically      Since our last visit, overall symptoms have been unchanged.      Med Compliance: " yes    HPI ROS:                      ('was' below is from prior visit)  Medication Side Effects (other than noted):  no     Depression (10 worst):  low (Was low)   Anxiety (10 worst):  low (Was low)   Hallucinations or Psychosis  no (Was no)    Safety concerns (self harm thoughts, suicidal ideation, HI, etc):  no (Was no)   Sleep: (NM = Nightmares)  good (Was good)   Energy:  good (Was good)   Appetite:  good (Was good)   Weight Change:  no          PHQ-2/9 Depression Screening    Little interest or pleasure in doing things: 0 - not at all  Feeling down, depressed, or hopeless: 0 - not at all  Trouble falling or staying asleep, or sleeping too much: 0 - not at all  Feeling tired or having little energy: 1 - several days  Poor appetite or overeatin - several days  Feeling bad about yourself - or that you are a failure or have let yourself or your family down: 0 - not at all  Trouble concentrating on things, such as reading the newspaper or watching television: 0 - not at all  Moving or speaking so slowly that other people could have noticed. Or the opposite - being so fidgety or restless that you have been moving around a lot more than usual: 0 - not at all  Thoughts that you would be better off dead, or of hurting yourself in some way: 0 - not at all  PHQ-9 Score: 2  PHQ-9 Interpretation: No or Minimal depression             Carolin denies any side effects from medications unless noted above    Review Of Systems as noted above. Otherwise A relevant review of symptoms was otherwise negative    History Review: The following portions of the patient's history were reviewed and documented: allergies, current medications, past family history, past medical history, past social history, and problem list.     Lab Review: No new labs or no relevant labs needing review with patient today      OBJECTIVE:     MENTAL STATUS EXAM  Appearance:  age appropriate   Behavior:  pleasant, cooperative, with good eye contact   Speech:   Normal volume, regular rate and rhythm   Mood:  euthymic   Affect:  mood congruent   Language: intact and appropriate for age, education, and intellect   Thought Process:  Linear and goal directed   Associations: intact associations   Thought Content:  normal and appropriate   Perceptual Disturbances: no auditory or visual hallcunations   Risk Potential / Abnormal Thoughts: Suicidal ideation - None  Homicidal ideation - None  Potential for aggression - No       Consciousness:  Alert & Awake   Sensorium:  Grossly oriented   Attention: attention span and concentration are age appropriate       Fund of Knowledge:  Memory: awareness of current events: yes  recent and remote memory grossly intact   Insight:  good   Judgment: good   Muscle Strength Muscle Tone:      Gait/Station:    Motor Activity:        Risks, Benefits And Possible Side Effects Of Medications:    AGREE: Risks, benefits, and possible side effects of medications explained to Carolin and she (or legal representative) verbalizes understanding and agreement for treatment.    Controlled Medication Discussion:     Carolin has been filling controlled prescriptions on time as prescribed according to [place] Prescription Drug Monitoring program.   _____________________________________________________________        Psychiatric History   CAROLIN    Patient did used to see Isaias Bill in early 2016 when she had a change providers due to insurance.    She does not have a therapist.    She been hospitalized once in 2000. She says that it was related to having a breakup with her boyfriend at the time and then finding out that she lost her job because she was  at his place of employment. Losing her boyfriend because she did not want to get  and he did and also having job issues led her to crisis and overdose although it was not a dramatic overdose it was a time when she said that she was trying to take pills to go to sleep and that she did have some  "suicidal thoughts associated with it. She denies any self-harm homicidal ideation in the past or present. She's never been violent. She says that she's not had any suicidal ideation since that time.      Social History:  Carolin was raised in [place] to a \"good\" childhood. Her parents were together and still are. She denies ever having physical or sexual abuse or other forms neglect now or in the past as a child. She has one brother and one sister. She developed normally. She finished high school and got associates degree in business.    Her daughter currently lives with her. She has 3 children 2 boys and one girl. She is good relationships with her family. She's been  and  twice. Currently not in a serious relationship at intake    Her support system includes her children her parents and her cousins. She has friends that are close but she doesn't typically opened up to them about mental health.    She is Christian and attends [employer] sometimes. No  history no legal issues and no weapons at home.    She does not use tobacco drinks about 1 cup of coffee a day and is a social drinker and when she does drink it's very rare and not much at that time. She denies ever using substances and has never been to rehabilitation.           Family Psychiatric History:   Daughter -  Family history of ADD (attention deficit disorder)  Son - Family history of ADD (attention deficit disorder)  Family History    13. Denied: Family history of bipolar disorder   14. Denied: Family history of substance abuse   15. Denied: Family history of suicide attempt      Assessment/Plan:        Diagnoses and all orders for this visit:    Recurrent major depressive disorder, in remission (HCC)  -     FLUoxetine (PROzac) 40 MG capsule; Take 1 capsule (40 mg total) by mouth daily  -     buPROPion (WELLBUTRIN XL) 300 mg 24 hr tablet; Take 1 tablet (300 mg total) by mouth every morning  -     amphetamine-dextroamphetamine (ADDERALL " "XR, 20MG,) 20 MG 24 hr capsule; Take 1 capsule (20 mg total) by mouth every morning Max Daily Amount: 20 mg Do not start before March 15, 2024.    TIM (generalized anxiety disorder)  -     FLUoxetine (PROzac) 40 MG capsule; Take 1 capsule (40 mg total) by mouth daily  -     clonazePAM (KlonoPIN) 0.5 mg tablet; Take 1/2 tab daily as needed for anxiety and take 1 tab nightly as needed for insomnia and anxiety Do not start before January 16, 2024.    Attention and concentration deficit  -     buPROPion (WELLBUTRIN XL) 300 mg 24 hr tablet; Take 1 tablet (300 mg total) by mouth every morning  -     amphetamine-dextroamphetamine (ADDERALL XR, 20MG,) 20 MG 24 hr capsule; Take 1 capsule (20 mg total) by mouth every morning Max Daily Amount: 20 mg Do not start before March 15, 2024.    Memory difficulties  -     buPROPion (WELLBUTRIN XL) 300 mg 24 hr tablet; Take 1 tablet (300 mg total) by mouth every morning  -     amphetamine-dextroamphetamine (ADDERALL XR, 20MG,) 20 MG 24 hr capsule; Take 1 capsule (20 mg total) by mouth every morning Max Daily Amount: 20 mg Do not start before March 15, 2024.          ______________________________________________________________________    Generalized anxiety disorder  MDD, recurrent, in remission    Attention and concentration deficit  Memory difficulties    ---  Generalized anxiety disorder - manageable   MDD, recurrent, in remission   - manageable  Attention and concentration deficit - manageable  Memory difficulties - stable    Carolin is doing well, likes current treatment. We have discussed reducing klonopin, she likes where it is.     Attention and concentration deficit may be related to organic issues, anxiety, or underlying ADD/ADHD. Ongoing f/u with Neurology for this and migraines. She was in a cognitive study years ago, no amyloid. However, she feels issues predated klonopin. \"Maybe it is just me\". Talked about cognitive concerns in context benzodiazepines, but she is " "comfortable taking them, but understands coming off would be long term goal as feasible and weighing risks/benefits. Also we discussed long term risks of all medications as well, including cardiovascular, bone density, falls, etc but she very much appreciates the \"quality of life\" she receives not matter what was mentioned. She will still f/u with PCP re: getting a Dexa scan.    Safety Risk Assessment: see above . In considering risk and protective factors, suicide risk and safety risk are low.    Past medications include   Prozac which helps but does have some decreased orgasm  Topamax for migraines but this seemed to lead to confusion ( years ago)  ON but no detail recall - Zoloft, Celexa or Lexapro.   Effexor seemed to cause weight gain  Wellbutrin  Ambien caused oversedation  melatonin did not help.     Scales:       Treatment Plan:        Patient has been educated about their diagnosis and treatment modalities. They voiced understanding and agreement with the following plan:    1) Meds:   - Prozac 40mg daily. (consider increase but orgasm affected. Was on 50mg in past per charts)   - Wellbutrin XL 300mg daily   - Klonopin 0.25mg in day PRN and 0.5mg HS PRN.    - Adderal XR 20mg Daily     2) Labs: PRN   - 2/2017: CBC, CMP WNL. TSH 1.57, TG 56, HDL 98,     3) Therapy:   - not in therapy, revisit PRN   - Provided progressive muscle relaxation handout, asked her to look into belly breathing and guided imagery. (2/9/2018)    4) Medical:  Concentration issues, MRI abnormalities, family h/o early onset dementia (PAunt), migraines (gets Botox)   - pt to f/u with neurologist   - pt to f/u with other providers PRN    5) Other: support prn   - works at [employer]. Reception   - good support from family, daughter lives with her   - 3 kids, . Daughter graduates from [employer] with BS May.  Likes psychology, healthcare    6) Follow up:   - 6 months, but patient to call if issues or concerns    7) " Treatment Plan: Enacted 2/9/2018, 12/7/2018, 7/26/2019, 8/14/2020, 4/1/21, 10/21/2021, 5/19/2022, 1/19/2023, 7/13/2023, 1/11/2024    8) Crisis Plan 1/11/2024      Discussed self monitoring of symptoms, and symptom monitoring tools.    Patient has been informed of 24 hours and weekend coverage for urgent situations accessed by calling the main clinic phone number.           Psychotherapy in session:  Time spent performing psychotherapy: 16 minutes supportive therapy related to her health issues, family, work balance, self care

## 2024-01-11 NOTE — BH CRISIS PLAN
"[facility] Safety Plan    Creation Date: 1/11/24    Created By: David Hernandez III, DO       Step 1: Warning Signs:   Warning Signs   (not suicidal) \"I would start feeling blue\", more negative thougths.            Step 2: Internal Coping Strategies:   Internal Coping Strategies   (not suicidal) exercising, helping others            Step 3: People and social settings that provide distraction:   Name Contact Information   Mother Cell phone   Lorrie (best friend from HS) Cell phone   Yogesh (Boyfriend) Cell phone          Step 4: People whom I can ask for help during a crisis:    Name Contact Information    mother cell phone    lorrie (best friend from HS) cell phone    Yogesh (Boyfriend) cell phone      Step 5: Professionals or agencies I can contact during a crisis:    Clinican/Agency Name Phone Emergency Contact    [facility] [phone]     Local Emergency Department Emergency Department Phone Emergency   Department Address     [facility]  [address] [place] [address]      Crisis Phone Numbers:   Suicide Prevention Lifeline: Call or Text  982 Crisis Text Line: Text HOME   to [phone]   Please note: Some [facility] do not have a separate number for   Child/Adolescent specific crisis. If your county is not listed under   Child/Adolescent, please call the adult number for your county      Adult Crisis Numbers: Child/Adolescent Crisis Numbers   [facility]: [phone] [facility]: [phone]   [facility]: [phone] [facility]: [phone]   [facility]: [phone] [place]: [phone]   [facility]: [phone] [facility]: [phone]   [place]: [phone]   [facility]: [phone]   [facility]: [phone]   [facility]: [phone] (daytime) [phone]   (after hours, weekends, holidays)      Step 6: Making the environment safer (plan for lethal means safety):   Patient did not identify any lethal methods: Yes     Optional: What " is most important to me and worth living for?   family     [place] Safety Plan. Amy Jiang and Hardik Peter. Used with   permission of the authors.

## 2024-01-11 NOTE — BH TREATMENT PLAN
"TREATMENT PLAN (Medication Management Only)        [facility]    Name/Date of Birth/MRN:  Carolin Heck 58 y.o. 1965 MRN: [id number]  Date of Treatment Plan: January 11, 2024  Diagnosis/Diagnoses:   1. Recurrent major depressive disorder, in remission (HCC)    2. TIM (generalized anxiety disorder)    3. Attention and concentration deficit    4. Memory difficulties      Strengths/Personal Resources for Self-Care: very good work ethics, honest, kind  Area/Areas of need (in own words): speaking my mind, making my decisions  1. Long Term Goal: \"maintain what I am doing\"   Target Date: 180 days from treatment plan  Person/Persons responsible for completion of goal: Dr. Hernandez and Self  2.  Short Term Objective (s) - How will we reach this goal?:   A.  Provider new recommended medication/dosage changes and/or continue medication(s): discussed  B.  Continue to stay active and involved with family  C.        Follow up with all providers, take meds as prescribed  D.  Target Date: 6 months from treatment plan unless noted otherwise  Person/Persons Responsible for Completion of Goal: Dr. Hernandez and Self   Progress Towards Goals: continuing treatment   Treatment Modality: Medication management and therapy PRN  Review due 180 days from date of this plan: Approximately 6 months from today ( 4/11/2024 )    Expected length of service: ongoing treatment  My Physician/PA/NP and I have developed this plan together and I agree to work on the goals and objectives. I understand the treatment goals that were developed for my treatment.  Signature:  Carolin Heck  1/11/2024  8:52 AM  Treatment Plan done but not signed at time of office visit due to:  Plan reviewed by phone or in person  and verbal consent given due to virtual visit       Date and time:  Signature of parent/guardian if under age of 14 years: Date and time:  Signature of provider:      Date and time:  Signature of Supervising " Physician:    Date and time: 1/11/2024      David Hernandez III

## 2024-01-18 ENCOUNTER — TELEPHONE (OUTPATIENT)
Age: 59
End: 2024-01-18

## 2024-01-18 ENCOUNTER — OFFICE VISIT (OUTPATIENT)
Dept: UROLOGY | Facility: CLINIC | Age: 59
End: 2024-01-18
Payer: COMMERCIAL

## 2024-01-18 VITALS
WEIGHT: 126.2 LBS | DIASTOLIC BLOOD PRESSURE: 88 MMHG | OXYGEN SATURATION: 99 % | HEART RATE: 90 BPM | BODY MASS INDEX: 21.54 KG/M2 | HEIGHT: 64 IN | SYSTOLIC BLOOD PRESSURE: 118 MMHG

## 2024-01-18 DIAGNOSIS — N20.0 CALCULUS OF KIDNEY: Primary | ICD-10-CM

## 2024-01-18 DIAGNOSIS — N39.0 RECURRENT UTI: ICD-10-CM

## 2024-01-18 PROCEDURE — 99214 OFFICE O/P EST MOD 30 MIN: CPT | Performed by: PHYSICIAN ASSISTANT

## 2024-01-18 RX ORDER — ESTRADIOL 10 UG/1
1 INSERT VAGINAL 2 TIMES WEEKLY
Qty: 30 TABLET | Refills: 2 | Status: SHIPPED | OUTPATIENT
Start: 2024-01-18

## 2024-01-18 NOTE — PROGRESS NOTES
"1/18/2024      Chief Complaint   Patient presents with    Follow-up     4 months/ recurrent uti's /acute uti's    Nephrolithiasis         Assessment and Plan    58 y.o. female managed by Dr Tineo    Nephrolithiasis  Recurrent UTI    Bladder is feeling great since adding the Vagifem suppositories.  She has not had any irritability or infection symptoms.  Reviewed her CT tiny lower pole stone.  No acute stone issues since her PCNL 40 years ago.  Discussed some dietary and hydration goals for stone prevention, also offered her 24-hour stone risk profile to have done at her convenience.  Otherwise I will check in with her in about a year    History of Present Illness  Carolin Heck is a 58 y.o. female here for evaluation of 4-month follow-up stable small renal calculi, sporadic recurrent UTI last treated in October.      History of significant right-sided stone underwent PCNL in 1983..  Has dealt with recurrent UTIs around that time, was on suppressive antibiotic therapy at one point she is continue to deal with recurrent infections typically with some bloating urinary frequency and rarely dysuria.  Cultures most recently are low colony count gram-negative rods and alpha strep.  She is on Premarin estrogen replacement.  She takes cranberry and started methenamine in the fall from Dr. ANDREW.      Review of Systems   Constitutional: Negative.    Respiratory: Negative.     Cardiovascular: Negative.    Genitourinary:  Negative for decreased urine volume, difficulty urinating, dysuria, flank pain, frequency, hematuria, pelvic pain and urgency.   Musculoskeletal: Negative.                 Vitals  Vitals:    01/18/24 1004   BP: 118/88   BP Location: Left arm   Patient Position: Sitting   Cuff Size: Standard   Pulse: 90   SpO2: 99%   Weight: 57.2 kg (126 lb 3.2 oz)   Height: 5' 4\" (1.626 m)       Physical Exam  Vitals and nursing note reviewed.   Constitutional:       General: She is not in acute distress.     Appearance: Normal " appearance. She is well-developed. She is not diaphoretic.   HENT:      Head: Normocephalic and atraumatic.   Pulmonary:      Effort: Pulmonary effort is normal.   Musculoskeletal:      Right lower leg: No edema.      Left lower leg: No edema.   Skin:     General: Skin is warm and dry.   Neurological:      General: No focal deficit present.      Mental Status: She is alert and oriented to person, place, and time.   Psychiatric:         Mood and Affect: Mood normal.         Speech: Speech normal.         Behavior: Behavior normal.           Past History  Past Medical History:   Diagnosis Date    Anxiety     Basal cell carcinoma     Last assessed: 9/26/14    Cancer (HCC)     BCC    Depression     Depression with anxiety     Last assessed: 1/13/17    Headache, tension-type     Insomnia     Last assessed: 9/26/14    Kidney stone     Memory loss     Migraine 1998    Varicella 1970     Social History     Socioeconomic History    Marital status: Single     Spouse name: None    Number of children: None    Years of education: None    Highest education level: None   Occupational History    None   Tobacco Use    Smoking status: Never    Smokeless tobacco: Never   Vaping Use    Vaping status: Never Used   Substance and Sexual Activity    Alcohol use: Yes     Alcohol/week: 3.0 standard drinks of alcohol     Types: 3 Standard drinks or equivalent per week     Comment: socially    Drug use: Never    Sexual activity: Yes     Partners: Male     Birth control/protection: Post-menopausal, None   Other Topics Concern    None   Social History Narrative    Caffeine use    Drinks coffee     Social Determinants of Health     Financial Resource Strain: Not on file   Food Insecurity: Not on file   Transportation Needs: Not on file   Physical Activity: Not on file   Stress: Not on file   Social Connections: Not on file   Intimate Partner Violence: Not on file   Housing Stability: Not on file     Social History     Tobacco Use   Smoking Status  "Never   Smokeless Tobacco Never     Family History   Problem Relation Age of Onset    Heart murmur Mother     Hyperlipidemia Mother     Atrial fibrillation Father     Arthritis Father     Basal cell carcinoma Father 60    Psoriasis Sister     No Known Problems Daughter     No Known Problems Maternal Grandmother     Coronary artery disease Maternal Grandfather     Heart disease Maternal Grandfather     Breast cancer Paternal Grandmother 66    Diabetes Paternal Grandmother         Mellitus    Arthritis Paternal Grandmother     Cancer Paternal Grandmother     Hyperlipidemia Paternal Grandmother     Hypertension Paternal Grandmother     Hypertension Paternal Grandfather     Hyperlipidemia Paternal Grandfather     Skin cancer Paternal Grandfather 70    No Known Problems Brother     ADD / ADHD Son     No Known Problems Son     Hyperlipidemia Maternal Aunt     Coronary artery disease Maternal Aunt     No Known Problems Maternal Aunt     Hyperlipidemia Maternal Uncle     Coronary artery disease Maternal Uncle     Dementia Paternal Aunt              No Known Problems Paternal Aunt     Alcohol abuse Neg Hx     Substance Abuse Neg Hx     Mental illness Neg Hx        The following portions of the patient's history were reviewed and updated as appropriate: allergies, current medications, past medical history, past social history, past surgical history and problem list.    Results  No results found for this or any previous visit (from the past 1 hour(s)).]  No results found for: \"PSA\"  Lab Results   Component Value Date    GLUCOSE 98 2014    CALCIUM 8.3 2021     2014    K 3.6 2021    CO2 31 2021     2021    BUN 15 2021    CREATININE 0.90 2021     Lab Results   Component Value Date    WBC 6.06 2021    HGB 13.1 2021    HCT 41.5 2021     (H) 2021     2021       "

## 2024-01-18 NOTE — TELEPHONE ENCOUNTER
She's running late to her appointment today because she was stuck behind an accident but will be at the office shortly. I made her aware of the 15 minute tres period.

## 2024-01-23 DIAGNOSIS — G43.109 MIGRAINE WITH AURA AND WITHOUT STATUS MIGRAINOSUS, NOT INTRACTABLE: ICD-10-CM

## 2024-01-24 ENCOUNTER — TELEPHONE (OUTPATIENT)
Age: 59
End: 2024-01-24

## 2024-01-24 NOTE — TELEPHONE ENCOUNTER
Spoke with pt and gave her my number. Also helped with her clearance. She is updated and set to be cleared.

## 2024-01-24 NOTE — TELEPHONE ENCOUNTER
Caller: Carolin Heck    Doctor/Office: Dr. Miranda/[facility]#: [phone]    Escalation: Surgery Does Carolin need a medical clearance for surgery? If so, what form will be needed to be filled out? Several attempts made to reach surgery scheduler in [place] with no return call. Please return call and advise. Thank you

## 2024-01-30 LAB
APOB+LDLR+PCSK9 GENE MUT ANL BLD/T: NOT DETECTED
BRCA1+BRCA2 DEL+DUP + FULL MUT ANL BLD/T: NOT DETECTED
MLH1+MSH2+MSH6+PMS2 GN DEL+DUP+FUL M: NOT DETECTED

## 2024-01-31 ENCOUNTER — TELEPHONE (OUTPATIENT)
Dept: NEUROLOGY | Facility: CLINIC | Age: 59
End: 2024-01-31

## 2024-01-31 NOTE — TELEPHONE ENCOUNTER
----- Message from Carolin Heck sent at 1/30/2024 12:57 PM EST -----  Regarding: Ubrelvy Preauthorization  Contact: [phone]  Lupillo De Guzman,  I called the pharmacy to get the Ubrelvy filled, but they said it has not been preauthorized yet.  I'm not really sure how this works, I've never had to pre-authorize a script.  Thank you Gege and have a good day!

## 2024-01-31 NOTE — TELEPHONE ENCOUNTER
Ubrelvy approved per CMM-  Approved today  PA Case: [id number], Status: Approved, Coverage Starts on: 1/31/2024 12:00 AM, Coverage Ends on: 1/31/2025 12:00 AM.    [employer] message sent to donnie

## 2024-01-31 NOTE — TELEPHONE ENCOUNTER
Received fax from \[facility]\"". Ubrelvy req PA  Key [id number]    Urgent PA initiated on CMM    Awaiting determination

## 2024-02-09 ENCOUNTER — TELEPHONE (OUTPATIENT)
Dept: PODIATRY | Facility: CLINIC | Age: 59
End: 2024-02-09

## 2024-02-09 NOTE — TELEPHONE ENCOUNTER
Called and left a voice mail to check on clearance. She went for one on 01/11/24 but they is too far out of the 30 day rang and will not be valid for 03/07/24. Left my number to call me back

## 2024-02-14 ENCOUNTER — APPOINTMENT (OUTPATIENT)
Dept: LAB | Facility: CLINIC | Age: 59
End: 2024-02-14
Payer: COMMERCIAL

## 2024-02-14 DIAGNOSIS — Z13.1 SCREENING FOR DIABETES MELLITUS: ICD-10-CM

## 2024-02-14 DIAGNOSIS — E78.01 FAMILIAL HYPERCHOLESTEROLEMIA: ICD-10-CM

## 2024-02-14 LAB
ALBUMIN SERPL BCP-MCNC: 4.3 G/DL (ref 3.5–5)
ALP SERPL-CCNC: 61 U/L (ref 34–104)
ALT SERPL W P-5'-P-CCNC: 40 U/L (ref 7–52)
ANION GAP SERPL CALCULATED.3IONS-SCNC: 8 MMOL/L
AST SERPL W P-5'-P-CCNC: 33 U/L (ref 13–39)
BASOPHILS # BLD AUTO: 0.02 THOUSANDS/ÂΜL (ref 0–0.1)
BASOPHILS NFR BLD AUTO: 0 % (ref 0–1)
BILIRUB SERPL-MCNC: 0.62 MG/DL (ref 0.2–1)
BUN SERPL-MCNC: 15 MG/DL (ref 5–25)
CALCIUM SERPL-MCNC: 8.9 MG/DL (ref 8.4–10.2)
CHLORIDE SERPL-SCNC: 101 MMOL/L (ref 96–108)
CHOLEST SERPL-MCNC: 266 MG/DL
CO2 SERPL-SCNC: 28 MMOL/L (ref 21–32)
CREAT SERPL-MCNC: 0.86 MG/DL (ref 0.6–1.3)
EOSINOPHIL # BLD AUTO: 0.24 THOUSAND/ÂΜL (ref 0–0.61)
EOSINOPHIL NFR BLD AUTO: 4 % (ref 0–6)
ERYTHROCYTE [DISTWIDTH] IN BLOOD BY AUTOMATED COUNT: 12.9 % (ref 11.6–15.1)
EST. AVERAGE GLUCOSE BLD GHB EST-MCNC: 117 MG/DL
GFR SERPL CREATININE-BSD FRML MDRD: 73 ML/MIN/1.73SQ M
GLUCOSE P FAST SERPL-MCNC: 85 MG/DL (ref 65–99)
HBA1C MFR BLD: 5.7 %
HCT VFR BLD AUTO: 42.5 % (ref 34.8–46.1)
HDLC SERPL-MCNC: 104 MG/DL
HGB BLD-MCNC: 13.7 G/DL (ref 11.5–15.4)
IMM GRANULOCYTES # BLD AUTO: 0.02 THOUSAND/UL (ref 0–0.2)
IMM GRANULOCYTES NFR BLD AUTO: 0 % (ref 0–2)
LDLC SERPL CALC-MCNC: 149 MG/DL (ref 0–100)
LYMPHOCYTES # BLD AUTO: 2.29 THOUSANDS/ÂΜL (ref 0.6–4.47)
LYMPHOCYTES NFR BLD AUTO: 33 % (ref 14–44)
MCH RBC QN AUTO: 31.8 PG (ref 26.8–34.3)
MCHC RBC AUTO-ENTMCNC: 32.2 G/DL (ref 31.4–37.4)
MCV RBC AUTO: 99 FL (ref 82–98)
MONOCYTES # BLD AUTO: 0.88 THOUSAND/ÂΜL (ref 0.17–1.22)
MONOCYTES NFR BLD AUTO: 13 % (ref 4–12)
NEUTROPHILS # BLD AUTO: 3.41 THOUSANDS/ÂΜL (ref 1.85–7.62)
NEUTS SEG NFR BLD AUTO: 50 % (ref 43–75)
NRBC BLD AUTO-RTO: 0 /100 WBCS
PLATELET # BLD AUTO: 265 THOUSANDS/UL (ref 149–390)
PMV BLD AUTO: 10.5 FL (ref 8.9–12.7)
POTASSIUM SERPL-SCNC: 3.7 MMOL/L (ref 3.5–5.3)
PROT SERPL-MCNC: 7.4 G/DL (ref 6.4–8.4)
RBC # BLD AUTO: 4.31 MILLION/UL (ref 3.81–5.12)
SODIUM SERPL-SCNC: 137 MMOL/L (ref 135–147)
TRIGL SERPL-MCNC: 63 MG/DL
WBC # BLD AUTO: 6.86 THOUSAND/UL (ref 4.31–10.16)

## 2024-02-14 PROCEDURE — 80061 LIPID PANEL: CPT

## 2024-02-14 PROCEDURE — 83036 HEMOGLOBIN GLYCOSYLATED A1C: CPT

## 2024-02-19 ENCOUNTER — TELEPHONE (OUTPATIENT)
Dept: FAMILY MEDICINE CLINIC | Facility: CLINIC | Age: 59
End: 2024-02-19

## 2024-02-19 NOTE — TELEPHONE ENCOUNTER
Pt scheduled herself through my chart for a 15 min appt tomorrow - when I questioned her what the appt was for, she said it is a preop for bunion surgery.  She just had one done on 1/11 for cataract surgery.  Will 15 min suffice or does she need 30 min?

## 2024-02-20 ENCOUNTER — OFFICE VISIT (OUTPATIENT)
Dept: FAMILY MEDICINE CLINIC | Facility: CLINIC | Age: 59
End: 2024-02-20
Payer: COMMERCIAL

## 2024-02-20 VITALS
WEIGHT: 129.5 LBS | BODY MASS INDEX: 22.11 KG/M2 | TEMPERATURE: 96.6 F | RESPIRATION RATE: 16 BRPM | HEART RATE: 88 BPM | OXYGEN SATURATION: 98 % | SYSTOLIC BLOOD PRESSURE: 122 MMHG | DIASTOLIC BLOOD PRESSURE: 80 MMHG | HEIGHT: 64 IN

## 2024-02-20 DIAGNOSIS — R41.840 ATTENTION AND CONCENTRATION DEFICIT: ICD-10-CM

## 2024-02-20 DIAGNOSIS — E78.5 HYPERLIPIDEMIA, UNSPECIFIED HYPERLIPIDEMIA TYPE: ICD-10-CM

## 2024-02-20 DIAGNOSIS — Z01.818 PREOP EXAMINATION: Primary | ICD-10-CM

## 2024-02-20 DIAGNOSIS — M21.619 BUNION: ICD-10-CM

## 2024-02-20 DIAGNOSIS — R73.01 IFG (IMPAIRED FASTING GLUCOSE): ICD-10-CM

## 2024-02-20 DIAGNOSIS — G43.109 MIGRAINE WITH AURA AND WITHOUT STATUS MIGRAINOSUS, NOT INTRACTABLE: ICD-10-CM

## 2024-02-20 DIAGNOSIS — F33.40 RECURRENT MAJOR DEPRESSIVE DISORDER, IN REMISSION (HCC): ICD-10-CM

## 2024-02-20 PROCEDURE — 99214 OFFICE O/P EST MOD 30 MIN: CPT | Performed by: PHYSICIAN ASSISTANT

## 2024-02-20 NOTE — PROGRESS NOTES
[facility] PRE-OPERATIVE EVALUATION  [facility] - [facility]    NAME: Carolin Heck  AGE: 58 y.o. SEX: female  : 1965     DATE: 2024    [facility] Pre-Operative Evaluation      Chief Complaint: Pre-operative Evaluation     Surgery: bunionectomy christopher, right  Anticipated Date of Surgery: 2024  Referring Provider: Dr Miranda     History of Present Illness:     Carolin Heck is a 58 y.o. female who presents to the office today for a preoperative consultation at the request of surgeon, Ruth, who plans on performing right bunionectomy on 3/7/24. Planned anesthesia is  general/LMA . Patient has a bleeding risk of: no recent abnormal bleeding. Patient does not have objections to receiving blood products if needed. Current anti-platelet/anti-coagulation medications that the patient is prescribed includes:  none .      Assessment of Chronic Conditions:   - hyperlipidemia - on lipitor 10 mg  - MDD - wellbutrin, prozac, adderall, klonopin  - migraines - maxalat, ubrelvy  - frequent UTI - on hiprex and estrogen cream  - IFG - controlled with diet     Assessment of Cardiac Risk:  Denies unstable or severe angina or MI in the last 6 weeks or history of stent placement in the last year   Denies decompensated heart failure (e.g. New onset heart failure, NYHA functional class IV heart failure, or worsening existing heart failure)  Denies significant arrhythmias such as high grade AV block, symptomatic ventricular arrhythmia, newly recognized ventricular tachycardia, supraventricular tachycardia with resting heart rate >100, or symptomatic bradycardia  Denies severe heart valve disease including aortic stenosis or symptomatic mitral stenosis     Exercise Capacity:  Able to walk 4 blocks without symptoms?: Yes  Able to walk 2 flights without symptoms?: Yes    Prior Anesthesia Reactions:  No     Personal history of venous  thromboembolic disease? No    History of steroid use for >2 weeks within last year? No         Review of Systems:     Review of Systems   Constitutional: Negative.    HENT: Negative.     Eyes: Negative.    Respiratory: Negative.     Cardiovascular: Negative.    Gastrointestinal: Negative.    Endocrine: Negative.    Genitourinary: Negative.    Musculoskeletal: Negative.    Skin: Negative.    Allergic/Immunologic: Negative.    Neurological: Negative.    Hematological: Negative.    Psychiatric/Behavioral: Negative.         Current Problem List:     Patient Active Problem List   Diagnosis    Attention and concentration deficit    TIM (generalized anxiety disorder)    Recurrent major depressive disorder, in remission ([facility])    Migraine with aura and without status migrainosus, not intractable    Hyperlipidemia    Memory difficulties    Cyst of right eyelid    Chronic migraine without aura without status migrainosus, not intractable    Calcific tendonitis of left shoulder       Allergies:     Allergies   Allergen Reactions    Butoconazole Hives    Tioconazole Hives       Current Medications:       Current Outpatient Medications:     [START ON 3/15/2024] amphetamine-dextroamphetamine (ADDERALL XR, 20MG,) 20 MG 24 hr capsule, Take 1 capsule (20 mg total) by mouth every morning Max Daily Amount: 20 mg Do not start before March 15, 2024., Disp: 90 capsule, Rfl: 0    atorvastatin (LIPITOR) 10 mg tablet, Take 1 tablet (10 mg total) by mouth daily, Disp: 90 tablet, Rfl: 3    buPROPion (WELLBUTRIN XL) 300 mg 24 hr tablet, Take 1 tablet (300 mg total) by mouth every morning, Disp: 90 tablet, Rfl: 1    clonazePAM (KlonoPIN) 0.5 mg tablet, Take 1/2 tab daily as needed for anxiety and take 1 tab nightly as needed for insomnia and anxiety Do not start before January 16, 2024., Disp: 45 tablet, Rfl: 5    estradiol (VAGIFEM, YUVAFEM) 10 MCG TABS vaginal tablet, Insert 1 tablet (10 mcg total) into the vagina 2 (two) times a week, Disp: 30  tablet, Rfl: 2    FLUoxetine (PROzac) 40 MG capsule, Take 1 capsule (40 mg total) by mouth daily, Disp: 90 capsule, Rfl: 1    methenamine hippurate (HIPREX) 1 g tablet, Take 1 tablet (1 g total) by mouth 2 (two) times a day with meals, Disp: 180 tablet, Rfl: 3    rizatriptan (MAXALT) 10 mg tablet, Take 1 tablet (10 mg total) by mouth as needed for migraine May repeat in 2 hours if needed.  Limit 3 a week or 9 a month, Disp: 9 tablet, Rfl: 0    Ubrogepant (UBRELVY) 100 MG tablet, Take 1 tablet (100 mg) one time as needed for migraine. May repeat one additional tablet (100 mg) at least two hours after the first dose. Do not use more than 200 mg a day, Disp: 16 tablet, Rfl: 6    estrogens, conjugated (Premarin) vaginal cream, Insert 0.5g vaginally three times per week (Patient not taking: Reported on 1/18/2024), Disp: 30 g, Rfl: 1    Multiple Vitamins-Minerals (MULTIVITAL-M PO), Take 1 tablet by mouth in the morning (Patient not taking: Reported on 2/20/2024), Disp: , Rfl:     Past Medical History:       Past Medical History:   Diagnosis Date    Anxiety     Basal cell carcinoma     Last assessed: 9/26/14    Cancer (HCC)     BCC    Depression     Depression with anxiety     Last assessed: 1/13/17    Headache, tension-type     Insomnia     Last assessed: 9/26/14    Kidney stone     Memory loss     Migraine 1998    Varicella 1970        Past Surgical History:   Procedure Laterality Date    BLADDER SURGERY      lift of bladder    CATARACT EXTRACTION Left     CYSTOSCOPY      Theurapeutic. TVT-O    EYE SURGERY Bilateral     LASIK    FACIAL/NECK BIOPSY Right 03/12/2019    Procedure: UPPER EYELID CYST EXCISION;  Surgeon: Ector Hoover MD;  Location: [facility];  Service: Plastics    GYNECOLOGIC CRYOSURGERY  2010    Cervix    HYSTEROSCOPY W/ ENDOMETRIAL ABLATION  12/14/2010    Novasure    KIDNEY SURGERY  kidney stone removal    1983    MOHS SURGERY  2010    Forehead    DC CORRJ HLX VLGS BNCTY [facility] DSTL METAR  OSTEOT Left 2021    Procedure: BUNIONECTOMY JENNA left foot;  Surgeon: Isaias Miranda DPM;  Location:  MAIN OR;  Service: Podiatry    SKIN BIOPSY      TUBAL LIGATION      WISDOM TOOTH EXTRACTION          Family History   Problem Relation Age of Onset    Heart murmur Mother     Hyperlipidemia Mother     Atrial fibrillation Father     Arthritis Father     Basal cell carcinoma Father 60    Psoriasis Sister     No Known Problems Daughter     No Known Problems Maternal Grandmother     Coronary artery disease Maternal Grandfather     Heart disease Maternal Grandfather     Breast cancer Paternal Grandmother 66    Diabetes Paternal Grandmother         Mellitus    Arthritis Paternal Grandmother     Cancer Paternal Grandmother     Hyperlipidemia Paternal Grandmother     Hypertension Paternal Grandmother     Hypertension Paternal Grandfather     Hyperlipidemia Paternal Grandfather     Skin cancer Paternal Grandfather 70    No Known Problems Brother     ADD / ADHD Son     No Known Problems Son     Hyperlipidemia Maternal Aunt     Coronary artery disease Maternal Aunt     No Known Problems Maternal Aunt     Hyperlipidemia Maternal Uncle     Coronary artery disease Maternal Uncle     Dementia Paternal Aunt              No Known Problems Paternal Aunt     Alcohol abuse Neg Hx     Substance Abuse Neg Hx     Mental illness Neg Hx         Social History     Socioeconomic History    Marital status: Single     Spouse name: Not on file    Number of children: Not on file    Years of education: Not on file    Highest education level: Not on file   Occupational History    Not on file   Tobacco Use    Smoking status: Never    Smokeless tobacco: Never   Vaping Use    Vaping status: Never Used   Substance and Sexual Activity    Alcohol use: Yes     Alcohol/week: 3.0 standard drinks of alcohol     Types: 3 Standard drinks or equivalent per week     Comment: socially    Drug use: Never    Sexual activity: Yes      "Partners: Male     Birth control/protection: Post-menopausal, None   Other Topics Concern    Not on file   Social History Narrative    Caffeine use    Drinks coffee     Social Determinants of Health     Financial Resource Strain: Not on file   Food Insecurity: Not on file   Transportation Needs: Not on file   Physical Activity: Not on file   Stress: Not on file   Social Connections: Not on file   Intimate Partner Violence: Not on file   Housing Stability: Not on file        Physical Exam:     /80   Pulse 88   Temp (!) 96.6 °F (35.9 °C)   Resp 16   Ht 5' 4\" (1.626 m)   Wt 58.7 kg (129 lb 8 oz)   SpO2 98%   BMI 22.23 kg/m²     Physical Exam  Constitutional:       Appearance: Normal appearance. She is well-developed and normal weight.   HENT:      Head: Normocephalic and atraumatic.   Eyes:      Extraocular Movements: Extraocular movements intact.      Conjunctiva/sclera: Conjunctivae normal.      Pupils: Pupils are equal, round, and reactive to light.   Neck:      Thyroid: No thyromegaly.   Cardiovascular:      Rate and Rhythm: Normal rate and regular rhythm.      Pulses: Normal pulses.      Heart sounds: Normal heart sounds. No murmur heard.  Pulmonary:      Effort: Pulmonary effort is normal. No respiratory distress.      Breath sounds: Normal breath sounds. No wheezing or rales.   Abdominal:      General: Abdomen is flat. Bowel sounds are normal. There is no distension.      Palpations: Abdomen is soft. There is no mass.      Tenderness: There is no abdominal tenderness.   Musculoskeletal:         General: Normal range of motion.      Cervical back: Normal range of motion and neck supple. No rigidity or tenderness.   Lymphadenopathy:      Cervical: No cervical adenopathy.   Skin:     General: Skin is warm and dry.   Neurological:      General: No focal deficit present.      Mental Status: She is alert and oriented to person, place, and time.      Cranial Nerves: No cranial nerve deficit.      Deep " Tendon Reflexes: Reflexes are normal and symmetric.   Psychiatric:         Mood and Affect: Mood normal.         Behavior: Behavior normal.         Thought Content: Thought content normal.         Judgment: Judgment normal.          Data:     Pre-operative work-up    Laboratory Results: I have personally reviewed the pertinent laboratory results/reports      EKG:  no record available    Chest x-ray:  not indicated      Previous cardiopulmonary studies within the past year:  Echocardiogram: none  Cardiac Catheterization: none  Stress Test: none  Pulmonary Function Testing: none      Assessment & Recommendations:       Pre-Op Evaluation Assessment  58 y.o. female with planned surgery: right bunionectomy.    Known risk factors for perioperative complications: None.        Current medications which may produce withdrawal symptoms if withheld perioperatively: none.    Pre-Op Evaluation Plan  1. Further preoperative workup as follows:   - None; no further preoperative work-up is required    2. Medication Management/Recommendations:   - Not applicable, not on any medications    3. Prophylaxis for cardiac events with perioperative beta-blockers: not indicated.    4. Patient requires further consultation with: None    Clearance  Patient is CLEARED for surgery without any additional cardiac testing.     Isabelle Viramontes PA-C  [facility]  [address] [place]  [address] [address]  Phone#  [phone]  Fax#  [phone]

## 2024-02-22 ENCOUNTER — ANESTHESIA EVENT (OUTPATIENT)
Dept: PERIOP | Facility: AMBULARY SURGERY CENTER | Age: 59
End: 2024-02-22
Payer: COMMERCIAL

## 2024-02-23 ENCOUNTER — TELEMEDICINE (OUTPATIENT)
Dept: NEUROLOGY | Facility: CLINIC | Age: 59
End: 2024-02-23
Payer: COMMERCIAL

## 2024-02-23 DIAGNOSIS — G43.709 CHRONIC MIGRAINE WITHOUT AURA WITHOUT STATUS MIGRAINOSUS, NOT INTRACTABLE: Primary | ICD-10-CM

## 2024-02-23 PROCEDURE — 99213 OFFICE O/P EST LOW 20 MIN: CPT | Performed by: PHYSICIAN ASSISTANT

## 2024-02-23 NOTE — ASSESSMENT & PLAN NOTE
Preventative:  Continue medications per other providers  Continue Botox every 3 months    Abortive:  At onset of migraine take Ubrelvy 100 mg.  May repeat in 2 hours if needed.  Limit of 200 mg in 24 hours  May use ibuprofen or Tylenol as needed but less than 3 doses a week

## 2024-02-23 NOTE — PROGRESS NOTES
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     She states prior to botox her migraines were severe, she would have to go home from work due to migraines as she would need to sleep. Her frequency would vary-sometimes would have 3 headaches  Per week, on average 10-15 migraines days per month per patient. Since starting botox her headaches have reduced, she has on average 2 headaches per month, she is able to now work with migraines with rescue medication on boards-respond to advil. She used to have an aura of confusion with her headaches that would interfere with her functioning-this has stopped since starting botox. She continues to follow with psych-she remains on wellbutrin and prozac.         What medications do you take or have you taken for your headaches?   Current Preventative  Adderall, wellbutrin, fluoxetine  Botox    Current Abortive  Ubrelvy    Prior PREVENTATIVE:  Klonopin, Effexor, Lexapro, Zoloft, Celexa,  Depakote, Topamax, zonisamide, gabapentin  verapamil,     Prior ABORTIVE:  Aleve, Tylenol, ibuprofen  Sumatriptan rizatriptan     Alternative therapies used in the past for headaches? none  Headache are worse if the patient: cough, sneeze, bending over  Headache triggers:  Lack of sleep, fatigue, stress/tension, hot flashes     Aura and how long does it last -  Yes, confusion a few minutes before migraine begins-has not occurred since botox was started      What is your current pain level - 0/10     Any family history of migraines? No  Any family history of aneurysms? Yes maternal cousin     Headaches started at what age? 32 years old  How often do the headaches occur?   1-3 month; prior to botox was more than 15 a month  What time of the day do the headaches start? varies  How long do the headaches last?   1-3 hours; prior to botox was over 4 hours or rest of the day  Are you ever headache free? Yes  Describe your usual headache - Throbbing, Pressure, Dull  Where is your  headache located?   Bilateral frontal, bilateral temporalis and occipitalis  What is the intensity of pain? 5-6/10     Associated symptoms: unchanged  Decrease of appetite, nausea  Photophobia, phonophobia, sensitivity to smell   Problem with concentration  light-headed or dizzy, stiff or sore neck,   Hands or feet tingle or feel numb, prefer to be alone and in a dark room, unable to work     Number of days missed per month because of headaches: prior to botox she was not working but would miss some family activities at least a few   Work (or school) days: 0  Social or Family activities: 0      What time of the year do headaches occur more frequently?   no  Have you seen someone else for headaches or pain? No  Have you had trigger point injection performed and how often? No  Have you had Botox injection performed and how often? Yes   Have you had epidural injections or transforaminal injections performed? No     Have you used CBD or THC for your headaches and how often? No  Are you current pregnant or planning on getting pregnant? No, done with family planning  Have you ever had any Brain imaging? yes      11/2014 MRI brain: Several nonspecific subcortical white matter lesions bilaterally in   the frontal lobes, nonspecific findings which may be related to early   changes of microangiopathy, in the appropriate clinical setting.  Other   postinfectious, postinflammatory or demyelinating processes not   excluded.  Patients with migraine headaches can have white matter   lesions as well.  Consider followup repeat contrast enhanced MRI of the   brain in 6-12 months to establish stability and to exclude abnormal   enhancement. No acute infarction, intracranial hemorrhage or mass effect.      11/2014 MRA head: No intracranial aneurysm or major intracranial arterial stenosis.      6/2/2015 MRI brain: No acute disease.  Stable white matter changes, nonspecific.  Selective hippocampal volume loss with normal sized lateral  and temporal horn volume.  This may be a congenital in etiology.  Repeat marrow quantitative imaging in one year to document trajectory of volume loss.      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  Small white matter lesions have been described within the frontal lobes in patients with chronic   migraine headaches.  Overall no significant change noted.     10/2016 PET brain Symmetric decreased FDG activity in the medial temporal lobes bilaterally, corresponding with prior MRI findings.  FDG distribution is otherwise unremarkable.  If early Alzheimer's disease is a clinical consideration, beta amyloid PET CT brain imaging may be useful.     NeuroQuant analysis was performed: Measurements suggest significantly decreased hippocampal volume and mild local ex-vacuo dilatation of the adjacent inferior lateral ventricles : 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.  Given patient's age and anatomic imaging, consider 6-12 month follow-up examination.     4/2017 MRI NQ No acute intracranial abnormality.  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 F/U to establish presence and nature of volume change over time.    No substantial interval change from the prior examination of 10/10/2016 when given measurement error.    Nonspecific white matter changes suggestive of mild chronic microvascular ischemia.     4/28/2018 MRI NQ Brain   Several tiny supratentorial white matter T2 and FLAIR hyperintense foci suspicious for mild chronic microangiopathic changes such as may accompany migraine headaches.     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 F/U to establish presence and nature of volume change over time.      6/16/2020 MRI NQ brain  No acute intracranial disease.  Footprint of what most probably represents mild degree of chronic small vessel disease is stable. 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

## 2024-02-23 NOTE — PROGRESS NOTES
Virtual Regular Visit    Verification of patient location:    Patient is located at Home in the following state in which I hold an active license [place]      Assessment/Plan:    Problem List Items Addressed This Visit        Cardiovascular and Mediastinum    Chronic migraine without aura without status migrainosus, not intractable - Primary     Preventative:  Continue medications per other providers  Continue Botox every 3 months    Abortive:  At onset of migraine take Ubrelvy 100 mg.  May repeat in 2 hours if needed.  Limit of 200 mg in 24 hours  May use ibuprofen or Tylenol as needed but less than 3 doses a week                 Reason for visit is   Chief Complaint   Patient presents with   • Migraine        Encounter provider Gege Garcia PA-C    Provider located at [facility]  [facility] [address] [address] [phone]      Recent Visits  Date Type Provider Dept   02/20/24 Office Visit Isabelle Viramontes PA-C [facility]   02/19/24 Telephone Isabelle Viramontes PA-C [facility]   Showing recent visits within past 7 days and meeting all other requirements  Today's Visits  Date Type Provider Dept   02/23/24 Telemedicine Gege Garcia PA-C [facility]   Showing today's visits and meeting all other requirements  Future Appointments  No visits were found meeting these conditions.  Showing future appointments within next 150 days and meeting all other requirements       The patient was identified by name and date of birth. Carolin Heck was informed that this is a telemedicine visit and that the visit is being conducted through the Epic Embedded platform. She agrees to proceed..  My office door was closed. No one else was in the room.  She acknowledged consent and understanding of privacy and security of the video platform. The patient has agreed to participate and understands they can discontinue the  visit at any time.    Patient is aware this is a billable service.     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     With botox has had a reduction of at least 7 migraine days with less abortive medication, less ER visits which correlates to headache diary          What medications do you take or have you taken for your headaches?   Current Preventative  Adderall, wellbutrin, fluoxetine  Botox    Current Abortive  Ubrelvy    Prior PREVENTATIVE:  Klonopin, Effexor, Lexapro, Zoloft, Celexa,  Depakote, Topamax, zonisamide, gabapentin  verapamil,     Prior ABORTIVE:  Aleve, Tylenol, ibuprofen  Sumatriptan rizatriptan     Alternative therapies used in the past for headaches? none  Headache are worse if the patient: cough, sneeze, bending over  Headache triggers:  Lack of sleep, fatigue, stress/tension, hot flashes     Aura and how long does it last -  Yes, confusion a few minutes before migraine begins-has not occurred since botox was started      What is your current pain level - 0/10     Any family history of migraines? No  Any family history of aneurysms? Yes maternal cousin     Headaches started at what age? 32 years old  How often do the headaches occur?  2-4 month; prior to botox was more than 15 a month  What time of the day do the headaches start? varies  How long do the headaches last?   30 minutes-3 hours; prior to botox was over 4 hours or rest of the day  Are you ever headache free? Yes  Describe your usual headache - Throbbing, Pressure, Dull  Where is your headache located?   Bilateral frontal, bilateral temporalis and occipitalis  What is the intensity of pain? 8/10     Associated symptoms: unchanged  Decrease of appetite, nausea  Photophobia, phonophobia, sensitivity to smell   Problem with concentration  light-headed or dizzy, stiff or sore neck,   Hands or feet tingle or feel numb, prefer to be alone and in a dark room, unable to work      Number of days missed per month because of headaches:   Work (or school) days: 0  Social or Family activities: 0      What time of the year do headaches occur more frequently?   no  Have you seen someone else for headaches or pain? No  Have you had trigger point injection performed and how often? No  Have you had Botox injection performed and how often? Yes   Have you had epidural injections or transforaminal injections performed? No     Have you used CBD or THC for your headaches and how often? No  Are you current pregnant or planning on getting pregnant? No, done with family planning  Have you ever had any Brain imaging? yes      11/2014 MRI brain: Several nonspecific subcortical white matter lesions bilaterally in   the frontal lobes, nonspecific findings which may be related to early   changes of microangiopathy, in the appropriate clinical setting.  Other   postinfectious, postinflammatory or demyelinating processes not   excluded.  Patients with migraine headaches can have white matter   lesions as well.  Consider followup repeat contrast enhanced MRI of the   brain in 6-12 months to establish stability and to exclude abnormal   enhancement. No acute infarction, intracranial hemorrhage or mass effect.      11/2014 MRA head: No intracranial aneurysm or major intracranial arterial stenosis.      6/2/2015 MRI brain: No acute disease.  Stable white matter changes, nonspecific.  Selective hippocampal volume loss with normal sized lateral and temporal horn volume.  This may be a congenital in etiology.  Repeat marrow quantitative imaging in one year to document trajectory of volume loss.      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  Small white matter lesions have been described within the frontal lobes in patients with chronic   migraine headaches.  Overall no significant change noted.      10/2016 PET brain Symmetric decreased FDG activity in the medial temporal lobes bilaterally, corresponding with prior MRI findings.  FDG distribution is otherwise unremarkable.  If early Alzheimer's disease is a clinical consideration, beta amyloid PET CT brain imaging may be useful.     NeuroQuant analysis was performed: Measurements suggest significantly decreased hippocampal volume and mild local ex-vacuo dilatation of the adjacent inferior lateral ventricles : 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.  Given patient's age and anatomic imaging, consider 6-12 month follow-up examination.     4/2017 MRI NQ No acute intracranial abnormality.  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 F/U to establish presence and nature of volume change over time.    No substantial interval change from the prior examination of 10/10/2016 when given measurement error.    Nonspecific white matter changes suggestive of mild chronic microvascular ischemia.     4/28/2018 MRI NQ Brain   Several tiny supratentorial white matter T2 and FLAIR hyperintense foci suspicious for mild chronic microangiopathic changes such as may accompany migraine headaches.     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 F/U to establish presence and nature of volume change over time.     6/16/2020 MRI NQ brain  No acute intracranial disease.  Footprint of what most probably represents mild degree of chronic small vessel disease is stable. 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    .      Migraine  Pertinent negatives include no dizziness, eye pain, fever, hearing loss, nausea, neck pain, numbness, photophobia, seizures, tinnitus, vomiting or weakness.        Past Medical History:    Diagnosis Date   • Anxiety    • Basal cell carcinoma     Last assessed: 9/26/14   • Cancer (HCC)     BCC   • Depression    • Depression with anxiety     Last assessed: 1/13/17   • Headache, tension-type    • Insomnia     Last assessed: 9/26/14   • Kidney stone    • Memory loss    • Migraine 1998   • Varicella 1970       Past Surgical History:   Procedure Laterality Date   • BLADDER SURGERY      lift of bladder   • CATARACT EXTRACTION Left    • CYSTOSCOPY      Theurapeutic. TVT-O   • EYE SURGERY Bilateral     LASIK   • FACIAL/NECK BIOPSY Right 03/12/2019    Procedure: UPPER EYELID CYST EXCISION;  Surgeon: Ector Hoover MD;  Location: [facility];  Service: Plastics   • GYNECOLOGIC CRYOSURGERY  2010    Cervix   • HYSTEROSCOPY W/ ENDOMETRIAL ABLATION  12/14/2010    Deborah   • KIDNEY SURGERY  kidney stone removal    1983   • MOHS SURGERY  2010    Forehead   • MN CORRJ HLX VLGS BNCTY SESMDC DSTL METAR OSTEOT Left 04/16/2021    Procedure: BUNIONECTOMY JENNA left foot;  Surgeon: Isaias Miranda DPM;  Location:  MAIN OR;  Service: Podiatry   • SKIN BIOPSY     • TUBAL LIGATION     • WISDOM TOOTH EXTRACTION         Current Outpatient Medications   Medication Sig Dispense Refill   • [START ON 3/15/2024] amphetamine-dextroamphetamine (ADDERALL XR, 20MG,) 20 MG 24 hr capsule Take 1 capsule (20 mg total) by mouth every morning Max Daily Amount: 20 mg Do not start before March 15, 2024. 90 capsule 0   • atorvastatin (LIPITOR) 10 mg tablet Take 1 tablet (10 mg total) by mouth daily 90 tablet 3   • buPROPion (WELLBUTRIN XL) 300 mg 24 hr tablet Take 1 tablet (300 mg total) by mouth every morning (Patient taking differently: Take 300 mg by mouth if needed) 90 tablet 1   • clonazePAM (KlonoPIN) 0.5 mg tablet Take 1/2 tab daily as needed for anxiety and take 1 tab nightly as needed for insomnia and anxiety Do not start before January 16, 2024. 45 tablet 5   • estradiol (VAGIFEM, YUVAFEM) 10 MCG TABS vaginal  tablet Insert 1 tablet (10 mcg total) into the vagina 2 (two) times a week 30 tablet 2   • FLUoxetine (PROzac) 40 MG capsule Take 1 capsule (40 mg total) by mouth daily 90 capsule 1   • methenamine hippurate (HIPREX) 1 g tablet Take 1 tablet (1 g total) by mouth 2 (two) times a day with meals 180 tablet 3   • Multiple Vitamins-Minerals (MULTIVITAL-M PO) Take 1 tablet by mouth in the morning     • rizatriptan (MAXALT) 10 mg tablet Take 1 tablet (10 mg total) by mouth as needed for migraine May repeat in 2 hours if needed.  Limit 3 a week or 9 a month 9 tablet 0   • Ubrogepant (UBRELVY) 100 MG tablet Take 1 tablet (100 mg) one time as needed for migraine. May repeat one additional tablet (100 mg) at least two hours after the first dose. Do not use more than 200 mg a day 16 tablet 6   • estrogens, conjugated (Premarin) vaginal cream Insert 0.5g vaginally three times per week (Patient not taking: Reported on 1/18/2024) 30 g 1     No current facility-administered medications for this visit.        Allergies   Allergen Reactions   • Butoconazole Hives   • Tioconazole Hives    I have reviewed the patient's medical, social and surgical history as well as medications in detail and updated the computerized patient record.      Review of Systems   Constitutional:  Negative for appetite change and fever.   HENT: Negative.  Negative for hearing loss, tinnitus, trouble swallowing and voice change.    Eyes: Negative.  Negative for photophobia and pain.   Respiratory: Negative.  Negative for shortness of breath.    Cardiovascular: Negative.  Negative for palpitations.   Gastrointestinal: Negative.  Negative for nausea and vomiting.   Endocrine: Negative.  Negative for cold intolerance.   Genitourinary: Negative.  Negative for dysuria, frequency and urgency.   Musculoskeletal: Negative.  Negative for myalgias and neck pain.   Skin: Negative.  Negative for rash.   Neurological:  Positive for headaches. Negative for dizziness,  tremors, seizures, syncope, facial asymmetry, speech difficulty, weakness, light-headedness and numbness.   Hematological: Negative.  Does not bruise/bleed easily.   Psychiatric/Behavioral: Negative.  Negative for confusion, hallucinations and sleep disturbance.    All other systems reviewed and are negative.  I personally reviewed and updated the ROS that was entered by the medical assistant      Video Exam    There were no vitals filed for this visit.    Physical Exam   CONSTITUTIONAL: Well developed, well nourished, well groomed. No dysmorphic features.     HEENT:  Normocephalic atraumatic.    Chest:  Respirations regular and unlabored.    Psychiatric:  Normal behavior and appropriate affect      MENTAL STATUS  Orientation: Alert and oriented x 3  Fund of knowledge: Intact.    Visit Time  I have spent a total time of 23 minutes on 02/23/24 in caring for this patient including Prognosis, Risks and benefits of tx options, Instructions for management, Importance of tx compliance, Risk factor reductions, Impressions, Documenting in the medical record, and Obtaining or reviewing history  .

## 2024-02-28 ENCOUNTER — TELEPHONE (OUTPATIENT)
Age: 59
End: 2024-02-28

## 2024-02-28 ENCOUNTER — TELEPHONE (OUTPATIENT)
Dept: OBGYN CLINIC | Facility: CLINIC | Age: 59
End: 2024-02-28

## 2024-02-28 NOTE — TELEPHONE ENCOUNTER
Caller: Carolin Heck    Doctor/Office: Dr. Miranda/Martina    #: [phone]    Escalation: Disability forms Patient would like to know what the direct fax number is to fax her disability forms to. She is a [employer] employee and did not want to fax to the central fax where it would sit. Please return call and give her fax. Thank you

## 2024-03-04 ENCOUNTER — TELEPHONE (OUTPATIENT)
Age: 59
End: 2024-03-04

## 2024-03-04 NOTE — TELEPHONE ENCOUNTER
Caller: Carolin Heck    Doctor/Office: Dr. Miranda/Martina    #: [phone]    Escalation: Patient calling in to see if we have received her disability forms. I advised her we did as of 2-29. She is just following up to be sure they will be faxed to HR with the number she provided on the cover sheet. Thank you

## 2024-03-06 NOTE — PRE-PROCEDURE INSTRUCTIONS
Pre-Surgery Instructions:   Medication Instructions    [START ON 3/15/2024] amphetamine-dextroamphetamine (ADDERALL XR, 20MG,) 20 MG 24 hr capsule Hold day of surgery.    atorvastatin (LIPITOR) 10 mg tablet Take day of surgery.    buPROPion (WELLBUTRIN XL) 300 mg 24 hr tablet Take day of surgery.    clonazePAM (KlonoPIN) 0.5 mg tablet Take night before surgery    estradiol (VAGIFEM, YUVAFEM) 10 MCG TABS vaginal tablet Last dose 3/4/2024    FLUoxetine (PROzac) 40 MG capsule Take day of surgery.    methenamine hippurate (HIPREX) 1 g tablet Take day of surgery.    Multiple Vitamins-Minerals (MULTIVITAL-M PO) Hold day of surgery.    rizatriptan (MAXALT) 10 mg tablet Uses PRN- OK to take day of surgery    Ubrogepant (UBRELVY) 100 MG tablet Uses PRN- OK to take day of surgery      Medication instructions for day surgery reviewed. Please use only a sip of water to take your instructed medications. Avoid all over the counter vitamins, supplements and NSAIDS for one week prior to surgery per anesthesia guidelines. Tylenol is ok to take as needed.     You will receive a call one business day prior to surgery with an arrival time and hospital directions. If your surgery is scheduled on a Monday, the hospital will be calling you on the Friday prior to your surgery. If you have not heard from anyone by 8pm, please call the hospital supervisor through the hospital  at [phone]. ([place] [phone] or [place] [phone]).    Do not eat or drink anything after midnight the night before your surgery, including candy, mints, lifesavers, or chewing gum. Do not drink alcohol 24hrs before your surgery. Try not to smoke at least 24hrs before your surgery.       Follow the pre surgery showering instructions as listed in the “My Surgical Experience Booklet” or otherwise provided by your surgeon's office. Do not use a blade to shave the surgical area 1 week before surgery. It is okay to use a clean electric clippers up to  24 hours before surgery. Do not apply any lotions, creams, including makeup, cologne, deodorant, or perfumes after showering on the day of your surgery. Do not use dry shampoo, hair spray, hair gel, or any type of hair products.     No contact lenses, eye make-up, or artificial eyelashes. Remove nail polish, including gel polish, and any artificial, gel, or acrylic nails if possible. Remove all jewelry including rings and body piercing jewelry.     Wear causal clothing that is easy to take on and off. Consider your type of surgery.    Keep any valuables, jewelry, piercings at home. Please bring any specially ordered equipment (sling, braces) if indicated.    Arrange for a responsible person to drive you to and from the hospital on the day of your surgery. Please confirm the visitor policy for the day of your procedure when you receive your phone call with an arrival time.     Call the surgeon's office with any new illnesses, exposures, or additional questions prior to surgery.    Please reference your “My Surgical Experience Booklet” for additional information to prepare for your upcoming surgery.

## 2024-03-07 ENCOUNTER — APPOINTMENT (OUTPATIENT)
Dept: RADIOLOGY | Facility: AMBULARY SURGERY CENTER | Age: 59
End: 2024-03-07
Payer: COMMERCIAL

## 2024-03-07 ENCOUNTER — ANESTHESIA (OUTPATIENT)
Dept: PERIOP | Facility: AMBULARY SURGERY CENTER | Age: 59
End: 2024-03-07
Payer: COMMERCIAL

## 2024-03-07 ENCOUNTER — HOSPITAL ENCOUNTER (OUTPATIENT)
Facility: AMBULARY SURGERY CENTER | Age: 59
Setting detail: OUTPATIENT SURGERY
Discharge: HOME | End: 2024-03-07
Attending: PODIATRIST | Admitting: PODIATRIST
Payer: COMMERCIAL

## 2024-03-07 VITALS
HEIGHT: 64 IN | SYSTOLIC BLOOD PRESSURE: 136 MMHG | TEMPERATURE: 97.5 F | BODY MASS INDEX: 21.99 KG/M2 | DIASTOLIC BLOOD PRESSURE: 71 MMHG | HEART RATE: 80 BPM | OXYGEN SATURATION: 99 % | RESPIRATION RATE: 15 BRPM | WEIGHT: 128.8 LBS

## 2024-03-07 DIAGNOSIS — Z98.890 POST-OPERATIVE STATE: Primary | ICD-10-CM

## 2024-03-07 PROCEDURE — 99024 POSTOP FOLLOW-UP VISIT: CPT | Performed by: PODIATRIST

## 2024-03-07 PROCEDURE — C1713 ANCHOR/SCREW BN/BN,TIS/BN: HCPCS | Performed by: PODIATRIST

## 2024-03-07 PROCEDURE — 28296 COR HLX VLGS DSTL MTAR OSTEO: CPT | Performed by: PODIATRIST

## 2024-03-07 PROCEDURE — 73630 X-RAY EXAM OF FOOT: CPT

## 2024-03-07 DEVICE — SCREW COMP 2.5 X 16MM MICRO FT: Type: IMPLANTABLE DEVICE | Site: FOOT | Status: FUNCTIONAL

## 2024-03-07 DEVICE — SCREW COMP 2.5 X 14MM MICRO FT: Type: IMPLANTABLE DEVICE | Site: FOOT | Status: FUNCTIONAL

## 2024-03-07 RX ORDER — KETOROLAC TROMETHAMINE 30 MG/ML
INJECTION, SOLUTION INTRAMUSCULAR; INTRAVENOUS AS NEEDED
Status: DISCONTINUED | OUTPATIENT
Start: 2024-03-07 | End: 2024-03-07

## 2024-03-07 RX ORDER — ONDANSETRON 2 MG/ML
4 INJECTION INTRAMUSCULAR; INTRAVENOUS EVERY 6 HOURS PRN
Status: DISCONTINUED | OUTPATIENT
Start: 2024-03-07 | End: 2024-03-07 | Stop reason: HOSPADM

## 2024-03-07 RX ORDER — METOCLOPRAMIDE HYDROCHLORIDE 5 MG/ML
10 INJECTION INTRAMUSCULAR; INTRAVENOUS ONCE AS NEEDED
Status: DISCONTINUED | OUTPATIENT
Start: 2024-03-07 | End: 2024-03-07 | Stop reason: HOSPADM

## 2024-03-07 RX ORDER — PROPOFOL 10 MG/ML
INJECTION, EMULSION INTRAVENOUS AS NEEDED
Status: DISCONTINUED | OUTPATIENT
Start: 2024-03-07 | End: 2024-03-07

## 2024-03-07 RX ORDER — ONDANSETRON 2 MG/ML
INJECTION INTRAMUSCULAR; INTRAVENOUS AS NEEDED
Status: DISCONTINUED | OUTPATIENT
Start: 2024-03-07 | End: 2024-03-07

## 2024-03-07 RX ORDER — CEFAZOLIN SODIUM 2 G/50ML
2000 SOLUTION INTRAVENOUS ONCE
Status: COMPLETED | OUTPATIENT
Start: 2024-03-07 | End: 2024-03-07

## 2024-03-07 RX ORDER — ACETAMINOPHEN 325 MG/1
650 TABLET ORAL EVERY 4 HOURS PRN
Status: DISCONTINUED | OUTPATIENT
Start: 2024-03-07 | End: 2024-03-07 | Stop reason: HOSPADM

## 2024-03-07 RX ORDER — MIDAZOLAM HYDROCHLORIDE 2 MG/2ML
INJECTION, SOLUTION INTRAMUSCULAR; INTRAVENOUS AS NEEDED
Status: DISCONTINUED | OUTPATIENT
Start: 2024-03-07 | End: 2024-03-07

## 2024-03-07 RX ORDER — ONDANSETRON 2 MG/ML
4 INJECTION INTRAMUSCULAR; INTRAVENOUS ONCE AS NEEDED
Status: DISCONTINUED | OUTPATIENT
Start: 2024-03-07 | End: 2024-03-07 | Stop reason: HOSPADM

## 2024-03-07 RX ORDER — SODIUM CHLORIDE, SODIUM LACTATE, POTASSIUM CHLORIDE, CALCIUM CHLORIDE 600; 310; 30; 20 MG/100ML; MG/100ML; MG/100ML; MG/100ML
INJECTION, SOLUTION INTRAVENOUS CONTINUOUS PRN
Status: DISCONTINUED | OUTPATIENT
Start: 2024-03-07 | End: 2024-03-07

## 2024-03-07 RX ORDER — BUPIVACAINE HYDROCHLORIDE 5 MG/ML
INJECTION, SOLUTION EPIDURAL; INTRACAUDAL AS NEEDED
Status: DISCONTINUED | OUTPATIENT
Start: 2024-03-07 | End: 2024-03-07 | Stop reason: HOSPADM

## 2024-03-07 RX ORDER — DEXAMETHASONE SODIUM PHOSPHATE 10 MG/ML
INJECTION, SOLUTION INTRAMUSCULAR; INTRAVENOUS AS NEEDED
Status: DISCONTINUED | OUTPATIENT
Start: 2024-03-07 | End: 2024-03-07

## 2024-03-07 RX ORDER — OXYCODONE HYDROCHLORIDE AND ACETAMINOPHEN 5; 325 MG/1; MG/1
1 TABLET ORAL EVERY 4 HOURS PRN
Qty: 20 TABLET | Refills: 0 | Status: SHIPPED | OUTPATIENT
Start: 2024-03-07

## 2024-03-07 RX ORDER — FENTANYL CITRATE/PF 50 MCG/ML
25 SYRINGE (ML) INJECTION
Status: DISCONTINUED | OUTPATIENT
Start: 2024-03-07 | End: 2024-03-07 | Stop reason: HOSPADM

## 2024-03-07 RX ORDER — HYDROMORPHONE HCL/PF 1 MG/ML
0.2 SYRINGE (ML) INJECTION
Status: DISCONTINUED | OUTPATIENT
Start: 2024-03-07 | End: 2024-03-07 | Stop reason: HOSPADM

## 2024-03-07 RX ORDER — OXYCODONE HYDROCHLORIDE AND ACETAMINOPHEN 5; 325 MG/1; MG/1
1 TABLET ORAL EVERY 4 HOURS PRN
Status: DISCONTINUED | OUTPATIENT
Start: 2024-03-07 | End: 2024-03-07 | Stop reason: HOSPADM

## 2024-03-07 RX ORDER — FENTANYL CITRATE 50 UG/ML
INJECTION, SOLUTION INTRAMUSCULAR; INTRAVENOUS AS NEEDED
Status: DISCONTINUED | OUTPATIENT
Start: 2024-03-07 | End: 2024-03-07

## 2024-03-07 RX ORDER — EPHEDRINE SULFATE 50 MG/ML
INJECTION INTRAVENOUS AS NEEDED
Status: DISCONTINUED | OUTPATIENT
Start: 2024-03-07 | End: 2024-03-07

## 2024-03-07 RX ORDER — MAGNESIUM HYDROXIDE 1200 MG/15ML
LIQUID ORAL AS NEEDED
Status: DISCONTINUED | OUTPATIENT
Start: 2024-03-07 | End: 2024-03-07 | Stop reason: HOSPADM

## 2024-03-07 RX ADMIN — ONDANSETRON 4 MG: 2 INJECTION INTRAMUSCULAR; INTRAVENOUS at 08:15

## 2024-03-07 RX ADMIN — DEXAMETHASONE SODIUM PHOSPHATE 10 MG: 10 INJECTION, SOLUTION INTRAMUSCULAR; INTRAVENOUS at 07:33

## 2024-03-07 RX ADMIN — CEFAZOLIN SODIUM 2000 MG: 2 SOLUTION INTRAVENOUS at 07:35

## 2024-03-07 RX ADMIN — SODIUM CHLORIDE, SODIUM LACTATE, POTASSIUM CHLORIDE, AND CALCIUM CHLORIDE: .6; .31; .03; .02 INJECTION, SOLUTION INTRAVENOUS at 07:27

## 2024-03-07 RX ADMIN — FENTANYL CITRATE 50 MCG: 50 INJECTION INTRAMUSCULAR; INTRAVENOUS at 07:32

## 2024-03-07 RX ADMIN — KETOROLAC TROMETHAMINE 15 MG: 30 INJECTION, SOLUTION INTRAMUSCULAR; INTRAVENOUS at 08:32

## 2024-03-07 RX ADMIN — EPHEDRINE SULFATE 10 MG: 50 INJECTION INTRAVENOUS at 08:12

## 2024-03-07 RX ADMIN — MIDAZOLAM 2 MG: 1 INJECTION INTRAMUSCULAR; INTRAVENOUS at 07:27

## 2024-03-07 RX ADMIN — FENTANYL CITRATE 25 MCG: 50 INJECTION INTRAMUSCULAR; INTRAVENOUS at 08:04

## 2024-03-07 RX ADMIN — FENTANYL CITRATE 25 MCG: 50 INJECTION INTRAMUSCULAR; INTRAVENOUS at 08:15

## 2024-03-07 RX ADMIN — PROPOFOL 200 MG: 10 INJECTION, EMULSION INTRAVENOUS at 07:32

## 2024-03-07 NOTE — ANESTHESIA PREPROCEDURE EVALUATION
"Procedure:  BUNIONECTOMY JENNA (Right: Foot)    Relevant Problems   CARDIO   (+) Chronic migraine without aura without status migrainosus, not intractable   (+) Hyperlipidemia      NEURO/PSYCH   (+) Chronic migraine without aura without status migrainosus, not intractable   (+) TIM (generalized anxiety disorder)   (+) Recurrent major depressive disorder, in remission (HCC)      Other   (+) Attention and concentration deficit        Physical Exam    Airway    Mallampati score: II  TM Distance: >3 FB  Neck ROM: full     Dental        Cardiovascular      Pulmonary      Other Findings  post-pubertal.      Anesthesia Plan  ASA Score- 1     Anesthesia Type- general with ASA Monitors.         Additional Monitors:     Airway Plan: LMA.    Comment: Recent labs personally reviewed:  Lab Results       Component                Value               Date                       WBC                      6.86                02/14/2024                 HGB                      13.7                02/14/2024                 PLT                      265                 02/14/2024            Lab Results       Component                Value               Date                       NA                       139                 09/22/2014                 K                        3.7                 02/14/2024                 BUN                      15                  02/14/2024                 CREATININE               0.86                02/14/2024                 GLUCOSE                  98                  09/22/2014            No results found for: \"PTT\"   No results found for: \"INR\"    Blood type       I, Karen Mai MD, have personally seen and evaluated the patient prior to anesthetic care.  I have reviewed the pre-anesthetic record, medical history, allergies, medications and any other medical records if appropriate to the anesthetic care.  If a CRNA is involved in the case, I have reviewed the CRNA assessment, if present, and agree. " I consented the patient for general anesthesia with appropriate airway support as indicated. We reviewed the risks associated including PONV, sore throat, allergic reaction to anesthetics and management plan to address these issues. We discussed the indication and risks associated with any invasive monitors that would be placed. We discussed post op pain control and expectations. We discussed rare complications including hypoxia, perioperative cardiac and neurologic events, and death based on the patient's baseline risk. All questions and concerns were addressed.     .       Plan Factors-Exercise tolerance (METS): >4 METS.    Chart reviewed.   Existing labs reviewed. Patient summary reviewed.    Patient is not a current smoker.  Patient did not smoke on day of surgery.    Obstructive sleep apnea risk education given perioperatively.        Induction- intravenous.    Postoperative Plan-     Informed Consent- Anesthetic plan and risks discussed with patient.  I personally reviewed this patient with the CRNA. Discussed and agreed on the Anesthesia Plan with the CRNA..

## 2024-03-07 NOTE — ANESTHESIA POSTPROCEDURE EVALUATION
Post-Op Assessment Note    CV Status:  Stable  Pain Score: 0    Pain management: adequate    Multimodal analgesia used between 6 hours prior to anesthesia start to PACU discharge    Mental Status:  Sleepy   Hydration Status:  Stable   PONV Controlled:  None   Airway Patency:  Patent  Two or more mitigation strategies used for obstructive sleep apnea   Post Op Vitals Reviewed: Yes    No anethesia notable event occurred.    Staff: Anesthesiologist               BP   127/54   Temp   97.4   Pulse  72   Resp   16   SpO2   95% on room air

## 2024-03-07 NOTE — DISCHARGE INSTR - AVS FIRST PAGE
[facility] Podiatry  Dr. Isaias Miranda, DPM, FACFAS  Post-Operative Instructions    1. Take your prescribed medication as directed. You can take ibuprofen in between doses of the narcotic if breakthrough pain occurs  2. Upon arrival at home, lie down and elevate your surgical foot on 2 pillows. Unless you're moving from the couch, bed, or bathroom, make sure to elevate the foot. Swelling is not your friend.   3. Remain quiet, off your feet as much as possible, for the first 24-48 hours. This is when your feet first swell and may become painful. After 48 hours you may begin limited walking following these restrictions:   Weightbear as tolerated to surgical foot but rest as much as possible  4. Drink large quantities of water. Consume no alcohol. Continue a well-balanced diet.  5. Report any unusual discomfort or fever to this office.  6. A limited amount of discomfort and swelling is to be expected. In some cases the skin may take on a bruised appearance. The surgical solution that was applied to your foot prior to the operation is dark in color and the operation site may appear to be oozing when it actually is not.  7. A slight amount of blood is to be expected, and is no cause for alarm. Do not remove the dressings. If there is active bleeding and if the bleeding persists, add additional gauze to the bandage, apply direct pressure, elevate your feet and call this office.  8. Do not get the dressings wet. As regular bathing may be inconvenient, sponge baths are recommended. If you shower, keep the dressing dry.   9. When anesthesia wears off and if any discomfort should be present, apply an ice pack directly over the operated area for 15 minute intervals for several hours or until the pain leaves. (USE IN EXCESS OF 15 MINUTES COULD CAUSE FROSTBITE). Do not use hot water bags or electric pads. A convenient icepack can be made by placing ice cubes in a plastic bag and covering this with a towel.  10. If  necessary, take a mild laxative before retiring.  11. Wear your special boot anytime you put weight on your foot, even if it is just to walk to the bathroom and back. It will probably be a few weeks before you will be permitted to try regular shoes.  12. Having performed the operation, we are interested in a prompt recovery. Please cooperate by following the above instructions.  13. Please call to confirm your post-op appointment or call with any other questions.

## 2024-03-07 NOTE — H&P
"Interval H&P    No changes in H&P from preoperative clearance note on 2/20/24. Patient denies CP, SOB with exertion, nausea, vomiting. Reports mild nasal congestion but denies any other URI symptoms.    Exam:  /67   Pulse 76   Temp 97.5 °F (36.4 °C) (Temporal)   Resp 20   Ht 5' 4\" (1.626 m)   Wt 58.4 kg (128 lb 12.8 oz)   SpO2 97%   BMI 22.11 kg/m²   AAO x3  Lungs clear  Abdomen soft  RRR  Ext warm    Karen Mai MD  Anesthesia Attending  "

## 2024-03-07 NOTE — DISCHARGE SUMMARY
Discharge Summary Outpatient Procedure Podiatry -   Carolin Heck 58 y.o. female MRN: [id number]  Unit/Bed#: OR [place] Encounter: [id number]    Admission Date: 3/7/2024     Admitting Diagnosis: Right foot pain [M79.671]  Hallux abducto valgus, right [M20.11]    Discharge Diagnosis: same    Procedures Performed: BUNIONECTOMY JENNA: 76632 (CPT®)    Complications: none    Condition at Discharge: stable    Discharge instructions/Information to patient and family:   See after visit summary for information provided to patient and family.      Provisions for Follow-Up Care/Important appointments:  See after visit summary for information related to follow-up care and any pertinent home health orders.      Discharge Medications:  See after visit summary for reconciled discharge medications provided to patient and family.

## 2024-03-07 NOTE — OP NOTE
OPERATIVE REPORT - Podiatry  PATIENT NAME: Carolin Heck    :  1965  MRN: [id number]  Pt Location: [facility] OR ROOM 05    SURGERY DATE: 3/7/2024    Surgeons and Role:     * Isaias Miranda DPM - Primary    Pre-op Diagnosis:  Right foot pain [M79.671]  Hallux abducto valgus, right [M20.11]    Post-Op Diagnosis Codes:     * Right foot pain [M79.671]     * Hallux abducto valgus, right [M20.11]    Procedure(s) (LRB):  BUNIONECTOMY JENNA (Right)    Specimen(s):  * No specimens in log *    Estimated Blood Loss:   10 mL    Drains:  * No LDAs found *    Anesthesia Type:   General/LMA with 10 ml of 0.5% Bupivacaine and 1% Lidocaine in a 1:1 mixture    Hemostasis:  Pneumatic ankle tourniquet set at 250 mmHg for 44 mins  Direct compression  Electrocautery    Materials:  Implant Name Type Inv. Item Serial No.  Lot No. LRB No. Used Action   SCREW COMP 2.5 X 16MM MICRO FT - [id number]  SCREW COMP 2.5 X 16MM MICRO FT  [employer]  Right 1 Implanted   SCREW COMP 2.5 X 14MM MICRO FT - [id number]  SCREW COMP 2.5 X 14MM MICRO FT  [employer]  Right 1 Implanted     3-0 Vicryl   Stratafix     Injectables:  10 mL 0.5% Bupivacaine     Operative Findings:  -Consistent with Diagnosis  -Correction of hallux valgus deformity with screw fixation     Complications:   None    Procedure and Technique:     Under mild sedation, the patient was brought into the operating room and placed on the operating room table in the supine position. IV sedation was achieved by anesthesia team and a universal timeout was performed where all parties are in agreement of correct patient, correct procedure and correct site. A pneumatic tourniquet was then placed over the patient's right lower extremity with ample padding. A cabello block was performed consisting of 10 ml of 1% Lidocaine and 0.5% Bupivacaine in a 1:1 mixture. The foot was then prepped and draped in the usual aseptic manner. An esmarch bandage was used to exsangunate the foot and  the pneumatic tourniquet was then inflated to 250 mmHg.     Attention was then directed to the dorsal aspect of the right first metatarsal where an approximately 6 cm linear incision was made. The incision was deepened through the subcutaneous tissues using sharp and blunt dissection. Care was taken to identify and retract all vital neural and vascular structures. All bleeders were cauterized and ligated as necessary. A capsuloptomy was performed over the dorsal aspect of the MPJ. The periosteal and capsular structures were then carefully dissected free of their osseous attachments and reflected medially and laterally, thus exposing the head of the first metatarsal at the operative site.      Attention was then directed to the first met head where the medial prominence was resected by the sagittal bone saw. A through and through V type osteotomy was made at a 60 degree angle at metatarsal head. This cut was created in the metataphyseal region of the bone utilizing a sagittal bone saw and the apices of this osteotomy pointing proximal plantarly and proximal dorsally. Upon completion of this osteotomy, the capital fragment was distracted and shifted laterally into a more corrected position and impacted onto the shaft of the first met. K wires were used as temp fixation across the osteotomy site.With proper AO technique two screws serve as fixation across the osteotomy site. Attention was directed to the remaining medial bone shelf proximal to the osteotomy site which was resected using a sagittal saw and passed from operative field. Correction of the deformity was assessed at this time and noted to be adequate, there was also noted decompression at MTPJ. The adductor halluces tendon transfer was not necessary.    The surgical incision was irrigated with copious amounts of normal sterile saline. The periosteal and capsular structures were reapproximated using 3-0 vicryl. Subcutaneous closure was obtained utilizing  "Stratafix. Skin edges were reapproximated and closure was obtained utilizing Stratafix and steri strips. The foot was then cleansed and dried. A postoperative injection consisting of 10 ml of 0.5% Bupivacaine was performed. The incision site was dressed with 4x4 gauze. This was then covered with a Kathie and an ACE wrap.     The tourniquet was deflated at approximately 44 min and normal hyperemic response was noted to all digits. The patient tolerated the procedure and anesthesia well without immediate complications and transferred to PACU with vital signs stable.     Patient is to remain weightbearing as tolerated in a surgical shoe at this time.     Dr. Miranda was present during the entire procedure and participated in all key aspects.    SIGNATURE: Renée Perez DPM  DATE: March 7, 2024  TIME: 8:43 AM      Portions of the record may have been created with voice recognition software. Occasional wrong word or \"sound a like\" substitutions may have occurred due to the inherent limitations of voice recognition software. Read the chart carefully and recognize, using context, where substitutions have occurred.    "

## 2024-03-11 ENCOUNTER — OFFICE VISIT (OUTPATIENT)
Dept: PODIATRY | Facility: CLINIC | Age: 59
End: 2024-03-11

## 2024-03-11 VITALS — DIASTOLIC BLOOD PRESSURE: 80 MMHG | HEART RATE: 90 BPM | SYSTOLIC BLOOD PRESSURE: 120 MMHG

## 2024-03-11 DIAGNOSIS — Z09 POSTOP CHECK: ICD-10-CM

## 2024-03-11 DIAGNOSIS — M20.11 HALLUX ABDUCTO VALGUS, RIGHT: Primary | ICD-10-CM

## 2024-03-11 PROCEDURE — 99024 POSTOP FOLLOW-UP VISIT: CPT | Performed by: PODIATRIST

## 2024-03-11 NOTE — PATIENT INSTRUCTIONS
One week postop foot surgery  You're doing well, hang in there. The pain will improve  You can shower. Just sit while in the shower. Gently clean your foot and cover with a DRY gauze dressing when you get out of the shower. Do not put antibiotic or any other cream on your incision.   If your foot is on the ground, the CAM boot needs to be on your foot. Listen to your foot. If what you're doing hurts...don't do it. Ignoring pain isn't a sign of being tough. It's a sign that your foot isn't ready to be doing what you're doing to it.  Stitches will come out next week  Remember to RICE (Rest, ice, compress, elevate)

## 2024-03-11 NOTE — PROGRESS NOTES
Assessment/Plan:      Diagnoses and all orders for this visit:    Hallux abducto valgus, right  -     Cam Boot    Postop check  -     Cam Boot      Patient is stable postop 4 days    Incision: stable, sutures left intact    Instructions given to patient. Rest the foot as much as possible and elevate/ice  if swollen.    Ambulatory status: light WB in CAM boot    Images reviewed today: postop images shown and discussed with patient and her mother. No concerns    RTC: 1 week. Overall doing well    One week postop foot surgery  You're doing well, hang in there. The pain will improve  You can shower. Just sit while in the shower. Gently clean your foot and cover with a DRY gauze dressing when you get out of the shower. Do not put antibiotic or any other cream on your incision.   If your foot is on the ground, the CAM boot needs to be on your foot. Listen to your foot. If what you're doing hurts...don't do it. Ignoring pain isn't a sign of being tough. It's a sign that your foot isn't ready to be doing what you're doing to it.  Stitches will come out next week  Remember to RICE (Rest, ice, compress, elevate)        Subjective:     Patient ID: Carolin Heck is a 58 y.o. female.    DOS: 3/7/2024     Procedure: christopher bunionectomy right foot     Condition: Dressing C/D/I. Pain improving. She is putting weight on her heel and using crutches. Overall she feels well.   Patient denies N/F/V/C/D/CP/SOB          Review of Systems      Objective:     Physical Exam  Vitals reviewed.   Constitutional:       Appearance: She is not ill-appearing or diaphoretic.   Cardiovascular:      Rate and Rhythm: Normal rate.      Pulses: Normal pulses.   Pulmonary:      Effort: Pulmonary effort is normal. No respiratory distress.   Skin:     Capillary Refill: Capillary refill takes less than 2 seconds.      Findings: No erythema or rash.   Neurological:      Mental Status: She is alert and oriented to person, place, and time.      Sensory: No  sensory deficit.      Gait: Gait normal.   Psychiatric:         Mood and Affect: Mood normal.       RIGHT foot first ray correction maintained  Expected edema  Incision is stable, sutures intact  No MTPJ crepitus  EHL/FHL/TA intact.  Normal ankle ROM  No calf pain with compression  Neurovascular status at baseline to the foot and first ray

## 2024-03-15 ENCOUNTER — TELEPHONE (OUTPATIENT)
Dept: NEUROLOGY | Facility: CLINIC | Age: 59
End: 2024-03-15

## 2024-03-15 NOTE — TELEPHONE ENCOUNTER
Pt current auth on file has  3/14/2024. A new pa will be sent electronically for pt.     New PA sent .    Will await approval/denial determination

## 2024-03-18 ENCOUNTER — OFFICE VISIT (OUTPATIENT)
Dept: PODIATRY | Facility: CLINIC | Age: 59
End: 2024-03-18

## 2024-03-18 VITALS — DIASTOLIC BLOOD PRESSURE: 80 MMHG | HEART RATE: 84 BPM | SYSTOLIC BLOOD PRESSURE: 124 MMHG

## 2024-03-18 DIAGNOSIS — Z09 POSTOP CHECK: ICD-10-CM

## 2024-03-18 DIAGNOSIS — M20.11 HALLUX ABDUCTO VALGUS, RIGHT: Primary | ICD-10-CM

## 2024-03-18 PROCEDURE — 99024 POSTOP FOLLOW-UP VISIT: CPT | Performed by: PODIATRIST

## 2024-03-18 NOTE — PROGRESS NOTES
Assessment/Plan:      Diagnoses and all orders for this visit:    Hallux abducto valgus, right    Postop check      Patient is stable postop 2 weeks    Incision: healed    Instructions given to patient. Rest the foot as much as possible and elevate/ice  if swollen.    Ambulatory status: WB in CAM boot    Images reviewed today: none    RTC: healing well, serial XR in 4 weeks.       Subjective:     Patient ID: Carolin Heck is a 58 y.o. female.    DOS: 3/7/2024     Procedure: christopher bunionectomy right foot     Condition :she is 2 weeks postop, no acute concerns today        Review of Systems      Objective:     Physical Exam    right foot first ray correction maintained  Expected edema  Incision is healed/stable  No MTPJ crepitus  EHL/FHL/TA intact.  Normal ankle ROM  No calf pain with compression  Neurovascular status at baseline to the foot

## 2024-03-20 NOTE — TELEPHONE ENCOUNTER
Received fax from [facility] with approval details below.     Approved   Botox 200 UNITS  Qty.1  Auth# [id number]  Valid:3/16/2024-3/16/2025  Visits: 4    Please use Stock

## 2024-04-11 ENCOUNTER — PROCEDURE VISIT (OUTPATIENT)
Dept: NEUROLOGY | Facility: CLINIC | Age: 59
End: 2024-04-11
Payer: COMMERCIAL

## 2024-04-11 ENCOUNTER — ANNUAL EXAM (OUTPATIENT)
Age: 59
End: 2024-04-11
Payer: COMMERCIAL

## 2024-04-11 VITALS — TEMPERATURE: 97.7 F | SYSTOLIC BLOOD PRESSURE: 130 MMHG | DIASTOLIC BLOOD PRESSURE: 60 MMHG | HEART RATE: 88 BPM

## 2024-04-11 VITALS — SYSTOLIC BLOOD PRESSURE: 122 MMHG | DIASTOLIC BLOOD PRESSURE: 80 MMHG

## 2024-04-11 DIAGNOSIS — G43.709 CHRONIC MIGRAINE WITHOUT AURA WITHOUT STATUS MIGRAINOSUS, NOT INTRACTABLE: Primary | ICD-10-CM

## 2024-04-11 DIAGNOSIS — N94.10 DYSPAREUNIA, FEMALE: ICD-10-CM

## 2024-04-11 DIAGNOSIS — Z82.62 FAMILY HISTORY OF OSTEOPOROSIS IN SISTER: ICD-10-CM

## 2024-04-11 DIAGNOSIS — Z13.820 SCREENING FOR OSTEOPOROSIS: ICD-10-CM

## 2024-04-11 DIAGNOSIS — Z78.0 MENOPAUSE: ICD-10-CM

## 2024-04-11 DIAGNOSIS — Z01.419 ENCOUNTER FOR ANNUAL ROUTINE GYNECOLOGICAL EXAMINATION: Primary | ICD-10-CM

## 2024-04-11 DIAGNOSIS — Z12.31 ENCOUNTER FOR SCREENING MAMMOGRAM FOR MALIGNANT NEOPLASM OF BREAST: ICD-10-CM

## 2024-04-11 PROCEDURE — S0612 ANNUAL GYNECOLOGICAL EXAMINA: HCPCS | Performed by: OBSTETRICS & GYNECOLOGY

## 2024-04-11 PROCEDURE — 64615 CHEMODENERV MUSC MIGRAINE: CPT | Performed by: PHYSICIAN ASSISTANT

## 2024-04-11 RX ORDER — CONJUGATED ESTROGENS 0.62 MG/G
CREAM VAGINAL
Qty: 30 G | Refills: 1 | Status: SHIPPED | OUTPATIENT
Start: 2024-04-11 | End: 2025-04-11

## 2024-04-11 NOTE — PROGRESS NOTES
Assessment/Plan:     Diagnoses and all orders for this visit:    Encounter for annual routine gynecological examination    Encounter for screening mammogram for malignant neoplasm of breast  -     Mammo screening bilateral w 3d & cad; Future    Menopause  -     DXA bone density spine hip and pelvis; Future    Screening for osteoporosis  -     DXA bone density spine hip and pelvis; Future    Family history of osteoporosis in sister  -     DXA bone density spine hip and pelvis; Future    Dyspareunia, female  -     estrogens, conjugated (Premarin) vaginal cream; Insert 0.5g vaginally three times per week         Martha Heck is a 58 y.o. female who presents for annual exam. The patient has no complaints today. Recurrent UTI's have resolved with Vagifem use.  She still uses Premarin cream when she plans to see significant other in [place].  She denies any breast concerns.  Notes aunt and sister both have osteoporosis and questions whether she should have a Dexa scan.        Menstrual History:  OB History          3    Para   3    Term   0       0    AB   0    Living   3         SAB   0    IAB   0    Ectopic   0    Multiple   0    Live Births   3                No LMP recorded. Patient is postmenopausal.     Past Medical History:   Diagnosis Date    Anxiety     Basal cell carcinoma     Last assessed: 14    Cancer (HCC)     BCC    Depression     Depression with anxiety     Last assessed: 17    Headache, tension-type     Insomnia     Last assessed: 14    Kidney stone     Memory loss     Migraine     Varicella 1970       Family History   Problem Relation Age of Onset    Heart murmur Mother     Hyperlipidemia Mother     Atrial fibrillation Father     Arthritis Father     Basal cell carcinoma Father 60    Psoriasis Sister     No Known Problems Daughter     No Known Problems Maternal Grandmother     Coronary artery disease Maternal Grandfather     Heart disease Maternal  Grandfather     Breast cancer Paternal Grandmother 66    Diabetes Paternal Grandmother         Mellitus    Arthritis Paternal Grandmother     Cancer Paternal Grandmother     Hyperlipidemia Paternal Grandmother     Hypertension Paternal Grandmother     Hypertension Paternal Grandfather     Hyperlipidemia Paternal Grandfather     Skin cancer Paternal Grandfather 70    No Known Problems Brother     ADD / ADHD Son     No Known Problems Son     Hyperlipidemia Maternal Aunt     Coronary artery disease Maternal Aunt     No Known Problems Maternal Aunt     Hyperlipidemia Maternal Uncle     Coronary artery disease Maternal Uncle     Dementia Paternal Aunt          2020    No Known Problems Paternal Aunt     Alcohol abuse Neg Hx     Substance Abuse Neg Hx     Mental illness Neg Hx        The following portions of the patient's history were reviewed and updated as appropriate: allergies, current medications, past family history, past medical history, past social history, past surgical history, and problem list.    Review of Systems  Pertinent items are noted in HPI.     Objective      /80 (BP Location: Right arm, Patient Position: Sitting, Cuff Size: Standard)     General:   alert and oriented, in no acute distress   Heart:  Breasts: regular rate and rhythm  appear normal, no suspicious masses, no skin or nipple changes or axillary nodes.   Lungs: Effort normal   Abdomen: soft, non-tender, without masses or organomegaly   Vulva: normal   Vagina: normal mucosa   Cervix: no lesions   Uterus: normal size, mobile, non-tender   Adnexa: normal adnexa and no mass, fullness, tenderness

## 2024-04-11 NOTE — PROGRESS NOTES
"Universal Protocol   Consent: Verbal consent obtained. Written consent obtained.  Risks and benefits: risks, benefits and alternatives were discussed  Consent given by: patient  Time out: Immediately prior to procedure a \"time out\" was called to verify the correct patient, procedure, equipment, support staff and site/side marked as required.  Patient understanding: patient states understanding of the procedure being performed  Patient consent: the patient's understanding of the procedure matches consent given  Procedure consent: procedure consent matches procedure scheduled  Relevant documents: relevant documents present and verified  Patient identity confirmed: verbally with patient      Chemodenervation     Date/Time  4/11/2024 8:30 AM     Performed by  Gege Garcia PA-C   Authorized by  Gege Garcia PA-C     Pre-procedure details      Prepped With: Alcohol     Anesthesia  (see MAR for exact dosages):     Anesthesia method:  None   Procedure details      Position:  Upright   Botox      Botox Type:  Type A    Brand:  Botox    mL's of Botulinum Toxin:  200    Final Concentration per CC:  50 units    Needle Gauge:  30 G 2.5 inch   Procedures      Botox Procedures: chronic headache      Indications: migraines     Injection Location      Head / Face:  L superior cervical paraspinal, R superior cervical paraspinal, L , R , L frontalis, R frontalis, L medial occipitalis, R medial occipitalis, procerus, R temporalis, L temporalis, R superior trapezius and L superior trapezius    L  injection amount:  5 unit(s)    R  injection amount:  5 unit(s)    L lateral frontalis:  5 unit(s)    R lateral frontalis:  5 unit(s)    L medial frontalis:  5 unit(s)    R medial frontalis:  5 unit(s)    L temporalis injection amount:  20 unit(s)    R temporalis injection amount:  20 unit(s)    Procerus injection amount:  5 unit(s)    L medial occipitalis injection amount:  15 unit(s)    R medial " occipitalis injection amount:  15 unit(s)    L superior cervical paraspinal injection amount:  10 unit(s)    R superior cervical paraspinal injection amount:  10 unit(s)    L superior trapezius injection amount:  15 unit(s)    R superior trapezius injection amount:  15 unit(s)   Total Units      Total units used:  200    Total units discarded:  0   Post-procedure details      Chemodenervation:  Chronic migraine    Facial Nerve Location::  Bilateral facial nerve    Patient tolerance of procedure:  Tolerated well, no immediate complications   Comments       5 units orbicularis oculi bilaterally  35 units frontalis  All medically necessary

## 2024-04-15 ENCOUNTER — OFFICE VISIT (OUTPATIENT)
Dept: PODIATRY | Facility: CLINIC | Age: 59
End: 2024-04-15

## 2024-04-15 ENCOUNTER — OFFICE VISIT (OUTPATIENT)
Dept: FAMILY MEDICINE CLINIC | Facility: CLINIC | Age: 59
End: 2024-04-15
Payer: COMMERCIAL

## 2024-04-15 VITALS
OXYGEN SATURATION: 96 % | SYSTOLIC BLOOD PRESSURE: 116 MMHG | HEART RATE: 88 BPM | DIASTOLIC BLOOD PRESSURE: 80 MMHG | WEIGHT: 132.9 LBS | TEMPERATURE: 97.8 F | BODY MASS INDEX: 22.14 KG/M2 | HEIGHT: 65 IN | RESPIRATION RATE: 15 BRPM

## 2024-04-15 VITALS — HEART RATE: 60 BPM | DIASTOLIC BLOOD PRESSURE: 64 MMHG | SYSTOLIC BLOOD PRESSURE: 120 MMHG

## 2024-04-15 DIAGNOSIS — G43.109 MIGRAINE WITH AURA AND WITHOUT STATUS MIGRAINOSUS, NOT INTRACTABLE: ICD-10-CM

## 2024-04-15 DIAGNOSIS — Z09 POSTOP CHECK: ICD-10-CM

## 2024-04-15 DIAGNOSIS — R73.01 IFG (IMPAIRED FASTING GLUCOSE): ICD-10-CM

## 2024-04-15 DIAGNOSIS — Z01.818 PREOP EXAMINATION: Primary | ICD-10-CM

## 2024-04-15 DIAGNOSIS — H25.9 AGE-RELATED CATARACT OF BOTH EYES, UNSPECIFIED AGE-RELATED CATARACT TYPE: ICD-10-CM

## 2024-04-15 DIAGNOSIS — F33.40 RECURRENT MAJOR DEPRESSIVE DISORDER, IN REMISSION (HCC): ICD-10-CM

## 2024-04-15 DIAGNOSIS — M20.11 HALLUX ABDUCTO VALGUS, RIGHT: ICD-10-CM

## 2024-04-15 DIAGNOSIS — E78.5 HYPERLIPIDEMIA, UNSPECIFIED HYPERLIPIDEMIA TYPE: ICD-10-CM

## 2024-04-15 DIAGNOSIS — M79.671 RIGHT FOOT PAIN: Primary | ICD-10-CM

## 2024-04-15 PROCEDURE — 99214 OFFICE O/P EST MOD 30 MIN: CPT | Performed by: PHYSICIAN ASSISTANT

## 2024-04-15 PROCEDURE — 99024 POSTOP FOLLOW-UP VISIT: CPT | Performed by: PODIATRIST

## 2024-04-15 NOTE — PROGRESS NOTES
FAMILY [facility] PRE-OPERATIVE EVALUATION  [facility] PHYSICIAN GROUP - [facility]    NAME: Carolin Heck  AGE: 58 y.o. SEX: female  : 1965     DATE: 4/15/2024    [facility] Pre-Operative Evaluation      Chief Complaint: Pre-operative Evaluation     Surgery: right cataract removal  Anticipated Date of Surgery: 24  Referring Provider: Dr Huston     History of Present Illness:     Carolin Heck is a 58 y.o. female who presents to the office today for a preoperative consultation at the request of surgeon, Dr Huston, who plans on performing right cataract removal on 24. Planned anesthesia is local. Patient has a bleeding risk of: no recent abnormal bleeding. Patient does not have objections to receiving blood products if needed. Current anti-platelet/anti-coagulation medications that the patient is prescribed includes:  none .      Assessment of Chronic Conditions:   - hyperlipidemia - on lipitor 10 mg  - MDD - wellbutrin, prozac, adderall, klonopin  - migraines - maxalat, ubrelvy  - frequent UTI - on hiprex and estrogen cream  - IFG - controlled with diet     Assessment of Cardiac Risk:  Denies unstable or severe angina or MI in the last 6 weeks or history of stent placement in the last year   Denies decompensated heart failure (e.g. New onset heart failure, NYHA functional class IV heart failure, or worsening existing heart failure)  Denies significant arrhythmias such as high grade AV block, symptomatic ventricular arrhythmia, newly recognized ventricular tachycardia, supraventricular tachycardia with resting heart rate >100, or symptomatic bradycardia  Denies severe heart valve disease including aortic stenosis or symptomatic mitral stenosis     Exercise Capacity:  Able to walk 4 blocks without symptoms?: Yes  Able to walk 2 flights without symptoms?: Yes    Prior Anesthesia Reactions: No     Personal history of venous thromboembolic disease?  No    History of steroid use for >2 weeks within last year? No         Review of Systems:     Review of Systems   Constitutional: Negative.    HENT: Negative.     Eyes: Negative.    Respiratory: Negative.     Cardiovascular: Negative.    Gastrointestinal: Negative.    Endocrine: Negative.    Genitourinary: Negative.    Musculoskeletal: Negative.    Skin: Negative.    Allergic/Immunologic: Negative.    Neurological: Negative.    Hematological: Negative.    Psychiatric/Behavioral: Negative.         Current Problem List:     Patient Active Problem List   Diagnosis    Attention and concentration deficit    TIM (generalized anxiety disorder)    Recurrent major depressive disorder, in remission (HCC)    Migraine with aura and without status migrainosus, not intractable    Hyperlipidemia    Memory difficulties    Cyst of right eyelid    Chronic migraine without aura without status migrainosus, not intractable    Calcific tendonitis of left shoulder    Post-operative state    Postop check    Hallux abducto valgus, right       Allergies:     Allergies   Allergen Reactions    Butoconazole Hives    Tioconazole Hives       Current Medications:       Current Outpatient Medications:     amphetamine-dextroamphetamine (ADDERALL XR, 20MG,) 20 MG 24 hr capsule, Take 1 capsule (20 mg total) by mouth every morning Max Daily Amount: 20 mg Do not start before March 15, 2024. (Patient taking differently: Take 20 mg by mouth every morning As needed), Disp: 90 capsule, Rfl: 0    atorvastatin (LIPITOR) 10 mg tablet, Take 1 tablet (10 mg total) by mouth daily, Disp: 90 tablet, Rfl: 3    buPROPion (WELLBUTRIN XL) 300 mg 24 hr tablet, Take 1 tablet (300 mg total) by mouth every morning, Disp: 90 tablet, Rfl: 1    clonazePAM (KlonoPIN) 0.5 mg tablet, Take 1/2 tab daily as needed for anxiety and take 1 tab nightly as needed for insomnia and anxiety Do not start before January 16, 2024. (Patient taking differently: daily at bedtime Take 1/2 tab  daily as needed for anxiety and take 1 tab nightly as needed for insomnia and anxiety), Disp: 45 tablet, Rfl: 5    estradiol (VAGIFEM, YUVAFEM) 10 MCG TABS vaginal tablet, Insert 1 tablet (10 mcg total) into the vagina 2 (two) times a week (Patient taking differently: Insert 1 tablet into the vagina 2 (two) times a week Last dose Monday,3/4/24), Disp: 30 tablet, Rfl: 2    estrogens, conjugated (Premarin) vaginal cream, Insert 0.5g vaginally three times per week, Disp: 30 g, Rfl: 1    FLUoxetine (PROzac) 40 MG capsule, Take 1 capsule (40 mg total) by mouth daily, Disp: 90 capsule, Rfl: 1    methenamine hippurate (HIPREX) 1 g tablet, Take 1 tablet (1 g total) by mouth 2 (two) times a day with meals, Disp: 180 tablet, Rfl: 3    Multiple Vitamins-Minerals (MULTIVITAL-M PO), Take 1 tablet by mouth in the morning, Disp: , Rfl:     rizatriptan (MAXALT) 10 mg tablet, Take 1 tablet (10 mg total) by mouth as needed for migraine May repeat in 2 hours if needed.  Limit 3 a week or 9 a month, Disp: 9 tablet, Rfl: 0    Ubrogepant (UBRELVY) 100 MG tablet, Take 1 tablet (100 mg) one time as needed for migraine. May repeat one additional tablet (100 mg) at least two hours after the first dose. Do not use more than 200 mg a day, Disp: 16 tablet, Rfl: 6    Past Medical History:       Past Medical History:   Diagnosis Date    Anxiety     Basal cell carcinoma     Last assessed: 9/26/14    Cancer (HCC)     BCC    Depression     Depression with anxiety     Last assessed: 1/13/17    Headache(784.0)     Headache, tension-type     Insomnia     Last assessed: 9/26/14    Kidney stone     Memory loss     Migraine 1998    Urinary tract infection     Varicella 1970        Past Surgical History:   Procedure Laterality Date    BLADDER SURGERY      lift of bladder    CATARACT EXTRACTION Left 01/25/2024    CYSTOSCOPY      [place]ur[place]. TVT-O    EYE SURGERY Bilateral     LASIK    FACIAL/NECK BIOPSY Right 03/12/2019    Procedure: UPPER EYELID CYST  EXCISION;  Surgeon: Ector Hoover MD;  Location: [facility];  Service: Plastics    GYNECOLOGIC CRYOSURGERY      Cervix    HYSTEROSCOPY W/ ENDOMETRIAL ABLATION  2010    Novasure    KIDNEY SURGERY  kidney stone removal        MOHS SURGERY  2010    Forehead    RI CORRJ HLX VLGS BNCTY SESMDC DSTL METAR OSTEOT Left 2021    Procedure: BUNIONECTOMY JENNA left foot;  Surgeon: Isaias Miranda DPM;  Location: [facility];  Service: Podiatry    RI CORRJ HLX VLGS BNCTY SESMDC DSTL METAR OSTEOT Right 2024    Procedure: BUNIONECTOMY JENNA;  Surgeon: Isaias Miranda DPM;  Location: [facility];  Service: Podiatry    SKIN BIOPSY      TUBAL LIGATION      WISDOM TOOTH EXTRACTION          Family History   Problem Relation Age of Onset    Heart murmur Mother     Hyperlipidemia Mother     Atrial fibrillation Father     Arthritis Father     Basal cell carcinoma Father 60    Glaucoma Father     Psoriasis Sister     No Known Problems Daughter     Arthritis Maternal Grandmother     Coronary artery disease Maternal Grandfather     Heart disease Maternal Grandfather     Breast cancer Paternal Grandmother 66    Diabetes Paternal Grandmother         Mellitus    Arthritis Paternal Grandmother     Cancer Paternal Grandmother     Hyperlipidemia Paternal Grandmother     Hypertension Paternal Grandmother     Hypertension Paternal Grandfather     Hyperlipidemia Paternal Grandfather     Skin cancer Paternal Grandfather 70    No Known Problems Brother     ADD / ADHD Son     No Known Problems Son     Hyperlipidemia Maternal Aunt     Coronary artery disease Maternal Aunt     No Known Problems Maternal Aunt     Hyperlipidemia Maternal Uncle     Coronary artery disease Maternal Uncle     Dementia Paternal Aunt              No Known Problems Paternal Aunt     Alcohol abuse Neg Hx     Substance Abuse Neg Hx     Mental illness Neg Hx         Social History     Socioeconomic History    Marital  "status: Single     Spouse name: Not on file    Number of children: Not on file    Years of education: Not on file    Highest education level: Not on file   Occupational History    Not on file   Tobacco Use    Smoking status: Never    Smokeless tobacco: Never   Vaping Use    Vaping status: Never Used   Substance and Sexual Activity    Alcohol use: Yes     Alcohol/week: 3.0 standard drinks of alcohol     Types: 3 Standard drinks or equivalent per week     Comment: socially    Drug use: Never    Sexual activity: Yes     Partners: Male     Birth control/protection: Post-menopausal, None   Other Topics Concern    Not on file   Social History Narrative    Caffeine use    Drinks coffee     Social Determinants of Health     Financial Resource Strain: Not on file   Food Insecurity: Not on file   Transportation Needs: Not on file   Physical Activity: Not on file   Stress: Not on file   Social Connections: Not on file   Intimate Partner Violence: Not on file   Housing Stability: Not on file        Physical Exam:     /80   Pulse 88   Temp 97.8 °F (36.6 °C)   Resp 15   Ht 5' 4.75\" (1.645 m)   Wt 60.3 kg (132 lb 14.4 oz)   SpO2 96%   BMI 22.29 kg/m²     Physical Exam  Constitutional:       Appearance: Normal appearance. She is well-developed and normal weight.   HENT:      Head: Normocephalic and atraumatic.   Eyes:      Extraocular Movements: Extraocular movements intact.      Conjunctiva/sclera: Conjunctivae normal.      Pupils: Pupils are equal, round, and reactive to light.   Neck:      Thyroid: No thyromegaly.   Cardiovascular:      Rate and Rhythm: Normal rate and regular rhythm.      Pulses: Normal pulses.      Heart sounds: Normal heart sounds. No murmur heard.  Pulmonary:      Effort: Pulmonary effort is normal. No respiratory distress.      Breath sounds: Normal breath sounds. No wheezing or rales.   Abdominal:      General: Abdomen is flat. Bowel sounds are normal. There is no distension.      Palpations: " Abdomen is soft. There is no mass.      Tenderness: There is no abdominal tenderness.   Musculoskeletal:         General: Normal range of motion.      Cervical back: Normal range of motion and neck supple. No rigidity or tenderness.   Lymphadenopathy:      Cervical: No cervical adenopathy.   Skin:     General: Skin is warm and dry.   Neurological:      General: No focal deficit present.      Mental Status: She is alert and oriented to person, place, and time.      Cranial Nerves: No cranial nerve deficit.      Deep Tendon Reflexes: Reflexes are normal and symmetric.   Psychiatric:         Mood and Affect: Mood normal.         Behavior: Behavior normal.         Thought Content: Thought content normal.         Judgment: Judgment normal.          Data:     Pre-operative work-up    Laboratory Results: I have personally reviewed the pertinent laboratory results/reports      EKG: I have personally reviewed pertinent reports.      Chest x-ray:  not indicated      Previous cardiopulmonary studies within the past year:  Echocardiogram: none  Cardiac Catheterization: none  Stress Test: none  Pulmonary Function Testing: none      Assessment & Recommendations:     No diagnosis found.    Pre-Op Evaluation Assessment  58 y.o. female with planned surgery: right cataract removal.    Known risk factors for perioperative complications: None.        Current medications which may produce withdrawal symptoms if withheld perioperatively: none.    Pre-Op Evaluation Plan  1. Further preoperative workup as follows:   - None; no further preoperative work-up is required    2. Medication Management/Recommendations:   - None, continue medication regimen including morning of surgery, with sip of water    3. Prophylaxis for cardiac events with perioperative beta-blockers: not indicated.    4. Patient requires further consultation with: None    Clearance  Patient is CLEARED for surgery without any additional cardiac testing.     Isabelle Boyer  JACKY Viramontes  [facility]  [address]  [address] [address]  Phone#  [phone]  Fax#  [phone]

## 2024-04-15 NOTE — LETTER
April 15, 2024     Patient: Carolin Heck  YOB: 1965  Date of Visit: 4/15/2024      To Whom it May Concern:    Carolin Heck is under my professional care. Carolin was seen in my office on 4/15/2024. Carolin may return to work on 4/19/2024. She may need to wear a CAM boot for the first few weeks but she is able to work with the boot. .    If you have any questions or concerns, please don't hesitate to call.         Sincerely,          Isaias Miranda DPM        CC: No Recipients

## 2024-04-15 NOTE — PROGRESS NOTES
"Assessment/Plan:      Diagnoses and all orders for this visit:    Right foot pain  -     X-ray foot right 3+ views; Future    Hallux abducto valgus, right    Postop check      Patient is stable postop 6 weeks    Incision: healed    Instructions given to patient. Rest the foot as much as possible and elevate/ice  if swollen.    Ambulatory status: transition to sneaker, slowly increase activity    Images reviewed today: stable osteotomy first ray, correction maintained    RTC: 6 weeks if concerns      Subjective:     Patient ID: Carolin Heck is a 58 y.o. female.    DOS: 3/7/2024     Procedure: christopher bunionectomy right foot     Condition She is 6 weeks postop, her foot \"feels pretty good.\"         Review of Systems      Objective:     Physical Exam  Vitals reviewed.   Constitutional:       Appearance: She is not ill-appearing or diaphoretic.   Cardiovascular:      Rate and Rhythm: Normal rate.      Pulses: Normal pulses.   Pulmonary:      Effort: Pulmonary effort is normal. No respiratory distress.   Skin:     Capillary Refill: Capillary refill takes less than 2 seconds.      Findings: No erythema or rash.   Neurological:      Mental Status: She is alert and oriented to person, place, and time.      Sensory: No sensory deficit.      Gait: Gait normal.   Psychiatric:         Mood and Affect: Mood normal.       right foot first ray correction maintained  Expected edema  Incision is healed  No MTPJ crepitus. DF to 10 degrees without pain  EHL/FHL/TA intact.  Normal ankle ROM  No calf pain with compression  Neurovascular status at baseline to the foot      "

## 2024-06-06 ENCOUNTER — TELEPHONE (OUTPATIENT)
Dept: DERMATOLOGY | Facility: CLINIC | Age: 59
End: 2024-06-06

## 2024-06-06 NOTE — TELEPHONE ENCOUNTER
LMOM that 06/20/24 appt w/Dr Sanon has been cx & r/s to [place], due to change in her sched, to please call the office to confirm or r/s.

## 2024-06-17 DIAGNOSIS — F33.40 RECURRENT MAJOR DEPRESSIVE DISORDER, IN REMISSION (HCC): ICD-10-CM

## 2024-06-17 DIAGNOSIS — R41.840 ATTENTION AND CONCENTRATION DEFICIT: ICD-10-CM

## 2024-06-17 DIAGNOSIS — R41.3 MEMORY DIFFICULTIES: ICD-10-CM

## 2024-06-17 RX ORDER — DEXTROAMPHETAMINE SACCHARATE, AMPHETAMINE ASPARTATE MONOHYDRATE, DEXTROAMPHETAMINE SULFATE AND AMPHETAMINE SULFATE 5; 5; 5; 5 MG/1; MG/1; MG/1; MG/1
20 CAPSULE, EXTENDED RELEASE ORAL EVERY MORNING
Qty: 90 CAPSULE | Refills: 0 | Status: SHIPPED | OUTPATIENT
Start: 2024-06-17

## 2024-07-08 DIAGNOSIS — F41.1 GAD (GENERALIZED ANXIETY DISORDER): ICD-10-CM

## 2024-07-08 RX ORDER — CLONAZEPAM 0.5 MG/1
TABLET ORAL
Qty: 45 TABLET | Refills: 2 | Status: SHIPPED | OUTPATIENT
Start: 2024-07-09

## 2024-07-11 ENCOUNTER — PROCEDURE VISIT (OUTPATIENT)
Dept: NEUROLOGY | Facility: CLINIC | Age: 59
End: 2024-07-11
Payer: COMMERCIAL

## 2024-07-11 VITALS — DIASTOLIC BLOOD PRESSURE: 64 MMHG | SYSTOLIC BLOOD PRESSURE: 142 MMHG | TEMPERATURE: 98.2 F | HEART RATE: 91 BPM

## 2024-07-11 DIAGNOSIS — G43.709 CHRONIC MIGRAINE WITHOUT AURA WITHOUT STATUS MIGRAINOSUS, NOT INTRACTABLE: Primary | ICD-10-CM

## 2024-07-11 PROCEDURE — 64615 CHEMODENERV MUSC MIGRAINE: CPT | Performed by: PHYSICIAN ASSISTANT

## 2024-07-11 NOTE — PROGRESS NOTES
"Universal Protocol   Consent: Verbal consent obtained. Written consent obtained.  Risks and benefits: risks, benefits and alternatives were discussed  Consent given by: patient  Time out: Immediately prior to procedure a \"time out\" was called to verify the correct patient, procedure, equipment, support staff and site/side marked as required.  Patient understanding: patient states understanding of the procedure being performed  Patient consent: the patient's understanding of the procedure matches consent given  Procedure consent: procedure consent matches procedure scheduled  Relevant documents: relevant documents present and verified  Patient identity confirmed: verbally with patient      Chemodenervation     Date/Time  7/11/2024 8:30 AM     Performed by  Gege Garcia PA-C   Authorized by  Gege Garcia PA-C     Pre-procedure details      Prepped With: Alcohol     Anesthesia  (see MAR for exact dosages):     Anesthesia method:  None   Procedure details      Position:  Upright   Botox      Botox Type:  Type A    Brand:  Botox    mL's of Botulinum Toxin:  200    Final Concentration per CC:  50 units    Needle Gauge:  30 G 2.5 inch   Procedures      Botox Procedures: chronic headache      Indications: migraines     Injection Location      Head / Face:  L superior cervical paraspinal, R superior cervical paraspinal, L , R , L frontalis, R frontalis, L medial occipitalis, R medial occipitalis, procerus, R temporalis, L temporalis, R superior trapezius and L superior trapezius    L  injection amount:  5 unit(s)    R  injection amount:  5 unit(s)    L lateral frontalis:  5 unit(s)    R lateral frontalis:  5 unit(s)    L medial frontalis:  5 unit(s)    R medial frontalis:  5 unit(s)    L temporalis injection amount:  20 unit(s)    R temporalis injection amount:  20 unit(s)    Procerus injection amount:  5 unit(s)    L medial occipitalis injection amount:  15 unit(s)    R medial " occipitalis injection amount:  15 unit(s)    L superior cervical paraspinal injection amount:  10 unit(s)    R superior cervical paraspinal injection amount:  10 unit(s)    L superior trapezius injection amount:  15 unit(s)    R superior trapezius injection amount:  15 unit(s)   Total Units      Total units used:  200    Total units discarded:  0   Post-procedure details      Chemodenervation:  Chronic migraine    Facial Nerve Location::  Bilateral facial nerve    Patient tolerance of procedure:  Tolerated well, no immediate complications   Comments       5 units orbicularis oculi bilaterally  35 units frontalis  All medically necessary

## 2024-08-23 ENCOUNTER — APPOINTMENT (OUTPATIENT)
Dept: LAB | Facility: MEDICAL CENTER | Age: 59
End: 2024-08-23
Payer: COMMERCIAL

## 2024-08-23 ENCOUNTER — HOSPITAL ENCOUNTER (OUTPATIENT)
Dept: MAMMOGRAPHY | Facility: MEDICAL CENTER | Age: 59
Discharge: HOME | End: 2024-08-23
Payer: COMMERCIAL

## 2024-08-23 VITALS — BODY MASS INDEX: 21.16 KG/M2 | HEIGHT: 65 IN | WEIGHT: 127 LBS

## 2024-08-23 DIAGNOSIS — Z12.31 ENCOUNTER FOR SCREENING MAMMOGRAM FOR MALIGNANT NEOPLASM OF BREAST: ICD-10-CM

## 2024-08-23 DIAGNOSIS — R73.01 IFG (IMPAIRED FASTING GLUCOSE): ICD-10-CM

## 2024-08-23 DIAGNOSIS — E78.5 HYPERLIPIDEMIA, UNSPECIFIED HYPERLIPIDEMIA TYPE: ICD-10-CM

## 2024-08-23 LAB
CHOLEST SERPL-MCNC: 209 MG/DL
EST. AVERAGE GLUCOSE BLD GHB EST-MCNC: 123 MG/DL
HBA1C MFR BLD: 5.9 %
HDLC SERPL-MCNC: 82 MG/DL
LDLC SERPL CALC-MCNC: 114 MG/DL (ref 0–100)
TRIGL SERPL-MCNC: 64 MG/DL

## 2024-08-23 PROCEDURE — 83036 HEMOGLOBIN GLYCOSYLATED A1C: CPT

## 2024-08-23 PROCEDURE — 77067 SCR MAMMO BI INCL CAD: CPT

## 2024-08-23 PROCEDURE — 36415 COLL VENOUS BLD VENIPUNCTURE: CPT

## 2024-08-23 PROCEDURE — 80061 LIPID PANEL: CPT

## 2024-08-23 PROCEDURE — 77063 BREAST TOMOSYNTHESIS BI: CPT

## 2024-09-11 DIAGNOSIS — N39.0 RECURRENT UTI: ICD-10-CM

## 2024-09-11 RX ORDER — METHENAMINE HIPPURATE 1000 MG/1
1 TABLET ORAL 2 TIMES DAILY WITH MEALS
Qty: 180 TABLET | Refills: 3 | Status: SHIPPED | OUTPATIENT
Start: 2024-09-11

## 2024-09-11 NOTE — PSYCH
Virtual Regular Visit    Verification of patient location:    Patient is located at Home in the following state in which I hold an active license [place]        Reason for visit is No chief complaint on file.       Encounter provider David Hernandez III, DO      Recent Visits  No visits were found meeting these conditions.  Showing recent visits within past 7 days and meeting all other requirements  Today's Visits  Date Type Provider Dept   09/12/24 Telemedicine David Hernandez III DO [facility] [address]   Showing today's visits and meeting all other requirements  Future Appointments  No visits were found meeting these conditions.  Showing future appointments within next 150 days and meeting all other requirements       The patient was identified by name and date of birth. Carolin Heck was informed that this is a telemedicine visit and that the visit is being conducted throughthe [employer] platform. She agrees to proceed..  My office door was closed. No one else was in the room.  She acknowledged consent and understanding of privacy and security of the video platform. The patient has agreed to participate and understands they can discontinue the visit at any time.    Patient is aware this is a billable service.     Subjective  See below    HPI     Past Medical History:   Diagnosis Date    Anxiety     Basal cell carcinoma     Last assessed: 9/26/14    Cancer (HCC)     BCC    Depression     Depression with anxiety     Last assessed: 1/13/17    Headache(784.0)     Headache, tension-type     Insomnia     Last assessed: 9/26/14    Kidney stone     Memory loss     Migraine 1998    Urinary tract infection     Varicella 1970       Past Surgical History:   Procedure Laterality Date    BLADDER SURGERY      lift of bladder    CATARACT EXTRACTION Left 01/25/2024    CYSTOSCOPY      Theurapeutic. TVT-O    EYE SURGERY Bilateral     LASIK    FACIAL/NECK BIOPSY Right 03/12/2019    Procedure: UPPER EYELID CYST EXCISION;   Surgeon: Ector Hoover MD;  Location: [facility];  Service: Plastics    GYNECOLOGIC CRYOSURGERY  2010    Cervix    HYSTEROSCOPY W/ ENDOMETRIAL ABLATION  12/14/2010    Novasure    KIDNEY SURGERY  kidney stone removal    1983    MOHS SURGERY  2010    Forehead    LA CORRJ HLX VLGS BNCTY SESMDC DSTL METAR OSTEOT Left 04/16/2021    Procedure: BUNIONECTOMY JENNA left foot;  Surgeon: Isaias Miranda DPM;  Location: [facility];  Service: Podiatry    LA CORRJ HLX VLGS BNCTY SESMDC DSTL METAR OSTEOT Right 03/07/2024    Procedure: BUNIONECTOMY JENNA;  Surgeon: Isaias Miranda DPM;  Location: [facility];  Service: Podiatry    SKIN BIOPSY      TUBAL LIGATION      WISDOM TOOTH EXTRACTION         Current Outpatient Medications   Medication Sig Dispense Refill    estradiol (VAGIFEM, YUVAFEM) 10 MCG TABS vaginal tablet Insert 1 tablet (10 mcg total) into the vagina 2 (two) times a week (Patient taking differently: Insert 1 tablet into the vagina 2 (two) times a week Last dose Monday,3/4/24) 30 tablet 2    Restasis 0.05 % ophthalmic emulsion       amphetamine-dextroamphetamine (ADDERALL XR, 20MG,) 20 MG 24 hr capsule Take 1 capsule (20 mg total) by mouth every morning Max Daily Amount: 20 mg 90 capsule 0    atorvastatin (LIPITOR) 10 mg tablet Take 1 tablet (10 mg total) by mouth daily 90 tablet 3    buPROPion (WELLBUTRIN XL) 300 mg 24 hr tablet Take 1 tablet (300 mg total) by mouth every morning 90 tablet 1    clonazePAM (KlonoPIN) 0.5 mg tablet Take 1/2 tab daily as needed for anxiety and take 1 tab nightly as needed for insomnia and anxiety Do not start before July 9, 2024. 45 tablet 2    estrogens, conjugated (Premarin) vaginal cream Insert 0.5g vaginally three times per week 30 g 1    FLUoxetine (PROzac) 40 MG capsule Take 1 capsule (40 mg total) by mouth daily 90 capsule 1    methenamine hippurate (HIPREX) 1 g tablet Take 1 tablet (1 g total) by mouth 2 (two) times a day with meals 180 tablet 3     "Multiple Vitamins-Minerals (MULTIVITAL-M PO) Take 1 tablet by mouth in the morning      rizatriptan (MAXALT) 10 mg tablet Take 1 tablet (10 mg total) by mouth as needed for migraine May repeat in 2 hours if needed.  Limit 3 a week or 9 a month 9 tablet 0    Ubrogepant (UBRELVY) 100 MG tablet Take 1 tablet (100 mg) one time as needed for migraine. May repeat one additional tablet (100 mg) at least two hours after the first dose. Do not use more than 200 mg a day 16 tablet 6     No current facility-administered medications for this visit.        Allergies   Allergen Reactions    Butoconazole Hives    Tioconazole Hives       Review of Systems    Video Exam    There were no vitals filed for this visit.    Physical Exam     Visit Time    Visit Start Time: 933  Visit Stop Time: 957  Total Visit Duration:  24 minutes                      MEDICATION MANAGEMENT NOTE        [facility]      Name and Date of Birth:  Carolin Heck 59 y.o. 1965    Date of Visit: September 12, 2024    SUBJECTIVE:  CC: Carolin presents today for follow up on \"well ok for the most part\"; anxiety and depression     Carolin feels stimulant is not working as well as before and also klonopin as well. Probably for the last 6mo. She notices at work she has to write even more down than before. Has not had any cognitive/memory tests. She plans to connect with her neurologist on this, but I also mentioned she can do it if she would like to come in to the office    Father's dementia continues to progress, but she does not feel that is weighing on her mental health enough to impact her anxiety/depression/focus    Klonopin used to help her relax better at night, and just not as effective as in the past.    Adderall - asked about manufacture changes.     Nothing has changed in her life.    Having a bit more anxiety, and more social anxiety.     She is doing well at [employer], likely retire 2028.     F/U She and Yogesh " "doing well, visits every 1-2 mo to [place]. Likely will live up here upon [facility] but stay there more often.   F/U Daughter doing well with new baby. Lives with son, Barry and his (Kids ~, , 2018, 2016). She has 2 other kids    F/U PRN- \"Same old memory thing\". Cognitive situation is unchanged still, no decline. Neurologist periodically    Med Compliance: yes  HPI ROS:                      ('was' below is from prior visit)  Medication Side Effects (other than noted):  no     Depression (10 worst):  low (Was low)   Anxiety (10 worst):  low (Was low)   Hallucinations or Psychosis  no (Was no)    Safety concerns (self harm thoughts, suicidal ideation, HI, etc):  no (Was no)   Sleep: (NM = Nightmares)  7-8 (Was good)   Energy:  good (Was good)   Appetite:  good (Was good)   Weight Change:  no      Since our last visit, overall symptoms have been unchanged.          PHQ-2/9 Depression Screening    Little interest or pleasure in doing things: 1 - several days  Feeling down, depressed, or hopeless: 1 - several days  Trouble falling or staying asleep, or sleeping too much: 2 - more than half the days  Feeling tired or having little energy: 1 - several days  Poor appetite or overeatin - several days  Feeling bad about yourself - or that you are a failure or have let yourself or your family down: 1 - several days  Trouble concentrating on things, such as reading the newspaper or watching television: 1 - several days  Moving or speaking so slowly that other people could have noticed. Or the opposite - being so fidgety or restless that you have been moving around a lot more than usual: 2 - more than half the days  Thoughts that you would be better off dead, or of hurting yourself in some way: 0 - not at all  PHQ-9 Score: 10  PHQ-9 Interpretation: Moderate depression           TIM-7 Flowsheet Screening      Flowsheet Row Most Recent Value   Over the last two weeks, how often have you been bothered by the following " problems?     Feeling nervous, anxious, or on edge 1    Not being able to stop or control worrying 1    Worrying too much about different things 1    Trouble relaxing  1    Being so restless that it's hard to sit still 0    Becoming easily annoyed or irritable  0    Feeling afraid as if something awful might happen 1    How difficult have these problems made it for you to do your work, take care of things at home, or get along with other people?  Somewhat difficult    TIM Score  5             Carolin denies any side effects from medications unless noted above    Review Of Systems as noted above. Otherwise A relevant review of symptoms was otherwise negative    History Review: The following portions of the patient's history were reviewed and documented: allergies, current medications, past family history, past medical history, past social history, and problem list.     Lab Review: Labs were reviewed      OBJECTIVE:     MENTAL STATUS EXAM  Appearance:  age appropriate   Behavior:  pleasant, cooperative, with good eye contact   Speech:  Normal volume, regular rate and rhythm   Mood:  euthymic   Affect:  mood congruent   Language: intact and appropriate for age, education, and intellect   Thought Process:  Linear and goal directed   Associations: intact associations   Thought Content:  normal and appropriate   Perceptual Disturbances: no auditory or visual hallcunations   Risk Potential / Abnormal Thoughts: Suicidal ideation - None  Homicidal ideation - None  Potential for aggression - No       Consciousness:  Alert & Awake   Sensorium:  Grossly oriented   Attention: attention span and concentration are age appropriate       Fund of Knowledge:  Memory: awareness of current events: yes  recent and remote memory grossly intact   Insight:  good   Judgment: good   Muscle Strength Muscle Tone:      Gait/Station:    Motor Activity:        Risks, Benefits And Possible Side Effects Of Medications:    AGREE: Risks, benefits, and  "possible side effects of medications explained to Carolin and she (or legal representative) verbalizes understanding and agreement for treatment.    Controlled Medication Discussion:     Carolin has been filling controlled prescriptions on time as prescribed according to [place] Prescription Drug Monitoring program.   _____________________________________________________________      Psychiatric History   CAROLIN    Patient did used to see Isaias Bill in early 2016 when she had a change providers due to insurance.    She does not have a therapist.    She been hospitalized once in 2000. She says that it was related to having a breakup with her boyfriend at the time and then finding out that she lost her job because she was  at his place of employment. Losing her boyfriend because she did not want to get  and he did and also having job issues led her to crisis and overdose although it was not a dramatic overdose it was a time when she said that she was trying to take pills to go to sleep and that she did have some suicidal thoughts associated with it. She denies any self-harm homicidal ideation in the past or present. She's never been violent. She says that she's not had any suicidal ideation since that time.      Social History:  Carolin was raised in [place] to a \"good\" childhood. Her parents were together and still are. She denies ever having physical or sexual abuse or other forms neglect now or in the past as a child. She has one brother and one sister. She developed normally. She finished high school and got associates degree in business.    Her daughter currently lives with her. She has 3 children 2 boys and one girl. She is good relationships with her family. She's been  and  twice. Currently not in a serious relationship at intake    Her support system includes her children her parents and her cousins. She has friends that are close but she doesn't typically opened up to them " about mental health.    She is [employer] and attends [employer] sometimes. No  history no legal issues and no weapons at home.    She does not use tobacco drinks about 1 cup of coffee a day and is a social drinker and when she does drink it's very rare and not much at that time. She denies ever using substances and has never been to rehabilitation.           Family Psychiatric History:   Daughter -  Family history of ADD (attention deficit disorder)  Son - Family history of ADD (attention deficit disorder)  Family History    13. Denied: Family history of bipolar disorder   14. Denied: Family history of substance abuse   15. Denied: Family history of suicide attempt      Assessment/Plan:     Generalized anxiety disorder  MDD, recurrent, in remission    Attention and concentration deficit  Memory difficulties    ---  Generalized anxiety disorder - manageable but not at goal  MDD, recurrent, in remission   - manageable  Attention and concentration deficit - not at goal  Memory difficulties - stable    Assessment & Plan  Recurrent major depressive disorder, in remission (HCC)  Carolin is doing well, likes current treatment overall but notes less benefit from adderall and klonopin over last 6mo or so. Toleration / efficacy discussed and considered but I wonder if the generic adderall is leading to this change. Will request brand name only and see if this new onset issue resolves as she has been stable with medications for a very long time without change before today.       Attention and concentration deficit  Carolin is doing well, likes current treatment overall but notes less benefit from adderall and klonopin over last 6mo or so. Toleration / efficacy discussed and considered but I wonder if the generic adderall is leading to this change. Will request brand name only and see if this new onset issue resolves as she has been stable with medications for a very long time without change before today.       Memory  "difficulties  Carolin is doing well, likes current treatment overall but notes less benefit from adderall and klonopin over last 6mo or so. Toleration / efficacy discussed and considered but I wonder if the generic adderall is leading to this change. Will request brand name only and see if this new onset issue resolves as she has been stable with medications for a very long time without change before today.       TIM (generalized anxiety disorder)  Carolin is doing well, likes current treatment overall but notes less benefit from adderall and klonopin over last 6mo or so. Toleration / efficacy discussed and considered but I wonder if the generic adderall is leading to this change. Will request brand name only and see if this new onset issue resolves as she has been stable with medications for a very long time without change before today.           Carolin is doing well, likes current treatment overall but notes less benefit from adderall and klonopin over last 6mo or so. Toleration / efficacy discussed and considered but I wonder if the generic adderall is leading to this change. Will request brand name only and see if this new onset issue resolves as she has been stable with medications for a very long time without change before today.    Considered cognitive organic changes as well, and she will talk to neurologist about MoCA, or I could do one.     Attention and concentration deficit may be related to organic issues, anxiety, or underlying ADD/ADHD. Ongoing f/u with Neurology for this and migraines. She was in a cognitive study years ago, no amyloid. However, she feels issues predated klonopin. \"Maybe it is just me\". Talked about cognitive concerns in context benzodiazepines, but she is comfortable taking them, but understands coming off would be long term goal as feasible and weighing risks/benefits. Also we discussed long term risks of all medications as well, including cardiovascular, bone density, falls, etc but she " "very much appreciates the \"quality of life\" she receives not matter what was mentioned. She will still f/u with PCP re: getting a Dexa scan.    Safety Risk Assessment: see above . In considering risk and protective factors, suicide risk and safety risk are low.    Past medications include   Prozac which helps but does have some decreased orgasm  Topamax for migraines but this seemed to lead to confusion ( years ago)  ON but no detail recall - Zoloft, Celexa or Lexapro.   Effexor seemed to cause weight gain  Wellbutrin  Ambien caused oversedation  melatonin did not help.     Scales:       Treatment Plan:        Patient has been educated about their diagnosis and treatment modalities. They voiced understanding and agreement with the following plan:    1) Meds:   - Prozac 40mg daily. (consider increase but orgasm affected. Was on 50mg in past per charts)   - Wellbutrin XL 300mg daily   - Klonopin 0.25mg in day PRN and 0.5mg HS PRN.    - MAKE BRAND NAME NECESSARY 9/12/2024 - Adderal XR 20mg Daily     2) Labs: PRN   - 2/2017: CBC, CMP WNL. TSH 1.57, TG 56, HDL 98,     3) Therapy:   - not in therapy, revisit PRN   - Provided progressive muscle relaxation handout, asked her to look into belly breathing and guided imagery. (2/9/2018)    4) Medical:  Concentration issues, MRI abnormalities, family h/o early onset dementia (PAunt), migraines (gets Botox)   - pt to f/u with neurologist   - pt to f/u with other providers PRN    5) Other: support prn   - works at [employer]. Reception   - good support from family, daughter lives with her   - 3 kids, . Daughter graduates from [employer] with BS May.  Likes psychology, healthcare    6) Follow up:   - 6 months, but patient to call if issues or concerns    7) Treatment Plan: Enacted 2/9/2018, 12/7/2018, 7/26/2019, 8/14/2020, 4/1/21, 10/21/2021, 5/19/2022, 1/19/2023, 7/13/2023, 1/11/2024, 9/12/2024    8) Crisis Plan 1/11/2024      Discussed self monitoring of " symptoms, and symptom monitoring tools.    Patient has been informed of 24 hours and weekend coverage for urgent situations accessed by calling the main clinic phone number.           Psychotherapy in session:  Time spent performing psychotherapy:

## 2024-09-12 ENCOUNTER — TELEMEDICINE (OUTPATIENT)
Dept: PSYCHIATRY | Facility: CLINIC | Age: 59
End: 2024-09-12
Payer: COMMERCIAL

## 2024-09-12 DIAGNOSIS — R41.840 ATTENTION AND CONCENTRATION DEFICIT: ICD-10-CM

## 2024-09-12 DIAGNOSIS — F33.40 RECURRENT MAJOR DEPRESSIVE DISORDER, IN REMISSION (HCC): ICD-10-CM

## 2024-09-12 DIAGNOSIS — R41.3 MEMORY DIFFICULTIES: ICD-10-CM

## 2024-09-12 DIAGNOSIS — F41.1 GAD (GENERALIZED ANXIETY DISORDER): ICD-10-CM

## 2024-09-12 PROCEDURE — 99214 OFFICE O/P EST MOD 30 MIN: CPT | Performed by: PSYCHIATRY & NEUROLOGY

## 2024-09-12 RX ORDER — CYCLOSPORINE 0.5 MG/ML
EMULSION OPHTHALMIC
COMMUNITY
Start: 2024-08-08

## 2024-09-12 RX ORDER — BUPROPION HYDROCHLORIDE 300 MG/1
300 TABLET ORAL EVERY MORNING
Qty: 90 TABLET | Refills: 1 | Status: SHIPPED | OUTPATIENT
Start: 2024-09-12

## 2024-09-12 RX ORDER — CLONAZEPAM 0.5 MG/1
TABLET ORAL
Qty: 45 TABLET | Refills: 2 | Status: SHIPPED | OUTPATIENT
Start: 2024-10-08

## 2024-09-12 RX ORDER — FLUOXETINE 40 MG/1
40 CAPSULE ORAL DAILY
Qty: 90 CAPSULE | Refills: 1 | Status: SHIPPED | OUTPATIENT
Start: 2024-09-12

## 2024-09-12 RX ORDER — DEXTROAMPHETAMINE SACCHARATE, AMPHETAMINE ASPARTATE MONOHYDRATE, DEXTROAMPHETAMINE SULFATE AND AMPHETAMINE SULFATE 5; 5; 5; 5 MG/1; MG/1; MG/1; MG/1
20 CAPSULE, EXTENDED RELEASE ORAL EVERY MORNING
Qty: 90 CAPSULE | Refills: 0 | Status: SHIPPED | OUTPATIENT
Start: 2024-09-15

## 2024-09-12 NOTE — ASSESSMENT & PLAN NOTE
Carolin is doing well, likes current treatment overall but notes less benefit from adderall and klonopin over last 6mo or so. Toleration / efficacy discussed and considered but I wonder if the generic adderall is leading to this change. Will request brand name only and see if this new onset issue resolves as she has been stable with medications for a very long time without change before today.

## 2024-09-12 NOTE — BH TREATMENT PLAN
"Treatment Plan not done within 6mo because last seen in January (patient canceled appointment between then and now)    TREATMENT PLAN (Medication Management Only)        [facility] [place] [facility]    Name/Date of Birth/MRN:  Carolin Heck 59 y.o. 1965 MRN: [id number]  Date of Treatment Plan: September 12, 2024  Diagnosis/Diagnoses:   1. Recurrent major depressive disorder, in remission (HCC)    2. Attention and concentration deficit    3. Memory difficulties    4. TIM (generalized anxiety disorder)      Strengths/Personal Resources for Self-Care: very good work ethics, honest, kind  Area/Areas of need (in own words): speaking my mind, making my decisions  1. Long Term Goal: \"maintain what I am doing\"   Target Date: 180 days from treatment plan  Person/Persons responsible for completion of goal: Dr. Hernandez and Self  2.  Short Term Objective (s) - How will we reach this goal?:   A.  Provider new recommended medication/dosage changes and/or continue medication(s): discussed  B.  Continue walking at least a mile 5 days a week ; Continue to stay active and involved with family  C.        Follow up with all providers, take meds as prescribed  D.  Target Date: 6 months from treatment plan unless noted otherwise  Person/Persons Responsible for Completion of Goal: Dr. Hernandez and Self   Progress Towards Goals: continuing treatment   Treatment Modality: Medication management and therapy PRN  Review due 180 days from date of this plan: Approximately 6 months from today ( 12/12/2024 )    Expected length of service: ongoing treatment  My Physician/PA/NP and I have developed this plan together and I agree to work on the goals and objectives. I understand the treatment goals that were developed for my treatment.  Signature:  Carolin Heck  9/12/2024  9:45 AM       Date and time:  Signature of parent/guardian if under age of 14 years: Date and time:  Signature of provider:      Date and " time:  Signature of Supervising Physician:    Date and time: 9/12/2024      David Hernandez III

## 2024-09-20 ENCOUNTER — OFFICE VISIT (OUTPATIENT)
Dept: DERMATOLOGY | Facility: CLINIC | Age: 59
End: 2024-09-20

## 2024-09-20 VITALS — WEIGHT: 127 LBS | HEIGHT: 64 IN | BODY MASS INDEX: 21.68 KG/M2 | TEMPERATURE: 97.6 F

## 2024-09-20 DIAGNOSIS — L81.4 LENTIGINES: ICD-10-CM

## 2024-09-20 DIAGNOSIS — D22.60 MULTIPLE BENIGN NEVI OF UPPER EXTREMITY, LOWER EXTREMITY, AND TRUNK: ICD-10-CM

## 2024-09-20 DIAGNOSIS — D48.5 NEOPLASM OF UNCERTAIN BEHAVIOR OF SKIN: ICD-10-CM

## 2024-09-20 DIAGNOSIS — D22.70 MULTIPLE BENIGN NEVI OF UPPER EXTREMITY, LOWER EXTREMITY, AND TRUNK: ICD-10-CM

## 2024-09-20 DIAGNOSIS — Z85.828 HISTORY OF BASAL CELL CARCINOMA: ICD-10-CM

## 2024-09-20 DIAGNOSIS — L57.0 KERATOSIS, ACTINIC: ICD-10-CM

## 2024-09-20 DIAGNOSIS — D22.5 MULTIPLE BENIGN NEVI OF UPPER EXTREMITY, LOWER EXTREMITY, AND TRUNK: ICD-10-CM

## 2024-09-20 DIAGNOSIS — L82.1 SEBORRHEIC KERATOSIS: ICD-10-CM

## 2024-09-20 DIAGNOSIS — D18.01 CHERRY ANGIOMA: Primary | ICD-10-CM

## 2024-09-20 PROCEDURE — 88341 IMHCHEM/IMCYTCHM EA ADD ANTB: CPT | Performed by: STUDENT IN AN ORGANIZED HEALTH CARE EDUCATION/TRAINING PROGRAM

## 2024-09-20 PROCEDURE — 88342 IMHCHEM/IMCYTCHM 1ST ANTB: CPT | Performed by: STUDENT IN AN ORGANIZED HEALTH CARE EDUCATION/TRAINING PROGRAM

## 2024-09-20 PROCEDURE — 88305 TISSUE EXAM BY PATHOLOGIST: CPT | Performed by: STUDENT IN AN ORGANIZED HEALTH CARE EDUCATION/TRAINING PROGRAM

## 2024-09-20 NOTE — PROGRESS NOTES
"[facility] Dermatology Clinic Note     Patient Name: Carolin Heck  Encounter Date: 09/20/24     Have you been cared for by a [facility] Dermatologist in the last 3 years and, if so, which description applies to you?    Yes.  I have been here within the last 3 years, and my medical history has NOT changed since that time.  I am FEMALE/of child-bearing potential.    REVIEW OF SYSTEMS:  Have you recently had or currently have any of the following? No changes in my recent health.   PAST MEDICAL HISTORY:  Have you personally ever had or currently have any of the following?  If \"YES,\" then please provide more detail. No changes in my medical history.   HISTORY OF IMMUNOSUPPRESSION: Do you have a history of any of the following:  Systemic Immunosuppression such as Diabetes, Biologic or Immunotherapy, Chemotherapy, Organ Transplantation, Bone Marrow Transplantation or Prednisone?  No     Answering \"YES\" requires the addition of the dotphrase \"IMMUNOSUPPRESSED\" as the first diagnosis of the patient's visit.   FAMILY HISTORY:  Any \"first degree relatives\" (parent, brother, sister, or child) with the following?    No changes in my family's known health.   PATIENT EXPERIENCE:    Do you want the Dermatologist to perform a COMPLETE skin exam today including a clinical examination under the \"bra and underwear\" areas?  Yes  If necessary, do we have your permission to call and leave a detailed message on your Preferred Phone number that includes your specific medical information?  NO      Allergies   Allergen Reactions    Butoconazole Hives    Tioconazole Hives      Current Outpatient Medications:     amphetamine-dextroamphetamine (ADDERALL XR, 20MG,) 20 MG 24 hr capsule, Take 1 capsule (20 mg total) by mouth every morning Max Daily Amount: 20 mg Do not start before September 15, 2024., Disp: 90 capsule, Rfl: 0    atorvastatin (LIPITOR) 10 mg tablet, Take 1 tablet (10 mg total) by mouth daily, Disp: 90 tablet, Rfl: 3    buPROPion " (WELLBUTRIN XL) 300 mg 24 hr tablet, Take 1 tablet (300 mg total) by mouth every morning, Disp: 90 tablet, Rfl: 1    [START ON 10/8/2024] clonazePAM (KlonoPIN) 0.5 mg tablet, Take 1/2 tab daily as needed for anxiety and take 1 tab nightly as needed for insomnia and anxiety Do not start before October 8, 2024., Disp: 45 tablet, Rfl: 2    estradiol (VAGIFEM, YUVAFEM) 10 MCG TABS vaginal tablet, Insert 1 tablet (10 mcg total) into the vagina 2 (two) times a week (Patient taking differently: Insert 1 tablet into the vagina 2 (two) times a week Last dose Monday,3/4/24), Disp: 30 tablet, Rfl: 2    estrogens, conjugated (Premarin) vaginal cream, Insert 0.5g vaginally three times per week, Disp: 30 g, Rfl: 1    FLUoxetine (PROzac) 40 MG capsule, Take 1 capsule (40 mg total) by mouth daily, Disp: 90 capsule, Rfl: 1    methenamine hippurate (HIPREX) 1 g tablet, Take 1 tablet (1 g total) by mouth 2 (two) times a day with meals, Disp: 180 tablet, Rfl: 3    Multiple Vitamins-Minerals (MULTIVITAL-M PO), Take 1 tablet by mouth in the morning, Disp: , Rfl:     Restasis 0.05 % ophthalmic emulsion, , Disp: , Rfl:     rizatriptan (MAXALT) 10 mg tablet, Take 1 tablet (10 mg total) by mouth as needed for migraine May repeat in 2 hours if needed.  Limit 3 a week or 9 a month, Disp: 9 tablet, Rfl: 0    Ubrogepant (UBRELVY) 100 MG tablet, Take 1 tablet (100 mg) one time as needed for migraine. May repeat one additional tablet (100 mg) at least two hours after the first dose. Do not use more than 200 mg a day, Disp: 16 tablet, Rfl: 6          Whom besides the patient is providing clinical information about today's encounter?   NO ADDITIONAL HISTORIAN (patient alone provided history)    Physical Exam and Assessment/Plan by Diagnosis:    HISTORY OF BASAL CELL CARCINOMA     Physical Exam:  Anatomic Location Affected: On forehead and left upper arm  Morphological Description of scar:  Well healed  Suspected Recurrence: No  Pertinent  Positives:  Pertinent Negatives:        Additional History of Present Condition:  History of basal cell carcinoma with no sign of recurrence     Assessment and Plan:  Based on a thorough discussion of this condition and the management approach to it (including a comprehensive discussion of the known risks, side effects and potential benefits of treatment), the patient (family) agrees to implement the following specific plan:  Will monitor  Continue routine skin checks     How can basal cell carcinoma be prevented?  The most important way to prevent BCC is to avoid sunburn. This is especially important in childhood and early life. Fair skinned individuals and those with a personal or family history of BCC should protect their skin from sun exposure daily, year-round and lifelong.  Stay indoors or under the shade in the middle of the day   Wear covering clothing   Apply high protection factor SPF50+ broad-spectrum sunscreens generously to exposed skin if outdoors   Avoid indoor tanning (sun beds, solaria)  Oral nicotinamide (vitamin B3) in a dose of 500 mg twice daily may reduce the number and severity of BCCs.     What is the outlook for basal cell carcinoma?  Most BCCs are cured by treatment. Cure is most likely if treatment is undertaken when the lesion is small.  About 50% of people with BCC develop a second one within 3 years of the first. They are also at increased risk of other skin cancers, especially melanoma. Regular self-skin examinations and long-term annual skin checks by an experienced health professional are recommended.      NEOPLASM OF UNCERTAIN BEHAVIOR OF SKIN    Physical Exam:  (Anatomic Location); (Size and Morphological Description); (Differential Diagnosis):  Specimen A;skin;shave biopsy;right central upper chest;0.5 cm x 0.6 cm pink hypopigmented  scaly papule ;Ddx ;Ak vs BCC             Additional History of Present Condition:  present on exam     Assessment and Plan:  I have discussed with  "the patient that a sample of skin via a \"skin biopsy” would be potentially helpful to further make a specific diagnosis under the microscope.  Based on a thorough discussion of this condition and the management approach to it (including a comprehensive discussion of the known risks, side effects and potential benefits of treatment), the patient (family) agrees to implement the following specific plan:    Procedure:  Skin Biopsy.  After a thorough discussion of treatment options and risk/benefits/alternatives (including but not limited to local pain, scarring, dyspigmentation, blistering, possible superinfection, and inability to confirm a diagnosis via histopathology), verbal and written consent were obtained and portion of the rash was biopsied for tissue sample.  See below for consent that was obtained from patient and subsequent Procedure Note.       PROCEDURE TANGENTIAL (SHAVE) BIOPSY NOTE:    Performing Physician:  Hue Baker PA-C  Anatomic Location; Clinical Description with size (cm); Pre-Op Diagnosis:   Specimen A;skin;shave biopsy;right central upper chest;0.5 cm x 0.6 cm pink hypopigmented  scaly papule ;Ddx ;Ak vs BCC   Post-op diagnosis: Same     Local anesthesia: 2% Xylocaine with epi     Topical anesthesia: None    Hemostasis: Aluminum chloride       After obtaining informed consent  at which time there was a discussion about the purpose of biopsy  and low risks of infection and bleeding.  The area was prepped and draped in the usual fashion. Anesthesia was obtained with 1% lidocaine with epinephrine. A shave biopsy to an appropriate sampling depth was obtained by Shave (Dermablade or 15 blade) The resulting wound was covered with surgical ointment and bandaged appropriately.     The patient tolerated the procedure well without complications and was without signs of functional compromise.      Specimen has been sent for review by Dermatopathology.    Standard post-procedure care has been explained and " "has been included in written form within the patient's copy of Informed Consent.    INFORMED CONSENT DISCUSSION AND POST-OPERATIVE INSTRUCTIONS FOR PATIENT    I.  RATIONALE FOR PROCEDURE  I understand that a skin biopsy allows the Dermatologist to test a lesion or rash under the microscope to obtain a diagnosis.  It usually involves numbing the area with numbing medication and removing a small piece of skin; sometimes the area will be closed with sutures. In this specific procedure, sutures are not usually needed.  If any sutures are placed, then they are usually need to be removed in 2 weeks or less.    I understand that my Dermatologist recommends that a skin \"shave\" biopsy be performed today.  A local anesthetic, similar to the kind that a dentist uses when filling a cavity, will be injected with a very small needle into the skin area to be sampled.  The injected skin and tissue underneath \"will go to sleep” and become numb so no pain should be felt afterwards.  An instrument shaped like a tiny \"razor blade\" (shave biopsy instrument) will be used to cut a small piece of tissue and skin from the area so that a sample of tissue can be taken and examined more closely under the microscope.  A slight amount of bleeding will occur, but it will be stopped with direct pressure and a pressure bandage and any other appropriate methods.  I understands that a scar will form where the wound was created.  Surgical ointment will be applied to help protect the wound.  Sutures are not usually needed.    II.  RISKS AND POTENTIAL COMPLICATIONS   I understand the risks and potential complications of a skin biopsy include but are not limited to the following:  Bleeding  Infection  Pain  Scar/keloid  Skin discoloration  Incomplete Removal  Recurrence  Nerve Damage/Numbness/Loss of Function  Allergic Reaction to Anesthesia  Biopsies are diagnostic procedures and based on findings additional treatment or evaluation may be required  Loss " "or destruction of specimen resulting in no additional findings    My Dermatologist has explained to me the nature of the condition, the nature of the procedure, and the benefits to be reasonably expected compared with alternative approaches.  My Dermatologist has discussed the likelihood of major risks or complications of this procedure including the specific risks listed above, such as bleeding, infection, and scarring/keloid.  I understand that a scar is expected after this procedure.  I understand that my physician cannot predict if the scar will form a \"keloid,\" which extends beyond the borders of the wound that is created.  A keloid is a thick, painful, and bumpy scar.  A keloid can be difficult to treat, as it does not always respond well to therapy, which includes injecting cortisone directly into the keloid every few weeks.  While this usually reduces the pain and size of the scar, it does not eliminate it.      I understand that photographs may be taken before and after the procedure.  These will be maintained as part of the medical providers confidential records and may not be made available to me.  I further authorize the medical provider to use the photographs for teaching purposes or to illustrate scientific papers, books, or lectures if in his/her judgment, medical research, education, or science may benefit from its use.    I have had an opportunity to fully inquire about the risks and benefits of this procedure and its alternatives.   I have been given ample time and opportunity to ask questions and to seek a second opinion if I wished to do so.  I acknowledge that there have specifically been no guarantees as to the cosmetic results from the procedure.  I am aware that with any procedure there is always the possibility of an unexpected complication.    III. POST-PROCEDURAL CARE (WHAT YOU WILL NEED TO DO \"AFTER THE BIOPSY\" TO OPTIMIZE HEALING)    Keep the area clean and dry.  Try NOT to remove the " "bandage or get it wet for the first 24 hours.    Gently clean the area and apply surgical ointment (such as Vaseline petrolatum ointment, which is available \"over the counter\" and not a prescription) to the biopsy site for up to 2 weeks straight.  This acts to protect the wound from the outside world.      Sutures are not usually placed in this procedure.  If any sutures were placed, return for suture removal as instructed (generally 1 week for the face, 2 weeks for the body).      Take Acetaminophen (Tylenol) for discomfort, if no contraindications.  Ibuprofen or aspirin could make bleeding worse.    Call our office immediately for signs of infection: fever, chills, increased redness, warmth, tenderness, discomfort/pain, or pus or foul smell coming from the wound.    WHAT TO DO IF THERE IS ANY BLEEDING?  If a small amount of bleeding is noticed, place a clean cloth over the area and apply firm pressure for ten minutes.  Check the wound after 10 minutes of direct pressure.  If bleeding persists, try one more time for an additional 10 minutes of direct pressure on the area.  If the bleeding becomes heavier or does not stop after the second attempt, or if you have any other questions about this procedure, then please call your [facility]'s Dermatologist by calling [phone] (SKIN).     I hereby acknowledge that I have reviewed and verified the site with my Dermatologist and have requested and authorized my Dermatologist to proceed with the procedure.      CHERRY ANGIOMAS     Physical Exam:  Anatomic Location Affected:  Trunk and extremities  Morphological Description:  Scattered cherry red papules  Denies pain, itch, bleeding. No treatments tried. Present for years. Present constantly; no modifying factors which make it worse or better.     Assessment and Plan:  Based on a thorough discussion of this condition and the management approach to it (including a comprehensive discussion of the known risks, side effects and " "potential benefits of treatment), the patient (family) agrees to implement the following specific plan:  Reassure benign        SEBORRHEIC KERATOSIS; NON-INFLAMED     Physical Exam:  Anatomic Location Affected:  left shouldwer  Morphological Description:  Waxy, smooth to warty textured, yellow to brownish-grey to dark brown to blackish, discrete, \"stuck-on\" appearing papules.  Present for years. Denies pain, itch, bleeding.      Additional History of Present Condition:  Present constantly; no modifying factors which make it worse or better. No prior treatment.       Assessment and Plan:  Based on a thorough discussion of this condition and the management approach to it (including a comprehensive discussion of the known risks, side effects and potential benefits of treatment), the patient (family) agrees to implement the following specific plan:  Reassure benign  Use sun protection.  Apply SPF 30 or higher at least three times a day.  Wear sun protecting clothing and hats.        SOLAR LENTIGINES   OTHER SKIN CHANGES DUE TO CHRONIC EXPOSURE TO NONIONIZING RADIATION     Physical Exam:  Anatomic Location Affected:  Sun exposed areas of back, chest, arms, legs  Morphological Description:  Multiple scattered brown to tan evenly pigmented macules   Denies pain, itch, bleeding. No treatments tried. Present for months - years. Reports getting newer lesions with sun exposure.         Assessment and Plan:  Based on a thorough discussion of this condition and the management approach to it (including a comprehensive discussion of the known risks, side effects and potential benefits of treatment), the patient (family) agrees to implement the following specific plan:  Reassure benign  Use sun protection.  Apply SPF 30 or higher at least three times a day.  Wear sun protecting clothing and hats.         MULTIPLE MELANOCYTIC NEVI (\"Moles\")     Physical Exam:  Anatomic Location Affected: Trunk and extremities  Morphological " Description:  Scattered, round to ovoid, symmetrical-appearing, even bordered, skin colored to dark brown macules/papules  Denies pain, itch, bleeding. No treatments tried. Present for years. Present constantly; no modifying factors which make it worse or better. Denies actively changing or growing moles.      Assessment and Plan:  Based on a thorough discussion of this condition and the management approach to it (including a comprehensive discussion of the known risks, side effects and potential benefits of treatment), the patient (family) agrees to implement the following specific plan:  Reassure benign  Monitor for changes  Use sun protection.  Apply SPF 30 or higher at least three times a day.  Wear sun protecting clothing and hats.       Worrisome signs of skin malignancy discussed, questions answered. Regular self-skin check discussed. Advised to call or return to office if patient notices any spots of concern, rapidly growing/changing lesions, bleeding lesions, non-healing lesions. Advised regular SPF use.          ACTINIC KERATOSES  - Relevant exam: On the right temple is a gritty pink papule  - Exam and clinical history consistent with actinic keratoses  - Discussed that these lesions are considered premalignant with the potential to evolve into squamous cell carcinoma.   - Discussed that these are due to chronic sun exposure and therefore recommend use of sunscreen/sun protection to prevent further sun damage  - Discussed treatment options, including liquid nitrogen destruction, topical immunotherapy, and photodynamic therapy, including risks, benefits      PROCEDURE:  DESTRUCTION OF PRE-MALIGNANT LESIONS    - After a thorough discussion of treatment options and risk/benefits/alternatives (including but not limited to local pain, scarring, dyspigmentation, blistering, and possible superinfection), verbal and written consent were obtained and the aforementioned lesions were treated on with cryotherapy using  liquid nitrogen x 1 cycle for 5-10 seconds.    TOTAL NUMBER of 1 pre-malignant lesions were treated today on the ANATOMIC LOCATION: right temple.     The patient tolerated the procedure well, and after-care instructions were provided.         Scribe Attestation      I,:  Dejah Crow MA am acting as a scribe while in the presence of the attending physician.:       I,:  Hue Baker PA-C personally performed the services described in this documentation    as scribed in my presence.:

## 2024-09-20 NOTE — PATIENT INSTRUCTIONS
"HISTORY OF BASAL CELL CARCINOMA     Physical Exam:  Anatomic Location Affected: On forehead and left upper arm  Morphological Description of scar:  Well healed  Suspected Recurrence: No  Pertinent Positives:  Pertinent Negatives:        Additional History of Present Condition:  History of basal cell carcinoma with no sign of recurrence     Assessment and Plan:  Based on a thorough discussion of this condition and the management approach to it (including a comprehensive discussion of the known risks, side effects and potential benefits of treatment), the patient (family) agrees to implement the following specific plan:  Will monitor     How can basal cell carcinoma be prevented?  The most important way to prevent BCC is to avoid sunburn. This is especially important in childhood and early life. Fair skinned individuals and those with a personal or family history of BCC should protect their skin from sun exposure daily, year-round and lifelong.  Stay indoors or under the shade in the middle of the day   Wear covering clothing   Apply high protection factor SPF50+ broad-spectrum sunscreens generously to exposed skin if outdoors   Avoid indoor tanning (sun beds, solaria)  Oral nicotinamide (vitamin B3) in a dose of 500 mg twice daily may reduce the number and severity of BCCs.     What is the outlook for basal cell carcinoma?  Most BCCs are cured by treatment. Cure is most likely if treatment is undertaken when the lesion is small.  About 50% of people with BCC develop a second one within 3 years of the first. They are also at increased risk of other skin cancers, especially melanoma. Regular self-skin examinations and long-term annual skin checks by an experienced health professional are recommended.        NEOPLASM OF UNCERTAIN BEHAVIOR OF SKIN    Assessment and Plan:  I have discussed with the patient that a sample of skin via a \"skin biopsy” would be potentially helpful to further make a specific diagnosis under the " "microscope.  Based on a thorough discussion of this condition and the management approach to it (including a comprehensive discussion of the known risks, side effects and potential benefits of treatment), the patient (family) agrees to implement the following specific plan:    Procedure:  Skin Biopsy.  After a thorough discussion of treatment options and risk/benefits/alternatives (including but not limited to local pain, scarring, dyspigmentation, blistering, possible superinfection, and inability to confirm a diagnosis via histopathology), verbal and written consent were obtained and portion of the rash was biopsied for tissue sample.  See below for consent that was obtained from patient and subsequent Procedure Note.       CHERRY ANGIOMAS     Physical Exam:  Anatomic Location Affected:  Trunk and extremities  Morphological Description:  Scattered cherry red papules  Denies pain, itch, bleeding. No treatments tried. Present for years. Present constantly; no modifying factors which make it worse or better.     Assessment and Plan:  Based on a thorough discussion of this condition and the management approach to it (including a comprehensive discussion of the known risks, side effects and potential benefits of treatment), the patient (family) agrees to implement the following specific plan:  Reassure benign        SEBORRHEIC KERATOSIS; NON-INFLAMED- left shoulder      Physical Exam:  Anatomic Location Affected:  Trunk and extremities  Morphological Description:  Waxy, smooth to warty textured, yellow to brownish-grey to dark brown to blackish, discrete, \"stuck-on\" appearing papules.  Present for years. Denies pain, itch, bleeding.      Additional History of Present Condition:  Present constantly; no modifying factors which make it worse or better. No prior treatment.       Assessment and Plan:  Based on a thorough discussion of this condition and the management approach to it (including a comprehensive discussion of " "the known risks, side effects and potential benefits of treatment), the patient (family) agrees to implement the following specific plan:  Reassure benign  Use sun protection.  Apply SPF 30 or higher at least three times a day.  Wear sun protecting clothing and hats.        SOLAR LENTIGINES   OTHER SKIN CHANGES DUE TO CHRONIC EXPOSURE TO NONIONIZING RADIATION     Physical Exam:  Anatomic Location Affected:  Sun exposed areas of back, chest, arms, legs  Morphological Description:  Multiple scattered brown to tan evenly pigmented macules   Denies pain, itch, bleeding. No treatments tried. Present for months - years. Reports getting newer lesions with sun exposure.         Assessment and Plan:  Based on a thorough discussion of this condition and the management approach to it (including a comprehensive discussion of the known risks, side effects and potential benefits of treatment), the patient (family) agrees to implement the following specific plan:  Reassure benign  Use sun protection.  Apply SPF 30 or higher at least three times a day.  Wear sun protecting clothing and hats.         MULTIPLE MELANOCYTIC NEVI (\"Moles\")     Physical Exam:  Anatomic Location Affected: Trunk and extremities  Morphological Description:  Scattered, round to ovoid, symmetrical-appearing, even bordered, skin colored to dark brown macules/papules  Denies pain, itch, bleeding. No treatments tried. Present for years. Present constantly; no modifying factors which make it worse or better. Denies actively changing or growing moles.      Assessment and Plan:  Based on a thorough discussion of this condition and the management approach to it (including a comprehensive discussion of the known risks, side effects and potential benefits of treatment), the patient (family) agrees to implement the following specific plan:  Reassure benign  Monitor for changes  Use sun protection.  Apply SPF 30 or higher at least three times a day.  Wear sun protecting " clothing and hats.       Worrisome signs of skin malignancy discussed, questions answered. Regular self-skin check discussed. Advised to call or return to office if patient notices any spots of concern, rapidly growing/changing lesions, bleeding lesions, non-healing lesions. Advised regular SPF use.          ACTINIC KERATOSES  - Relevant exam: On the right temple are gritty pink papules  - Exam and clinical history consistent with actinic keratoses  - Discussed that these lesions are considered premalignant with the potential to evolve into squamous cell carcinoma.   - Discussed that these are due to chronic sun exposure and therefore recommend use of sunscreen/sun protection to prevent further sun damage  - Discussed treatment options, including liquid nitrogen destruction, topical immunotherapy, and photodynamic therapy, including risks, benefits      OPTION 3:    PROCEDURE:  DESTRUCTION OF PRE-MALIGNANT LESIONS    - After a thorough discussion of treatment options and risk/benefits/alternatives (including but not limited to local pain, scarring, dyspigmentation, blistering, and possible superinfection), verbal and written consent were obtained and the aforementioned lesions were treated on with cryotherapy using liquid nitrogen x 1 cycle for 5-10 seconds.    TOTAL NUMBER of 1 pre-malignant lesions were treated today on the ANATOMIC LOCATION: right temple.     The patient tolerated the procedure well, and after-care instructions were provided.

## 2024-09-25 PROCEDURE — 88305 TISSUE EXAM BY PATHOLOGIST: CPT | Performed by: STUDENT IN AN ORGANIZED HEALTH CARE EDUCATION/TRAINING PROGRAM

## 2024-09-25 PROCEDURE — 88341 IMHCHEM/IMCYTCHM EA ADD ANTB: CPT | Performed by: STUDENT IN AN ORGANIZED HEALTH CARE EDUCATION/TRAINING PROGRAM

## 2024-09-25 PROCEDURE — 88342 IMHCHEM/IMCYTCHM 1ST ANTB: CPT | Performed by: STUDENT IN AN ORGANIZED HEALTH CARE EDUCATION/TRAINING PROGRAM

## 2024-09-26 NOTE — RESULT ENCOUNTER NOTE
DERMATOPATHOLOGY RESULT NOTE    Results reviewed by ordering physician.  Sent [employer] message reviewing results and plan with patient. Sent her my teams number if she wants to call with further questions       Instructions for Clinical Derm Team:   (remember to route Result Note to appropriate staff):    Call patient and schedule for cryotherapy of AK on chest    Result & Plan by Specimen:    Specimen A: indeterminate  Plan: clinic appointment for liquid nitrogen    Tissue Exam: [id number]  Order: [id number]   Status: Final result      Visible to patient: Yes (seen)      Dx: Neoplasm of uncertain behavior of skin    0 Result Notes     Component   Case Report  Surgical Pathology Report                         Case: [id number]                                  Authorizing Provider:  Hue Baker PA-C          Collected:           09/20/2024 1212              Ordering Location:     [facility]      Received:            09/20/2024 1212                                     [place]                                                                Pathologist:           Matty Rocha MD                                                          Specimen:    Skin, Other, specimen A; right central upper chest                                      Final Diagnosis  A. Skin, right central upper chest, shave biopsy:    Proliferative ACTINIC KERATOSIS, focally inflamed (see note).    Note: Multiple levels examined. SOX10, p16, and p40 immunostains were reviewed and support the diagnosis. If the lesion were to recur or persist, conservative removal/destruction (e.g., cryotherapy) is recommended to ensure against local persistence/recurrence.      Electronically signed by Matty Rocha MD on 9/25/2024 at  5:00 PM  Additional Information   All reported additional testing was performed with appropriately reactive controls.  These tests were developed and their performance characteristics determined by [facility]atory or appropriate performing facility, though some tests may be performed on tissues which have not been validated for performance characteristics (such as staining performed on alcohol exposed cell blocks and decalcified tissues).  Results should be interpreted with caution and in the context of the patients' clinical condition. These tests may not be cleared or approved by the U.S. Food and Drug Administration, though the FDA has determined that such clearance or approval is not necessary. These tests are used for clinical purposes and they should not be regarded as investigational or for research. This laboratory has been approved by CLIA 88, designated as a high-complexity laboratory and is qualified to perform these tests.  .  Gross Description   A. The specimen is received in formalin, labeled with the patient's name and hospital number, and is designated \" skin right central upper chest\".  It consists of a 0.5 x 0.3 x 0.1 cm tan-brown semitranslucent keratotic skin shave.  The resection margin is inked green and the specimen is bisected.  Entirely submitted between sponges in cassette A1.    Note: The estimated total formalin fixation time based upon information provided by the submitting clinician and the standard processing schedule is under 72 hours.  [facility]  Clinical Information   Specimen A;skin;shave biopsy;right central upper chest;59 year old female with 0.5 cm x 0.6 cm pink hypopigmented scaly papule ;Ddx ;Ak vs BCC    Atten :Derm Path  Resulting Agency [id number]          Specimen Collected: 09/20/24 12:12 PM Last Resulted: 09/25/24  5:00 PM     Order Details       View Encounter       Lab and Collection Details       Routing       Result History    View All Conversations on this Encounter        Scans on Order [id number]    Lab Result Document - Document on 9/25/2024  5:00 PM          "

## 2024-09-27 NOTE — RESULT ENCOUNTER NOTE
Left voicemail in regard to scheduling follow up appt for Cryotherapy. When patient calls back please assist with scheduling, appt can be scheduled with an AP.

## 2024-10-01 ENCOUNTER — TELEPHONE (OUTPATIENT)
Age: 59
End: 2024-10-01

## 2024-10-01 NOTE — TELEPHONE ENCOUNTER
Pt called to schedule for cryotherapy  Can only scheduled on Monday Thursday or Friday (works Tuesd and Wednesday)  Pt prefers to be scheduled at [place] office  Please reach out to patient if she can be scheduled sooner than 2025

## 2024-10-03 NOTE — TELEPHONE ENCOUNTER
Received call from patient to [facility].    I message Hue Samuel via teams to ask her if this would be cherry with her.    Once Hue responds I will call patient back to [facility].    During this call the call was disconnected before I could tell the patient that I would call her back.

## 2024-10-03 NOTE — TELEPHONE ENCOUNTER
Botox authorization faxed to [phone] [place] [address] on 4/20/2021  Authorization can take up to 15 days to receive a determination  Will await an approval/denial letter 
Called [facility] Admin- spoke with Kay Carroll- I advised him I was calling to check the status of the patient's Botox authorization  He advised me that the patient's Botox authorization is still pending   He provided me with the following information:    Due date: 5/18/2021  Pending authorization #: [id number]    Call reference #: [id number]
Called patient and I left a message to call back to schedule a follow up prior to her Botox 
Called patient and I left a message to call back to schedule a follow up prior to her Botox 
Noted thank you- authorization may take up to 15 days to receive a determination  Will await an approval/denial letter 
Noted thank you- will apply for a follow-up after patient is seen in follow-up 
Prior authorization faxed to [phone] 5/3/2021   Nazario Tipton please follow up on patient's authorization await approval/denial letter
Received a call from Charleen with [address] [place] [address]- she advised me that she is calling because she needs an updated clinical note for the authorization  She advised me that the patient was last seen 5/28/20  I advised her that the patient would be due for her yearly follow-up this May  She advised me that she can either withdrawal the authorization or submit it and if it gets denied for needing updated clinicals I will have the opportunity to resubmit with the new notes  I advised her to withdrawal the application  Nicole,    Please contact the patient and schedule her yearly follow-up with Amrita  Patient was last seen 5/28/20  Patient is scheduled for her next injection on 6/10/21  Please let me know once the patient has been scheduled      Thank you,    Liv Villagran
Received a call from [facility] with [address]- she states she is calling with an approval for the patient's Botox authorization  An approval letter will be sent via fax   She provided me with the following approval information:    Authorization information:    Botox 200 units approved  Authorization #: [id number]  Valid dates: 6/1/2021 until 6/1/2022- valid for 4 visits (800 units total)  Please use our stock
Verita Bosworth 3 minutes ago (10:16 AM)        ADD-ON, patient called in; and needed to schedule a f/u appt; prior to her 06/10/2021 Botox appointment  Schedule 05/03/2021 @ 3:15pm w/Gege Kolb  Patient was a Dr Fara Weston patient 
pt reports living in private home with wife and daughter who can assist prn. Pt has 1 small step to enter with no handrails and all needs met on the first floor. Independent prior to admission with use of RW secondary pain/LE weakness. Owns cane and shower chair.

## 2024-10-10 ENCOUNTER — OFFICE VISIT (OUTPATIENT)
Dept: DERMATOLOGY | Facility: CLINIC | Age: 59
End: 2024-10-10
Payer: COMMERCIAL

## 2024-10-10 ENCOUNTER — PROCEDURE VISIT (OUTPATIENT)
Dept: NEUROLOGY | Facility: CLINIC | Age: 59
End: 2024-10-10
Payer: COMMERCIAL

## 2024-10-10 VITALS — DIASTOLIC BLOOD PRESSURE: 56 MMHG | HEART RATE: 77 BPM | SYSTOLIC BLOOD PRESSURE: 117 MMHG | TEMPERATURE: 97.9 F

## 2024-10-10 VITALS — BODY MASS INDEX: 21.87 KG/M2 | TEMPERATURE: 97 F | WEIGHT: 127.4 LBS

## 2024-10-10 DIAGNOSIS — L57.0 KERATOSIS, ACTINIC: Primary | ICD-10-CM

## 2024-10-10 DIAGNOSIS — G43.709 CHRONIC MIGRAINE WITHOUT AURA WITHOUT STATUS MIGRAINOSUS, NOT INTRACTABLE: Primary | ICD-10-CM

## 2024-10-10 PROCEDURE — 64615 CHEMODENERV MUSC MIGRAINE: CPT | Performed by: PHYSICIAN ASSISTANT

## 2024-10-10 PROCEDURE — 17000 DESTRUCT PREMALG LESION: CPT

## 2024-10-10 NOTE — PROGRESS NOTES
"Universal Protocol   procedure performed by consultantConsent: Verbal consent obtained. Written consent obtained.  Risks and benefits: risks, benefits and alternatives were discussed  Consent given by: patient  Time out: Immediately prior to procedure a \"time out\" was called to verify the correct patient, procedure, equipment, support staff and site/side marked as required.  Patient understanding: patient states understanding of the procedure being performed  Patient consent: the patient's understanding of the procedure matches consent given  Procedure consent: procedure consent matches procedure scheduled  Relevant documents: relevant documents present and verified  Patient identity confirmed: verbally with patient      Chemodenervation     Date/Time  10/10/2024 8:30 AM     Performed by  Gege Garcia PA-C   Authorized by  Gege Garcia PA-C     Pre-procedure details      Prepped With: Alcohol     Anesthesia  (see MAR for exact dosages):     Anesthesia method:  None   Procedure details      Position:  Upright   Botox      Botox Type:  Type A    Brand:  Botox    mL's of Botulinum Toxin:  200    Final Concentration per CC:  50 units    Needle Gauge:  30 G 2.5 inch   Procedures      Botox Procedures: chronic headache      Indications: migraines     Injection Location      Head / Face:  L superior cervical paraspinal, R superior cervical paraspinal, L , R , L frontalis, R frontalis, L medial occipitalis, R medial occipitalis, procerus, R temporalis, L temporalis, R superior trapezius and L superior trapezius    L  injection amount:  5 unit(s)    R  injection amount:  5 unit(s)    L lateral frontalis:  5 unit(s)    R lateral frontalis:  5 unit(s)    L medial frontalis:  5 unit(s)    R medial frontalis:  5 unit(s)    L temporalis injection amount:  20 unit(s)    R temporalis injection amount:  20 unit(s)    Procerus injection amount:  5 unit(s)    L medial occipitalis injection " amount:  15 unit(s)    R medial occipitalis injection amount:  15 unit(s)    L superior cervical paraspinal injection amount:  10 unit(s)    R superior cervical paraspinal injection amount:  10 unit(s)    L superior trapezius injection amount:  15 unit(s)    R superior trapezius injection amount:  15 unit(s)   Total Units      Total units used:  200    Total units discarded:  0   Post-procedure details      Chemodenervation:  Chronic migraine    Facial Nerve Location::  Bilateral facial nerve    Patient tolerance of procedure:  Tolerated well, no immediate complications   Comments       5 units orbicularis oculi bilaterally  35 units frontalis  All medically necessary

## 2024-10-10 NOTE — PATIENT INSTRUCTIONS
ACTINIC KERATOSES    Assessment and Plan:  Based on a thorough discussion of this condition and the management approach to it (including a comprehensive discussion of the known risks, side effects and potential benefits of treatment), the patient (family) agrees to implement the following specific plan:  Treated with cryotherapy today; written and verbal consent obtained     Patient instructions:  Your pre-cancerous lesions (called actinic keratosis) were treated with liquid nitrogen today. The treated areas will get more red, crusted over the next few days. There might be some blistering. Apply vaseline to the treated area for the next week to help it heal fully. Do not pick at the area. Return in 3-4 weeks for another round of liquid nitrogen treatment if lesion(s)  fails to fully resolve.

## 2024-10-10 NOTE — PROGRESS NOTES
"[facility] Dermatology Clinic Note     Patient Name: Carolin Heck  Encounter Date: 10/10/24     Have you been cared for by a [facility] Dermatologist in the last 3 years and, if so, which description applies to you?    Yes.  I have been here within the last 3 years, and my medical history has NOT changed since that time.  I am FEMALE/of child-bearing potential.    REVIEW OF SYSTEMS:  Have you recently had or currently have any of the following? No changes in my recent health.   PAST MEDICAL HISTORY:  Have you personally ever had or currently have any of the following?  If \"YES,\" then please provide more detail. No changes in my medical history.   HISTORY OF IMMUNOSUPPRESSION: Do you have a history of any of the following:  Systemic Immunosuppression such as Diabetes, Biologic or Immunotherapy, Chemotherapy, Organ Transplantation, Bone Marrow Transplantation or Prednisone?  No     Answering \"YES\" requires the addition of the dotphrase \"IMMUNOSUPPRESSED\" as the first diagnosis of the patient's visit.   FAMILY HISTORY:  Any \"first degree relatives\" (parent, brother, sister, or child) with the following?    No changes in my family's known health.   PATIENT EXPERIENCE:    Do you want the Dermatologist to perform a COMPLETE skin exam today including a clinical examination under the \"bra and underwear\" areas?  NO  If necessary, do we have your permission to call and leave a detailed message on your Preferred Phone number that includes your specific medical information?  Yes      Allergies   Allergen Reactions    Butoconazole Hives    Tioconazole Hives      Current Outpatient Medications:     amphetamine-dextroamphetamine (ADDERALL XR, 20MG,) 20 MG 24 hr capsule, Take 1 capsule (20 mg total) by mouth every morning Max Daily Amount: 20 mg Do not start before September 15, 2024., Disp: 90 capsule, Rfl: 0    atorvastatin (LIPITOR) 10 mg tablet, Take 1 tablet (10 mg total) by mouth daily, Disp: 90 tablet, Rfl: 3    buPROPion " (WELLBUTRIN XL) 300 mg 24 hr tablet, Take 1 tablet (300 mg total) by mouth every morning, Disp: 90 tablet, Rfl: 1    clonazePAM (KlonoPIN) 0.5 mg tablet, Take 1/2 tab daily as needed for anxiety and take 1 tab nightly as needed for insomnia and anxiety Do not start before October 8, 2024., Disp: 45 tablet, Rfl: 2    estradiol (VAGIFEM, YUVAFEM) 10 MCG TABS vaginal tablet, Insert 1 tablet (10 mcg total) into the vagina 2 (two) times a week (Patient taking differently: Insert 1 tablet into the vagina 2 (two) times a week Last dose Monday,3/4/24), Disp: 30 tablet, Rfl: 2    estrogens, conjugated (Premarin) vaginal cream, Insert 0.5g vaginally three times per week, Disp: 30 g, Rfl: 1    FLUoxetine (PROzac) 40 MG capsule, Take 1 capsule (40 mg total) by mouth daily, Disp: 90 capsule, Rfl: 1    methenamine hippurate (HIPREX) 1 g tablet, Take 1 tablet (1 g total) by mouth 2 (two) times a day with meals, Disp: 180 tablet, Rfl: 3    Multiple Vitamins-Minerals (MULTIVITAL-M PO), Take 1 tablet by mouth in the morning, Disp: , Rfl:     Restasis 0.05 % ophthalmic emulsion, , Disp: , Rfl:     rizatriptan (MAXALT) 10 mg tablet, Take 1 tablet (10 mg total) by mouth as needed for migraine May repeat in 2 hours if needed.  Limit 3 a week or 9 a month, Disp: 9 tablet, Rfl: 0    Ubrogepant (UBRELVY) 100 MG tablet, Take 1 tablet (100 mg) one time as needed for migraine. May repeat one additional tablet (100 mg) at least two hours after the first dose. Do not use more than 200 mg a day, Disp: 16 tablet, Rfl: 6  No current facility-administered medications for this visit.          Whom besides the patient is providing clinical information about today's encounter?   NO ADDITIONAL HISTORIAN (patient alone provided history)    Physical Exam and Assessment/Plan by Diagnosis:    ACTINIC KERATOSES    Physical Exam:  Anatomic Location Affected:  right central upper chest  Morphological Description:  Thin pink papule(s) with gritty scale      Additional History of Present Condition: patient was seen 9/20/24 and had biopsy proven proliferative AK on right central upper chest. Patient presents today for cryotherapy of biopsy site. Tissue Exam: [id number]     Assessment and Plan:  Based on a thorough discussion of this condition and the management approach to it (including a comprehensive discussion of the known risks, side effects and potential benefits of treatment), the patient (family) agrees to implement the following specific plan:  Treated with cryotherapy today; written and verbal consent obtained   Patient to return to office in 4-6 weeks if lesion is not flat and smooth after healing.     PROCEDURE:  DESTRUCTION OF PRE-MALIGNANT LESIONS  After a thorough discussion of treatment options and risk/benefits/alternatives (including but not limited to local pain, scarring, dyspigmentation, blistering, and possible superinfection), verbal and written consent were obtained and the aforementioned lesions were treated on with cryotherapy using liquid nitrogen x 2 cycles for 5-10 seconds. The patient tolerated the procedure well, and after-care instructions were provided.     TOTAL NUMBER of 1 pre-malignant lesions were treated today on the ANATOMIC LOCATION: chest.     Patient instructions:  Your pre-cancerous lesions (called actinic keratosis) were treated with liquid nitrogen today. The treated areas will get more red, crusted over the next few days. There might be some blistering. Apply vaseline to the treated area for the next week to help it heal fully. Do not pick at the area. Return in 3-4 weeks for another round of liquid nitrogen treatment if lesion(s)  fails to fully resolve.      Scribe Attestation      I,:  Stacey Amin MA am acting as a scribe while in the presence of the attending physician.:       I,:  Hue Baker PA-C personally performed the services described in this documentation    as scribed in my presence.:

## 2024-12-02 ENCOUNTER — TELEPHONE (OUTPATIENT)
Age: 59
End: 2024-12-02

## 2024-12-02 DIAGNOSIS — N39.0 RECURRENT UTI: ICD-10-CM

## 2024-12-02 NOTE — TELEPHONE ENCOUNTER
Pt calling in to change Botox appt. Appt now made for 1/16. Pt asking if second botox appt will need to be reschedule . Please contact pt and assist, Thank you.

## 2024-12-03 RX ORDER — METHENAMINE HIPPURATE 1000 MG/1
1 TABLET ORAL 2 TIMES DAILY WITH MEALS
Qty: 180 TABLET | Refills: 0 | Status: SHIPPED | OUTPATIENT
Start: 2024-12-03

## 2024-12-06 NOTE — TELEPHONE ENCOUNTER
Pt called to reschedule botox appointment for 4/10/25. She needs it after 90 days from 1/16/25. Please call patient for another appointment.

## 2024-12-10 NOTE — TELEPHONE ENCOUNTER
Patient called to report that they need to be Rs for the 4/10/2025 Botox appt. Due to changing the Jan 2025 appt. To the 16th    Offered the patient first available 5/1/2025    Patient stated that would be too long to wait as the ha get bad if not on the 91 day schedule    Patient ask that we send a message to Botox coordinators to assist with Rs this                     Thank you!

## 2024-12-13 NOTE — TELEPHONE ENCOUNTER
4th attempt,     Hello,     Patient has called back asking to speak to Someone that can help regarding re-scheduling her BINJ as it's scheduled too soon after her 01/16/2025 BINJ , It needs to be 90 days or more after, Nothing sooner than 05/01/2025..    She asked to be called back at [phone].    Thank you in advance,     Jeanette

## 2024-12-16 ENCOUNTER — TELEPHONE (OUTPATIENT)
Age: 59
End: 2024-12-16

## 2024-12-16 NOTE — TELEPHONE ENCOUNTER
Pt called in to r/s her botox appt on April 10th and VV needed for botox reauth on February 3rd. Attempted to r/s VV but first available was not until June and pt stated that was too far out.

## 2024-12-23 NOTE — TELEPHONE ENCOUNTER
Called patient and I left a message to call back to reschedule her April Botox appointment. I did let her know that I will be back in the office on Friday 12/27/24 or she can wait until she comes in for her 01/16/25 Botox appointment and I can fix her appointments then.

## 2024-12-30 ENCOUNTER — OFFICE VISIT (OUTPATIENT)
Dept: PSYCHIATRY | Facility: CLINIC | Age: 59
End: 2024-12-30
Payer: COMMERCIAL

## 2024-12-30 DIAGNOSIS — R41.840 ATTENTION AND CONCENTRATION DEFICIT: ICD-10-CM

## 2024-12-30 DIAGNOSIS — F33.40 RECURRENT MAJOR DEPRESSIVE DISORDER, IN REMISSION (HCC): ICD-10-CM

## 2024-12-30 DIAGNOSIS — F41.1 GAD (GENERALIZED ANXIETY DISORDER): ICD-10-CM

## 2024-12-30 DIAGNOSIS — R41.3 MEMORY DIFFICULTIES: ICD-10-CM

## 2024-12-30 PROCEDURE — 99214 OFFICE O/P EST MOD 30 MIN: CPT | Performed by: PSYCHIATRY & NEUROLOGY

## 2024-12-30 PROCEDURE — 90833 PSYTX W PT W E/M 30 MIN: CPT | Performed by: PSYCHIATRY & NEUROLOGY

## 2024-12-30 RX ORDER — DEXTROAMPHETAMINE SACCHARATE, AMPHETAMINE ASPARTATE MONOHYDRATE, DEXTROAMPHETAMINE SULFATE AND AMPHETAMINE SULFATE 5; 5; 5; 5 MG/1; MG/1; MG/1; MG/1
20 CAPSULE, EXTENDED RELEASE ORAL EVERY MORNING
Qty: 90 CAPSULE | Refills: 0 | Status: SHIPPED | OUTPATIENT
Start: 2024-12-30

## 2024-12-30 RX ORDER — CLONAZEPAM 0.5 MG/1
TABLET ORAL
Qty: 75 TABLET | Refills: 2 | Status: SHIPPED | OUTPATIENT
Start: 2025-01-05

## 2024-12-30 RX ORDER — BUPROPION HYDROCHLORIDE 300 MG/1
300 TABLET ORAL EVERY MORNING
Qty: 90 TABLET | Refills: 1 | Status: SHIPPED | OUTPATIENT
Start: 2024-12-30

## 2024-12-30 RX ORDER — FLUOXETINE 40 MG/1
40 CAPSULE ORAL DAILY
Qty: 90 CAPSULE | Refills: 1 | Status: SHIPPED | OUTPATIENT
Start: 2024-12-30

## 2024-12-30 NOTE — ASSESSMENT & PLAN NOTE
Manageable, no changes  Orders:    amphetamine-dextroamphetamine (ADDERALL XR, 20MG,) 20 MG 24 hr capsule; Take 1 capsule (20 mg total) by mouth every morning Max Daily Amount: 20 mg    buPROPion (WELLBUTRIN XL) 300 mg 24 hr tablet; Take 1 tablet (300 mg total) by mouth every morning    FLUoxetine (PROzac) 40 MG capsule; Take 1 capsule (40 mg total) by mouth daily

## 2024-12-30 NOTE — ASSESSMENT & PLAN NOTE
Manageable. Needs brand name adderall XR  Orders:    amphetamine-dextroamphetamine (ADDERALL XR, 20MG,) 20 MG 24 hr capsule; Take 1 capsule (20 mg total) by mouth every morning Max Daily Amount: 20 mg    buPROPion (WELLBUTRIN XL) 300 mg 24 hr tablet; Take 1 tablet (300 mg total) by mouth every morning

## 2024-12-30 NOTE — ASSESSMENT & PLAN NOTE
Not at goal. Discussed buspar vs prozac increase but she preferred klonopin increase. Discussed risks with aging and in general.   Orders:    clonazePAM (KlonoPIN) 0.5 mg tablet; Take 1/2 tab daily as needed for anxiety and take 1-2 tabs nightly as needed for insomnia and anxiety Do not start before January 5, 2025.    FLUoxetine (PROzac) 40 MG capsule; Take 1 capsule (40 mg total) by mouth daily

## 2024-12-30 NOTE — ASSESSMENT & PLAN NOTE
stable  Orders:    amphetamine-dextroamphetamine (ADDERALL XR, 20MG,) 20 MG 24 hr capsule; Take 1 capsule (20 mg total) by mouth every morning Max Daily Amount: 20 mg    buPROPion (WELLBUTRIN XL) 300 mg 24 hr tablet; Take 1 tablet (300 mg total) by mouth every morning

## 2024-12-30 NOTE — BH CRISIS PLAN
"[place] Safety Plan    Creation Date: 1/11/24 Update Date: 12/30/24   Created By: David Hernandez III, DO Last Updated By: David Hernandez III, DO      Step 1: Warning Signs:   Warning Signs   (not suicidal) \"I would start feeling blue\", more negative thougths.            Step 2: Internal Coping Strategies:   Internal Coping Strategies   (not suicidal) exercising, helping others            Step 3: People and social settings that provide distraction:   Name Contact Information   Mother Cell phone   Lorrie (best friend from HS) Cell phone   Yogesh (Boyfriend) Cell phone          Step 4: People whom I can ask for help during a crisis:    Name Contact Information    mother cell phone    lorrie (best friend from HS) cell phone    Yogesh (Boyfriend) cell phone      Step 5: Professionals or agencies I can contact during a crisis:    Clinican/Agency Name Phone Emergency Contact    [facility] [phone]     Local Emergency Department Emergency Department Phone Emergency   Department Address    [address]      Crisis Phone Numbers:   Suicide Prevention Lifeline: Call or Text  108 Crisis Text Line: Text HOME   to [phone]   Please note: Some [facility] do not have a separate number for   Child/Adolescent specific crisis. If your county is not listed under   Child/Adolescent, please call the adult number for your county      Adult Crisis Numbers: Child/Adolescent Crisis Numbers   [facility]: [phone] [facility]: [phone]   [facility]: [phone] [facility]: [phone]   [facility]: [phone] [place], [place]: [phone]   [facility]: [phone] [place]: [phone]   [place] [facility]: [phone]   [facility]: [phone]   [facility]: [phone]   [facility]: [phone] (daytime) [phone]   (after hours, weekends, holidays)      Step 6: Making the environment safer (plan for lethal means safety):   Plan: No " access to guns     Optional: What is most important to me and worth living for?   family     [place] Safety Plan. Amy Jiang and Hardik Peter. Used with   permission of the authors.

## 2024-12-30 NOTE — PSYCH
"      MEDICATION MANAGEMENT NOTE        [facility]      Name and Date of Birth:  Carolin Heck 59 y.o. 1965    Date of Visit: December 30, 2024    SUBJECTIVE:  CC: Carolin presents today for follow up on \"I think it is working\"; anxiety and depression     Carolin notes the brand name stimulant is working much better than the generic.    Klonopin is helping, but not as much as it used to. She feels the klonopin is not helping as much with anxiety but sleep initiation is the larger issue of the 2.     Has 5 grandkids are all doing sports, [employer], and a lot of activities. So she is having more obligations, more driving to events. Even if she does not go, she is home with the kids. And one of their dogs is sick, may pass.    Then when she goes to [place] she can sleep well, has less anxiety.    Does not feel she can leave [place] (parents are in 80s but good health other than father's dementia) so she is not wanting to move to [place] just yet (would not move fully though because of the kids needing her), but her s/o is slowing down, and she notes it when she goes to visit. Not sure if it is low stress or aging for him.     She is doing well at [employer], likely retire 2028.     Parenting style differences with daughter in law Farhana and herself (living with them, 4 kids and 3 dogs). They get along fine but it causes stress.    Lives with son, Barry and his (Kids ~2021, 2020, 2018, 2016). She has 2 other kids, one there grandkid    Cognitive situation is unchanged still, no decline. Neurologist periodically      Med Compliance: yes  HPI ROS:                      ('was' below is from prior visit)  Medication Side Effects (other than noted):  no     Depression (10 worst):  low (Was low)   Anxiety (10 worst):  moderate (Was low)   Hallucinations or Psychosis  no (Was no)    Safety concerns (self harm thoughts, suicidal ideation, HI, etc):  no (Was no)   Sleep: (NM = Nightmares)  8, but taking " longer to fall asleep and her thoughts are not stopping as much as it used to. (Was 7-8hr)   Energy:  good (Was good)   Appetite:  good (Was good)   Weight Change:  no      Since our last visit, overall symptoms have been unchanged.          PHQ-2/9 Depression Screening                 Carolin denies any side effects from medications unless noted above    Review Of Systems as noted above. Otherwise A relevant review of symptoms was otherwise negative    History Review: The following portions of the patient's history were reviewed and documented: allergies, current medications, past family history, past medical history, past social history, and problem list.     Lab Review: Labs were reviewed and discussed with patient      OBJECTIVE:     MENTAL STATUS EXAM  Appearance:  age appropriate   Behavior:  pleasant, cooperative, with good eye contact   Speech:  Normal volume, regular rate and rhythm   Mood:  euthymic, anxious   Affect:  mood congruent   Language: intact and appropriate for age, education, and intellect   Thought Process:  Linear and goal directed   Associations: intact associations   Thought Content:  normal and appropriate   Perceptual Disturbances: no auditory or visual hallcunations   Risk Potential / Abnormal Thoughts: Suicidal ideation - None  Homicidal ideation - None  Potential for aggression - No       Consciousness:  Alert & Awake   Sensorium:  Grossly oriented   Attention: attention span and concentration are age appropriate       Fund of Knowledge:  Memory: awareness of current events: yes  recent and remote memory grossly intact   Insight:  good   Judgment: good   Muscle Strength Muscle Tone: normal  normal   Gait/Station: normal gait/station with good balance   Motor Activity: no abnormal movements       Risks, Benefits And Possible Side Effects Of Medications:    AGREE: Risks, benefits, and possible side effects of medications explained to Carolin and she (or legal representative) verbalizes  "understanding and agreement for treatment.    Controlled Medication Discussion:     Carolin has been filling controlled prescriptions on time as prescribed according to [place] Prescription Drug Monitoring program.   _____________________________________________________________      Psychiatric History   CAROLIN    Patient did used to see Isaias Bill in early 2016 when she had a change providers due to insurance.    She does not have a therapist.    She been hospitalized once in 2000. She says that it was related to having a breakup with her boyfriend at the time and then finding out that she lost her job because she was  at his place of employment. Losing her boyfriend because she did not want to get  and he did and also having job issues led her to crisis and overdose although it was not a dramatic overdose it was a time when she said that she was trying to take pills to go to sleep and that she did have some suicidal thoughts associated with it. She denies any self-harm homicidal ideation in the past or present. She's never been violent. She says that she's not had any suicidal ideation since that time.      Social History:  Carolin was raised in [place] to a \"good\" childhood. Her parents were together and still are. She denies ever having physical or sexual abuse or other forms neglect now or in the past as a child. She has one brother and one sister. She developed normally. She finished high school and got associates degree in business.    Her daughter currently lives with her. She has 3 children 2 boys and one girl. She is good relationships with her family. She's been  and  twice. Currently not in a serious relationship at intake    Her support system includes her children her parents and her cousins. She has friends that are close but she doesn't typically opened up to them about mental health.    She is Zoroastrianism and attends [employer] sometimes. No  history no legal " issues and no weapons at home.    She does not use tobacco drinks about 1 cup of coffee a day and is a social drinker and when she does drink it's very rare and not much at that time. She denies ever using substances and has never been to rehabilitation.           Family Psychiatric History:   Daughter -  Family history of ADD (attention deficit disorder)  Son - Family history of ADD (attention deficit disorder)  Family History    13. Denied: Family history of bipolar disorder   14. Denied: Family history of substance abuse   15. Denied: Family history of suicide attempt      Assessment/Plan:     Generalized anxiety disorder  MDD, recurrent, in remission    Attention and concentration deficit  Memory difficulties    ---  Generalized anxiety disorder - manageable but not at goal  MDD, recurrent, in remission   - manageable  Attention and concentration deficit - not at goal  Memory difficulties - stable    Assessment & Plan  Recurrent major depressive disorder, in remission (HCC)  Manageable, no changes  Orders:    amphetamine-dextroamphetamine (ADDERALL XR, 20MG,) 20 MG 24 hr capsule; Take 1 capsule (20 mg total) by mouth every morning Max Daily Amount: 20 mg    buPROPion (WELLBUTRIN XL) 300 mg 24 hr tablet; Take 1 tablet (300 mg total) by mouth every morning    FLUoxetine (PROzac) 40 MG capsule; Take 1 capsule (40 mg total) by mouth daily    Attention and concentration deficit  Manageable. Needs brand name adderall XR  Orders:    amphetamine-dextroamphetamine (ADDERALL XR, 20MG,) 20 MG 24 hr capsule; Take 1 capsule (20 mg total) by mouth every morning Max Daily Amount: 20 mg    buPROPion (WELLBUTRIN XL) 300 mg 24 hr tablet; Take 1 tablet (300 mg total) by mouth every morning    Memory difficulties  stable  Orders:    amphetamine-dextroamphetamine (ADDERALL XR, 20MG,) 20 MG 24 hr capsule; Take 1 capsule (20 mg total) by mouth every morning Max Daily Amount: 20 mg    buPROPion (WELLBUTRIN XL) 300 mg 24 hr tablet; Take  "1 tablet (300 mg total) by mouth every morning    TIM (generalized anxiety disorder)  Not at goal. Discussed buspar vs prozac increase but she preferred klonopin increase. Discussed risks with aging and in general.   Orders:    clonazePAM (KlonoPIN) 0.5 mg tablet; Take 1/2 tab daily as needed for anxiety and take 1-2 tabs nightly as needed for insomnia and anxiety Do not start before January 5, 2025.    FLUoxetine (PROzac) 40 MG capsule; Take 1 capsule (40 mg total) by mouth daily        Carolin is doing well overall. Did not want increase in prozac or adding other agent like buspar. Adderall brand is necessary. Will increase klonopin. Does not seem tolerance issue but rather stressor increase. She did not want to pursue lifestyle, coping skill, or other approaches presently but could discuss therapy and other directions again in the future.     Considered cognitive organic changes as well, and she will talk to neurologist about Mo[facility], or I could do one.     Attention and concentration deficit may be related to organic issues, anxiety, or underlying ADD/ADHD. Ongoing f/u with Neurology for this and migraines. She was in a cognitive study years ago, no amyloid. However, she feels issues predated klonopin. \"Maybe it is just me\". Talked about cognitive concerns in context benzodiazepines, but she is comfortable taking them, but understands coming off would be long term goal as feasible and weighing risks/benefits. Also we discussed long term risks of all medications as well, including cardiovascular, bone density, falls, etc but she very much appreciates the \"quality of life\" she receives not matter what was mentioned. She will still f/u with PCP re: getting a Dexa scan.    Safety Risk Assessment: see above . In considering risk and protective factors, suicide risk and safety risk are low.    Past medications include   Prozac which helps but does have some decreased orgasm  Topamax for migraines but this seemed to lead to " confusion ( years ago)  ON but no detail recall - Zoloft, Celexa or Lexapro.   Effexor seemed to cause weight gain  Wellbutrin  Ambien caused oversedation  melatonin did not help.     Scales:       Treatment Plan:        Patient has been educated about their diagnosis and treatment modalities. They voiced understanding and agreement with the following plan:    1) Meds:   - Prozac 40mg daily. (consider increase but orgasm affected. Was on 50mg in past per charts)   - Wellbutrin XL 300mg daily   - INCREASE Klonopin 0.25mg in day PRN and 0.5mg HS PRN. To 0.25mg in AM and 0.5-1mg HS PRN   - BRAND NAME NECESSARY 9/12/2024 - Adderal XR 20mg Daily (ineffective as generic)    2) Labs: PRN   - 2/2017: CBC, CMP WNL. TSH 1.57, TG 56, HDL 98,     3) Therapy:   - not in therapy, revisit PRN   - Provided progressive muscle relaxation handout, asked her to look into belly breathing and guided imagery. (2/9/2018)    4) Medical:  Concentration issues, MRI abnormalities, family h/o early onset dementia (PAunt), migraines (gets Botox)   - pt to f/u with neurologist   - pt to f/u with other providers PRN    5) Other: support prn   - works at [employer]. Reception   - good support from family, daughter lives with her   - 3 kids, . Daughter graduates from [employer] with BS May.  Likes psychology, healthcare    6) Follow up:   - 6 months, but patient to call if issues or concerns    7) Treatment Plan: Enacted 2/9/2018, 12/7/2018, 7/26/2019, 8/14/2020, 4/1/21, 10/21/2021, 5/19/2022, 1/19/2023, 7/13/2023, 1/11/2024, 9/12/2024    8) Crisis Plan 1/11/2024, 12/30/2024      Discussed self monitoring of symptoms, and symptom monitoring tools.    Patient has been informed of 24 hours and weekend coverage for urgent situations accessed by calling the main clinic phone number.           Psychotherapy in session:  Time spent performing psychotherapy: 17 minutes supportive therapy related to family, kids, dynamics of  parenting, self care      Visit Time    Visit Start Time: 1004  Visit Stop Time: 1033  Total Visit Duration:  29 minutes

## 2025-01-16 ENCOUNTER — PROCEDURE VISIT (OUTPATIENT)
Dept: NEUROLOGY | Facility: CLINIC | Age: 60
End: 2025-01-16
Payer: COMMERCIAL

## 2025-01-16 VITALS — SYSTOLIC BLOOD PRESSURE: 131 MMHG | TEMPERATURE: 98.5 F | HEART RATE: 82 BPM | DIASTOLIC BLOOD PRESSURE: 62 MMHG

## 2025-01-16 DIAGNOSIS — G43.709 CHRONIC MIGRAINE WITHOUT AURA WITHOUT STATUS MIGRAINOSUS, NOT INTRACTABLE: Primary | ICD-10-CM

## 2025-01-16 PROCEDURE — 64615 CHEMODENERV MUSC MIGRAINE: CPT | Performed by: PHYSICIAN ASSISTANT

## 2025-01-16 NOTE — PROGRESS NOTES
"Universal Protocol   procedure performed by consultantConsent: Verbal consent obtained. Written consent obtained.  Risks and benefits: risks, benefits and alternatives were discussed  Consent given by: patient  Time out: Immediately prior to procedure a \"time out\" was called to verify the correct patient, procedure, equipment, support staff and site/side marked as required.  Patient understanding: patient states understanding of the procedure being performed  Patient consent: the patient's understanding of the procedure matches consent given  Procedure consent: procedure consent matches procedure scheduled  Relevant documents: relevant documents present and verified  Patient identity confirmed: verbally with patient      Chemodenervation     Date/Time  1/16/2025 2:00 PM     Performed by  Gege Garcia PA-C   Authorized by  Gege Garcia PA-C     Pre-procedure details      Prepped With: Alcohol     Anesthesia  (see MAR for exact dosages):     Anesthesia method:  None   Procedure details      Position:  Upright   Botox      Botox Type:  Type A    Brand:  Botox    mL's of Botulinum Toxin:  200    Final Concentration per CC:  50 units    Needle Gauge:  30 G 2.5 inch   Procedures      Botox Procedures: chronic headache      Indications: migraines     Injection Location      Head / Face:  L superior cervical paraspinal, R superior cervical paraspinal, L , R , L frontalis, R frontalis, L medial occipitalis, R medial occipitalis, procerus, R temporalis, L temporalis, R superior trapezius and L superior trapezius    L  injection amount:  5 unit(s)    R  injection amount:  5 unit(s)    L lateral frontalis:  5 unit(s)    R lateral frontalis:  5 unit(s)    L medial frontalis:  5 unit(s)    R medial frontalis:  5 unit(s)    L temporalis injection amount:  20 unit(s)    R temporalis injection amount:  20 unit(s)    Procerus injection amount:  5 unit(s)    L medial occipitalis injection " amount:  15 unit(s)    R medial occipitalis injection amount:  15 unit(s)    L superior cervical paraspinal injection amount:  10 unit(s)    R superior cervical paraspinal injection amount:  10 unit(s)    L superior trapezius injection amount:  15 unit(s)    R superior trapezius injection amount:  15 unit(s)   Total Units      Total units used:  200    Total units discarded:  0   Post-procedure details      Chemodenervation:  Chronic migraine    Facial Nerve Location::  Bilateral facial nerve    Patient tolerance of procedure:  Tolerated well, no immediate complications   Comments       5 units orbicularis oculi bilaterally  35 units frontalis  All medically necessary

## 2025-01-23 ENCOUNTER — OFFICE VISIT (OUTPATIENT)
Dept: UROLOGY | Facility: CLINIC | Age: 60
End: 2025-01-23
Payer: COMMERCIAL

## 2025-01-23 VITALS
DIASTOLIC BLOOD PRESSURE: 80 MMHG | SYSTOLIC BLOOD PRESSURE: 130 MMHG | HEART RATE: 97 BPM | OXYGEN SATURATION: 95 % | HEIGHT: 64 IN | WEIGHT: 130 LBS | BODY MASS INDEX: 22.2 KG/M2

## 2025-01-23 DIAGNOSIS — N39.0 RECURRENT UTI: ICD-10-CM

## 2025-01-23 PROCEDURE — 99213 OFFICE O/P EST LOW 20 MIN: CPT | Performed by: PHYSICIAN ASSISTANT

## 2025-01-23 RX ORDER — VARENICLINE 0.03 MG/.05ML
SPRAY NASAL
COMMUNITY
Start: 2025-01-12

## 2025-01-23 RX ORDER — ESTRADIOL 10 UG/1
1 INSERT VAGINAL 2 TIMES WEEKLY
Qty: 30 TABLET | Refills: 3 | Status: SHIPPED | OUTPATIENT
Start: 2025-01-23

## 2025-01-23 RX ORDER — METHENAMINE HIPPURATE 1000 MG/1
1 TABLET ORAL DAILY
Qty: 90 TABLET | Refills: 3 | Status: SHIPPED | OUTPATIENT
Start: 2025-01-23

## 2025-01-23 NOTE — PROGRESS NOTES
"Name: Carolin Heck      : 1965      MRN: [id number]  Encounter Provider: Abbey Ram PA-C  Encounter Date: 2025   Encounter department: [facility] [place] [facility]  :  Assessment & Plan  Recurrent UTI  Excellent symptom relief, no breakthrough LUTS or UTI in over a year on current regimen hiprex/cranberry/estrogen; continue indefinitely      Orders:  •  estradiol (VAGIFEM, YUVAFEM) 10 MCG TABS vaginal tablet; Insert 1 tablet (10 mcg total) into the vagina 2 (two) times a week  •  methenamine hippurate (HIPREX) 1 g tablet; Take 1 tablet (1 g total) by mouth in the morning        History of Present Illness   Carolin Heck is a 59 y.o. female who presents routine f/u nephrolithiasis and recurrent UTI.  Significant right-sided stone burden underwent PCNL in .  She had dealt with recurrent UTIs around that time was on suppressive antibiotics.  In the past few years she has symptoms again of recurrent infections urinary frequency dysuria cultures with low colony count gram-negative rods and alphahemolytic strep.  She now takes cranberry, methenamine, and Vagifem suppositories and has not had any irritability or infection symptoms since.  She has not had recurrent stone pain. Her CT in  shows a small 3mm lower pole right renal stone and 2mm left lower pole stone. Presents today feeling well.    Review of Systems   Constitutional: Negative.    Respiratory: Negative.     Cardiovascular: Negative.    Genitourinary:  Negative for decreased urine volume, difficulty urinating, dysuria, flank pain, frequency, hematuria and urgency.   Musculoskeletal: Negative.           Objective   /80 (BP Location: Left arm, Patient Position: Sitting, Cuff Size: Standard)   Pulse 97   Ht 5' 4\" (1.626 m)   Wt 59 kg (130 lb)   SpO2 95%   BMI 22.31 kg/m²     Physical Exam  Vitals and nursing note reviewed.   Constitutional:       Appearance: She is well-developed.   HENT:      Head: Normocephalic and " "atraumatic.   Pulmonary:      Effort: Pulmonary effort is normal.   Skin:     General: Skin is warm.   Neurological:      Mental Status: She is alert and oriented to person, place, and time.          Results  No results found for: \"PSA\"  Lab Results   Component Value Date    GLUCOSE 98 09/22/2014    CALCIUM 8.9 02/14/2024     09/22/2014    K 3.7 02/14/2024    CO2 28 02/14/2024     02/14/2024    BUN 15 02/14/2024    CREATININE 0.86 02/14/2024     Lab Results   Component Value Date    WBC 6.86 02/14/2024    HGB 13.7 02/14/2024    HCT 42.5 02/14/2024    MCV 99 (H) 02/14/2024     02/14/2024       Office Urine Dip  No results found for this or any previous visit (from the past hour).]      "

## 2025-01-27 ENCOUNTER — TELEPHONE (OUTPATIENT)
Age: 60
End: 2025-01-27

## 2025-01-27 NOTE — TELEPHONE ENCOUNTER
Cc'd chart    Per enc 24, Ubrelvy PA will  25    Ubrelvy PA initiated on CMM. Aldana [id number]    Awaiting determination

## 2025-01-30 ENCOUNTER — TELEPHONE (OUTPATIENT)
Dept: DERMATOLOGY | Facility: CLINIC | Age: 60
End: 2025-01-30

## 2025-01-30 NOTE — TELEPHONE ENCOUNTER
Called patient to advise the time for their upcoming appointment on 9/18 with [facility] needs to be changed due to some scheduling changes. Left a message to advise her appt was moved from 9:40 to 10:00, and to call the office with any questions/concerns.

## 2025-02-06 NOTE — TELEPHONE ENCOUNTER
Per CMM, Ubrelvy-  PA Case: [id number], Status: Approved, Coverage Starts on: 2/1/2025 12:00 AM, Coverage Ends on: 2/1/2026 12:00 AM       Dispenses     Dispensed Days Supply Quantity Provider Pharmacy   UBRELVY 100 MG TABLET 01/12/2025 30 16 each Gege Garcia PA-C [facility]...

## 2025-03-03 ENCOUNTER — OFFICE VISIT (OUTPATIENT)
Dept: PSYCHIATRY | Facility: CLINIC | Age: 60
End: 2025-03-03
Payer: COMMERCIAL

## 2025-03-03 DIAGNOSIS — F33.40 RECURRENT MAJOR DEPRESSIVE DISORDER, IN REMISSION (HCC): ICD-10-CM

## 2025-03-03 DIAGNOSIS — F41.1 GAD (GENERALIZED ANXIETY DISORDER): ICD-10-CM

## 2025-03-03 DIAGNOSIS — R41.3 MEMORY DIFFICULTIES: ICD-10-CM

## 2025-03-03 DIAGNOSIS — R41.840 ATTENTION AND CONCENTRATION DEFICIT: ICD-10-CM

## 2025-03-03 PROCEDURE — 99214 OFFICE O/P EST MOD 30 MIN: CPT | Performed by: PSYCHIATRY & NEUROLOGY

## 2025-03-03 PROCEDURE — 90833 PSYTX W PT W E/M 30 MIN: CPT | Performed by: PSYCHIATRY & NEUROLOGY

## 2025-03-03 RX ORDER — DEXTROAMPHETAMINE SACCHARATE, AMPHETAMINE ASPARTATE MONOHYDRATE, DEXTROAMPHETAMINE SULFATE AND AMPHETAMINE SULFATE 5; 5; 5; 5 MG/1; MG/1; MG/1; MG/1
20 CAPSULE, EXTENDED RELEASE ORAL EVERY MORNING
Qty: 90 CAPSULE | Refills: 0 | Status: SHIPPED | OUTPATIENT
Start: 2025-03-30

## 2025-03-03 RX ORDER — BUPROPION HYDROCHLORIDE 300 MG/1
300 TABLET ORAL EVERY MORNING
Qty: 90 TABLET | Refills: 1 | Status: SHIPPED | OUTPATIENT
Start: 2025-03-03

## 2025-03-03 RX ORDER — FLUOXETINE HYDROCHLORIDE 40 MG/1
40 CAPSULE ORAL DAILY
Qty: 90 CAPSULE | Refills: 1 | Status: SHIPPED | OUTPATIENT
Start: 2025-03-03

## 2025-03-03 RX ORDER — CLONAZEPAM 0.5 MG/1
TABLET ORAL
Qty: 75 TABLET | Refills: 2 | Status: SHIPPED | OUTPATIENT
Start: 2025-03-10

## 2025-03-03 NOTE — PSYCH
"  MEDICATION MANAGEMENT NOTE        [facility]      Name and Date of Birth:  Carolin Heck 59 y.o. 1965    Date of Visit: March 3, 2025    SUBJECTIVE:  CC: Carolin presents today for follow up on \"ok, I am having a little bit of a slump\"; anxiety and depression     Carolin notes in January she started getting sad, a lot of conflict about being in [place] with son, parents or in [place]. Son's kids are getting bigger, wanting their own rooms and son is talking about converting the attic to a room for her. His wife (Barry) has gotten used to her being there and helping, so there are guilt elements.    And her daughter is upset she lives with her son and his 4 kids and favoring him over her and her 2yo son. She wants her to sleep over. Her  is a good dad. Other son has 2 kids,  but is doing ok.    Her mom is still a good support. Said she could move in with them. But not sure she wants to do that.    She only works 2d/wk, so can't afford own place.     Not interested in therapy at this point. She plans to sit down and talk this through with her family.    Cognitive situation is unchanged still. Neurologist periodically      F/U PRN- middle son, 2 kids and wife (low need), Daughter with 2yo and  (high demand), son with 4 kids and wife (high demand), parents live in [facility], boyfriend Jareth lives in [place].    - Lives with son, Barry and his (Kids ~2021, 2020, 2018, 2016)   - She is doing well at [employer], likely retire 2028.         Med Compliance: yes  HPI ROS:                      ('was' below is from prior visit)  Medication Side Effects (other than noted):  no     Depression (10 worst):  5 (Was low)   Anxiety (10 worst):  5 (Was moderate)   Hallucinations or Psychosis  no (Was no)    Safety concerns (self harm thoughts, suicidal ideation, HI, etc):  no (Was no)   Sleep: (NM = Nightmares)  good (Was 8, but taking longer to fall asleep and her thoughts are not stopping as " much as it used to.)   Energy:  lower (Was good)   Appetite:  good (Was good)   Weight Change:  no      Since our last visit, overall symptoms have been worsening.          PHQ-2/9 Depression Screening                 Carolin denies any side effects from medications unless noted above    Review Of Systems as noted above. Otherwise A relevant review of symptoms was otherwise negative    History Review: The following portions of the patient's history were reviewed and documented: allergies, current medications, past family history, past medical history, and past social history.     Lab Review: No new labs or no relevant labs needing review with patient today      OBJECTIVE:     MENTAL STATUS EXAM  Appearance:  age appropriate   Behavior:  pleasant, cooperative, with good eye contact   Speech:  Normal volume, regular rate and rhythm   Mood:  dysphoric, anxious   Affect:  mood congruent   Language: intact and appropriate for age, education, and intellect   Thought Process:  Linear and goal directed   Associations: intact associations   Thought Content:  normal and appropriate   Perceptual Disturbances: no auditory or visual hallcunations   Risk Potential / Abnormal Thoughts: Suicidal ideation - None  Homicidal ideation - None  Potential for aggression - No       Consciousness:  Alert & Awake   Sensorium:  Grossly oriented   Attention: attention span and concentration are age appropriate       Fund of Knowledge:  Memory: awareness of current events: yes  recent and remote memory grossly intact   Insight:  good   Judgment: good   Muscle Strength Muscle Tone: normal  normal   Gait/Station: normal gait/station with good balance   Motor Activity: no abnormal movements       Risks, Benefits And Possible Side Effects Of Medications:    AGREE: Risks, benefits, and possible side effects of medications explained to Carolin and she (or legal representative) verbalizes understanding and agreement for treatment.    Controlled Medication  "Discussion:     Carolin has been filling controlled prescriptions on time as prescribed according to [place] Prescription Drug Monitoring program.   _____________________________________________________________      Psychiatric History   CAROLIN    Patient did used to see Isaias Bill in early 2016 when she had a change providers due to insurance.    She does not have a therapist.    She been hospitalized once in 2000. She says that it was related to having a breakup with her boyfriend at the time and then finding out that she lost her job because she was  at his place of employment. Losing her boyfriend because she did not want to get  and he did and also having job issues led her to crisis and overdose although it was not a dramatic overdose it was a time when she said that she was trying to take pills to go to sleep and that she did have some suicidal thoughts associated with it. She denies any self-harm homicidal ideation in the past or present. She's never been violent. She says that she's not had any suicidal ideation since that time.      Social History:  Carolin was raised in [place] to a \"good\" childhood. Her parents were together and still are. She denies ever having physical or sexual abuse or other forms neglect now or in the past as a child. She has one brother and one sister. She developed normally. She finished high school and got associates degree in business.    Her daughter currently lives with her. She has 3 children 2 boys and one girl. She is good relationships with her family. She's been  and  twice. Currently not in a serious relationship at intake    Her support system includes her children her parents and her cousins. She has friends that are close but she doesn't typically opened up to them about mental health.    She is Quaker and attends [employer] sometimes. No  history no legal issues and no weapons at home.    She does not use tobacco drinks about " 1 cup of coffee a day and is a social drinker and when she does drink it's very rare and not much at that time. She denies ever using substances and has never been to rehabilitation.       Family Psychiatric History:   Daughter -  Family history of ADD (attention deficit disorder)  Son - Family history of ADD (attention deficit disorder)  Family History    13. Denied: Family history of bipolar disorder   14. Denied: Family history of substance abuse   15. Denied: Family history of suicide attempt      Assessment/Plan:     Assessment & Plan  Recurrent major depressive disorder, in remission (HCC)  Not at goal, acutely worse/situational. May change severity if continues  Orders:    amphetamine-dextroamphetamine (ADDERALL XR, 20MG,) 20 MG 24 hr capsule; Take 1 capsule (20 mg total) by mouth every morning Max Daily Amount: 20 mg Do not start before March 30, 2025.    buPROPion (WELLBUTRIN XL) 300 mg 24 hr tablet; Take 1 tablet (300 mg total) by mouth every morning    FLUoxetine (PROzac) 40 MG capsule; Take 1 capsule (40 mg total) by mouth daily    Attention and concentration deficit  Adequately managed  Orders:    amphetamine-dextroamphetamine (ADDERALL XR, 20MG,) 20 MG 24 hr capsule; Take 1 capsule (20 mg total) by mouth every morning Max Daily Amount: 20 mg Do not start before March 30, 2025.    buPROPion (WELLBUTRIN XL) 300 mg 24 hr tablet; Take 1 tablet (300 mg total) by mouth every morning    Memory difficulties  No acute changes  Orders:    amphetamine-dextroamphetamine (ADDERALL XR, 20MG,) 20 MG 24 hr capsule; Take 1 capsule (20 mg total) by mouth every morning Max Daily Amount: 20 mg Do not start before March 30, 2025.    buPROPion (WELLBUTRIN XL) 300 mg 24 hr tablet; Take 1 tablet (300 mg total) by mouth every morning    TIM (generalized anxiety disorder)  Not at goal, klonopin increase did help, she is not interested in further changes. A lot situational  Orders:    clonazePAM (KlonoPIN) 0.5 mg tablet; Take  "1/2 tab daily as needed for anxiety and take 1-2 tabs nightly as needed for insomnia and anxiety Do not start before March 10, 2025.    FLUoxetine (PROzac) 40 MG capsule; Take 1 capsule (40 mg total) by mouth daily        Generalized anxiety disorder  MDD, recurrent, in remission    Attention and concentration deficit  Memory difficulties    Carolin is doing worse, very situational. She does not want medication changes. We have discussed prozac or adding other agent like buspar. Adderall brand is necessary.     Considered cognitive organic factors, she has a neurologist    Attention and concentration deficit may be related to organic issues, anxiety, or underlying ADD/ADHD. Ongoing f/u with Neurology for this and migraines. She was in a cognitive study years ago, no amyloid. However, she feels issues predated klonopin. \"Maybe it is just me\". Talked about cognitive concerns in context benzodiazepines, but she is comfortable taking them, but understands coming off would be long term goal as feasible and weighing risks/benefits. Also we discussed long term risks of all medications as well, including cardiovascular, bone density, falls, etc but she very much appreciates the \"quality of life\" she receives not matter what was mentioned. She will still f/u with PCP re: getting a Dexa scan.    Safety Risk Assessment: see above . In considering risk and protective factors, suicide risk and safety risk are low.    Past medications include   Prozac which helps but does have some decreased orgasm  Topamax for migraines but this seemed to lead to confusion ( years ago)  ON but no detail recall - Zoloft, Celexa or Lexapro.   Effexor seemed to cause weight gain  Wellbutrin  Ambien caused oversedation  melatonin did not help.     Scales:       Treatment Plan:        Patient has been educated about their diagnosis and treatment modalities. They voiced understanding and agreement with the following plan:    1) Meds:   - Prozac 40mg " daily. (consider increase but orgasm affected. Was on 50mg in past per charts)   - Wellbutrin XL 300mg daily   - Klonopin 0.25mg in AM and 0.5-1mg HS PRN (Increase ~8/2024)   - BRAND NAME NECESSARY 9/12/2024 - Adderal XR 20mg Daily (ineffective as generic)    2) Labs: PRN   - 2/2017: CBC, CMP WNL. TSH 1.57, TG 56, HDL 98,     3) Therapy:   - not in therapy, revisit PRN   - Provided progressive muscle relaxation handout, asked her to look into belly breathing and guided imagery. 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     6) Follow up:   - 6 months, but patient to call if issues or concerns    7) Treatment Plan: Enacted 2/9/2018, 12/7/2018, 7/26/2019, 8/14/2020, 4/1/21, 10/21/2021, 5/19/2022, 1/19/2023, 7/13/2023, 1/11/2024, 9/12/2024, 3/3/2025    8) Crisis Plan 1/11/2024, 12/30/2024      Discussed self monitoring of symptoms, and symptom monitoring tools.    Patient has been informed of 24 hours and weekend coverage for urgent situations accessed by calling the main clinic phone number.           Psychotherapy in session:  Time spent performing psychotherapy:  17minutes supportive therapy related to family, kids, dynamics of parenting, self care, boundaries    Visit Time    Visit Start Time: 1004  Visit Stop Time: 1032  Total Visit Duration:  28 minutes

## 2025-03-03 NOTE — BH TREATMENT PLAN
"Treatment Plan not done within 6mo because last seen in January (patient canceled appointment between then and now)    TREATMENT PLAN (Medication Management Only)        [facility]    Name/Date of Birth/MRN:  Carolin Heck 59 y.o. 1965 MRN: [id number]  Date of Treatment Plan: March 3, 2025  Diagnosis/Diagnoses:   No diagnosis found.    Strengths/Personal Resources for Self-Care: very good work ethics, honest, kind  Area/Areas of need (in own words): speaking my mind, making my decisions  1. Long Term Goal: \"maintain what I am doing\"   Target Date: 180 days from treatment plan  Person/Persons responsible for completion of goal: Dr. Hernandez and Self  2.  Short Term Objective (s) - How will we reach this goal?:   A.  Provider new recommended medication/dosage changes and/or continue medication(s): discussed  B.  Continue walking at least a mile 5 days a week ; Continue to stay active and involved with family  C.        Figure out a path forward with her children and their competing needs. Set boundaries.   D.  Target Date: 6 months from treatment plan unless noted otherwise  Person/Persons Responsible for Completion of Goal: Dr. Hernandez and Self   Progress Towards Goals: continuing treatment   Treatment Modality: Medication management and therapy PRN  Review due 180 days from date of this plan: Approximately 6 months from today ( 6/3/2025 )    Expected length of service: ongoing treatment  My Physician/PA/NP and I have developed this plan together and I agree to work on the goals and objectives. I understand the treatment goals that were developed for my treatment.  Signature:       Date and time:  Signature of parent/guardian if under age of 14 years: Date and time:  Signature of provider:      Date and time:  Signature of Supervising Physician:    Date and time: 3/3/2025      David Hernandez III  "

## 2025-03-03 NOTE — ASSESSMENT & PLAN NOTE
No acute changes  Orders:    amphetamine-dextroamphetamine (ADDERALL XR, 20MG,) 20 MG 24 hr capsule; Take 1 capsule (20 mg total) by mouth every morning Max Daily Amount: 20 mg Do not start before March 30, 2025.    buPROPion (WELLBUTRIN XL) 300 mg 24 hr tablet; Take 1 tablet (300 mg total) by mouth every morning

## 2025-03-03 NOTE — ASSESSMENT & PLAN NOTE
Not at goal, acutely worse/situational. May change severity if continues  Orders:    amphetamine-dextroamphetamine (ADDERALL XR, 20MG,) 20 MG 24 hr capsule; Take 1 capsule (20 mg total) by mouth every morning Max Daily Amount: 20 mg Do not start before March 30, 2025.    buPROPion (WELLBUTRIN XL) 300 mg 24 hr tablet; Take 1 tablet (300 mg total) by mouth every morning    FLUoxetine (PROzac) 40 MG capsule; Take 1 capsule (40 mg total) by mouth daily

## 2025-03-03 NOTE — ASSESSMENT & PLAN NOTE
Adequately managed  Orders:    amphetamine-dextroamphetamine (ADDERALL XR, 20MG,) 20 MG 24 hr capsule; Take 1 capsule (20 mg total) by mouth every morning Max Daily Amount: 20 mg Do not start before March 30, 2025.    buPROPion (WELLBUTRIN XL) 300 mg 24 hr tablet; Take 1 tablet (300 mg total) by mouth every morning

## 2025-03-03 NOTE — ASSESSMENT & PLAN NOTE
Not at goal, klonopin increase did help, she is not interested in further changes. A lot situational  Orders:    clonazePAM (KlonoPIN) 0.5 mg tablet; Take 1/2 tab daily as needed for anxiety and take 1-2 tabs nightly as needed for insomnia and anxiety Do not start before March 10, 2025.    FLUoxetine (PROzac) 40 MG capsule; Take 1 capsule (40 mg total) by mouth daily

## 2025-03-10 ENCOUNTER — TELEPHONE (OUTPATIENT)
Age: 60
End: 2025-03-10

## 2025-03-10 NOTE — TELEPHONE ENCOUNTER
Pt called to get her appt for 9/8/2025  at 1:30 pm switched to a virtual visit due to scheduling error.was supposed to be virtual.  Writer switched appt.

## 2025-03-27 ENCOUNTER — TELEMEDICINE (OUTPATIENT)
Dept: NEUROLOGY | Facility: CLINIC | Age: 60
End: 2025-03-27
Payer: COMMERCIAL

## 2025-03-27 DIAGNOSIS — G43.709 CHRONIC MIGRAINE WITHOUT AURA WITHOUT STATUS MIGRAINOSUS, NOT INTRACTABLE: Primary | ICD-10-CM

## 2025-03-27 DIAGNOSIS — G43.109 MIGRAINE WITH AURA AND WITHOUT STATUS MIGRAINOSUS, NOT INTRACTABLE: ICD-10-CM

## 2025-03-27 PROCEDURE — 99213 OFFICE O/P EST LOW 20 MIN: CPT | Performed by: PHYSICIAN ASSISTANT

## 2025-03-27 NOTE — ASSESSMENT & PLAN NOTE
Orders:  •  Ubrogepant (UBRELVY) 100 MG tablet; Take 1 tablet (100 mg) one time as needed for migraine. May repeat one additional tablet (100 mg) at least two hours after the first dose. Do not use more than 200 mg a day

## 2025-03-27 NOTE — PROGRESS NOTES
Cholesterol has improved slightly but not to goal would recommend increasing the Crestor to 10 mg per day and recheck in 3 months Virtual Regular VisitName: Carolin Heck      : 1965      MRN: [id number]  Encounter Provider: Gege Garcia PA-C  Encounter Date: 3/27/2025   Encounter department: [facility]  :  Assessment & Plan  Chronic migraine without aura without status migrainosus, not intractable  Preventative:  Continue medications per other providers  Continue Botox every 3 months    Abortive:  At onset of migraine take Ubrelvy 100 mg.  May repeat in 2 hours if needed.  Limit of 200 mg in 24 hours  May use ibuprofen or Tylenol as needed but less than 3 doses a week       Migraine with aura and without status migrainosus, not intractable    Orders:  •  Ubrogepant (UBRELVY) 100 MG tablet; Take 1 tablet (100 mg) one time as needed for migraine. May repeat one additional tablet (100 mg) at least two hours after the first dose. 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     With botox has had a reduction of at least 7 migraine days with less abortive medication, less ER visits which correlates to headache diary      What medications do you take or have you taken for your headaches?   Current Preventative  Adderall, wellbutrin, fluoxetine  Botox     Current Abortive  Ubrelvy     Prior PREVENTATIVE:  Klonopin, Effexor, Lexapro, Zoloft, Celexa,  Depakote, Topamax, zonisamide, gabapentin  verapamil,      Prior ABORTIVE:  Aleve, Tylenol, ibuprofen  Sumatriptan rizatriptan     Alternative therapies used in the past for headaches? none  Headache are worse if the patient: cough, sneeze, bending over  Headache triggers:  Lack of sleep, fatigue, stress/tension, hot flashes     Aura and how long does it last -  Yes, confusion a few minutes before migraine begins-has not occurred since botox was started      What is your current pain level - 0/10     Any family history of migraines? No  Any  family history of aneurysms? Yes maternal cousin     Headaches started at what age? 32 years old  How often do the headaches occur?  1-3 month; prior to botox was more than 15 a month  What time of the day do the headaches start? varies  How long do the headaches last?   30 minutes-3 hours; prior to botox was over 4 hours or rest of the day  Are you ever headache free? Yes  Describe your usual headache - Throbbing, Pressure, Dull  Where is your headache located?   Bilateral frontal, bilateral temporalis and occipitalis  What is the intensity of pain? 7/10     Associated symptoms: unchanged  Decrease of appetite, nausea  Photophobia, phonophobia, sensitivity to smell   Problem with concentration  light-headed or dizzy, stiff or sore neck,   Hands or feet tingle or feel numb, prefer to be alone and in a dark room, unable to work     Number of days missed per month because of headaches:   Work (or school) days: 0  Social or Family activities: 0      What time of the year do headaches occur more frequently?   no  Have you seen someone else for headaches or pain? No  Have you had trigger point injection performed and how often? No  Have you had Botox injection performed and how often? Yes   Have you had epidural injections or transforaminal injections performed? No     Have you used CBD or THC for your headaches and how often? No  Are you current pregnant or planning on getting pregnant? No, done with family planning  Have you ever had any Brain imaging? yes      11/2014 MRI brain: Several nonspecific subcortical white matter lesions bilaterally in   the frontal lobes, nonspecific findings which may be related to early   changes of microangiopathy, in the appropriate clinical setting.  Other   postinfectious, postinflammatory or demyelinating processes not   excluded.  Patients with migraine headaches can have white matter   lesions as well.  Consider followup repeat contrast enhanced MRI of the   brain in 6-12 months to  establish stability and to exclude abnormal   enhancement. No acute infarction, intracranial hemorrhage or mass effect.      11/2014 MRA head: No intracranial aneurysm or major intracranial arterial stenosis.      6/2/2015 MRI brain: No acute disease.  Stable white matter changes, nonspecific.  Selective hippocampal volume loss with normal sized lateral and temporal horn volume.  This may be a congenital in etiology.  Repeat marrow quantitative imaging in one year to document trajectory of volume loss.      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  Small white matter lesions have been described within the frontal lobes in patients with chronic   migraine headaches.  Overall no significant change noted.     10/2016 PET brain Symmetric decreased FDG activity in the medial temporal lobes bilaterally, corresponding with prior MRI findings.  FDG distribution is otherwise unremarkable.  If early Alzheimer's disease is a clinical consideration, beta amyloid PET CT brain imaging may be useful.     NeuroQuant analysis was performed: Measurements suggest significantly decreased hippocampal volume and mild local ex-vacuo dilatation of the adjacent inferior lateral ventricles : 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.  Given patient's age and anatomic imaging, consider 6-12 month follow-up examination.     4/2017 MRI NQ No acute intracranial abnormality.  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 F/U to establish presence and nature of volume change over time.    No substantial interval change from the prior examination of 10/10/2016 when given measurement error.    Nonspecific white matter changes suggestive of mild chronic microvascular ischemia.     4/28/2018 MRI NQ Brain   Several tiny  supratentorial white matter T2 and FLAIR hyperintense foci suspicious for mild chronic microangiopathic changes such as may accompany migraine headaches.     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 F/U to establish presence and nature of volume change over time.     6/16/2020 MRI NQ brain  No acute intracranial disease.  Footprint of what most probably represents mild degree of chronic small vessel disease is stable. 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    Review of Systems   Constitutional: Negative.    HENT: Negative.     Eyes: Negative.    Respiratory: Negative.     Cardiovascular: Negative.    Gastrointestinal: Negative.    Endocrine: Negative.    Genitourinary: Negative.    Musculoskeletal: Negative.    Skin: Negative.    Allergic/Immunologic: Negative.    Neurological:  Positive for headaches.   Hematological: Negative.    Psychiatric/Behavioral: Negative.     I personally reviewed and updated the ROS that was entered by the medical assistant      Objective   There were no vitals taken for this visit.    Physical Exam  CONSTITUTIONAL: Well developed, well nourished, well groomed. No dysmorphic features.     HEENT:  Normocephalic atraumatic.    Chest:  Respirations regular and unlabored.    Psychiatric:  Normal behavior and appropriate affect      MENTAL STATUS  Orientation: Alert and oriented x 3  Fund of knowledge: Intact.    Administrative Statements   Encounter provider Gege Garcia PA-C    The Patient is located at Home and in the following state in which I hold an active license [place].    The patient was identified by name and date of birth. Carolin Heck was informed that this is a telemedicine visit and that the visit is being conducted through the Epic Embedded platform. She agrees to proceed..  My office door was closed. No one else  was in the room.  She acknowledged consent and understanding of privacy and security of the video platform. The patient has agreed to participate and understands they can discontinue the visit at any time.    I have spent a total time of 22 minutes in caring for this patient on the day of the visit/encounter including Prognosis, Instructions for management, Impressions, Counseling / Coordination of care, Documenting in the medical record, Reviewing/placing orders in the medical record (including tests, medications, and/or procedures), and Obtaining or reviewing history  , not including the time spent for establishing the audio/video connection.

## 2025-03-27 NOTE — PROGRESS NOTES
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     With botox has had a reduction of at least 7 migraine days with less abortive medication, less ER visits which correlates to headache diary      What medications do you take or have you taken for your headaches?   Current Preventative  Adderall, wellbutrin, fluoxetine  Botox     Current Abortive  Ubrelvy     Prior PREVENTATIVE:  Klonopin, Effexor, Lexapro, Zoloft, Celexa,  Depakote, Topamax, zonisamide, gabapentin  verapamil,      Prior ABORTIVE:  Aleve, Tylenol, ibuprofen  Sumatriptan rizatriptan     Alternative therapies used in the past for headaches? none  Headache are worse if the patient: cough, sneeze, bending over  Headache triggers:  Lack of sleep, fatigue, stress/tension, hot flashes     Aura and how long does it last -  Yes, confusion a few minutes before migraine begins-has not occurred since botox was started      What is your current pain level - 0/10     Any family history of migraines? No  Any family history of aneurysms? Yes maternal cousin     Headaches started at what age? 32 years old  How often do the headaches occur?  2-4 month; prior to botox was more than 15 a month  What time of the day do the headaches start? varies  How long do the headaches last?   30 minutes-3 hours; prior to botox was over 4 hours or rest of the day  Are you ever headache free? Yes  Describe your usual headache - Throbbing, Pressure, Dull  Where is your headache located?   Bilateral frontal, bilateral temporalis and occipitalis  What is the intensity of pain? 8/10     Associated symptoms: unchanged  Decrease of appetite, nausea  Photophobia, phonophobia, sensitivity to smell   Problem with concentration  light-headed or dizzy, stiff or sore neck,   Hands or feet tingle or feel numb, prefer to be alone and in a dark room, unable to work     Number of days missed per month because of headaches:   Work (or school) days:  0  Social or Family activities: 0      What time of the year do headaches occur more frequently?   no  Have you seen someone else for headaches or pain? No  Have you had trigger point injection performed and how often? No  Have you had Botox injection performed and how often? Yes   Have you had epidural injections or transforaminal injections performed? No     Have you used CBD or THC for your headaches and how often? No  Are you current pregnant or planning on getting pregnant? No, done with family planning  Have you ever had any Brain imaging? yes      11/2014 MRI brain: Several nonspecific subcortical white matter lesions bilaterally in   the frontal lobes, nonspecific findings which may be related to early   changes of microangiopathy, in the appropriate clinical setting.  Other   postinfectious, postinflammatory or demyelinating processes not   excluded.  Patients with migraine headaches can have white matter   lesions as well.  Consider followup repeat contrast enhanced MRI of the   brain in 6-12 months to establish stability and to exclude abnormal   enhancement. No acute infarction, intracranial hemorrhage or mass effect.      11/2014 MRA head: No intracranial aneurysm or major intracranial arterial stenosis.      6/2/2015 MRI brain: No acute disease.  Stable white matter changes, nonspecific.  Selective hippocampal volume loss with normal sized lateral and temporal horn volume.  This may be a congenital in etiology.  Repeat marrow quantitative imaging in one year to document trajectory of volume loss.      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  Small white matter lesions have been described within the frontal lobes in patients with chronic   migraine headaches.  Overall no significant change noted.     10/2016 PET brain Symmetric decreased FDG activity in the medial temporal lobes  bilaterally, corresponding with prior MRI findings.  FDG distribution is otherwise unremarkable.  If early Alzheimer's disease is a clinical consideration, beta amyloid PET CT brain imaging may be useful.     NeuroQuant analysis was performed: Measurements suggest significantly decreased hippocampal volume and mild local ex-vacuo dilatation of the adjacent inferior lateral ventricles : 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.  Given patient's age and anatomic imaging, consider 6-12 month follow-up examination.     4/2017 MRI NQ No acute intracranial abnormality.  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 F/U to establish presence and nature of volume change over time.    No substantial interval change from the prior examination of 10/10/2016 when given measurement error.    Nonspecific white matter changes suggestive of mild chronic microvascular ischemia.     4/28/2018 MRI NQ Brain   Several tiny supratentorial white matter T2 and FLAIR hyperintense foci suspicious for mild chronic microangiopathic changes such as may accompany migraine headaches.     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 F/U to establish presence and nature of volume change over time.     6/16/2020 MRI NQ brain  No acute intracranial disease.  Footprint of what most probably represents mild degree of chronic small vessel disease is stable. 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

## 2025-04-10 ENCOUNTER — APPOINTMENT (OUTPATIENT)
Dept: LAB | Facility: CLINIC | Age: 60
End: 2025-04-10

## 2025-04-10 DIAGNOSIS — Z00.8 HEALTH EXAMINATION IN POPULATION SURVEY: ICD-10-CM

## 2025-04-10 LAB
CHOLEST SERPL-MCNC: 216 MG/DL (ref ?–200)
EST. AVERAGE GLUCOSE BLD GHB EST-MCNC: 126 MG/DL
HBA1C MFR BLD: 6 %
HDLC SERPL-MCNC: 82 MG/DL
LDLC SERPL CALC-MCNC: 121 MG/DL (ref 0–100)
NONHDLC SERPL-MCNC: 134 MG/DL
TRIGL SERPL-MCNC: 67 MG/DL (ref ?–150)

## 2025-04-10 PROCEDURE — 83036 HEMOGLOBIN GLYCOSYLATED A1C: CPT

## 2025-04-10 PROCEDURE — 80061 LIPID PANEL: CPT

## 2025-04-10 PROCEDURE — 36415 COLL VENOUS BLD VENIPUNCTURE: CPT

## 2025-04-11 ENCOUNTER — OFFICE VISIT (OUTPATIENT)
Dept: FAMILY MEDICINE CLINIC | Facility: CLINIC | Age: 60
End: 2025-04-11
Payer: COMMERCIAL

## 2025-04-11 VITALS
WEIGHT: 128 LBS | RESPIRATION RATE: 16 BRPM | BODY MASS INDEX: 21.85 KG/M2 | TEMPERATURE: 97.8 F | HEIGHT: 64 IN | OXYGEN SATURATION: 98 % | DIASTOLIC BLOOD PRESSURE: 82 MMHG | HEART RATE: 78 BPM | SYSTOLIC BLOOD PRESSURE: 128 MMHG

## 2025-04-11 DIAGNOSIS — Z00.00 ANNUAL PHYSICAL EXAM: Primary | ICD-10-CM

## 2025-04-11 DIAGNOSIS — E78.01 FAMILIAL HYPERCHOLESTEROLEMIA: ICD-10-CM

## 2025-04-11 DIAGNOSIS — Z23 ENCOUNTER FOR IMMUNIZATION: ICD-10-CM

## 2025-04-11 PROBLEM — Z98.890 POST-OPERATIVE STATE: Status: RESOLVED | Noted: 2024-03-07 | Resolved: 2025-04-11

## 2025-04-11 PROBLEM — Z09 POSTOP CHECK: Status: RESOLVED | Noted: 2024-03-11 | Resolved: 2025-04-11

## 2025-04-11 PROCEDURE — 99396 PREV VISIT EST AGE 40-64: CPT | Performed by: PHYSICIAN ASSISTANT

## 2025-04-11 PROCEDURE — 90471 IMMUNIZATION ADMIN: CPT

## 2025-04-11 PROCEDURE — 90750 HZV VACC RECOMBINANT IM: CPT

## 2025-04-11 PROCEDURE — 99213 OFFICE O/P EST LOW 20 MIN: CPT | Performed by: PHYSICIAN ASSISTANT

## 2025-04-11 RX ORDER — PRASTERONE (DHEA) 50 MG
CAPSULE ORAL
COMMUNITY

## 2025-04-11 RX ORDER — ATORVASTATIN CALCIUM 20 MG/1
20 TABLET, FILM COATED ORAL DAILY
Qty: 100 TABLET | Refills: 1 | Status: SHIPPED | OUTPATIENT
Start: 2025-04-11

## 2025-04-11 NOTE — PROGRESS NOTES
Adult Annual Physical  Name: Carolin Heck      : 1965      MRN: [id number]  Encounter Provider: Isabelle Viramontes PA-C  Encounter Date: 2025   Encounter department: [facility]    :  Assessment & Plan  Annual physical exam         Familial hypercholesterolemia    Will increase to 20 mg once daily and repeat labs in 3 months no appt needed  Orders:    atorvastatin (LIPITOR) 20 mg tablet; Take 1 tablet (20 mg total) by mouth daily    Lipid Panel with Direct LDL reflex; Future    Comprehensive metabolic panel; Future    Encounter for immunization    Orders:    Zoster Vaccine Recombinant IM    F/u 2-6 months for shingrix #2, 1 year physical    Preventive Screenings:  - Diabetes Screening: screening up-to-date  - Cholesterol Screening: screening not indicated and has hyperlipidemia   - Hepatitis C screening: screening up-to-date   - Breast cancer screening: screening up-to-date   - Colon cancer screening: screening up-to-date   - Lung cancer screening: screening not indicated          History of Present Illness     Adult Annual Physical:  Patient presents for annual physical.     Diet and Physical Activity:  - Diet/Nutrition: no special diet, frequent junk food and adequate fiber intake.  - Exercise: walking.    Depression Screening:    - PHQ-9 Score: 1    General Health:  - Sleep: sleeps well.  - Hearing: normal hearing bilateral ears.  - Vision: wears glasses and previous LASIK surgery. Cataract surgery both eyes   - Dental: regular dental visits, brushes teeth twice daily and does not floss.    /GYN Health:  - Follows with GYN: yes.   - Menopause: postmenopausal.   - History of STDs: no  - Contraception: menopause.      Advanced Care Planning:  - Has an advanced directive?: no    - Has a durable medical POA?: no      Review of Systems   Constitutional: Negative.    HENT: Negative.     Eyes: Negative.    Respiratory: Negative.     Cardiovascular: Negative.  "   Gastrointestinal: Negative.    Endocrine: Negative.    Genitourinary: Negative.    Musculoskeletal: Negative.    Skin: Negative.    Allergic/Immunologic: Negative.    Neurological: Negative.    Hematological: Negative.    Psychiatric/Behavioral: Negative.       Medical History Reviewed by provider this encounter:  Allergies  Meds     .    Objective   /82   Pulse 78   Temp 97.8 °F (36.6 °C) (Temporal)   Resp 16   Ht 5' 4.25\" (1.632 m)   Wt 58.1 kg (128 lb)   SpO2 98%   BMI 21.80 kg/m²     Physical Exam  Constitutional:       Appearance: Normal appearance. She is well-developed and normal weight.   HENT:      Head: Normocephalic and atraumatic.      Right Ear: Hearing and external ear normal.      Left Ear: Hearing and external ear normal.      Mouth/Throat:      Pharynx: Uvula midline.   Eyes:      Extraocular Movements: Extraocular movements intact.      Conjunctiva/sclera: Conjunctivae normal.      Pupils: Pupils are equal, round, and reactive to light.   Neck:      Thyroid: No thyromegaly.   Cardiovascular:      Rate and Rhythm: Normal rate and regular rhythm.      Pulses: Normal pulses.      Heart sounds: Normal heart sounds. No murmur heard.  Pulmonary:      Effort: Pulmonary effort is normal.      Breath sounds: Normal breath sounds.   Abdominal:      General: Abdomen is flat. Bowel sounds are normal. There is no distension.      Palpations: Abdomen is soft. There is no mass.      Tenderness: There is no abdominal tenderness.   Musculoskeletal:         General: Normal range of motion.      Cervical back: Normal range of motion and neck supple.   Lymphadenopathy:      Cervical: No cervical adenopathy.   Skin:     General: Skin is warm.   Neurological:      General: No focal deficit present.      Mental Status: She is alert and oriented to person, place, and time.      Cranial Nerves: No cranial nerve deficit.      Deep Tendon Reflexes: Reflexes normal.   Psychiatric:         Mood and Affect: " Mood normal.         Behavior: Behavior normal.         Thought Content: Thought content normal.         Judgment: Judgment normal.

## 2025-04-11 NOTE — ASSESSMENT & PLAN NOTE
Will increase to 20 mg once daily and repeat labs in 3 months no appt needed  Orders:    atorvastatin (LIPITOR) 20 mg tablet; Take 1 tablet (20 mg total) by mouth daily    Lipid Panel with Direct LDL reflex; Future    Comprehensive metabolic panel; Future

## 2025-04-17 ENCOUNTER — ANNUAL EXAM (OUTPATIENT)
Age: 60
End: 2025-04-17
Payer: COMMERCIAL

## 2025-04-17 VITALS
DIASTOLIC BLOOD PRESSURE: 62 MMHG | BODY MASS INDEX: 22.26 KG/M2 | HEIGHT: 64 IN | SYSTOLIC BLOOD PRESSURE: 126 MMHG | WEIGHT: 130.4 LBS

## 2025-04-17 DIAGNOSIS — Z12.4 SCREENING FOR CERVICAL CANCER: ICD-10-CM

## 2025-04-17 DIAGNOSIS — Z11.51 SCREENING FOR HUMAN PAPILLOMAVIRUS (HPV): ICD-10-CM

## 2025-04-17 DIAGNOSIS — Z01.419 ENCOUNTER FOR ANNUAL ROUTINE GYNECOLOGICAL EXAMINATION: Primary | ICD-10-CM

## 2025-04-17 DIAGNOSIS — N39.0 RECURRENT UTI: ICD-10-CM

## 2025-04-17 DIAGNOSIS — Z12.31 ENCOUNTER FOR SCREENING MAMMOGRAM FOR MALIGNANT NEOPLASM OF BREAST: ICD-10-CM

## 2025-04-17 PROCEDURE — S0612 ANNUAL GYNECOLOGICAL EXAMINA: HCPCS | Performed by: OBSTETRICS & GYNECOLOGY

## 2025-04-17 PROCEDURE — G0476 HPV COMBO ASSAY CA SCREEN: HCPCS | Performed by: OBSTETRICS & GYNECOLOGY

## 2025-04-17 PROCEDURE — G0145 SCR C/V CYTO,THINLAYER,RESCR: HCPCS | Performed by: OBSTETRICS & GYNECOLOGY

## 2025-04-17 RX ORDER — ESTRADIOL 10 UG/1
1 TABLET, FILM COATED VAGINAL 2 TIMES WEEKLY
Qty: 30 TABLET | Refills: 3 | Status: SHIPPED | OUTPATIENT
Start: 2025-04-17

## 2025-04-17 NOTE — PROGRESS NOTES
Assessment/Plan:     Diagnoses and all orders for this visit:    Encounter for annual routine gynecological examination    Screening for cervical cancer  -     Liquid-based pap, screening    Screening for human papillomavirus (HPV)  -     Liquid-based pap, screening    Encounter for screening mammogram for malignant neoplasm of breast  -     Mammo screening bilateral w 3d and cad; Future    Recurrent UTI  -     estradiol (VAGIFEM, YUVAFEM) 10 MCG TABS vaginal tablet; Insert 1 tablet (10 mcg total) into the vagina 2 (two) times a week         Martha Heck is a 59 y.o. female who presents for annual exam. The patient has no complaints today. The patient is sexually active. The patient is not taking hormone replacement therapy. Patient denies post-menopausal vaginal bleeding.  She denies any breast concerns.  UTI's have resolved.       Menstrual History:  OB History          3    Para   3    Term   0       0    AB   0    Living   3         SAB   0    IAB   0    Ectopic   0    Multiple   0    Live Births   3                No LMP recorded. Patient is postmenopausal.     Past Medical History:   Diagnosis Date    Abnormal Pap smear of cervix     ADHD (attention deficit hyperactivity disorder)     Anxiety     Basal cell carcinoma     Last assessed: 14    Bunion 2010    Cancer (HCC)     BCC    Depression     Depression with anxiety     Last assessed: 17    Headache(784.0)     Headache, tension-type     Insomnia     Last assessed: 14    Kidney stone 1983    Memory loss     Migraine 1998    Urinary tract infection     Varicella 1970       Family History   Problem Relation Age of Onset    Heart murmur Mother     Hyperlipidemia Mother     Atrial fibrillation Father     Arthritis Father     Basal cell carcinoma Father 60    Glaucoma Father     Cancer Father         Basal Cell    Urolithiasis Father         Passed the stone(s)    Psoriasis Sister     No Known Problems Daughter   "   Arthritis Maternal Grandmother     Coronary artery disease Maternal Grandfather     Heart disease Maternal Grandfather         Massive Heart Attack    Breast cancer Paternal Grandmother 66    Diabetes Paternal Grandmother         Mellitus    Arthritis Paternal Grandmother     Cancer Paternal Grandmother         Breast    Hyperlipidemia Paternal Grandmother     Hypertension Paternal Grandmother     Hypertension Paternal Grandfather         Skin cancer In both ears.    Hyperlipidemia Paternal Grandfather     Skin cancer Paternal Grandfather 70    Cancer Paternal Grandfather         Skin cancer both ears    No Known Problems Brother     ADD / ADHD Son     No Known Problems Son     Hyperlipidemia Maternal Aunt     Coronary artery disease Maternal Aunt     No Known Problems Maternal Aunt     Hyperlipidemia Maternal Uncle     Coronary artery disease Maternal Uncle     Dementia Paternal Aunt              Migraines Paternal Aunt     Parkinsonism Paternal Aunt     No Known Problems Paternal Aunt     Dementia Paternal Aunt     Psoriasis Sister     Alcohol abuse Neg Hx     Substance Abuse Neg Hx     Mental illness Neg Hx        The following portions of the patient's history were reviewed and updated as appropriate: allergies, current medications, past family history, past medical history, past social history, past surgical history, and problem list.    Review of Systems  Pertinent items are noted in HPI.     Objective      /62 (BP Location: Right arm, Patient Position: Sitting, Cuff Size: Large)   Ht 5' 4\" (1.626 m)   Wt 59.1 kg (130 lb 6.4 oz)   BMI 22.38 kg/m²     General:   alert and oriented, in no acute distress   Heart:  Breasts: regular rate and rhythm  appear normal, no suspicious masses, no skin or nipple changes or axillary nodes.   Lungs: Effort normal   Abdomen: soft, non-tender, without masses or organomegaly   Vulva: normal   Vagina: normal mucosa   Cervix: no lesions   Uterus: normal " size, mobile, non-tender   Adnexa:  Bladder: normal adnexa and no mass, fullness, tenderness  Non-tender, no prolapse

## 2025-04-22 ENCOUNTER — RESULTS FOLLOW-UP (OUTPATIENT)
Age: 60
End: 2025-04-22

## 2025-04-22 LAB
LAB AP GYN PRIMARY INTERPRETATION: NORMAL
Lab: NORMAL

## 2025-04-24 ENCOUNTER — PROCEDURE VISIT (OUTPATIENT)
Dept: NEUROLOGY | Facility: CLINIC | Age: 60
End: 2025-04-24
Payer: COMMERCIAL

## 2025-04-24 VITALS — SYSTOLIC BLOOD PRESSURE: 138 MMHG | TEMPERATURE: 98.9 F | HEART RATE: 83 BPM | DIASTOLIC BLOOD PRESSURE: 92 MMHG

## 2025-04-24 DIAGNOSIS — G43.709 CHRONIC MIGRAINE WITHOUT AURA WITHOUT STATUS MIGRAINOSUS, NOT INTRACTABLE: Primary | ICD-10-CM

## 2025-04-24 PROCEDURE — 64615 CHEMODENERV MUSC MIGRAINE: CPT | Performed by: PHYSICIAN ASSISTANT

## 2025-04-24 NOTE — PROGRESS NOTES
"Universal Protocol   procedure performed by consultantConsent: Verbal consent obtained. Written consent obtained.  Risks and benefits: risks, benefits and alternatives were discussed  Consent given by: patient  Time out: Immediately prior to procedure a \"time out\" was called to verify the correct patient, procedure, equipment, support staff and site/side marked as required.  Patient understanding: patient states understanding of the procedure being performed  Patient consent: the patient's understanding of the procedure matches consent given  Procedure consent: procedure consent matches procedure scheduled  Relevant documents: relevant documents present and verified  Patient identity confirmed: verbally with patient      Chemodenervation     Date/Time  4/24/2025 10:00 AM     Performed by  Gege Garcia PA-C   Authorized by  Gege Garcia PA-C     Pre-procedure details      Prepped With: Alcohol     Anesthesia  (see MAR for exact dosages):     Anesthesia method:  None   Procedure details      Position:  Upright   Botox      Botox Type:  Type A    Brand:  Botox    mL's of Botulinum Toxin:  200    Final Concentration per CC:  50 units    Needle Gauge:  30 G 2.5 inch   Procedures      Botox Procedures: chronic headache      Indications: migraines     Injection Location      Head / Face:  L superior cervical paraspinal, R superior cervical paraspinal, L , R , L frontalis, R frontalis, L medial occipitalis, R medial occipitalis, procerus, R temporalis, L temporalis, R superior trapezius and L superior trapezius    L  injection amount:  5 unit(s)    R  injection amount:  5 unit(s)    L lateral frontalis:  5 unit(s)    R lateral frontalis:  5 unit(s)    L medial frontalis:  5 unit(s)    R medial frontalis:  5 unit(s)    L temporalis injection amount:  20 unit(s)    R temporalis injection amount:  20 unit(s)    Procerus injection amount:  5 unit(s)    L medial occipitalis injection " amount:  15 unit(s)    R medial occipitalis injection amount:  15 unit(s)    L superior cervical paraspinal injection amount:  10 unit(s)    R superior cervical paraspinal injection amount:  10 unit(s)    L superior trapezius injection amount:  15 unit(s)    R superior trapezius injection amount:  15 unit(s)   Total Units      Total units used:  200    Total units discarded:  0   Post-procedure details      Chemodenervation:  Chronic migraine    Facial Nerve Location::  Bilateral facial nerve    Patient tolerance of procedure:  Tolerated well, no immediate complications   Comments       5 units orbicularis oculi bilaterally  35 units frontalis  All medically necessary

## 2025-04-25 ENCOUNTER — OFFICE VISIT (OUTPATIENT)
Dept: FAMILY MEDICINE CLINIC | Facility: CLINIC | Age: 60
End: 2025-04-25
Payer: COMMERCIAL

## 2025-04-25 VITALS
HEIGHT: 64 IN | HEART RATE: 85 BPM | BODY MASS INDEX: 22.3 KG/M2 | SYSTOLIC BLOOD PRESSURE: 132 MMHG | TEMPERATURE: 98.1 F | WEIGHT: 130.6 LBS | RESPIRATION RATE: 15 BRPM | OXYGEN SATURATION: 98 % | DIASTOLIC BLOOD PRESSURE: 82 MMHG

## 2025-04-25 DIAGNOSIS — L08.9: Primary | ICD-10-CM

## 2025-04-25 DIAGNOSIS — S50.911A: Primary | ICD-10-CM

## 2025-04-25 DIAGNOSIS — S50.852A: ICD-10-CM

## 2025-04-25 PROCEDURE — 99213 OFFICE O/P EST LOW 20 MIN: CPT | Performed by: FAMILY MEDICINE

## 2025-04-25 RX ORDER — CEPHALEXIN 500 MG/1
500 CAPSULE ORAL 3 TIMES DAILY
Qty: 21 CAPSULE | Refills: 0 | Status: SHIPPED | OUTPATIENT
Start: 2025-04-25 | End: 2025-05-02

## 2025-04-25 NOTE — PROGRESS NOTES
"Name: Carolin Heck      : 1965      MRN: [id number]  Encounter Provider: Leticia Kelley MD  Encounter Date: 2025   Encounter department: [facility]  :  Assessment & Plan  Superficial injury of right forearm with infection, initial encounter  - I&D performed, no FB noted, bacitracin oint applied and covered with band aide. patient tolerated the procedure well without complications; advised to wash the site with soap and water and apply triple abx oint; cephalexin prescribed, side effects reviewed; advised to follow up on Monday or contact the office sooner for worsening symptoms    Orders:    cephalexin (KEFLEX) 500 mg capsule; Take 1 capsule (500 mg total) by mouth 3 (three) times a day for 7 days    Splinter of forearm without major open wound or infection, left, initial encounter  - removed using tweezers without difficulty, wound care reviewed       Return in 3 days or sooner as needed.  Patient understands and agrees with the treatment plan.          History of Present Illness   Patient presents today with c/o tender erythematous lump on her right forearm that she noticed on 25, she did yard work on Monday, but denies any known injury to the area or insect bites, no drainage, no fever. Patient also noticed a splinter on her left forearm.       Review of Systems   Constitutional:  Negative for appetite change, chills, fatigue, fever and unexpected weight change.   Respiratory:  Negative for cough, shortness of breath and wheezing.    Cardiovascular:  Negative for chest pain, palpitations and leg swelling.   Gastrointestinal:  Negative for abdominal pain, blood in stool, diarrhea, nausea and vomiting.   Skin:         As per HPI   Neurological:  Negative for dizziness, weakness, numbness and headaches.       Objective   /82   Pulse 85   Temp 98.1 °F (36.7 °C)   Resp 15   Ht 5' 4\" (1.626 m)   Wt 59.2 kg (130 lb 9.6 oz)   SpO2 98%  "  BMI 22.42 kg/m²      Physical Exam  Constitutional:       General: She is not in acute distress.  Cardiovascular:      Rate and Rhythm: Normal rate and regular rhythm.      Heart sounds: Normal heart sounds.   Pulmonary:      Effort: Pulmonary effort is normal.      Breath sounds: Normal breath sounds.   Skin:     Comments: Right forearm with about 1 cm tender erythematous non fluctuant area, no drainage. Left forearm with small splinter under skin, cleansed with alcohol, about 4 mm splinter removed using tweezers without difficulty, bacitracin oint applied and covered with band aide   Neurological:      Mental Status: She is alert and oriented to person, place, and time.   Psychiatric:         Mood and Affect: Mood normal.         Behavior: Behavior normal.       Incision and Drainage    Date/Time: 4/25/2025 3:15 PM    Performed by: Leticia Kelley MD  Authorized by: Leticia Kelley MD  Universal Protocol:  Consent: Verbal consent obtained.  Risks and benefits: risks, benefits and alternatives were discussed  Consent given by: patient    Patient location:  Clinic  Location:     Location:  Upper extremity    Upper extremity location: right forearm.  Pre-procedure details:     Skin preparation:  Betadine  Anesthesia (see MAR for exact dosages):     Anesthesia method:  Local infiltration    Local anesthetic:  Lidocaine 1% WITH epi  Procedure details:     Complexity:  Simple    Incision types:  Single straight    Scalpel blade:  11    Incision depth:  Subcutaneous    Drainage:  Bloody    Drainage amount:  Scant  Post-procedure details:     Patient tolerance of procedure:  Tolerated well, no immediate complications

## 2025-04-28 ENCOUNTER — OFFICE VISIT (OUTPATIENT)
Dept: FAMILY MEDICINE CLINIC | Facility: CLINIC | Age: 60
End: 2025-04-28
Payer: COMMERCIAL

## 2025-04-28 VITALS
DIASTOLIC BLOOD PRESSURE: 80 MMHG | RESPIRATION RATE: 18 BRPM | HEART RATE: 80 BPM | WEIGHT: 129.4 LBS | TEMPERATURE: 97.8 F | BODY MASS INDEX: 22.09 KG/M2 | OXYGEN SATURATION: 97 % | HEIGHT: 64 IN | SYSTOLIC BLOOD PRESSURE: 124 MMHG

## 2025-04-28 DIAGNOSIS — R22.31 ARM MASS, RIGHT: Primary | ICD-10-CM

## 2025-04-28 PROCEDURE — 99213 OFFICE O/P EST LOW 20 MIN: CPT | Performed by: PHYSICIAN ASSISTANT

## 2025-04-28 RX ORDER — PERFLUOROHEXYLOCTANE 1 MG/MG
SOLUTION OPHTHALMIC
COMMUNITY
Start: 2025-04-25

## 2025-04-28 NOTE — PROGRESS NOTES
"Name: Carolin Heck      : 1965      MRN: [id number]  Encounter Provider: Isabelle Viramontes PA-C  Encounter Date: 2025   Encounter department: [facility]  :  Assessment & Plan  Arm mass, right    Etiology unclear, US area to access for possible foreign body?  Orders:    US extremity soft tissue; Future    F/u as needed       History of Present Illness   Patient here for follow up for 3 days ago. Noticed mass in right forearm. Wasn't sure if was from gardening. Dr Kelley tried to open area but no FB noted. Here today for follow up. Tender to palpation, but no erythema, no discharge, no itching.      Review of Systems    Objective   /80   Pulse 80   Temp 97.8 °F (36.6 °C)   Resp 18   Ht 5' 4\" (1.626 m)   Wt 58.7 kg (129 lb 6.4 oz)   SpO2 97%   BMI 22.21 kg/m²      Physical Exam  Constitutional:       Appearance: Normal appearance. She is normal weight.   HENT:      Head: Normocephalic and atraumatic.   Skin:     Comments: Right forearm with palpable firm area about 1 cm x 1cm, no erythema   Neurological:      General: No focal deficit present.      Mental Status: She is alert and oriented to person, place, and time.   Psychiatric:         Mood and Affect: Mood normal.         Behavior: Behavior normal.         Thought Content: Thought content normal.         Judgment: Judgment normal.         "

## 2025-05-02 ENCOUNTER — RESULTS FOLLOW-UP (OUTPATIENT)
Dept: FAMILY MEDICINE CLINIC | Facility: CLINIC | Age: 60
End: 2025-05-02

## 2025-05-02 ENCOUNTER — HOSPITAL ENCOUNTER (OUTPATIENT)
Dept: ULTRASOUND IMAGING | Facility: HOSPITAL | Age: 60
End: 2025-05-02
Attending: PHYSICIAN ASSISTANT
Payer: COMMERCIAL

## 2025-05-02 DIAGNOSIS — R22.31 ARM MASS, RIGHT: ICD-10-CM

## 2025-05-02 PROCEDURE — 76882 US LMTD JT/FCL EVL NVASC XTR: CPT

## 2025-05-14 DIAGNOSIS — F41.1 GAD (GENERALIZED ANXIETY DISORDER): ICD-10-CM

## 2025-05-14 NOTE — TELEPHONE ENCOUNTER
Refill must be reviewed and completed by the office or provider. The refill is unable to be approved or denied by the medication management team.  Medication cannot be delegated.     [id number] ** 05/08/2025 03/03/2025 clonazePAM (Tablet) 75.0 30 0.5 MG [facility] Commercial Insurance 2 / 2 PA   1 [id number] ** 04/09/2025 03/03/2025 clonazePAM (Tablet) 75.0 30 0.5 MG [facility] WITT [facility] Commercial Insurance 1 / 2 PA   1 [id number] ** 04/02/2025 03/03/2025 Adderall Xr (Capsule, Extended Release) 80.0 80 20 MG [facility] W[facility] Commercial Insurance 0 / 0 PA   1 [id number] ** 03/10/2025 03/03/2025 clonazePAM (Tablet) 75.0 30 0.5 MG [facility]Z WITT [facility] Commercial Insurance 0 / 2 PA   1 [id number] ** 02/10/2025 12/30/2024 clonazePAM (Tablet) 75.0 30 0.5 MG [facility] Commercial Insurance 1 / 2 PA   1 [id number] ** 01/10/2025 12/30/2024 clonazePAM (Tablet) 75.0 30 0.5 MG LUZ WITT

## 2025-05-14 NOTE — TELEPHONE ENCOUNTER
Medication Refill Request     Name of Medication     clonazePAM (KlonoPIN) 0.5 mg tablet     Dose/Frequency     Take 1/2 tab daily as needed for anxiety and take 1-2 tabs nightly as needed for insomnia and anxiety Do not start before March 10, 2025.     Quantity 75    Verified pharmacy   [x]  Verified ordering Provider   [x]  Does patient have enough for the next 3 days? Yes [x] No []  Does patient have a follow-up appointment scheduled? Yes [x] No []    If so when is appointment: 9.8.25 @ 1:30pm    *The patient is requesting this refill be sent to the pharmacy for 6.8.25:  [facility] - [place] - [address]

## 2025-05-16 RX ORDER — CLONAZEPAM 0.5 MG/1
TABLET ORAL
Qty: 75 TABLET | Refills: 2 | Status: SHIPPED | OUTPATIENT
Start: 2025-06-05

## 2025-06-04 ENCOUNTER — TELEPHONE (OUTPATIENT)
Dept: DERMATOLOGY | Facility: CLINIC | Age: 60
End: 2025-06-04

## 2025-06-04 NOTE — TELEPHONE ENCOUNTER
Called patient to advise her appointment with [facility] on 9/18 needs to be rescheduled, as the provider will be out of the office. Left a message for patient to call the office to reschedule this appointment.

## 2025-06-12 ENCOUNTER — CLINICAL SUPPORT (OUTPATIENT)
Dept: FAMILY MEDICINE CLINIC | Facility: CLINIC | Age: 60
End: 2025-06-12
Payer: COMMERCIAL

## 2025-06-12 DIAGNOSIS — Z23 ENCOUNTER FOR IMMUNIZATION: Primary | ICD-10-CM

## 2025-06-12 PROCEDURE — 90471 IMMUNIZATION ADMIN: CPT

## 2025-06-12 PROCEDURE — 90750 HZV VACC RECOMBINANT IM: CPT

## 2025-07-09 ENCOUNTER — TELEPHONE (OUTPATIENT)
Dept: DERMATOLOGY | Facility: CLINIC | Age: 60
End: 2025-07-09

## 2025-07-09 NOTE — TELEPHONE ENCOUNTER
Patient returned phone call to see if she is able to come at a later time for new appt date 10/09/2025. I offered new time at 9:50am for a follow up.    [facility] insurance was confirmed and appt/ time/ date and location was provided.

## 2025-07-09 NOTE — TELEPHONE ENCOUNTER
LMOM advising pt that appt on 09/05/25 w/[facility]  needs to be r/s due to a change in her schedule she will no longer be working on Mondays or Fridays. Please give the office a call to confirm or r/s.  If pt calls back I do not need to speak to them, reschedule appropriately

## 2025-07-24 ENCOUNTER — PROCEDURE VISIT (OUTPATIENT)
Dept: NEUROLOGY | Facility: CLINIC | Age: 60
End: 2025-07-24
Payer: COMMERCIAL

## 2025-07-24 VITALS — HEART RATE: 90 BPM | DIASTOLIC BLOOD PRESSURE: 82 MMHG | SYSTOLIC BLOOD PRESSURE: 130 MMHG | TEMPERATURE: 98.1 F

## 2025-07-24 DIAGNOSIS — G43.709 CHRONIC MIGRAINE WITHOUT AURA WITHOUT STATUS MIGRAINOSUS, NOT INTRACTABLE: Primary | ICD-10-CM

## 2025-07-24 PROCEDURE — 64615 CHEMODENERV MUSC MIGRAINE: CPT | Performed by: PHYSICIAN ASSISTANT

## 2025-07-24 NOTE — PROGRESS NOTES
"Universal Protocol   procedure performed by consultantConsent: Verbal consent obtained. Written consent obtained  Risks and benefits: risks, benefits and alternatives were discussed  Consent given by: patient  Time out: Immediately prior to procedure a \"time out\" was called to verify the correct patient, procedure, equipment, support staff and site/side marked as required.  Patient understanding: patient states understanding of the procedure being performed  Patient consent: the patient's understanding of the procedure matches consent given  Procedure consent: procedure consent matches procedure scheduled  Relevant documents: relevant documents present and verified  Patient identity confirmed: verbally with patient      Chemodenervation     Date/Time  7/24/2025 12:00 PM     Performed by  Gege Garcia PA-C   Authorized by  Gege Garcia PA-C     Pre-procedure details      Prepped With: Alcohol     Anesthesia  (see MAR for exact dosages):     Anesthesia method:  None   Procedure details      Position:  Upright   Botox      Botox Type:  Type A    Brand:  Botox    mL's of Botulinum Toxin:  200    Final Concentration per CC:  50 units    Needle Gauge:  30 G 2.5 inch   Procedures      Botox Procedures: chronic headache      Indications: migraines     Injection Location      Head / Face:  L superior cervical paraspinal, R superior cervical paraspinal, L , R , L frontalis, R frontalis, L medial occipitalis, R medial occipitalis, procerus, R temporalis, L temporalis, R superior trapezius and L superior trapezius    L  injection amount:  5 unit(s)    R  injection amount:  5 unit(s)    L lateral frontalis:  5 unit(s)    R lateral frontalis:  5 unit(s)    L medial frontalis:  5 unit(s)    R medial frontalis:  5 unit(s)    L temporalis injection amount:  20 unit(s)    R temporalis injection amount:  20 unit(s)    Procerus injection amount:  5 unit(s)    L medial occipitalis injection " amount:  15 unit(s)    R medial occipitalis injection amount:  15 unit(s)    L superior cervical paraspinal injection amount:  10 unit(s)    R superior cervical paraspinal injection amount:  10 unit(s)    L superior trapezius injection amount:  15 unit(s)    R superior trapezius injection amount:  15 unit(s)   Total Units      Total units used:  200    Total units discarded:  0   Post-procedure details      Chemodenervation:  Chronic migraine    Facial Nerve Location::  Bilateral facial nerve    Patient tolerance of procedure:  Tolerated well, no immediate complications   Comments       5 units orbicularis oculi bilaterally  35 units frontalis  All medically necessary

## 2025-08-13 ENCOUNTER — TELEPHONE (OUTPATIENT)
Dept: DERMATOLOGY | Facility: CLINIC | Age: 60
End: 2025-08-13

## (undated) DEVICE — GLOVE INDICATOR PI UNDERGLOVE SZ 7.5 BLUE

## (undated) DEVICE — SPONGE STICK WITH PVP-I: Brand: KENDALL

## (undated) DEVICE — CURITY STRETCH BANDAGE: Brand: CURITY

## (undated) DEVICE — CAST PADDING 4 IN SYNTHETIC NON-STRL

## (undated) DEVICE — 3M™ STERI-STRIP™ REINFORCED ADHESIVE SKIN CLOSURES, R1547, 1/2 IN X 4 IN (12 MM X 100 MM), 6 STRIPS/ENVELOPE: Brand: 3M™ STERI-STRIP™

## (undated) DEVICE — GLOVE INDICATOR PI UNDERGLOVE SZ 8.5 BLUE

## (undated) DEVICE — BETHLEHEM UNIVERSAL OUTPATIENT: Brand: CARDINAL HEALTH

## (undated) DEVICE — K-WIRE
Type: IMPLANTABLE DEVICE | Site: FOOT | Status: NON-FUNCTIONAL
Brand: ASNIS
Removed: 2021-04-16

## (undated) DEVICE — TIBURON SPLIT SHEET: Brand: CONVERTORS

## (undated) DEVICE — U-DRAPE: Brand: CONVERTORS

## (undated) DEVICE — TUBING SUCTION 5MM X 12 FT

## (undated) DEVICE — Device

## (undated) DEVICE — BLADE ACL FINE 9.5X25.5X0.4MM

## (undated) DEVICE — GLOVE SRG BIOGEL 7.5

## (undated) DEVICE — ACE WRAP 4 IN UNSTERILE

## (undated) DEVICE — ELECTRODE EZ CLEAN BLADE -0012

## (undated) DEVICE — PREP PAD BNS: Brand: CONVERTORS

## (undated) DEVICE — SUT ETHILON 4-0 PS-2 18 IN 1667H

## (undated) DEVICE — BETHLEHEM UNIVERSAL  MIONR EXT: Brand: CARDINAL HEALTH

## (undated) DEVICE — MEDI-VAC NON-CONDUCTIVE SUCTION TUBING 6MM X 1.8M (6FT.) L: Brand: CARDINAL HEALTH

## (undated) DEVICE — 2000CC GUARDIAN II: Brand: GUARDIAN

## (undated) DEVICE — BLADE SAGITTAL 25.6 X 9.5MM

## (undated) DEVICE — SYRINGE 10ML LL

## (undated) DEVICE — INTENDED FOR TISSUE SEPARATION, AND OTHER PROCEDURES THAT REQUIRE A SHARP SURGICAL BLADE TO PUNCTURE OR CUT.: Brand: BARD-PARKER ® CARBON RIB-BACK BLADES

## (undated) DEVICE — CHLORAPREP HI-LITE 26ML ORANGE

## (undated) DEVICE — SUT VICRYL 3-0 PS-2 27 IN J427H

## (undated) DEVICE — STRETCH BANDAGE: Brand: CURITY

## (undated) DEVICE — CURITY NON-ADHERENT STRIPS: Brand: CURITY

## (undated) DEVICE — VIAL DECANTER

## (undated) DEVICE — 3M™ STERI-STRIP™ REINFORCED ADHESIVE SKIN CLOSURES, R1546, 1/4 IN X 4 IN (6 MM X 100 MM), 10 STRIPS/ENVELOPE: Brand: 3M™ STERI-STRIP™

## (undated) DEVICE — LIGHT HANDLE COVER SLEEVE DISP BLUE STELLAR

## (undated) DEVICE — CUFF TOURNIQUET 18 X 4 IN QUICK CONNECT DISP 1 BLADDER

## (undated) DEVICE — GLOVE INDICATOR PI UNDERGLOVE SZ 8 BLUE

## (undated) DEVICE — REM POLYHESIVE ADULT PATIENT RETURN ELECTRODE: Brand: VALLEYLAB

## (undated) DEVICE — NEEDLE 18 G X 1 1/2

## (undated) DEVICE — GLOVE SRG BIOGEL ECLIPSE 8.5

## (undated) DEVICE — PENCIL ELECTROSURG E-Z CLEAN -0035H

## (undated) DEVICE — DRILL BIT 2MM CALIBRATED

## (undated) DEVICE — GLOVE INDICATOR PI UNDERGLOVE SZ 6.5 BLUE

## (undated) DEVICE — ELECTRODE NEEDLE MEGAFINE 2IN E-Z CLEAN MEGADYNE -0118

## (undated) DEVICE — WIRE 0.86MM TROCAR TIP
Type: IMPLANTABLE DEVICE | Site: FOOT | Status: NON-FUNCTIONAL
Removed: 2024-03-07

## (undated) DEVICE — BLADE BEAVER MINI SZ 67

## (undated) DEVICE — NEEDLE 25G X 1 1/2

## (undated) DEVICE — SUT VICRYL 4-0 PS-2 27 IN J426H

## (undated) DEVICE — GLOVE SRG BIOGEL 8

## (undated) DEVICE — CANNULATED COUNTERSINK: Brand: ASNIS

## (undated) DEVICE — STOCKINETTE REGULAR

## (undated) DEVICE — HYDROPHILIC WOUND DRESSING WITH ZINC PLUS VITAMINS A AND B6.: Brand: DERMAGRAN®-B

## (undated) DEVICE — GAUZE SPONGES,16 PLY: Brand: CURITY

## (undated) DEVICE — PAD GROUNDING ADULT

## (undated) DEVICE — COBAN 4 IN STERILE

## (undated) DEVICE — 10FR FRAZIER SUCTION HANDLE: Brand: CARDINAL HEALTH

## (undated) DEVICE — PAD CAST 4 IN COTTON NON STERILE

## (undated) DEVICE — SCD SEQUENTIAL COMPRESSION COMFORT SLEEVE MEDIUM KNEE LENGTH: Brand: KENDALL SCD

## (undated) DEVICE — BIPOLAR CORD DISP

## (undated) DEVICE — POV-IOD SWAB STICKS